# Patient Record
Sex: MALE | Race: WHITE | NOT HISPANIC OR LATINO | Employment: UNEMPLOYED | URBAN - METROPOLITAN AREA
[De-identification: names, ages, dates, MRNs, and addresses within clinical notes are randomized per-mention and may not be internally consistent; named-entity substitution may affect disease eponyms.]

---

## 2017-06-17 ENCOUNTER — HOSPITAL ENCOUNTER (EMERGENCY)
Facility: MEDICAL CENTER | Age: 47
End: 2017-06-18
Attending: EMERGENCY MEDICINE

## 2017-06-17 DIAGNOSIS — I10 HYPERTENSION, POOR CONTROL: ICD-10-CM

## 2017-06-17 DIAGNOSIS — L03.116 CELLULITIS OF LEFT LOWER LEG: ICD-10-CM

## 2017-06-17 LAB — EKG IMPRESSION: NORMAL

## 2017-06-17 PROCEDURE — 99284 EMERGENCY DEPT VISIT MOD MDM: CPT

## 2017-06-17 PROCEDURE — A9270 NON-COVERED ITEM OR SERVICE: HCPCS | Performed by: EMERGENCY MEDICINE

## 2017-06-17 PROCEDURE — 700102 HCHG RX REV CODE 250 W/ 637 OVERRIDE(OP): Performed by: EMERGENCY MEDICINE

## 2017-06-17 PROCEDURE — 93005 ELECTROCARDIOGRAM TRACING: CPT

## 2017-06-17 RX ORDER — LISINOPRIL 10 MG/1
10 TABLET ORAL ONCE
Status: COMPLETED | OUTPATIENT
Start: 2017-06-17 | End: 2017-06-17

## 2017-06-17 RX ORDER — CARVEDILOL 12.5 MG/1
12.5 TABLET ORAL ONCE
Status: COMPLETED | OUTPATIENT
Start: 2017-06-17 | End: 2017-06-17

## 2017-06-17 RX ADMIN — CARVEDILOL 12.5 MG: 12.5 TABLET, FILM COATED ORAL at 22:34

## 2017-06-17 RX ADMIN — LISINOPRIL 10 MG: 10 TABLET ORAL at 22:34

## 2017-06-17 ASSESSMENT — LIFESTYLE VARIABLES: DO YOU DRINK ALCOHOL: YES

## 2017-06-17 ASSESSMENT — PAIN SCALES - GENERAL: PAINLEVEL_OUTOF10: 0

## 2017-06-17 NOTE — ED AVS SNAPSHOT
Home Care Instructions                                                                                                                Jacinto Loyola   MRN: [id number]    Department:  [facility], Emergency Dept   Date of Visit:  6/17/2017            [facility], Emergency Dept    [address] [place] [id number]    Phone:  [phone]      You were seen by     Rudolph Chacko M.D.      Your Diagnosis Was     Cellulitis of left lower leg     L03.116       These are the medications you received during your hospitalization from 06/17/2017 2055 to 06/18/2017 0030     Date/Time Order Dose Route Action    06/17/2017 2234 lisinopril (PRINIVIL) 10 MG tablet 10 mg 10 mg Oral Given    06/17/2017 2234 carvedilol (COREG) tablet 12.5 mg 12.5 mg Oral Given      Follow-up Information     1. Follow up with Pcp Pt States None In 2 days.    Specialty:  Family Medicine        2. Follow up with [facility], Emergency Dept.    Specialty:  Emergency Medicine    Why:  As needed, If symptoms worsen    Contact information    [address] [place] [id number] [phone]      Medication Information     Review all of your home medications and newly ordered medications with your primary doctor and/or pharmacist as soon as possible. Follow medication instructions as directed by your doctor and/or pharmacist.     Please keep your complete medication list with you and share with your physician. Update the information when medications are discontinued, doses are changed, or new medications (including over-the-counter products) are added; and carry medication information at all times in the event of emergency situations.               Medication List      START taking these medications        Instructions    Morning Afternoon Evening Bedtime    cephALEXin 500 MG Caps   Commonly known as:  KEFLEX        Take 1 Cap by mouth 4 times a day for 7 days.   Dose:  500 mg                             ASK your doctor about these medications        Instructions    Morning Afternoon Evening Bedtime    amlodipine 10 MG Tabs   Commonly known as:  NORVASC        Take 1 Tab by mouth every day.   Dose:  10 mg                        carvedilol 12.5 MG Tabs   Last time this was given:  12.5 mg on 6/17/2017 10:34 PM   Commonly known as:  COREG        Take 1 Tab by mouth 2 times a day, with meals.   Dose:  12.5 mg                        * hydrochlorothiazide 25 MG Tabs   Commonly known as:  HYDRODIURIL        Take 25 mg by mouth every day.   Dose:  25 mg                        * hydrochlorothiazide 25 MG Tabs   Commonly known as:  HYDRODIURIL        Take 1 Tab by mouth every day.   Dose:  25 mg                        lisinopril 10 MG Tabs   Last time this was given:  10 mg on 6/17/2017 10:34 PM   Commonly known as:  PRINIVIL        Take 1 Tab by mouth every day.   Dose:  10 mg                        * Notice:  This list has 2 medication(s) that are the same as other medications prescribed for you. Read the directions carefully, and ask your doctor or other care provider to review them with you.         Where to Get Your Medications      You can get these medications from any pharmacy     Bring a paper prescription for each of these medications    - cephALEXin 500 MG Caps            Procedures and tests performed during your visit     EKG (ER)    LE VENOUS DUPLEX        Discharge Instructions       Cellulitis  Cellulitis is an infection of the skin and the tissue beneath it. The infected area is usually red and tender. Cellulitis occurs most often in the arms and lower legs.   CAUSES   Cellulitis is caused by bacteria that enter the skin through cracks or cuts in the skin. The most common types of bacteria that cause cellulitis are staphylococci and streptococci.  SIGNS AND SYMPTOMS   · Redness and warmth.  · Swelling.  · Tenderness or pain.  · Fever.  DIAGNOSIS   Your health care provider can usually determine  what is wrong based on a physical exam. Blood tests may also be done.  TREATMENT   Treatment usually involves taking an antibiotic medicine.  HOME CARE INSTRUCTIONS   · Take your antibiotic medicine as directed by your health care provider. Finish the antibiotic even if you start to feel better.  · Keep the infected arm or leg elevated to reduce swelling.  · Apply a warm cloth to the affected area up to 4 times per day to relieve pain.  · Take medicines only as directed by your health care provider.  · Keep all follow-up visits as directed by your health care provider.  SEEK MEDICAL CARE IF:   · You notice red streaks coming from the infected area.  · Your red area gets larger or turns dark in color.  · Your bone or joint underneath the infected area becomes painful after the skin has healed.  · Your infection returns in the same area or another area.  · You notice a swollen bump in the infected area.  · You develop new symptoms.  · You have a fever.  SEEK IMMEDIATE MEDICAL CARE IF:   · You feel very sleepy.  · You develop vomiting or diarrhea.  · You have a general ill feeling (malaise) with muscle aches and pains.  MAKE SURE YOU:   · Understand these instructions.  · Will watch your condition.  · Will get help right away if you are not doing well or get worse.     This information is not intended to replace advice given to you by your health care provider. Make sure you discuss any questions you have with your health care provider.     Document Released: 09/27/2006 Document Revised: 01/08/2016 Document Reviewed: 03/04/2013  [employer] Interactive Patient Education ©2016 [employer]      Hypertension  Hypertension, commonly called high blood pressure, is when the force of blood pumping through your arteries is too strong. Your arteries are the blood vessels that carry blood from your heart throughout your body. A blood pressure reading consists of a higher number over a lower number, such as 110/72. The higher  number (systolic) is the pressure inside your arteries when your heart pumps. The lower number (diastolic) is the pressure inside your arteries when your heart relaxes. Ideally you want your blood pressure below 120/80.  Hypertension forces your heart to work harder to pump blood. Your arteries may become narrow or stiff. Having untreated or uncontrolled hypertension can cause heart attack, stroke, kidney disease, and other problems.  RISK FACTORS  Some risk factors for high blood pressure are controllable. Others are not.   Risk factors you cannot control include:   · Race. You may be at higher risk if you are .  · Age. Risk increases with age.  · Gender. Men are at higher risk than women before age 45 years. After age 65, women are at higher risk than men.  Risk factors you can control include:  · Not getting enough exercise or physical activity.  · Being overweight.  · Getting too much fat, sugar, calories, or salt in your diet.  · Drinking too much alcohol.  SIGNS AND SYMPTOMS  Hypertension does not usually cause signs or symptoms. Extremely high blood pressure (hypertensive crisis) may cause headache, anxiety, shortness of breath, and nosebleed.  DIAGNOSIS  To check if you have hypertension, your health care provider will measure your blood pressure while you are seated, with your arm held at the level of your heart. It should be measured at least twice using the same arm. Certain conditions can cause a difference in blood pressure between your right and left arms. A blood pressure reading that is higher than normal on one occasion does not mean that you need treatment. If it is not clear whether you have high blood pressure, you may be asked to return on a different day to have your blood pressure checked again. Or, you may be asked to monitor your blood pressure at home for 1 or more weeks.  TREATMENT  Treating high blood pressure includes making lifestyle changes and possibly taking medicine.  Living a healthy lifestyle can help lower high blood pressure. You may need to change some of your habits.  Lifestyle changes may include:  · Following the DASH diet. This diet is high in fruits, vegetables, and whole grains. It is low in salt, red meat, and added sugars.  · Keep your sodium intake below 2,300 mg per day.  · Getting at least 30-45 minutes of aerobic exercise at least 4 times per week.  · Losing weight if necessary.  · Not smoking.  · Limiting alcoholic beverages.  · Learning ways to reduce stress.  Your health care provider may prescribe medicine if lifestyle changes are not enough to get your blood pressure under control, and if one of the following is true:  · You are 18-59 years of age and your systolic blood pressure is above 140.  · You are 60 years of age or older, and your systolic blood pressure is above 150.  · Your diastolic blood pressure is above 90.  · You have diabetes, and your systolic blood pressure is over 140 or your diastolic blood pressure is over 90.  · You have kidney disease and your blood pressure is above 140/90.  · You have heart disease and your blood pressure is above 140/90.  Your personal target blood pressure may vary depending on your medical conditions, your age, and other factors.  HOME CARE INSTRUCTIONS  · Have your blood pressure rechecked as directed by your health care provider.    · Take medicines only as directed by your health care provider. Follow the directions carefully. Blood pressure medicines must be taken as prescribed. The medicine does not work as well when you skip doses. Skipping doses also puts you at risk for problems.  · Do not smoke.    · Monitor your blood pressure at home as directed by your health care provider.   SEEK MEDICAL CARE IF:   · You think you are having a reaction to medicines taken.  · You have recurrent headaches or feel dizzy.  · You have swelling in your ankles.  · You have trouble with your vision.  SEEK IMMEDIATE MEDICAL  CARE IF:  · You develop a severe headache or confusion.  · You have unusual weakness, numbness, or feel faint.  · You have severe chest or abdominal pain.  · You vomit repeatedly.  · You have trouble breathing.  MAKE SURE YOU:   · Understand these instructions.  · Will watch your condition.  · Will get help right away if you are not doing well or get worse.     This information is not intended to replace advice given to you by your health care provider. Make sure you discuss any questions you have with your health care provider.     Document Released: 12/18/2006 Document Revised: 05/03/2016 Document Reviewed: 10/10/2014  [employer] Interactive Patient Education ©2016 [employer]            Patient Information     Patient Information    Following emergency treatment: all patient requiring follow-up care must return either to a private physician or a clinic if your condition worsens before you are able to obtain further medical attention, please return to the emergency room.     Billing Information    At [facility], we work to make the billing process streamlined for our patients.  Our Representatives are here to answer any questions you may have regarding your hospital bill.  If you have insurance coverage and have supplied your insurance information to us, we will submit a claim to your insurer on your behalf.  Should you have any questions regarding your bill, we can be reached online or by phone as follows:  Online: You are able pay your bills online or live chat with our representatives about any billing questions you may have. We are here to help Monday - Friday from 8:00am to 7:30pm and 9:00am - 12:00pm on Saturdays.  Please visit [email or web address]  for more information.   Phone:  [phone] or [phone]    Please note that your emergency physician, surgeon, pathologist, radiologist, anesthesiologist, and other specialists are not employed by [facility] and will  therefore bill separately for their services.  Please contact them directly for any questions concerning their bills at the numbers below:     Emergency Physician Services:  [phone]  [facility]:  [phone]  [facility]:  [phone]  [facility]:  [phone]    1. Your final bill may vary from the amount quoted upon discharge if all procedures are not complete at that time, or if your doctor has additional procedures of which we are not aware. You will receive an additional bill if you return to the Emergency Department at [facility] for suture removal regardless of the facility of which the sutures were placed.     2. Please arrange for settlement of this account at the emergency registration.    3. All self-pay accounts are due in full at the time of treatment.  If you are unable to meet this obligation then payment is expected within 4-5 days.     4. If you have had radiology studies (CT, X-ray, Ultrasound, MRI), you have received a preliminary result during your emergency department visit. Please contact the radiology department [phone] to receive a copy of your final result. Please discuss the Final result with your primary physician or with the follow up physician provided.     Crisis Hotline:  [place] Crisis Hotline:  6-016-AEJRPFN or 1-296.751.1825  [place] Crisis Hotline:    1-743.151.6895 or 548-078-3266         ED Discharge Follow Up Questions    1. In order to provide you with very good care, we would like to follow up with a phone call in the next few days.  May we have your permission to contact you?     YES /  NO    2. What is the best phone number to call you? (       )_____-__________    3. What is the best time to call you?      Morning  /  Afternoon  /  Evening                   Patient Signature:  ____________________________________________________________    Date:  ____________________________________________________________

## 2017-06-17 NOTE — ED AVS SNAPSHOT
[email or web address] Access Code: [email or web address]  Expires: 7/18/2017 12:30 AM    Your email address is not on file at [facility].  Email Addresses are required for you to sign up for [email or web address], please contact [phone] to verify your personal information and to provide your email address prior to attempting to register for [email or web address].    Jacinto Kosta Loyola  [address] [place] [place] [address]    [email or web address]  A secure, online tool to manage your health information     [facility]’s [employer]® is a secure, online tool that connects you to your personalized health information from the privacy of your home -- day or night - making it very easy for you to manage your healthcare. Once the activation process is completed, you can even access your medical information using the [email or web address] anamaria, which is available for free in the Apple Anamaria store or Google Play store.     To learn more about [email or web address], visit [email or web address]    There are two levels of access available (as shown below):   My Chart Features  [facility] Primary Care Doctor [facility]  Specialists [facility]  Urgent  Care Non-[facility] Primary Care Doctor   Email your healthcare team securely and privately 24/7 X X X    Manage appointments: schedule your next appointment; view details of past/upcoming appointments X      Request prescription refills. X      View recent personal medical records, including lab and immunizations X X X X   View health record, including health history, allergies, medications X X X X   Read reports about your outpatient visits, procedures, consult and ER notes X X X X   See your discharge summary, which is a recap of your hospital and/or ER visit that includes your diagnosis, lab results, and care plan X X  X     How to register for [employer]:  Once your e-mail address has been verified, follow the following steps to sign up for Y[employer].     1. Go to  [email or web address]  2. Click on the Sign Up Now box, which takes you to the New Member Sign Up page.  You will need to provide the following information:  a. Enter your Z[employer] Access Code exactly as it appears at the top of this page. (You will not need to use this code after you’ve completed the sign-up process. If you do not sign up before the expiration date, you must request a new code.)   b. Enter your date of birth.   c. Enter your home email address.   d. Click Submit, and follow the next screen’s instructions.  3. Create a Bizot ID. This will be your Zouxiu login ID and cannot be changed, so think of one that is secure and easy to remember.  4. Create a Zouxiu password. You can change your password at any time.  5. Enter your Password Reset Question and Answer. This can be used at a later time if you forget your password.   6. Enter your e-mail address. This allows you to receive e-mail notifications when new information is available in Zouxiu.  7. Click Sign Up. You can now view your health information.    For assistance activating your Zouxiu account, call [phone]

## 2017-06-17 NOTE — ED AVS SNAPSHOT
6/18/2017    Jacinto Loyola  [address] [place] [address]    Dear Jacinto Brambila:    Critical access hospital wants to ensure your discharge home is safe and you or your loved ones have had all of your questions answered regarding your care after you leave the hospital.    Below is a list of resources and contact information should you have any questions regarding your hospital stay, follow-up instructions, or active medical symptoms.    Questions or Concerns Regarding… Contact   Medical Questions Related to Your Discharge  (7 days a week, 8am-5pm) Contact a Nurse Care Coordinator   [phone]   Medical Questions Not Related to Your Discharge  (24 hours a day / 7 days a week)  Contact the Nurse Health Line   [phone]    Medications or Discharge Instructions Refer to your discharge packet   or contact your [facility] Primary Care Provider   [phone]   Follow-up Appointment(s) Schedule your appointment via Helicomm   or contact Scheduling [phone]   Billing Review your statement via Helicomm  or contact Billing [phone]   Medical Records Review your records via Helicomm   or contact Medical Records [phone]     You may receive a telephone call within two days of discharge. This call is to make certain you understand your discharge instructions and have the opportunity to have any questions answered. You can also easily access your medical information, test results and upcoming appointments via the Helicomm free online health management tool. You can learn more and sign up at [email or web address]. For assistance setting up your Helicomm account, please call [phone].    Once again, we want to ensure your discharge home is safe and that you have a clear understanding of any next steps in your care. If you have any questions or concerns, please do not hesitate to contact us, we are here for you. Thank you for choosing [facility] for your healthcare needs.    Sincerely,    Your [facility] Healthcare Team

## 2017-06-18 VITALS
SYSTOLIC BLOOD PRESSURE: 205 MMHG | WEIGHT: 248.02 LBS | OXYGEN SATURATION: 96 % | TEMPERATURE: 97.3 F | BODY MASS INDEX: 33.59 KG/M2 | RESPIRATION RATE: 20 BRPM | HEIGHT: 72 IN | DIASTOLIC BLOOD PRESSURE: 125 MMHG | HEART RATE: 94 BPM

## 2017-06-18 PROCEDURE — 93971 EXTREMITY STUDY: CPT

## 2017-06-18 RX ORDER — CEPHALEXIN 500 MG/1
500 CAPSULE ORAL 4 TIMES DAILY
Qty: 28 CAP | Refills: 0 | Status: SHIPPED | OUTPATIENT
Start: 2017-06-18 | End: 2017-06-25

## 2017-06-18 NOTE — ED PROVIDER NOTES
CHIEF COMPLAINT  Chief Complaint   Patient presents with   • Insect Bite     LLE   • Hypertension       HPI  Jacinto Loyola is a 46 y.o. male who presents with left lower calf swelling. Reports the medial aspect has an area of erythema and tenderness. He is uncertain if this is secondary to a blood clot or a possible bug bite. He denies any risk factors for DVT. No prior history of DVT. Denies any blood thinners. Upon the patient's arrival, was noted the patient has severe hypertension. Denies headache, chest pain, shortness of breath. Reports that he is on multiple antihypertensive medications however he does not take them regularly. He reports that he just cannot remember to take them on a regular basis.    REVIEW OF SYSTEMS  See HPI for further details. All other systems are negative.     PAST MEDICAL HISTORY   has a past medical history of Migraines, neuralgic (8/28/2009); HTN (8/28/2009); and Depression (8/28/2009).    SOCIAL HISTORY  Social History     Social History Main Topics   • Smoking status: Never Smoker    • Smokeless tobacco: Not on file   • Alcohol Use: Yes   • Drug Use: Yes     Special: Intravenous      Comment: daily marijuana use   • Sexual Activity: Not on file       SURGICAL HISTORY   has past surgical history that includes knee reconstruction and appendectomy.    CURRENT MEDICATIONS  Home Medications     **Home medications have not yet been reviewed for this encounter**          ALLERGIES  No Known Allergies    PHYSICAL EXAM  VITAL SIGNS: /125 mmHg  Pulse 103  Temp(Src) 36.3 °C (97.3 °F)  Resp 20  Ht 1.829 m (6')  Wt 112.5 kg (248 lb 0.3 oz)  BMI 33.63 kg/m2  SpO2 95%  Pulse ox interpretation: I interpret this pulse ox as normal.  Constitutional: Alert in no apparent distress.  HENT: No signs of trauma, Bilateral external ears normal, Nose normal.   Cardiovascular: Tachycardic rate and regular rhythm  Thorax & Lungs: Normal breath sounds, No respiratory distress, No wheezing,  No chest tenderness.   Abdomen: Bowel sounds normal, Soft, No tenderness, No masses, No pulsatile masses. No peritoneal signs.  Skin: Warm, Dry, No erythema, No rash. 6 cm area of erythema to the medial left calf with local swelling. Tenderness to palpation in that area. No obvious fluctuance underlying the erythema. Neurovascularly intact distally.  Extremities: Intact distal pulses, left lower extremity trace pitting edema, medial aspect of the left lower leg tenderness, No cyanosis  Musculoskeletal: Good range of motion in all major joints. No tenderness to palpation or major deformities noted.   Neurologic: Alert , Normal motor function and gait, Normal sensory function, No focal deficits noted.   Psychiatric: Affect normal, Judgment normal, Mood normal.       DIAGNOSTIC STUDIES / PROCEDURES    EKG  6/17/2017 at 9:16 PM  Normal sinus rhythm at 99  DC, QRS within normal limits  QTC slightly prolonged at 514  No acute changes from prior EKG in 3/2015    LABS  Labs Reviewed - No data to display    RADIOLOGY  LE VENOUS DUPLEX   Preliminary Result        COURSE & MEDICAL DECISION MAKING  Pertinent Labs & Imaging studies reviewed. (See chart for details)  46 y.o. M presenting with left lower extremity swelling. Presentation is more consistent with possible focal infection/inflammation/cellulitis. Cannot fully rule out DVT however. Ultrasound study was performed that was unremarkable on preliminary study. Wall attempt treatment of the patient's focal area of erythema on the left lower extremity with Keflex. To take for the next week and to monitor symptoms for any worsening.    With regards to the patient's significant hypertension. He is asymptomatic. No chest pain, shortness of breath, headache. EKG was only done per protocol. No significant changes from his prior EKG. He has known history of hypertension and known history of noncompliance with his prescribed medications. He was given his home dose of prescription  antihypertensive medications with improvement in his hypertension.  He does have his home medications however he continues to forget to take them.     Prolonged discussion was had with the patient regarding importance of treating his hypertension regularly. He reports understanding. To follow-up with his primary care physician for further management. Return precautions are provided for any worsening of his symptoms or development of any other concerning signs or symptoms.    The patient will return for worsening symptoms or failure of improvement and is stable at the time of discharge. The patient verbalizes understanding in their own words.    The patient was referred to primary care where they will receive further BP management.      Pcp Pt States None    In 2 days      [facility], Emergency Dept  [address] [address] [phone]    As needed, If symptoms worsen      FINAL IMPRESSION  1. Cellulitis of left lower leg    2. Hypertension, poor control            Electronically signed by: Rudolph Chacko, 6/17/2017 10:12 PM

## 2017-06-18 NOTE — DISCHARGE INSTRUCTIONS
Cellulitis  Cellulitis is an infection of the skin and the tissue beneath it. The infected area is usually red and tender. Cellulitis occurs most often in the arms and lower legs.   CAUSES   Cellulitis is caused by bacteria that enter the skin through cracks or cuts in the skin. The most common types of bacteria that cause cellulitis are staphylococci and streptococci.  SIGNS AND SYMPTOMS   · Redness and warmth.  · Swelling.  · Tenderness or pain.  · Fever.  DIAGNOSIS   Your health care provider can usually determine what is wrong based on a physical exam. Blood tests may also be done.  TREATMENT   Treatment usually involves taking an antibiotic medicine.  HOME CARE INSTRUCTIONS   · Take your antibiotic medicine as directed by your health care provider. Finish the antibiotic even if you start to feel better.  · Keep the infected arm or leg elevated to reduce swelling.  · Apply a warm cloth to the affected area up to 4 times per day to relieve pain.  · Take medicines only as directed by your health care provider.  · Keep all follow-up visits as directed by your health care provider.  SEEK MEDICAL CARE IF:   · You notice red streaks coming from the infected area.  · Your red area gets larger or turns dark in color.  · Your bone or joint underneath the infected area becomes painful after the skin has healed.  · Your infection returns in the same area or another area.  · You notice a swollen bump in the infected area.  · You develop new symptoms.  · You have a fever.  SEEK IMMEDIATE MEDICAL CARE IF:   · You feel very sleepy.  · You develop vomiting or diarrhea.  · You have a general ill feeling (malaise) with muscle aches and pains.  MAKE SURE YOU:   · Understand these instructions.  · Will watch your condition.  · Will get help right away if you are not doing well or get worse.     This information is not intended to replace advice given to you by your health care provider. Make sure you discuss any questions you have  with your health care provider.     Document Released: 09/27/2006 Document Revised: 01/08/2016 Document Reviewed: 03/04/2013  [employer] Interactive Patient Education ©2016 [employer]      Hypertension  Hypertension, commonly called high blood pressure, is when the force of blood pumping through your arteries is too strong. Your arteries are the blood vessels that carry blood from your heart throughout your body. A blood pressure reading consists of a higher number over a lower number, such as 110/72. The higher number (systolic) is the pressure inside your arteries when your heart pumps. The lower number (diastolic) is the pressure inside your arteries when your heart relaxes. Ideally you want your blood pressure below 120/80.  Hypertension forces your heart to work harder to pump blood. Your arteries may become narrow or stiff. Having untreated or uncontrolled hypertension can cause heart attack, stroke, kidney disease, and other problems.  RISK FACTORS  Some risk factors for high blood pressure are controllable. Others are not.   Risk factors you cannot control include:   · Race. You may be at higher risk if you are .  · Age. Risk increases with age.  · Gender. Men are at higher risk than women before age 45 years. After age 65, women are at higher risk than men.  Risk factors you can control include:  · Not getting enough exercise or physical activity.  · Being overweight.  · Getting too much fat, sugar, calories, or salt in your diet.  · Drinking too much alcohol.  SIGNS AND SYMPTOMS  Hypertension does not usually cause signs or symptoms. Extremely high blood pressure (hypertensive crisis) may cause headache, anxiety, shortness of breath, and nosebleed.  DIAGNOSIS  To check if you have hypertension, your health care provider will measure your blood pressure while you are seated, with your arm held at the level of your heart. It should be measured at least twice using the same arm. Certain  conditions can cause a difference in blood pressure between your right and left arms. A blood pressure reading that is higher than normal on one occasion does not mean that you need treatment. If it is not clear whether you have high blood pressure, you may be asked to return on a different day to have your blood pressure checked again. Or, you may be asked to monitor your blood pressure at home for 1 or more weeks.  TREATMENT  Treating high blood pressure includes making lifestyle changes and possibly taking medicine. Living a healthy lifestyle can help lower high blood pressure. You may need to change some of your habits.  Lifestyle changes may include:  · Following the DASH diet. This diet is high in fruits, vegetables, and whole grains. It is low in salt, red meat, and added sugars.  · Keep your sodium intake below 2,300 mg per day.  · Getting at least 30-45 minutes of aerobic exercise at least 4 times per week.  · Losing weight if necessary.  · Not smoking.  · Limiting alcoholic beverages.  · Learning ways to reduce stress.  Your health care provider may prescribe medicine if lifestyle changes are not enough to get your blood pressure under control, and if one of the following is true:  · You are 18-59 years of age and your systolic blood pressure is above 140.  · You are 60 years of age or older, and your systolic blood pressure is above 150.  · Your diastolic blood pressure is above 90.  · You have diabetes, and your systolic blood pressure is over 140 or your diastolic blood pressure is over 90.  · You have kidney disease and your blood pressure is above 140/90.  · You have heart disease and your blood pressure is above 140/90.  Your personal target blood pressure may vary depending on your medical conditions, your age, and other factors.  HOME CARE INSTRUCTIONS  · Have your blood pressure rechecked as directed by your health care provider.    · Take medicines only as directed by your health care provider.  Follow the directions carefully. Blood pressure medicines must be taken as prescribed. The medicine does not work as well when you skip doses. Skipping doses also puts you at risk for problems.  · Do not smoke.    · Monitor your blood pressure at home as directed by your health care provider.   SEEK MEDICAL CARE IF:   · You think you are having a reaction to medicines taken.  · You have recurrent headaches or feel dizzy.  · You have swelling in your ankles.  · You have trouble with your vision.  SEEK IMMEDIATE MEDICAL CARE IF:  · You develop a severe headache or confusion.  · You have unusual weakness, numbness, or feel faint.  · You have severe chest or abdominal pain.  · You vomit repeatedly.  · You have trouble breathing.  MAKE SURE YOU:   · Understand these instructions.  · Will watch your condition.  · Will get help right away if you are not doing well or get worse.     This information is not intended to replace advice given to you by your health care provider. Make sure you discuss any questions you have with your health care provider.     Document Released: 12/18/2006 Document Revised: 05/03/2016 Document Reviewed: 10/10/2014  [employer] Interactive Patient Education ©2016 [employer]

## 2017-06-18 NOTE — ED NOTES
Pt ambulatory to triage c/o possible insect bite to left calf 3 days ago. Mild redness/ swelling noted to left calf. Pt hypertensive in triage, Hx of same, has not taken his meds in 3-4 days. Denies chest discomfort or headache. Pt taken back for EKG.

## 2017-06-18 NOTE — ED NOTES
Discharge instructions given to patient; patient verbalized understanding. Paper prescription given to patient. Patient VS WNL for baseline and conditions. Patient ambulatory w/ steady gait upon leaving ED.

## 2019-12-24 ENCOUNTER — APPOINTMENT (OUTPATIENT)
Dept: RADIOLOGY | Facility: MEDICAL CENTER | Age: 49
DRG: 064 | End: 2019-12-24
Attending: EMERGENCY MEDICINE
Payer: MEDICAID

## 2019-12-24 ENCOUNTER — HOSPITAL ENCOUNTER (INPATIENT)
Facility: MEDICAL CENTER | Age: 49
LOS: 32 days | DRG: 064 | End: 2020-01-25
Attending: EMERGENCY MEDICINE | Admitting: INTERNAL MEDICINE
Payer: MEDICAID

## 2019-12-24 DIAGNOSIS — I10 HYPERTENSION, UNSPECIFIED TYPE: ICD-10-CM

## 2019-12-24 DIAGNOSIS — I16.1 HYPERTENSIVE EMERGENCY: ICD-10-CM

## 2019-12-24 DIAGNOSIS — F15.10 METHAMPHETAMINE ABUSE (HCC): ICD-10-CM

## 2019-12-24 DIAGNOSIS — I61.9 INTRAPARENCHYMAL HEMORRHAGE OF BRAIN (HCC): ICD-10-CM

## 2019-12-24 DIAGNOSIS — R40.4 TRANSIENT ALTERATION OF AWARENESS: ICD-10-CM

## 2019-12-24 DIAGNOSIS — I61.9 HEMORRHAGIC STROKE (HCC): Primary | ICD-10-CM

## 2019-12-24 PROBLEM — E87.6 HYPOKALEMIA: Status: ACTIVE | Noted: 2019-12-24

## 2019-12-24 PROBLEM — G47.33 OSA (OBSTRUCTIVE SLEEP APNEA): Status: ACTIVE | Noted: 2019-12-24

## 2019-12-24 LAB
ABO + RH BLD: NORMAL
ABO GROUP BLD: NORMAL
ALBUMIN SERPL BCP-MCNC: 3.9 G/DL (ref 3.2–4.9)
ALBUMIN/GLOB SERPL: 1.3 G/DL
ALP SERPL-CCNC: 65 U/L (ref 30–99)
ALT SERPL-CCNC: 15 U/L (ref 2–50)
AMPHET UR QL SCN: POSITIVE
ANION GAP SERPL CALC-SCNC: 7 MMOL/L (ref 0–11.9)
APTT PPP: 26.8 SEC (ref 24.7–36)
AST SERPL-CCNC: 19 U/L (ref 12–45)
BARBITURATES UR QL SCN: NEGATIVE
BASOPHILS # BLD AUTO: 0.5 % (ref 0–1.8)
BASOPHILS # BLD: 0.04 K/UL (ref 0–0.12)
BENZODIAZ UR QL SCN: NEGATIVE
BILIRUB SERPL-MCNC: 0.6 MG/DL (ref 0.1–1.5)
BLD GP AB SCN SERPL QL: NORMAL
BUN SERPL-MCNC: 16 MG/DL (ref 8–22)
BZE UR QL SCN: NEGATIVE
CALCIUM SERPL-MCNC: 9.2 MG/DL (ref 8.5–10.5)
CANNABINOIDS UR QL SCN: NEGATIVE
CHLORIDE SERPL-SCNC: 103 MMOL/L (ref 96–112)
CHOLEST SERPL-MCNC: 136 MG/DL (ref 100–199)
CO2 SERPL-SCNC: 29 MMOL/L (ref 20–33)
CREAT SERPL-MCNC: 1.12 MG/DL (ref 0.5–1.4)
EKG IMPRESSION: NORMAL
EOSINOPHIL # BLD AUTO: 0.17 K/UL (ref 0–0.51)
EOSINOPHIL NFR BLD: 2 % (ref 0–6.9)
ERYTHROCYTE [DISTWIDTH] IN BLOOD BY AUTOMATED COUNT: 41.8 FL (ref 35.9–50)
ETHANOL BLD-MCNC: 0.01 G/DL
GLOBULIN SER CALC-MCNC: 2.9 G/DL (ref 1.9–3.5)
GLUCOSE SERPL-MCNC: 96 MG/DL (ref 65–99)
HCT VFR BLD AUTO: 48.6 % (ref 42–52)
HDLC SERPL-MCNC: 44 MG/DL
HGB BLD-MCNC: 16.2 G/DL (ref 14–18)
IMM GRANULOCYTES # BLD AUTO: 0.03 K/UL (ref 0–0.11)
IMM GRANULOCYTES NFR BLD AUTO: 0.4 % (ref 0–0.9)
INR PPP: 0.95 (ref 0.87–1.13)
LDLC SERPL CALC-MCNC: 61 MG/DL
LYMPHOCYTES # BLD AUTO: 2.85 K/UL (ref 1–4.8)
LYMPHOCYTES NFR BLD: 33.5 % (ref 22–41)
MCH RBC QN AUTO: 29.2 PG (ref 27–33)
MCHC RBC AUTO-ENTMCNC: 33.3 G/DL (ref 33.7–35.3)
MCV RBC AUTO: 87.7 FL (ref 81.4–97.8)
METHADONE UR QL SCN: NEGATIVE
MONOCYTES # BLD AUTO: 0.74 K/UL (ref 0–0.85)
MONOCYTES NFR BLD AUTO: 8.7 % (ref 0–13.4)
NEUTROPHILS # BLD AUTO: 4.67 K/UL (ref 1.82–7.42)
NEUTROPHILS NFR BLD: 54.9 % (ref 44–72)
NRBC # BLD AUTO: 0 K/UL
NRBC BLD-RTO: 0 /100 WBC
OPIATES UR QL SCN: NEGATIVE
OXYCODONE UR QL SCN: NEGATIVE
PCP UR QL SCN: NEGATIVE
PLATELET # BLD AUTO: 222 K/UL (ref 164–446)
PMV BLD AUTO: 9.2 FL (ref 9–12.9)
POTASSIUM SERPL-SCNC: 3.6 MMOL/L (ref 3.6–5.5)
PROPOXYPH UR QL SCN: NEGATIVE
PROT SERPL-MCNC: 6.8 G/DL (ref 6–8.2)
PROTHROMBIN TIME: 12.8 SEC (ref 12–14.6)
RBC # BLD AUTO: 5.54 M/UL (ref 4.7–6.1)
RH BLD: NORMAL
SODIUM SERPL-SCNC: 139 MMOL/L (ref 135–145)
TRIGL SERPL-MCNC: 154 MG/DL (ref 0–149)
TROPONIN T SERPL-MCNC: 19 NG/L (ref 6–19)
WBC # BLD AUTO: 8.5 K/UL (ref 4.8–10.8)

## 2019-12-24 PROCEDURE — 86850 RBC ANTIBODY SCREEN: CPT

## 2019-12-24 PROCEDURE — 80307 DRUG TEST PRSMV CHEM ANLYZR: CPT

## 2019-12-24 PROCEDURE — 700101 HCHG RX REV CODE 250: Performed by: PSYCHIATRY & NEUROLOGY

## 2019-12-24 PROCEDURE — 303105 HCHG CATHETER EXTRA

## 2019-12-24 PROCEDURE — 700101 HCHG RX REV CODE 250

## 2019-12-24 PROCEDURE — 700105 HCHG RX REV CODE 258: Performed by: EMERGENCY MEDICINE

## 2019-12-24 PROCEDURE — 80061 LIPID PANEL: CPT

## 2019-12-24 PROCEDURE — 51798 US URINE CAPACITY MEASURE: CPT

## 2019-12-24 PROCEDURE — 99291 CRITICAL CARE FIRST HOUR: CPT | Performed by: INTERNAL MEDICINE

## 2019-12-24 PROCEDURE — 70498 CT ANGIOGRAPHY NECK: CPT

## 2019-12-24 PROCEDURE — 84484 ASSAY OF TROPONIN QUANT: CPT

## 2019-12-24 PROCEDURE — 94760 N-INVAS EAR/PLS OXIMETRY 1: CPT

## 2019-12-24 PROCEDURE — 71045 X-RAY EXAM CHEST 1 VIEW: CPT

## 2019-12-24 PROCEDURE — 700111 HCHG RX REV CODE 636 W/ 250 OVERRIDE (IP): Performed by: STUDENT IN AN ORGANIZED HEALTH CARE EDUCATION/TRAINING PROGRAM

## 2019-12-24 PROCEDURE — 36415 COLL VENOUS BLD VENIPUNCTURE: CPT

## 2019-12-24 PROCEDURE — 70496 CT ANGIOGRAPHY HEAD: CPT

## 2019-12-24 PROCEDURE — 96365 THER/PROPH/DIAG IV INF INIT: CPT

## 2019-12-24 PROCEDURE — 80053 COMPREHEN METABOLIC PANEL: CPT

## 2019-12-24 PROCEDURE — 93005 ELECTROCARDIOGRAM TRACING: CPT | Performed by: EMERGENCY MEDICINE

## 2019-12-24 PROCEDURE — 96375 TX/PRO/DX INJ NEW DRUG ADDON: CPT

## 2019-12-24 PROCEDURE — 70450 CT HEAD/BRAIN W/O DYE: CPT

## 2019-12-24 PROCEDURE — 51702 INSERT TEMP BLADDER CATH: CPT

## 2019-12-24 PROCEDURE — 700101 HCHG RX REV CODE 250: Performed by: EMERGENCY MEDICINE

## 2019-12-24 PROCEDURE — 86900 BLOOD TYPING SEROLOGIC ABO: CPT

## 2019-12-24 PROCEDURE — 700105 HCHG RX REV CODE 258: Performed by: STUDENT IN AN ORGANIZED HEALTH CARE EDUCATION/TRAINING PROGRAM

## 2019-12-24 PROCEDURE — 85730 THROMBOPLASTIN TIME PARTIAL: CPT

## 2019-12-24 PROCEDURE — 86901 BLOOD TYPING SEROLOGIC RH(D): CPT

## 2019-12-24 PROCEDURE — 770022 HCHG ROOM/CARE - ICU (200)

## 2019-12-24 PROCEDURE — 99291 CRITICAL CARE FIRST HOUR: CPT

## 2019-12-24 PROCEDURE — 700101 HCHG RX REV CODE 250: Performed by: STUDENT IN AN ORGANIZED HEALTH CARE EDUCATION/TRAINING PROGRAM

## 2019-12-24 PROCEDURE — 99233 SBSQ HOSP IP/OBS HIGH 50: CPT | Performed by: PSYCHIATRY & NEUROLOGY

## 2019-12-24 PROCEDURE — 85610 PROTHROMBIN TIME: CPT

## 2019-12-24 PROCEDURE — 85025 COMPLETE CBC W/AUTO DIFF WBC: CPT

## 2019-12-24 RX ORDER — ONDANSETRON 4 MG/1
4 TABLET, ORALLY DISINTEGRATING ORAL EVERY 4 HOURS PRN
Status: DISCONTINUED | OUTPATIENT
Start: 2019-12-24 | End: 2019-12-25

## 2019-12-24 RX ORDER — AMOXICILLIN 250 MG
2 CAPSULE ORAL 2 TIMES DAILY
Status: DISCONTINUED | OUTPATIENT
Start: 2019-12-25 | End: 2019-12-25

## 2019-12-24 RX ORDER — ACETAMINOPHEN 325 MG/1
650 TABLET ORAL EVERY 4 HOURS PRN
Status: DISCONTINUED | OUTPATIENT
Start: 2019-12-24 | End: 2019-12-25

## 2019-12-24 RX ORDER — FAMOTIDINE 20 MG/1
20 TABLET, FILM COATED ORAL EVERY 12 HOURS
Status: DISCONTINUED | OUTPATIENT
Start: 2019-12-24 | End: 2019-12-24

## 2019-12-24 RX ORDER — LABETALOL HYDROCHLORIDE 5 MG/ML
10 INJECTION, SOLUTION INTRAVENOUS ONCE
Status: COMPLETED | OUTPATIENT
Start: 2019-12-24 | End: 2019-12-24

## 2019-12-24 RX ORDER — POLYETHYLENE GLYCOL 3350 17 G/17G
1 POWDER, FOR SOLUTION ORAL
Status: DISCONTINUED | OUTPATIENT
Start: 2019-12-24 | End: 2019-12-25

## 2019-12-24 RX ORDER — POTASSIUM CHLORIDE 7.45 MG/ML
10 INJECTION INTRAVENOUS
Status: COMPLETED | OUTPATIENT
Start: 2019-12-25 | End: 2019-12-25

## 2019-12-24 RX ORDER — LABETALOL HYDROCHLORIDE 5 MG/ML
INJECTION, SOLUTION INTRAVENOUS
Status: COMPLETED
Start: 2019-12-24 | End: 2019-12-24

## 2019-12-24 RX ORDER — LABETALOL HYDROCHLORIDE 5 MG/ML
20 INJECTION, SOLUTION INTRAVENOUS ONCE
Status: COMPLETED | OUTPATIENT
Start: 2019-12-24 | End: 2019-12-24

## 2019-12-24 RX ORDER — ONDANSETRON 2 MG/ML
4 INJECTION INTRAMUSCULAR; INTRAVENOUS EVERY 4 HOURS PRN
Status: DISCONTINUED | OUTPATIENT
Start: 2019-12-24 | End: 2019-12-25

## 2019-12-24 RX ORDER — BISACODYL 10 MG
10 SUPPOSITORY, RECTAL RECTAL
Status: DISCONTINUED | OUTPATIENT
Start: 2019-12-24 | End: 2019-12-25

## 2019-12-24 RX ORDER — ACETAMINOPHEN 650 MG/1
650 SUPPOSITORY RECTAL EVERY 4 HOURS PRN
Status: DISCONTINUED | OUTPATIENT
Start: 2019-12-24 | End: 2019-12-25

## 2019-12-24 RX ADMIN — LABETALOL HYDROCHLORIDE 20 MG: 5 INJECTION INTRAVENOUS at 18:35

## 2019-12-24 RX ADMIN — SODIUM CHLORIDE 5 MG/HR: 9 INJECTION, SOLUTION INTRAVENOUS at 22:47

## 2019-12-24 RX ADMIN — LABETALOL HYDROCHLORIDE 20 MG: 5 INJECTION, SOLUTION INTRAVENOUS at 18:35

## 2019-12-24 RX ADMIN — FAMOTIDINE 20 MG: 10 INJECTION INTRAVENOUS at 21:34

## 2019-12-24 RX ADMIN — SODIUM CHLORIDE 5 MG/HR: 9 INJECTION, SOLUTION INTRAVENOUS at 18:49

## 2019-12-24 RX ADMIN — LABETALOL HYDROCHLORIDE 10 MG: 5 INJECTION, SOLUTION INTRAVENOUS at 18:42

## 2019-12-24 RX ADMIN — SODIUM CHLORIDE 5 MG/HR: 9 INJECTION, SOLUTION INTRAVENOUS at 21:30

## 2019-12-24 ASSESSMENT — PATIENT HEALTH QUESTIONNAIRE - PHQ9
1. LITTLE INTEREST OR PLEASURE IN DOING THINGS: NOT AT ALL
SUM OF ALL RESPONSES TO PHQ9 QUESTIONS 1 AND 2: 0
2. FEELING DOWN, DEPRESSED, IRRITABLE, OR HOPELESS: NOT AT ALL

## 2019-12-24 ASSESSMENT — ENCOUNTER SYMPTOMS
WEAKNESS: 1
HEADACHES: 1

## 2019-12-25 ENCOUNTER — APPOINTMENT (OUTPATIENT)
Dept: RADIOLOGY | Facility: MEDICAL CENTER | Age: 49
DRG: 064 | End: 2019-12-25
Attending: STUDENT IN AN ORGANIZED HEALTH CARE EDUCATION/TRAINING PROGRAM
Payer: MEDICAID

## 2019-12-25 ENCOUNTER — APPOINTMENT (OUTPATIENT)
Dept: CARDIOLOGY | Facility: MEDICAL CENTER | Age: 49
DRG: 064 | End: 2019-12-25
Attending: INTERNAL MEDICINE
Payer: MEDICAID

## 2019-12-25 ENCOUNTER — APPOINTMENT (OUTPATIENT)
Dept: RADIOLOGY | Facility: MEDICAL CENTER | Age: 49
DRG: 064 | End: 2019-12-25
Attending: INTERNAL MEDICINE
Payer: MEDICAID

## 2019-12-25 LAB
ACTION RANGE TRIGGERED IACRT: NO
ANION GAP SERPL CALC-SCNC: 7 MMOL/L (ref 0–11.9)
BASE EXCESS BLDA CALC-SCNC: -1 MMOL/L (ref -4–3)
BASOPHILS # BLD AUTO: 0.2 % (ref 0–1.8)
BASOPHILS # BLD: 0.03 K/UL (ref 0–0.12)
BODY TEMPERATURE: ABNORMAL DEGREES
BUN SERPL-MCNC: 15 MG/DL (ref 8–22)
CALCIUM SERPL-MCNC: 8.6 MG/DL (ref 8.5–10.5)
CFT BLD TEG: 3.8 MIN (ref 5–10)
CHLORIDE SERPL-SCNC: 105 MMOL/L (ref 96–112)
CLOT ANGLE BLD TEG: 66.1 DEGREES (ref 53–72)
CLOT LYSIS 30M P MA LENFR BLD TEG: 0.6 % (ref 0–8)
CO2 BLDA-SCNC: 24 MMOL/L (ref 20–33)
CO2 SERPL-SCNC: 24 MMOL/L (ref 20–33)
CREAT SERPL-MCNC: 1.03 MG/DL (ref 0.5–1.4)
CT.EXTRINSIC BLD ROTEM: 1.7 MIN (ref 1–3)
EOSINOPHIL # BLD AUTO: 0.05 K/UL (ref 0–0.51)
EOSINOPHIL NFR BLD: 0.3 % (ref 0–6.9)
ERYTHROCYTE [DISTWIDTH] IN BLOOD BY AUTOMATED COUNT: 40.6 FL (ref 35.9–50)
EST. AVERAGE GLUCOSE BLD GHB EST-MCNC: 108 MG/DL
GLUCOSE SERPL-MCNC: 125 MG/DL (ref 65–99)
HBA1C MFR BLD: 5.4 % (ref 0–5.6)
HCO3 BLDA-SCNC: 23.3 MMOL/L (ref 17–25)
HCT VFR BLD AUTO: 46.3 % (ref 42–52)
HGB BLD-MCNC: 15.8 G/DL (ref 14–18)
HOROWITZ INDEX BLDA+IHG-RTO: 200 MM[HG]
IMM GRANULOCYTES # BLD AUTO: 0.07 K/UL (ref 0–0.11)
IMM GRANULOCYTES NFR BLD AUTO: 0.4 % (ref 0–0.9)
INST. QUALIFIED PATIENT IIQPT: YES
LV EJECT FRACT  99904: 55
LV EJECT FRACT MOD 2C 99903: 51.76
LV EJECT FRACT MOD 4C 99902: 50.23
LV EJECT FRACT MOD BP 99901: 50.69
LYMPHOCYTES # BLD AUTO: 1.8 K/UL (ref 1–4.8)
LYMPHOCYTES NFR BLD: 11.2 % (ref 22–41)
MAGNESIUM SERPL-MCNC: 1.7 MG/DL (ref 1.5–2.5)
MCF BLD TEG: 60.9 MM (ref 50–70)
MCH RBC QN AUTO: 29.5 PG (ref 27–33)
MCHC RBC AUTO-ENTMCNC: 34.1 G/DL (ref 33.7–35.3)
MCV RBC AUTO: 86.4 FL (ref 81.4–97.8)
MONOCYTES # BLD AUTO: 0.9 K/UL (ref 0–0.85)
MONOCYTES NFR BLD AUTO: 5.6 % (ref 0–13.4)
NEUTROPHILS # BLD AUTO: 13.22 K/UL (ref 1.82–7.42)
NEUTROPHILS NFR BLD: 82.3 % (ref 44–72)
NRBC # BLD AUTO: 0 K/UL
NRBC BLD-RTO: 0 /100 WBC
O2/TOTAL GAS SETTING VFR VENT: 30 %
PA AA BLD-ACNC: 21.1 %
PA ADP BLD-ACNC: 58.6 %
PCO2 BLDA: 37.3 MMHG (ref 26–37)
PCO2 TEMP ADJ BLDA: 36.9 MMHG (ref 26–37)
PH BLDA: 7.4 [PH] (ref 7.4–7.5)
PH TEMP ADJ BLDA: 7.41 [PH] (ref 7.4–7.5)
PHOSPHATE SERPL-MCNC: 1.9 MG/DL (ref 2.5–4.5)
PLATELET # BLD AUTO: 251 K/UL (ref 164–446)
PMV BLD AUTO: 8.7 FL (ref 9–12.9)
PO2 BLDA: 60 MMHG (ref 64–87)
PO2 TEMP ADJ BLDA: 59 MMHG (ref 64–87)
POTASSIUM SERPL-SCNC: 3.5 MMOL/L (ref 3.6–5.5)
RBC # BLD AUTO: 5.36 M/UL (ref 4.7–6.1)
SAO2 % BLDA: 91 % (ref 93–99)
SODIUM SERPL-SCNC: 136 MMOL/L (ref 135–145)
SPECIMEN DRAWN FROM PATIENT: ABNORMAL
TEG ALGORITHM TGALG: ABNORMAL
WBC # BLD AUTO: 16.1 K/UL (ref 4.8–10.8)

## 2019-12-25 PROCEDURE — 70450 CT HEAD/BRAIN W/O DYE: CPT

## 2019-12-25 PROCEDURE — 80048 BASIC METABOLIC PNL TOTAL CA: CPT

## 2019-12-25 PROCEDURE — 36600 WITHDRAWAL OF ARTERIAL BLOOD: CPT

## 2019-12-25 PROCEDURE — 700101 HCHG RX REV CODE 250: Performed by: INTERNAL MEDICINE

## 2019-12-25 PROCEDURE — 93306 TTE W/DOPPLER COMPLETE: CPT | Mod: 26 | Performed by: INTERNAL MEDICINE

## 2019-12-25 PROCEDURE — 85025 COMPLETE CBC W/AUTO DIFF WBC: CPT

## 2019-12-25 PROCEDURE — 700111 HCHG RX REV CODE 636 W/ 250 OVERRIDE (IP): Performed by: INTERNAL MEDICINE

## 2019-12-25 PROCEDURE — 82803 BLOOD GASES ANY COMBINATION: CPT

## 2019-12-25 PROCEDURE — 84100 ASSAY OF PHOSPHORUS: CPT

## 2019-12-25 PROCEDURE — 770022 HCHG ROOM/CARE - ICU (200)

## 2019-12-25 PROCEDURE — 700105 HCHG RX REV CODE 258: Performed by: STUDENT IN AN ORGANIZED HEALTH CARE EDUCATION/TRAINING PROGRAM

## 2019-12-25 PROCEDURE — 700101 HCHG RX REV CODE 250: Performed by: STUDENT IN AN ORGANIZED HEALTH CARE EDUCATION/TRAINING PROGRAM

## 2019-12-25 PROCEDURE — 85576 BLOOD PLATELET AGGREGATION: CPT | Mod: 91

## 2019-12-25 PROCEDURE — 83036 HEMOGLOBIN GLYCOSYLATED A1C: CPT

## 2019-12-25 PROCEDURE — 83735 ASSAY OF MAGNESIUM: CPT

## 2019-12-25 PROCEDURE — 93306 TTE W/DOPPLER COMPLETE: CPT

## 2019-12-25 PROCEDURE — 99233 SBSQ HOSP IP/OBS HIGH 50: CPT | Performed by: PSYCHIATRY & NEUROLOGY

## 2019-12-25 PROCEDURE — 302136 NUTRITION PUMP: Performed by: INTERNAL MEDICINE

## 2019-12-25 PROCEDURE — 700105 HCHG RX REV CODE 258: Performed by: INTERNAL MEDICINE

## 2019-12-25 PROCEDURE — 700102 HCHG RX REV CODE 250 W/ 637 OVERRIDE(OP): Performed by: INTERNAL MEDICINE

## 2019-12-25 PROCEDURE — 85384 FIBRINOGEN ACTIVITY: CPT

## 2019-12-25 PROCEDURE — 99292 CRITICAL CARE ADDL 30 MIN: CPT | Performed by: INTERNAL MEDICINE

## 2019-12-25 PROCEDURE — 85347 COAGULATION TIME ACTIVATED: CPT

## 2019-12-25 PROCEDURE — A9270 NON-COVERED ITEM OR SERVICE: HCPCS | Performed by: INTERNAL MEDICINE

## 2019-12-25 PROCEDURE — 99291 CRITICAL CARE FIRST HOUR: CPT | Performed by: INTERNAL MEDICINE

## 2019-12-25 RX ORDER — ACETAMINOPHEN 650 MG/1
650 SUPPOSITORY RECTAL EVERY 4 HOURS PRN
Status: DISCONTINUED | OUTPATIENT
Start: 2019-12-25 | End: 2020-01-17

## 2019-12-25 RX ORDER — CARVEDILOL 6.25 MG/1
12.5 TABLET ORAL 2 TIMES DAILY WITH MEALS
Status: DISCONTINUED | OUTPATIENT
Start: 2019-12-25 | End: 2019-12-27

## 2019-12-25 RX ORDER — MAGNESIUM SULFATE HEPTAHYDRATE 40 MG/ML
2 INJECTION, SOLUTION INTRAVENOUS ONCE
Status: COMPLETED | OUTPATIENT
Start: 2019-12-25 | End: 2019-12-25

## 2019-12-25 RX ORDER — AMOXICILLIN 250 MG
2 CAPSULE ORAL 2 TIMES DAILY
Status: DISCONTINUED | OUTPATIENT
Start: 2019-12-25 | End: 2020-01-17

## 2019-12-25 RX ORDER — ONDANSETRON 2 MG/ML
4 INJECTION INTRAMUSCULAR; INTRAVENOUS EVERY 4 HOURS PRN
Status: DISCONTINUED | OUTPATIENT
Start: 2019-12-25 | End: 2020-01-17

## 2019-12-25 RX ORDER — AMLODIPINE BESYLATE 10 MG/1
10 TABLET ORAL
Status: DISCONTINUED | OUTPATIENT
Start: 2019-12-25 | End: 2019-12-27

## 2019-12-25 RX ORDER — POLYETHYLENE GLYCOL 3350 17 G/17G
1 POWDER, FOR SOLUTION ORAL
Status: DISCONTINUED | OUTPATIENT
Start: 2019-12-25 | End: 2020-01-17

## 2019-12-25 RX ORDER — BISACODYL 10 MG
10 SUPPOSITORY, RECTAL RECTAL
Status: DISCONTINUED | OUTPATIENT
Start: 2019-12-25 | End: 2020-01-17

## 2019-12-25 RX ORDER — LABETALOL HYDROCHLORIDE 5 MG/ML
10 INJECTION, SOLUTION INTRAVENOUS EVERY 4 HOURS PRN
Status: DISCONTINUED | OUTPATIENT
Start: 2019-12-25 | End: 2019-12-30

## 2019-12-25 RX ORDER — ONDANSETRON 4 MG/1
4 TABLET, ORALLY DISINTEGRATING ORAL EVERY 4 HOURS PRN
Status: DISCONTINUED | OUTPATIENT
Start: 2019-12-25 | End: 2020-01-17

## 2019-12-25 RX ORDER — ACETAMINOPHEN 325 MG/1
650 TABLET ORAL EVERY 4 HOURS PRN
Status: DISCONTINUED | OUTPATIENT
Start: 2019-12-25 | End: 2020-01-17

## 2019-12-25 RX ORDER — LABETALOL HYDROCHLORIDE 5 MG/ML
10 INJECTION, SOLUTION INTRAVENOUS ONCE
Status: COMPLETED | OUTPATIENT
Start: 2019-12-25 | End: 2019-12-25

## 2019-12-25 RX ADMIN — SODIUM CHLORIDE 7.5 MG/HR: 9 INJECTION, SOLUTION INTRAVENOUS at 13:06

## 2019-12-25 RX ADMIN — LABETALOL HYDROCHLORIDE 10 MG: 5 INJECTION INTRAVENOUS at 21:22

## 2019-12-25 RX ADMIN — POTASSIUM PHOSPHATE, MONOBASIC AND POTASSIUM PHOSPHATE, DIBASIC 30 MMOL: 224; 236 INJECTION, SOLUTION, CONCENTRATE INTRAVENOUS at 11:17

## 2019-12-25 RX ADMIN — MAGNESIUM SULFATE 2 G: 2 INJECTION INTRAVENOUS at 08:56

## 2019-12-25 RX ADMIN — POTASSIUM CHLORIDE 10 MEQ: 10 INJECTION, SOLUTION INTRAVENOUS at 04:15

## 2019-12-25 RX ADMIN — SODIUM CHLORIDE 5 MG/HR: 9 INJECTION, SOLUTION INTRAVENOUS at 18:20

## 2019-12-25 RX ADMIN — SODIUM CHLORIDE 12.5 MG/HR: 9 INJECTION, SOLUTION INTRAVENOUS at 15:42

## 2019-12-25 RX ADMIN — POTASSIUM CHLORIDE 10 MEQ: 10 INJECTION, SOLUTION INTRAVENOUS at 00:43

## 2019-12-25 RX ADMIN — SODIUM CHLORIDE 7.5 MG/HR: 9 INJECTION, SOLUTION INTRAVENOUS at 09:05

## 2019-12-25 RX ADMIN — LABETALOL HYDROCHLORIDE 10 MG: 5 INJECTION, SOLUTION INTRAVENOUS at 05:34

## 2019-12-25 RX ADMIN — CARVEDILOL 12.5 MG: 6.25 TABLET, FILM COATED ORAL at 15:42

## 2019-12-25 RX ADMIN — ACETAMINOPHEN 650 MG: 325 TABLET, FILM COATED ORAL at 16:03

## 2019-12-25 RX ADMIN — SODIUM CHLORIDE 10 MG/HR: 9 INJECTION, SOLUTION INTRAVENOUS at 05:37

## 2019-12-25 RX ADMIN — AMLODIPINE BESYLATE 10 MG: 10 TABLET ORAL at 15:42

## 2019-12-25 RX ADMIN — POTASSIUM CHLORIDE 10 MEQ: 10 INJECTION, SOLUTION INTRAVENOUS at 02:04

## 2019-12-25 RX ADMIN — POTASSIUM CHLORIDE 10 MEQ: 10 INJECTION, SOLUTION INTRAVENOUS at 03:16

## 2019-12-25 ASSESSMENT — LIFESTYLE VARIABLES: EVER_SMOKED: NEVER

## 2019-12-25 ASSESSMENT — COPD QUESTIONNAIRES: HAVE YOU SMOKED AT LEAST 100 CIGARETTES IN YOUR ENTIRE LIFE: NO/DON'T KNOW

## 2019-12-25 NOTE — ASSESSMENT & PLAN NOTE
-Presented with blood pressures of  190s SBP; and 110-130s DBP  - Intraparenchymal hemorrhage Imaging  - CTM Blood Pressure  - Assessing changes in BP regimen for better BP control  - Increasing Chlortalidone to 50 and Doxazosin to 2mg  - BP has been stable WNL SBP between 110's-120's  - Asymptomatic  - Normotensive  - Stable vital signs

## 2019-12-25 NOTE — ED PROVIDER NOTES
ED Provider Note    Scribed for SENIA Whitt II* by Constantin Kramer. 12/24/2019  6:29 PM    Means of Arrival: Ambulance  History obtained by: Patient and EMS  Limitations: None    CHIEF COMPLAINT  Chief Complaint   Patient presents with   • Possible Stroke     LKW 1600. noticed loss of sensation to LT side at that ttime then had a sudden onset of LUE & LLE weakness, slurred speech, LT sided facial droop 1745. BS: 89.        HPI  Jacinto Loyola is a 49 y.o. male with history of hypertension who presents to the Emergency Department for a possible stroke onset around 1600 today. Per EMS, patient was found down in his front yard, a few feet away from his vehicle. They report that he had just parked his vehicle in his driveway, walked a few steps away, and then fell down in his front yard. He was down for approximately 30 minutes before being found by a friend and EMS was called immediately after. During transport, EMS found the patient to have slurred speech, motor loss and numbness of both left upper and lower extremities, and weakness of his left face. At presentation, Mr. Loyola is endorsing a sudden onset headache that started when he was in the ambulance. He notes that these symptoms have never happened to him before. Per patient's friend, Mr. Loyola has not consumed alcohol for over a week now. He has not been compliant of his hypertension medications for about a year now. Mr. Loyola also has distant history of methamphetamine use.     REVIEW OF SYSTEMS  Review of Systems   Neurological: Positive for weakness (Left upper and lower extremities and left face) and headaches.        Positive for slurred speech  Positive for numbness of left upper and lower extremities   All other systems reviewed and are negative.    See HPI for further details.    PAST MEDICAL HISTORY   has a past medical history of Depression (8/28/2009), HTN (8/28/2009), and Migraines, neuralgic (8/28/2009).    SOCIAL HISTORY  Social History      Tobacco Use   • Smoking status: Never Smoker   • Smokeless tobacco: Never Used   Substance and Sexual Activity   • Alcohol use: Yes   • Drug use: Yes     Types: Intravenous     Comment: daily marijuana use   • Sexual activity: None noted       SURGICAL HISTORY   has a past surgical history that includes knee reconstruction and appendectomy.    CURRENT MEDICATIONS  Home Medications     Reviewed by Dana Dickinson (Pharmacy Tech) on 12/24/19 at 1908  Med List Status: Unable to Obtain   Medication Last Dose Status   amlodipine (NORVASC) 10 MG TABS  Active   carvedilol (COREG) 12.5 MG TABS  Active   hydrochlorothiazide (HYDRODIURIL) 25 MG TABS  Active   hydrochlorothiazide (HYDRODIURIL) 25 MG TABS  Active   lisinopril (PRINIVIL) 10 MG TABS  Active                ALLERGIES  No Known Allergies    PHYSICAL EXAM  VITAL SIGNS: Ht 1.829 m (6')   Wt 112.5 kg (248 lb)   BMI 33.63 kg/m²     BP (!) 164/84   Pulse 88   Temp 37.2 °C (98.9 °F) (Temporal)   Resp 17   Ht 1.829 m (6')   Wt 100.3 kg (221 lb 1.9 oz)   SpO2 94%   Initial blood pressure at bedside was 193/134  Pulse ox interpretation: I interpret this pulse ox as normal.  Constitutional: Drowsy 49 year old man  HENT: No signs of trauma, Bilateral external ears normal, Nose normal. Left sided facial droop. Protecting his own airway.   Eyes: Pupils are equal, Conjunctiva normal, Non-icteric.  Fixed gaze to the right  Neck:  No tenderness, Supple, No stridor.   Cardiovascular: Regular rate and rhythm, no murmurs. Symmetric distal pulses. No cyanosis of extremities. No peripheral edema of extremities.  Thorax & Lungs: Normal breath sounds, No respiratory distress, No wheezing, No chest tenderness.   Abdomen: Soft, No tenderness  Skin: Warm, Dry, No erythema, No rash.   Musculoskeletal: Good passive range of motion in all major joints. No tenderness to palpation or major deformities noted.   Neurologic: Drowsy. Oriented to place and day. He has mild dysarthria.   No obvious aphasia.  He has absence of sensation and strength at the left upper and lower extremities.  He has left-sided facial droop.  Fixed gaze to the right. Left-sided neglect.  Psychiatric: Flat affect    DIAGNOSTIC STUDIES / PROCEDURES    EKG Interpretation:  Results for orders placed or performed during the hospital encounter of 19   EKG (NOW)   Result Value Ref Range    Report       [facility] Emergency Dept.    Test Date:  2019  Pt Name:    TERESA KIM          Department: ER  MRN:        [id number]                      Room:       [facility]  Gender:     Male                         Technician: [id number]  :        1970                   Requested By:SAAD RAMIREZ II  Order #:    [id number]                    Reading MD: Saad Ramirez II, MD    Measurements  Intervals                                Axis  Rate:       64                           P:          53  DC:         173                          QRS:        49  QRSD:       100                          T:          131  QT:         455  QTc:        470    Interpretive Statements  Sinus rhythm  Rate 64  Normal intervals  Twave inversion in I, aVL; changed from prior  No ST elevation or depression  Normal sinus rhythm EKG with nonspecific twave inversions  Compared to ECG 2017 21:16:16  Left ventricular hypertrophy now present  Early repolarization now present  Prolon ged QT interval no longer present  Electronically Signed On 2019 19:05:30 PST by Saad Ramirez II, MD        LABS  Pertinent Labs & Imaging studies reviewed. (See chart for details)    RADIOLOGY  DX-CHEST-PORTABLE (1 VIEW)   Final Result      No evidence of acute cardiopulmonary process.      CT-CTA NECK WITH & W/O-POST PROCESSING   Final Result      CT angiogram of the neck within normal limits. No large vessel occlusion or stenosis. No dissection.      CT-CTA HEAD WITH & W/O-POST PROCESS   Final Result      1.  No evidence of  intracranial vascular occlusion or aneurysm.      2.  Large right basal ganglial intraparenchymal hemorrhage with extension into the right lateral ventricle again noted.      CT-HEAD W/O   Final Result      2.7 cm acute right basal ganglia/thalamic hemorrhage extravasating into the right lateral ventricle.   No hydrocephalus.   Mild right-sided mass effect with minimal right-to-left midline shift.   Central white matter low attenuation consistent with microvascular ischemic changes.      CT-HEAD W/O    (Results Pending)   EC-ECHOCARDIOGRAM COMPLETE W/O CONT    (Results Pending)     Pertinent Labs & Imaging studies reviewed. (See chart for details)    COURSE & MEDICAL DECISION MAKING  Pertinent Labs & Imaging studies reviewed. (See chart for details)    6:16 PM This is a 49 y.o. male who presents with acute onset left-sided hemiparesis, fixed gaze to the right, dysarthria and the differential diagnosis includes but is not limited to hemorrhagic stroke vs. Ischemic stroke. Ordered for DX-Chest, CT- Head w/o, CT-CTA Head w/ and w/o, CT-CTA Neck w/ and w/o, Troponin, COD, APTT, Prothrombin Time, CMP, CBC w/ Diff, Estimated GFR, Diagnostic Alcohol, EKG, ABO Rh Confirm, and Urine Drug screen to evaluate.     6:29 PM - Reviewed resulted CT findings at this time. Large right basal ganglial intraparenchymal hemorrhage with extension into the right lateral ventricle noted.     6:30 PM - I discussed the patient's case and the above findings with Dr. Graff (Neurologist) who is currently at the bedside.  Clearly not a TPA candidate since this is a hemorrhagic stroke.  We will focus on blood pressure control.  He will receive a one-time dose of labetalol and we will start a nicardipine infusion.  Suspect given deep brain bleed this is likely due to hypertension.  Dr. Isabel and I discussed if we needed neurosurgical involvement at this time.  Given location of bleed we do not feel that there is any indication for neurosurgical  intervention at this time.    6:37 PM - Paged ABNER Frausto.  I spoken with the [facility] gold ICU team as well as their attending.  They will be at the bedside shortly to evaluate for admission.      6:38 PM -  I discussed the patient's case and the above findings with Dr. Goldsmith ([facility]) who agrees to evaluate the patient for hospitalization.     7:37 PM  EKG without STEMI.  Blood count, metabolic panel within acceptable limits.  Alcohol level negative.  No coagulopathy.  Tox screen pending.  Troponin within acceptable limits.      The patient is not a candidate for IV Alteplase for stroke because patient has hemorrhagic stroke     CRITICAL CARE  The very real possibilty of a deterioration of this patient's condition required the highest level of my preparedness for sudden, emergent intervention. Hemorrhagic stroke, hypertension. I provided critical care services, which included medication orders, frequent reevaluations of the patient's condition and response to treatment, ordering and reviewing test results, and discussing the case with various consultants.  The critical care time associated with the care of the patient was 30 minutes. Review chart for interventions. This time is exclusive of any other billable procedures.      **Tox later returned positive for amphetamines**      DISPOSITION:  Patient will be hospitalized by Dr. Goldsmith ([facility] Frausto) in critical condition.       FINAL IMPRESSION  1. Hemorrhagic stroke (HCC)    2. Hypertension, unspecified type          I, Constantin Kramer (Josemanuel), am scribing for, and in the presence of, VIK Whitt II.    Electronically signed by: Constantin Kramer (Josemanuel), 12/24/2019    IWayne II, M* personally performed the services described in this documentation, as scribed by Constantin Kramer in my presence, and it is both accurate and complete.    C    The note accurately reflects work and decisions made by me.  Wayne Puentes II  12/24/2019  7:38 PM

## 2019-12-25 NOTE — ED NOTES
Unable to complete med rec -pt unable to participate in interview. No family at bedside. Pharmacy closed for the holiday. Med rec will attempt to follow up when pharmacy opens.

## 2019-12-25 NOTE — ASSESSMENT & PLAN NOTE
Improving again today  Continue with current dose of scheduled amlodipine, clonidine and Vasotec  PRN hydralazine and labetalol for goal SBP less than 160

## 2019-12-25 NOTE — ASSESSMENT & PLAN NOTE
- Intracranial Hemorrhage sec to poorly controlled HTN  -HTN emergency  - H/O methamphetamine abuse  - Head CT showed a 2.7 cm acute right basal ganglia/thalamic hemorrhage, no indications for neurosurgery intervention  - Repeated CT scan on 1/5 showed stable hemorrhage, no new bleed.  - Per Neurology: recommend inpatient SBP control <160, outpatient <130, LDL <70, A1C <7%.  - EEG negative for seizure. Neuro signed off on 12/30.  - Unchanged Dysarthria and Hemiparesis.  - Patient has not improved in gaining mobility at all in his extremities since I have been in the service.  - Peg Tube placed on 1/13  - Peg clamped  - Eating Dysph I diet w/o complications  - On Dysphagia 2 diet  - Encourage fluid intake                   PLAN:  - Barium swallow eval passed  - Patient tolerating diet  - Advancing diet to Dysphagia 2/NTL  - Hold Feeding by Tube  - PT/OT/SLP  - Turn patient Q2 hrs to prevent new bed sores  - HTN med changes made, with better BP control.  - SNF referral placed prior to transfer from ICU; pending.  - Speech following, recommendation appreciated.  - Feeding 1-1, bed 90 degree  - NO straws  - CTM

## 2019-12-25 NOTE — PROGRESS NOTES
[facility] ICU Progress Note      Admit Date: 12/24/2019  Resident(s): Sebastián Trent   Attending: ABNER Frausto/ MIREYA Chavis    Date & Time:   12/25/2019   8:34 AM       Patient ID:    Name:             Jacinto Loyola     YOB: 1970  Age:                 49 y.o.  male   MRN:               [id number]    Diagnosis:   Intraparenchymal Hemorrhage    ID:  49-year-old male with a past medical history of hypertension, JAYASHREE, medications noncompliance and h/o meth abuse was brought in via EMS after he was found down at outside of his home at around 1600 on 12/24/2019.  As per chart review, patient parked his car, took a few steps and collapsed down on his front yard. Down for approximately 30 minutes before being found by friend.  Patient noted for left lower and upper extremity weakness as well as left facial weakness/numbness.      At the ED, blood pressure in the 190s/110-130s, heart rate in the 70s, patient saturating above 90% on 2 liters nasal cannula.  No leukocytosis or anemia on CBC.  Chemistry, troponin, INR and diagnostic alcohol normal.  Checks x-ray without acute cardiopulmonary process.  Head CT showed a 2.7 cm acute right basal ganglia/thalamic hemorrhage extravasating into the right lateral ventricle along with small right-sided mass-effect with minimal right-to-left midline shift.  No hydrocephalus. This was confirmed on Head CTA. Neurology was consulted by EDP.  He was given a one-time dose of labetalol and was started on a Cardene drip.  As per neurology goal MAP of .  No indication for neurosurgical involvement at this time.  Patient was admitted to ICU for intraparenchymal hemorrhage management and hypertensive crisis control.      Interval Events:  - Afebrile, -149 mmHg, keep MAP , Spo2 92-97% on room air  - Still has dysarthria with left side weakness, no change in mentation  - Neurologist on board, follow rec  - Follow up CT brain W/O this morning  -  Replete electrolytes per pharmacy  - UDS positive for ampthetamine      Review of Systems   Unable to perform ROS: Acuity of condition       VITALS:  Pulse: 95  Blood Pressure: 149/86       Temp  Av.1 °C (98.8 °F)  Min: 36.8 °C (98.3 °F)  Max: 37.2 °C (99 °F)         Physical Exam:  Physical Exam  Constitutional: He is well-developed, well-nourished, and in no distress. No distress.   HENT:   Head: Normocephalic and atraumatic.   Eyes: Right eye exhibits no discharge. Left eye exhibits no discharge.   Neck: No tracheal deviation present.   Cardiovascular: Normal rate and regular rhythm.   No murmur heard.  Pulmonary/Chest: Effort normal and breath sounds normal. No respiratory distress.   Abdominal: Soft. Bowel sounds are normal. He exhibits no distension.   Musculoskeletal: Normal range of motion.         General: No tenderness or edema.   Lymphadenopathy:     He has no cervical adenopathy.   Neurological:   AAO to self  Drowsy  Following commands  Severe dysarthria  Right first gaze neglect  Left upper and lower extremity plegia, hyperflexion  No left-sided sensation to pressure or pain  Skin: Skin is warm and dry. No rash noted. He is not diaphoretic. No erythema. No pallor.     Consultants:  Neurologist:  Huan Graff M.D.  PMA: Constantin Avina M.D.      Procedures:  None    HemoDynamics:  Pulse: 95 Blood Pressure: 149/86        Respiratory:     Respiration: 20, Pulse Oximetry: 92 %    Chest Tube Drains:            Neuro:      Fluids:  Intake/Output       19 07 - 19 0659 (Not Admitted) 19 07 - 19 0659 19 07 - 19 0659      1010-6356 2460-2938 Total 6589-6392 9980-6337 Total 5222-7352 6809-7128 Total       Intake    I.V.  --  -- --  --  907.9 907.9  --  -- --    Cardene Volume -- -- -- -- 907.9 907.9 -- -- --    IV Piggyback  --  -- --  --  218.3 218.3  --  -- --    Volume (mL) (potassium chloride (KCL) ivpb 10 mEq) -- -- -- -- 218.3 218.3 -- -- --    Total Intake  -- -- -- -- 1126.3 1126.3 -- -- --       Output    Urine  --  -- --  --  1290 1290  --  -- --    Output (mL) (Urethral Catheter Temperature probe 16 Fr.) -- -- -- -- 1290 1290 -- -- --    Total Output -- -- -- -- 1290 1290 -- -- --       Net I/O     -- -- -- -- -163.7 -163.7 -- -- --        Weight: 100.3 kg (221 lb 1.9 oz)  Recent Labs     19  033   SODIUM 139 136   POTASSIUM 3.6 3.5*   CHLORIDE 103 105   CO2 29 24   BUN 16 15   CREATININE 1.12 1.03   MAGNESIUM  --  1.7   PHOSPHORUS  --  1.9*   CALCIUM 9.2 8.6     Body mass index is 29.99 kg/m².    GI/Nutrition:  Recent Labs     19  0331   ALTSGPT 15  --    ASTSGOT 19  --    ALKPHOSPHAT 65  --    TBILIRUBIN 0.6  --    GLUCOSE 96 125*       Heme:  Recent Labs     19   RBC 5.54   HEMOGLOBIN 16.2   HEMATOCRIT 48.6   PLATELETCT 222   PROTHROMBTM 12.8   APTT 26.8   INR 0.95       Infectious Disease:  Temp  Av.1 °C (98.8 °F)  Min: 36.8 °C (98.3 °F)  Max: 37.2 °C (99 °F)  Recent Labs     19   WBC 8.5   NEUTSPOLYS 54.90   LYMPHOCYTES 33.50   MONOCYTES 8.70   EOSINOPHILS 2.00   BASOPHILS 0.50   ASTSGOT 19   ALTSGPT 15   ALKPHOSPHAT 65   TBILIRUBIN 0.6       Medications:  • magnesium sulfate  2 g     • potassium phosphate ivpb  30 mmol     • senna-docusate  2 Tab      And   • polyethylene glycol/lytes  1 Packet      And   • magnesium hydroxide  30 mL      And   • bisacodyl  10 mg     • MD Alert...Adult ICU Electrolyte Replacement per Pharmacy       • Respiratory Care per Protocol       • niCARdipine infusion  0-15 mg/hr 7.5 mg/hr (19 0710)   • acetaminophen  650 mg      Or   • acetaminophen  650 mg     • ondansetron  4 mg      Or   • ondansetron  4 mg          Imaging:  DX-CHEST-PORTABLE (1 VIEW)   Final Result      No evidence of acute cardiopulmonary process.      CT-CTA NECK WITH & W/O-POST PROCESSING   Final Result      CT angiogram of the neck within normal limits. No large vessel occlusion or  stenosis. No dissection.      CT-CTA HEAD WITH & W/O-POST PROCESS   Final Result      1.  No evidence of intracranial vascular occlusion or aneurysm.      2.  Large right basal ganglial intraparenchymal hemorrhage with extension into the right lateral ventricle again noted.      CT-HEAD W/O   Final Result      2.7 cm acute right basal ganglia/thalamic hemorrhage extravasating into the right lateral ventricle.   No hydrocephalus.   Mild right-sided mass effect with minimal right-to-left midline shift.   Central white matter low attenuation consistent with microvascular ischemic changes.      CT-HEAD W/O    (Results Pending)   EC-ECHOCARDIOGRAM COMPLETE W/O CONT    (Results Pending)   CT-HEAD W/O    (Results Pending)         Assessment and plan    * Intraparenchymal hemorrhage of brain (HCC)- (present on admission)  Assessment & Plan       2/2 uncontrolled hypertension   History of HTN with medication noncompliance  History of methamphetamine abuse  Presented with left upper extremity, left lower extremity, and left facial weakness as well as slurred speech  Initial head CT showed a 2.7 cm acute right basal ganglia/thalamic hemorrhage extravasating into the right lateral ventricle along with small right-sided mass-effect with minimal right-to-left midline shift.  No hydrocephalus  Head/neck CT without vessel occlusions  Initially received a total of 30 mg of Labetalol push and was started on a Nicardipine drip  Plan:  Continue ICU care  Keep NPO  Keep head of bed 30 degrees  Neurochecks Q1H per neurologist rec  RT/O2  Continue IV hydration  Continue Nicardipine drip (titrate to MAP ), restart carvedilol and amlodipine.  Neurology following, appreciate recommendations  PT/OT/Speech/PMR  Head CT on AM, pending results     Hypertensive emergency- (present on admission)  Assessment & Plan  Presented with blood pressure in the 190s/110-130s  Intraparenchymal hemorrhage, no BEVERLY on labs or EKG changes/trops  elevation  Continue nicardipine drip (Goal MAP )  UDS positive for amphetamine       restart carvedilol and amlodipine.     JAYASHREE (obstructive sleep apnea)  Assessment & Plan  Not on CPAP  Continue O2 supplementation      DISPO: ICU    Code Status: FULL    Quality Measures:  Quality-Core Measures   Reviewed items::  EKG reviewed, Radiology images reviewed, Labs reviewed and Medications reviewed  Villasenor catheter::  Critically Ill - Requiring Accurate Measurement of Urinary Output  DVT prophylaxis pharmacological::  Contraindicated - High bleeding risk  DVT prophylaxis - mechanical:  SCDs  Ulcer Prophylaxis::  Yes

## 2019-12-25 NOTE — CONSULTS
INTRACEREBRAL HEMORRHAGE SCORE:    ICH SCORE:  [place] Coma Score:    Age:  0  ICH Volume greater than 30cc =no= 00  Intraventricular Hemorrhage:  1  Infratentorial Origin of Hemorrhage:  0  ICH TOTAL SCORE:  1

## 2019-12-25 NOTE — ASSESSMENT & PLAN NOTE
Keep head of bed at 30-45 degrees   Will need outpatient sleep study   Nocturnal oxygen as needed in the meantime

## 2019-12-25 NOTE — CONSULTS
Neurology Initial Consult H&P  Neurohospitalist Service, [facility]    Referring Physician: SENIA Whitt II*    Chief Complaint   Patient presents with   • Possible Stroke     LKW 1600. noticed loss of sensation to LT side at that ttime then had a sudden onset of LUE & LLE weakness, slurred speech, LT sided facial droop 1745. BS: 89.        HPI: Jacinto Loyola is a 49 y.o. male with history of HYN  Review of systems: In addition to what is detailed in the HPI above, (and scanned into the chart if and when applicable), all other systems reviewed and are negative.    Past Medical History:    has a past medical history of Depression (8/28/2009), HTN (8/28/2009), and Migraines, neuralgic (8/28/2009).    FHx:  family history is not on file.    SHx:   reports that he has never smoked. He has never used smokeless tobacco. He reports current alcohol use. He reports current drug use. Drug: Intravenous.    Allergies:  No Known Allergies    Medications:    Current Facility-Administered Medications:   •  niCARdipine (CARDENE) 25 mg in  mL Infusion, 0-15 mg/hr, Intravenous, Continuous, Wayne Puentes II, M.D., Last Rate: 75 mL/hr at 12/24/19 1854, 7.5 mg/hr at 12/24/19 1854    Current Outpatient Medications:   •  lisinopril (PRINIVIL) 10 MG TABS, Take 1 Tab by mouth every day., Disp: 30 Tab, Rfl: 11  •  carvedilol (COREG) 12.5 MG TABS, Take 1 Tab by mouth 2 times a day, with meals., Disp: 60 Tab, Rfl: 3  •  amlodipine (NORVASC) 10 MG TABS, Take 1 Tab by mouth every day., Disp: 30 Tab, Rfl: 11  •  hydrochlorothiazide (HYDRODIURIL) 25 MG TABS, Take 25 mg by mouth every day., Disp: , Rfl:   •  hydrochlorothiazide (HYDRODIURIL) 25 MG TABS, Take 1 Tab by mouth every day., Disp: 30 Tab, Rfl: 5    Physical Examination:     Vitals:    12/24/19 1850 12/24/19 1853 12/24/19 1855 12/24/19 1900   BP: (!) 166/116 152/106 145/99 121/97   Pulse: 64 65 73 72   Resp: 17 18 20 17   Temp:        TempSrc:       SpO2: 99% 98% 91% 92%   Weight:       Height:           General: Patient is awake and in no acute distress  Eyes: examination of optic disks not indicated at this time  CV: RRR    NEUROLOGICAL EXAM:     Somnolent, answers questions appropriately with severe dysarthria. Right forced gaze neglect left, left UE LE plegia with stim induced flexor posturing. Left sensory absent. Left UE LE hyperreflexia triple flexion.  Objective Data:    Labs:  Lab Results   Component Value Date/Time    PROTHROMBTM 12.8 12/24/2019 06:18 PM    INR 0.95 12/24/2019 06:18 PM      Lab Results   Component Value Date/Time    WBC 8.5 12/24/2019 06:18 PM    RBC 5.54 12/24/2019 06:18 PM    HEMOGLOBIN 16.2 12/24/2019 06:18 PM    HEMATOCRIT 48.6 12/24/2019 06:18 PM    MCV 87.7 12/24/2019 06:18 PM    MCH 29.2 12/24/2019 06:18 PM    MCHC 33.3 (L) 12/24/2019 06:18 PM    MPV 9.2 12/24/2019 06:18 PM    NEUTSPOLYS 54.90 12/24/2019 06:18 PM    LYMPHOCYTES 33.50 12/24/2019 06:18 PM    MONOCYTES 8.70 12/24/2019 06:18 PM    EOSINOPHILS 2.00 12/24/2019 06:18 PM    BASOPHILS 0.50 12/24/2019 06:18 PM      Lab Results   Component Value Date/Time    SODIUM 139 12/24/2019 06:18 PM    POTASSIUM 3.6 12/24/2019 06:18 PM    CHLORIDE 103 12/24/2019 06:18 PM    CO2 29 12/24/2019 06:18 PM    GLUCOSE 96 12/24/2019 06:18 PM    BUN 16 12/24/2019 06:18 PM    CREATININE 1.12 12/24/2019 06:18 PM    CREATININE 1.0 05/24/2007 09:55 PM      No results found for: CHOLSTRLTOT, LDL, HDL, TRIGLYCERIDE    Lab Results   Component Value Date/Time    ALKPHOSPHAT 65 12/24/2019 06:18 PM    ASTSGOT 19 12/24/2019 06:18 PM    ALTSGPT 15 12/24/2019 06:18 PM    TBILIRUBIN 0.6 12/24/2019 06:18 PM        Imaging/Testing:  CT CTA head reviewed. Approx 10-15ml right basalganglia ICH with slight right lat vent extension approx 2 cc    Assessment and Plan:    Jacinto Loyola is a 49 y.o. male with relevant history of HTN.  New right basal ganglia ICH Likely HTN.  193 systolic.   CTA with aneurysm or vascular malformation on my review.  Gave 30 mg labetolol and is on nicardipine 7.5 mg per hour just raised from 5 mg/hr with .  Goal MAP .  Adjust nicardipine accordingly.  Q 1 hr checks neuro.  Repeat CT head in am.  HOB 30 degrees.  Bed rest strict. Will likely need PO access via dobhoff given profound dysarthria and location of bleed.  Hx per report of meth in past tox pending.  Plan:    The evaluation of the patient, and recommended management, was discussed with the resident staff.     Huan Graff MD  Board Certified Neurology, ABPN  Board Certified Vascular Neurology, ABPN  (t) [phone]

## 2019-12-25 NOTE — ASSESSMENT & PLAN NOTE
Secondary to uncontrolled hypertension   Neurology consulted and signed off  Avoid anticoagulants, SCDs only  Continue strict blood pressure control with goal SBP less than 160  PT/OT/SLP  Rehab consulted  Up to chair, n.p.o. (may eventually need PEG), [facility]   no

## 2019-12-25 NOTE — PROGRESS NOTES
Neurology Progress Note  Neurohospitalist Service, [facility]    Referring Physician: SENIA Tejada*    Chief Complaint   Patient presents with   • Possible Stroke     LKW 1600. noticed loss of sensation to LT side at that ttime then had a sudden onset of LUE & LLE weakness, slurred speech, LT sided facial droop 1745. BS: 89.        HPI: Refer to initial documented Neurology H&P, as detailed in the patient's chart.    Interval History 12/25/19: No new events    Review of systems: In addition to what is detailed in the HPI and/or updated in the interval history, all other systems reviewed and are negative.    Past Medical History:    has a past medical history of Depression (8/28/2009), HTN (8/28/2009), and Migraines, neuralgic (8/28/2009).    FHx:  family history is not on file.    SHx:   reports that he has never smoked. He has never used smokeless tobacco. He reports current alcohol use. He reports current drug use. Drug: Intravenous.    Medications:    Current Facility-Administered Medications:   •  potassium phosphates 30 mmol in D5W 500 mL ivpb, 30 mmol, Intravenous, Once, Constantin Avina M.D., Last Rate: 100 mL/hr at 12/25/19 1117, 30 mmol at 12/25/19 1117  •  amLODIPine (NORVASC) tablet 10 mg, 10 mg, Enteral Tube, Q DAY, Constantin Avina M.D.  •  carvedilol (COREG) tablet 12.5 mg, 12.5 mg, Enteral Tube, BID WITH MEALS, Constantin Avina M.D.  •  senna-docusate (PERICOLACE or SENOKOT S) 8.6-50 MG per tablet 2 Tab, 2 Tab, Oral, BID, Stopped at 12/25/19 0600 **AND** polyethylene glycol/lytes (MIRALAX) PACKET 1 Packet, 1 Packet, Oral, QDAY PRN **AND** magnesium hydroxide (MILK OF MAGNESIA) suspension 30 mL, 30 mL, Oral, QDAY PRN **AND** bisacodyl (DULCOLAX) suppository 10 mg, 10 mg, Rectal, QDAY PRN, Wilberto Ness M.D.  •  MD Alert...ICU Electrolyte Replacement per Pharmacy, , Other, PHARMACY TO DOSE, Wilberto Ness M.D.  •  Respiratory Care per  Protocol, , Nebulization, Continuous RT, Wilberto Ness M.D.  •  niCARdipine (CARDENE) 25 mg in  mL Infusion, 0-15 mg/hr, Intravenous, Continuous, Wilberto Ness M.D., Last Rate: 75 mL/hr at 12/25/19 0905, 7.5 mg/hr at 12/25/19 0905  •  acetaminophen (TYLENOL) tablet 650 mg, 650 mg, Oral, Q4HRS PRN **OR** acetaminophen (TYLENOL) suppository 650 mg, 650 mg, Rectal, Q4HRS PRN, Wilberto Ness M.D.  •  ondansetron (ZOFRAN ODT) dispertab 4 mg, 4 mg, Oral, Q4HRS PRN **OR** ondansetron (ZOFRAN) syringe/vial injection 4 mg, 4 mg, Intravenous, Q4HRS PRN, Wilberto Ness M.D.    Physical Examination:     Vitals:    12/25/19 0800 12/25/19 0900 12/25/19 1000 12/25/19 1100   BP: 133/85 154/81 134/72 148/83   Pulse: 92 (!) 102 86 79   Resp: (!) 22 (!) 23 18 (!) 25   Temp: 37.1 °C (98.8 °F)      TempSrc: Temporal      SpO2: 96% 96% 98% 98%   Weight:       Height:           General: Patient is awake and in no acute distress  Eyes: examination of optic disks not indicated at this time  CV: RRR    NEUROLOGICAL EXAM:     Eyes persistently closed but answers questions appropriately with verbal and motor responses with profound dysarthria.  Right preferential gaze and left plegia persists.  Thalamic preservation of sensation.    Objective Data:    Labs:  Lab Results   Component Value Date/Time    PROTHROMBTM 12.8 12/24/2019 06:18 PM    INR 0.95 12/24/2019 06:18 PM      Lab Results   Component Value Date/Time    WBC 8.5 12/24/2019 06:18 PM    RBC 5.54 12/24/2019 06:18 PM    HEMOGLOBIN 16.2 12/24/2019 06:18 PM    HEMATOCRIT 48.6 12/24/2019 06:18 PM    MCV 87.7 12/24/2019 06:18 PM    MCH 29.2 12/24/2019 06:18 PM    MCHC 33.3 (L) 12/24/2019 06:18 PM    MPV 9.2 12/24/2019 06:18 PM    NEUTSPOLYS 54.90 12/24/2019 06:18 PM    LYMPHOCYTES 33.50 12/24/2019 06:18 PM    MONOCYTES 8.70 12/24/2019 06:18 PM    EOSINOPHILS 2.00 12/24/2019 06:18 PM    BASOPHILS 0.50 12/24/2019 06:18 PM      Lab Results    Component Value Date/Time    SODIUM 136 12/25/2019 03:31 AM    POTASSIUM 3.5 (L) 12/25/2019 03:31 AM    CHLORIDE 105 12/25/2019 03:31 AM    CO2 24 12/25/2019 03:31 AM    GLUCOSE 125 (H) 12/25/2019 03:31 AM    BUN 15 12/25/2019 03:31 AM    CREATININE 1.03 12/25/2019 03:31 AM    CREATININE 1.0 05/24/2007 09:55 PM      Lab Results   Component Value Date/Time    CHOLSTRLTOT 136 12/24/2019 06:18 PM    LDL 61 12/24/2019 06:18 PM    HDL 44 12/24/2019 06:18 PM    TRIGLYCERIDE 154 (H) 12/24/2019 06:18 PM       Lab Results   Component Value Date/Time    ALKPHOSPHAT 65 12/24/2019 06:18 PM    ASTSGOT 19 12/24/2019 06:18 PM    ALTSGPT 15 12/24/2019 06:18 PM    TBILIRUBIN 0.6 12/24/2019 06:18 PM        Imaging/Testing:  Repeat CT head this morning without change in ICH size approx 10ml with a small amount of right lateral ventricle IVH.    Assessment and Plan:    Jacinto Loyola is a 49 y.o. male with relevant history of HTN with new right thalamic ICH with small amount of IVH.  Severe deficit. BP well controlled on nicardipine same dose that controlled BP last night 7.5 mg per hour. Pt unlikely to pass swallow eval rec GI access via dobhoff placement. Amphetamine positive.  Will see if need to transition to oral BP meds as amphetamine wears off.  MAP 80 to 110 recommended.    Plan:    The evaluation of the patient, and recommended management, was discussed with the resident staff. I have performed a physical exam and reviewed and updated ROS and Plan today (12/25/2019). In review of yesterday's note (12/24/2019), there are no changes except as documented above.    Huan Graff MD  Board Certified Neurology, ABPN  Board Certified Vascular Neurology, ABPN  t) [phone]

## 2019-12-25 NOTE — CARE PLAN
Problem: Knowledge Deficit  Goal: Knowledge of disease process/condition, treatment plan, diagnostic tests, and medications will improve  Outcome: PROGRESSING AS EXPECTED  Goal: Knowledge of the prescribed therapeutic regimen will improve  Outcome: PROGRESSING AS EXPECTED     Problem: Pain Management  Goal: Pain level will decrease to patient's comfort goal  Outcome: PROGRESSING AS EXPECTED

## 2019-12-25 NOTE — PROGRESS NOTES
Critical Care Progress Note    Date of admission  12/24/2019    Chief Complaint  Jacinto Loyola, a 49 y.o. male for evaluation and management of the above problem. The history is mostly obtained from healthcare providers and the medical record as this gentleman cannot provide me with any history.  This gentleman has a history of primary hypertension, medical noncompliance, methamphetamine abuse and JAYASHREE.  At approximately 1600 hrs. today he was found down in his yard near his car.  He had left-sided weakness and slurred speech.  EMS was activated.  He complained of a headache en route to the hospital.  On arrival at [facility], he underwent CT imaging which revealed a 2.7 cm right basal ganglia hemorrhage with extension into the right lateral ventricle.  He was emergently seen by Dr. Graff from neurology.  CT imaging of his head and neck did not reveal any evidence of large vessel occlusion, aneurysms or AVMs.  At this point, Dr. Graff does not believe that neurosurgery needs to be involved.  This is likely a hypertensive hemorrhage. Taken from Dr Ledesma note.     Hospital Course    Interval Problem Update  Reviewed last 24 hour events:  Neuro: left side weakness basal ganglia lethargic withdrawal on left, right side full strength, expressive aphasia, left side neglect, dysartharic  HR:   SBP: nicardipine 7.5 -10 MAP    Tmax: afebrile  GI: prior to arrival NPO  UOP: palmer 1300ml  Lines: peripheral IV  Resp: oxy mask 3l    Vte: contra  PPI/H2: n/a  Antibx: none  k phos replace  Mag 4   Restart home regime 10mg coreg 12.5      Review of Systems  Review of Systems   Unable to perform ROS: Mental acuity        Vital Signs for last 24 hours   Temp:  [36.8 °C (98.3 °F)-37.2 °C (99 °F)] 37.2 °C (99 °F)  Pulse:  [] 109  Resp:  [13-32] 23  BP: (108-193)/() 144/82  SpO2:  [89 %-99 %] 97 %    Hemodynamic parameters for last 24 hours       Respiratory Information for the last 24 hours        Physical Exam   Physical Exam  Constitutional:       Appearance: He is not ill-appearing or diaphoretic.      Comments: sombulexnt male with oxy mask on   HENT:      Head: Normocephalic.      Nose: Nose normal.      Mouth/Throat:      Mouth: Mucous membranes are dry.      Comments: Thick blood like secretions  Eyes:      Comments: Disconjugate gaze   Cardiovascular:      Rate and Rhythm: Normal rate.      Heart sounds: No murmur.   Pulmonary:      Effort: No respiratory distress.      Breath sounds: No stridor. No wheezing, rhonchi or rales.   Abdominal:      General: Abdomen is flat. There is no distension.      Palpations: There is no mass.      Tenderness: There is no tenderness. There is no guarding or rebound.      Hernia: No hernia is present.   Skin:     Capillary Refill: Capillary refill takes less than 2 seconds.   Neurological:      Comments: Disconjugate gaze, weak left side but does withdrawal in upper, moves lower left right arm spont, snoring respiration, left side neglect   Psychiatric:      Comments: Unable          Medications  Current Facility-Administered Medications   Medication Dose Route Frequency Provider Last Rate Last Dose   • potassium phosphates 30 mmol in D5W 500 mL ivpb  30 mmol Intravenous Once Constantin Avina M.D. 100 mL/hr at 12/25/19 1117 30 mmol at 12/25/19 1117   • amLODIPine (NORVASC) tablet 10 mg  10 mg Enteral Tube Q DAY Constantin Avina M.D.   Stopped at 12/25/19 1115   • carvedilol (COREG) tablet 12.5 mg  12.5 mg Enteral Tube BID WITH MEALS Constantin Avina M.D.   Stopped at 12/25/19 1115   • Pharmacy Consult: Enteral tube insertion - review meds/change route/product selection  1 Each Other PHARMACY TO DOSE Constantin Avina M.D.       • senna-docusate (PERICOLACE or SENOKOT S) 8.6-50 MG per tablet 2 Tab  2 Tab Enteral Tube BID Constantin Avina M.D.        And   • polyethylene glycol/lytes (MIRALAX) PACKET 1 Packet  1 Packet Enteral Tube QDAY PRN Constantin Avina  M.D.        And   • magnesium hydroxide (MILK OF MAGNESIA) suspension 30 mL  30 mL Enteral Tube QDAY PRN Constantin Avina M.D.        And   • bisacodyl (DULCOLAX) suppository 10 mg  10 mg Rectal QDAY PRN Constantin Avina M.D.       • acetaminophen (TYLENOL) tablet 650 mg  650 mg Enteral Tube Q4HRS PRTAMEKA Avina M.D.        Or   • acetaminophen (TYLENOL) suppository 650 mg  650 mg Rectal Q4HRS PRN Constantin Avina M.D.       • ondansetron (ZOFRAN ODT) dispertab 4 mg  4 mg Enteral Tube Q4HRS PRTAMEKA Avina M.D.        Or   • ondansetron (ZOFRAN) syringe/vial injection 4 mg  4 mg Intravenous Q4HRS PRTAMEKA Avina M.D.       • MD Alert...ICU Electrolyte Replacement per Pharmacy   Other PHARMACY TO DOSE Wilberto Ness M.D.       • Respiratory Care per Protocol   Nebulization Continuous RT Wilberto Ness M.D.       • niCARdipine (CARDENE) 25 mg in  mL Infusion  0-15 mg/hr Intravenous Continuous Wilberto Ness M.D. 100 mL/hr at 12/25/19 1335 10 mg/hr at 12/25/19 1335       Fluids    Intake/Output Summary (Last 24 hours) at 12/25/2019 1432  Last data filed at 12/25/2019 1400  Gross per 24 hour   Intake 2029.18 ml   Output 1440 ml   Net 589.18 ml       Laboratory          Recent Labs     12/24/19 1818 12/25/19  0331   SODIUM 139 136   POTASSIUM 3.6 3.5*   CHLORIDE 103 105   CO2 29 24   BUN 16 15   CREATININE 1.12 1.03   MAGNESIUM  --  1.7   PHOSPHORUS  --  1.9*   CALCIUM 9.2 8.6     Recent Labs     12/24/19 1818 12/25/19  0331   ALTSGPT 15  --    ASTSGOT 19  --    ALKPHOSPHAT 65  --    TBILIRUBIN 0.6  --    GLUCOSE 96 125*     Recent Labs     12/24/19  1818 12/25/19  0300   WBC 8.5 16.1*   NEUTSPOLYS 54.90 82.30*   LYMPHOCYTES 33.50 11.20*   MONOCYTES 8.70 5.60   EOSINOPHILS 2.00 0.30   BASOPHILS 0.50 0.20   ASTSGOT 19  --    ALTSGPT 15  --    ALKPHOSPHAT 65  --    TBILIRUBIN 0.6  --      Recent Labs     12/24/19  1818 12/25/19  0300   RBC 5.54 5.36    HEMOGLOBIN 16.2 15.8   HEMATOCRIT 48.6 46.3   PLATELETCT 222 251   PROTHROMBTM 12.8  --    APTT 26.8  --    INR 0.95  --        Imaging  X-Ray:  I have personally reviewed the images and compared with prior images. and My impression is: bilateral aveolar process no obvious infiltrates  CT:    Reviewed    Assessment/Plan  * Intraparenchymal hemorrhage of brain (HCC)- (present on admission)  Assessment & Plan  Neurosurgery consult: No Neurology felt it was not necessary  Correct and reverse coagulopathy  Avoid platelet transfusion (aspirin/plavix) unless planned neurosurgery (patch trial)  SBP goal < 140 unless chronic hypertension and or poor exam < 160  Aspiration precautions  Head of bed 30 degress   Maintain CPP > 60-70  Neuro check Q 1 and pupilometer  Seizure prophylaxis: not indicated  Maintain euvolemia  Sodium goal: normatremia    Hypertensive related to methamphetamine abuse    Methamphetamine abuse (HCC)  Assessment & Plan  Cessation counseling when clinically appropriate  Urine drug screen positive for amphetamines    JAYASHREE (obstructive sleep apnea)  Assessment & Plan  Unclear if relate to ICH patient with significant obstructive on exam  Head of bed at 30-45 degrees   Unable to use Bipap with mental status and stroke    Re-evaluate  Will need outpatient sleep study     Hypertensive emergency- (present on admission)  Assessment & Plan  Strict blood pressure control with SBP < 160  I am titrating a nicardipine drip to achieve blood pressure goals  Start home norvasc and coreg    Hypokalemia  Assessment & Plan  Replete potassium       VTE:  Contraindicated  Ulcer: Not Indicated  Lines: None    I have performed a physical exam and reviewed and updated ROS and Plan today (12/25/2019). In review of yesterday's note (12/24/2019), there are no changes except as documented above.     Discussed patient condition and risk of morbidity and/or mortality with Hospitalist, RN, RT, Pharmacy, Charge nurse / hot rounds,  Patient and neurology  The patient remains critically ill for acute ICH and severe hypertension on nicardipine infusion with active titration and close monitor for need for intubation.  Critical care time = 76 minutes in directly providing and coordinating critical care and extensive data review.  No time overlap and excludes procedures.

## 2019-12-25 NOTE — CONSULTS
PULMONARY AND CRITICAL CARE MEDICINE CONSULTATION    Date of Consultation:  12/24/2019    Requesting Physician:  Wayne Puentes MD    Consulting Physician:  Brian Ledesma MD    Reason for Consultation: Critical care management in gentleman with acute intracranial hemorrhage    Chief Complaint: Intracranial hemorrhage    History of Present Illness:    I was kindly asked to see and evaluate Jacinto Loyola, a 49 y.o. male for evaluation and management of the above problem.    The history is mostly obtained from healthcare providers and the medical record as this gentleman cannot provide me with any history.  This gentleman has a history of primary hypertension, medical noncompliance, methamphetamine abuse and JAYASHREE.  At approximately 1600 hrs. today he was found down in his yard near his car.  He had left-sided weakness and slurred speech.  EMS was activated.  He complained of a headache en route to the hospital.  On arrival at [facility], he underwent CT imaging which revealed a 2.7 cm right basal ganglia hemorrhage with extension into the right lateral ventricle.  He was emergently seen by Dr. Graff from neurology.  CT imaging of his head and neck did not reveal any evidence of large vessel occlusion, aneurysms or AVMs.  At this point, Dr. Graff does not believe that neurosurgery needs to be involved.  This is likely a hypertensive hemorrhage.    Medications Prior to Admission:    No current facility-administered medications on file prior to encounter.      Current Outpatient Medications on File Prior to Encounter   Medication Sig Dispense Refill   • lisinopril (PRINIVIL) 10 MG TABS Take 1 Tab by mouth every day. 30 Tab 11   • carvedilol (COREG) 12.5 MG TABS Take 1 Tab by mouth 2 times a day, with meals. 60 Tab 3   • amlodipine (NORVASC) 10 MG TABS Take 1 Tab by mouth every day. 30 Tab 11   • hydrochlorothiazide (HYDRODIURIL) 25 MG TABS Take 25 mg by mouth every day.     • hydrochlorothiazide  (HYDRODIURIL) 25 MG TABS Take 1 Tab by mouth every day. 30 Tab 5       Current Medications:      Current Facility-Administered Medications:   •  [START ON 12/25/2019] senna-docusate (PERICOLACE or SENOKOT S) 8.6-50 MG per tablet 2 Tab, 2 Tab, Oral, BID **AND** polyethylene glycol/lytes (MIRALAX) PACKET 1 Packet, 1 Packet, Oral, QDAY PRN **AND** magnesium hydroxide (MILK OF MAGNESIA) suspension 30 mL, 30 mL, Oral, QDAY PRN **AND** bisacodyl (DULCOLAX) suppository 10 mg, 10 mg, Rectal, QDAY PRN, Wilberto Ness M.D.  •  MD Alert...ICU Electrolyte Replacement per Pharmacy, , Other, PHARMACY TO DOSE, Wilberto Ness M.D.  •  Respiratory Care per Protocol, , Nebulization, Continuous RT, Wilberto Ness M.D.  •  niCARdipine (CARDENE) 25 mg in  mL Infusion, 0-15 mg/hr, Intravenous, Continuous, Wilberto Ness M.D., Last Rate: 75 mL/hr at 12/24/19 2329, 7.5 mg/hr at 12/24/19 2329  •  acetaminophen (TYLENOL) tablet 650 mg, 650 mg, Oral, Q4HRS PRN **OR** acetaminophen (TYLENOL) suppository 650 mg, 650 mg, Rectal, Q4HRS PRN, Wilberto Ness M.D.  •  ondansetron (ZOFRAN ODT) dispertab 4 mg, 4 mg, Oral, Q4HRS PRN **OR** ondansetron (ZOFRAN) syringe/vial injection 4 mg, 4 mg, Intravenous, Q4HRS PRN, Wilberto Ness M.D.  •  [START ON 12/25/2019] potassium chloride (KCL) ivpb 10 mEq, 10 mEq, Intravenous, Q HOUR, Brian Ledesma M.D.    Allergies:    Patient has no known allergies.    Past Surgical History:    Past Surgical History:   Procedure Laterality Date   • APPENDECTOMY     • KNEE RECONSTRUCTION      x5       Past Medical History:    Past Medical History:   Diagnosis Date   • Depression 8/28/2009   • HTN 8/28/2009   • Migraines, neuralgic 8/28/2009       Social History:    Social History     Socioeconomic History   • Marital status:      Spouse name: Not on file   • Number of children: Not on file   • Years of education: Not on file   • Highest  education level: Not on file   Occupational History   • Not on file   Social Needs   • Financial resource strain: Not on file   • Food insecurity:     Worry: Not on file     Inability: Not on file   • Transportation needs:     Medical: Not on file     Non-medical: Not on file   Tobacco Use   • Smoking status: Never Smoker   • Smokeless tobacco: Never Used   Substance and Sexual Activity   • Alcohol use: Yes   • Drug use: Yes     Types: Intravenous     Comment: daily marijuana use   • Sexual activity: Not on file   Lifestyle   • Physical activity:     Days per week: Not on file     Minutes per session: Not on file   • Stress: Not on file   Relationships   • Social connections:     Talks on phone: Not on file     Gets together: Not on file     Attends Sabianist service: Not on file     Active member of club or organization: Not on file     Attends meetings of clubs or organizations: Not on file     Relationship status: Not on file   • Intimate partner violence:     Fear of current or ex partner: Not on file     Emotionally abused: Not on file     Physically abused: Not on file     Forced sexual activity: Not on file   Other Topics Concern   • Not on file   Social History Narrative   • Not on file       Family History:    History reviewed. No pertinent family history.    Review of System:    Review of Systems   Unable to perform ROS: Acuity of condition       Physical Examination:    BP (!) 164/84   Pulse 88   Temp 37.2 °C (99 °F) (Temporal)   Resp 17   Ht 1.829 m (6')   Wt 100.3 kg (221 lb 1.9 oz)   SpO2 94%   BMI 29.99 kg/m²   Physical Exam   HENT:   Head: Normocephalic and atraumatic.   Right Ear: External ear normal.   Left Ear: External ear normal.   Mouth/Throat: No oropharyngeal exudate.   Eyes: Pupils are equal, round, and reactive to light. Conjunctivae are normal. Right eye exhibits no discharge. Left eye exhibits no discharge.   Neck: Normal range of motion. Neck supple. No tracheal deviation present.    Cardiovascular: Intact distal pulses. Exam reveals no gallop.   No murmur heard.  Sinus rhythm   Pulmonary/Chest: No stridor. He has no wheezes. He has no rales.   Abdominal: Soft. Bowel sounds are normal. He exhibits no distension. There is no tenderness. There is no rebound.   Musculoskeletal:         General: No tenderness, deformity or edema.      Comments: No clubbing or cyanosis   Neurological:   He is somnolent, but arouses easily.  He is weak in his left arm and left leg.  His pupils are equal round react to light.  He is oriented.   Skin: He is not diaphoretic.       Laboratory Data:        Recent Labs     12/24/19  1818   WBC 8.5   RBC 5.54   HEMOGLOBIN 16.2   HEMATOCRIT 48.6   MCV 87.7   MCH 29.2   MCHC 33.3*   RDW 41.8   PLATELETCT 222   MPV 9.2     Recent Labs     12/24/19  1818   SODIUM 139   POTASSIUM 3.6   CHLORIDE 103   CO2 29   GLUCOSE 96   BUN 16   CREATININE 1.12   CALCIUM 9.2             Recent Labs     12/24/19  1937   METHADONE Negative   OPIATES Negative   CANNABINOID Negative   AMPHUR Positive*       Imaging:    I personally viewed the CXR and CT scan images as well as reviewed the radiology interpretation reports.    DX-CHEST-PORTABLE (1 VIEW)   Final Result      No evidence of acute cardiopulmonary process.      CT-CTA NECK WITH & W/O-POST PROCESSING   Final Result      CT angiogram of the neck within normal limits. No large vessel occlusion or stenosis. No dissection.      CT-CTA HEAD WITH & W/O-POST PROCESS   Final Result      1.  No evidence of intracranial vascular occlusion or aneurysm.      2.  Large right basal ganglial intraparenchymal hemorrhage with extension into the right lateral ventricle again noted.      CT-HEAD W/O   Final Result      2.7 cm acute right basal ganglia/thalamic hemorrhage extravasating into the right lateral ventricle.   No hydrocephalus.   Mild right-sided mass effect with minimal right-to-left midline shift.   Central white matter low attenuation  consistent with microvascular ischemic changes.      CT-HEAD W/O    (Results Pending)   EC-ECHOCARDIOGRAM COMPLETE W/O CONT    (Results Pending)       Assessment and Plan:    * Intraparenchymal hemorrhage of brain (HCC)- (present on admission)  Assessment & Plan  Imaging confirms a 2.7 cm right basal ganglia hemorrhage which is likely hypertensive in nature  Extension into the right lateral ventricle  No hydrocephalus  Neuro checks every hour  Strict blood pressure control with goal MAP   I am titrating a nicardipine drip to achieve blood pressure goals    Methamphetamine abuse (HCC)  Assessment & Plan  Cessation counseling when clinically appropriate  Urine drug screen positive for amphetamines    JAYASHREE (obstructive sleep apnea)  Assessment & Plan       Hypertensive emergency- (present on admission)  Assessment & Plan  Strict blood pressure control with goal MAP   I am titrating a nicardipine drip to achieve blood pressure goals    Hypokalemia  Assessment & Plan  Replete potassium    I have assessed and reassessed his neurologic status, blood pressure, hemodynamics and cardiovascular status with the titration of a nicardipine drip.  He is at high risk for worsening CNS system dysfunction.    High risk of deterioration and worsening vital organ dysfunction and death without the above critical care interventions.    Thank you for allowing me to participate in the care of this gentleman.  I will continue to follow him with great interest.    Critical Care Time: 35 minutes  [id number]  No time overlap  Time excludes procedures  Discussed with RN, RT, ER physician, neurologist, [facility] resident    Brian Ledesma MD  Pulmonary and Critical Care Medicine

## 2019-12-25 NOTE — ASSESSMENT & PLAN NOTE
- Not on CPAP at home  - Unable to use Bipap with mental status and stroke  - Continue O2 supplementation as needed  - CTM

## 2019-12-25 NOTE — H&P
Internal Medicine Admitting History and Physical    Note Author: Wilberto Ness M.D.       Name Jacinto Loyola       1970   Age/Sex 49 y.o. male   MRN [id number]   Code Status Full     After 5PM or if no immediate response to page, please call for cross-coverage  Attending/Team: Alexandre Tinoco  See Patient List for primary contact information  Call [phone] to page    1st Call - Day Intern (R1):    2nd Call - Day Sr. Resident (R2/R3):   Rupal       Chief Complaint:   Intraparenchymal Hemorrhage    HPI:  49-year-old male with a past medical history of hypertension, JAYASHREE, medications noncompliance and h/o meth abuse was brought in via EMS after he was found down at outside of his home at around 1600 on 2019.  As per chart review, patient parked his car, took a few steps and collapsed down on his front yard. Down for approximately 30 minutes before being found by friend.  Patient noted for left lower and upper extremity weakness as well as left facial weakness/numbness.     At the ED, blood pressure in the 190s/110-130s, heart rate in the 70s, patient saturating above 90% on 2 liters nasal cannula.  No leukocytosis or anemia on CBC.  Chemistry, troponin, INR and diagnostic alcohol normal.  Checks x-ray without acute cardiopulmonary process.  Head CT showed a 2.7 cm acute right basal ganglia/thalamic hemorrhage extravasating into the right lateral ventricle along with small right-sided mass-effect with minimal right-to-left midline shift.  No hydrocephalus. This was confirmed on Head CTA. Neurology was consulted by EDP.  He was given a one-time dose of labetalol and was started on a Cardene drip.  As per neurology goal MAP of .  No indication for neurosurgical involvement at this time.  Patient was admitted to ICU for intraparenchymal hemorrhage management and hypertensive crisis control.     Review of Systems   Unable to perform ROS: Mental status change     Past Medical  History (Chronic medical problem, known complications and current treatment)    Hypertension  JAYASHREE  Meth use    Past Surgical History:  Past Surgical History:   Procedure Laterality Date   • APPENDECTOMY     • KNEE RECONSTRUCTION      x5       Current Outpatient Medications:  Home Medications     Reviewed by Dana Dickinson (Pharmacy Tech) on 12/24/19 at 1908  Med List Status: Unable to Obtain   Medication Last Dose Status   amlodipine (NORVASC) 10 MG TABS  Active   carvedilol (COREG) 12.5 MG TABS  Active   hydrochlorothiazide (HYDRODIURIL) 25 MG TABS  Active   hydrochlorothiazide (HYDRODIURIL) 25 MG TABS  Active   lisinopril (PRINIVIL) 10 MG TABS  Active                Medication Allergy/Sensitivities:  No Known Allergies      Family History (mandatory)   History reviewed. No pertinent family history.    Social History (mandatory)   Social History     Socioeconomic History   • Marital status:      Spouse name: Not on file   • Number of children: Not on file   • Years of education: Not on file   • Highest education level: Not on file   Occupational History   • Not on file   Social Needs   • Financial resource strain: Not on file   • Food insecurity:     Worry: Not on file     Inability: Not on file   • Transportation needs:     Medical: Not on file     Non-medical: Not on file   Tobacco Use   • Smoking status: Never Smoker   • Smokeless tobacco: Never Used   Substance and Sexual Activity   • Alcohol use: Yes   • Drug use: Yes     Types: Intravenous     Comment: daily marijuana use   • Sexual activity: Not on file   Lifestyle   • Physical activity:     Days per week: Not on file     Minutes per session: Not on file   • Stress: Not on file   Relationships   • Social connections:     Talks on phone: Not on file     Gets together: Not on file     Attends Zoroastrian service: Not on file     Active member of club or organization: Not on file     Attends meetings of clubs or organizations: Not on file      Relationship status: Not on file   • Intimate partner violence:     Fear of current or ex partner: Not on file     Emotionally abused: Not on file     Physically abused: Not on file     Forced sexual activity: Not on file   Other Topics Concern   • Not on file   Social History Narrative   • Not on file     PCP : Pcp Pt States None    Physical Exam     Vitals:    12/24/19 1900 12/24/19 1905 12/24/19 1915 12/24/19 1930   BP: 121/97 135/95 148/113 141/93   Pulse: 72 72 87 87   Resp: 17 17 19    Temp:       TempSrc:       SpO2: 92% 96% 98% 99%   Weight:       Height:         Body mass index is 31.44 kg/m².  O2 therapy: Pulse Oximetry: 99 %    Physical Exam   Constitutional: He is well-developed, well-nourished, and in no distress. No distress.   HENT:   Head: Normocephalic and atraumatic.   Eyes: Right eye exhibits no discharge. Left eye exhibits no discharge.   Neck: No tracheal deviation present.   Cardiovascular: Normal rate and regular rhythm.   No murmur heard.  Pulmonary/Chest: Effort normal and breath sounds normal. No respiratory distress.   Abdominal: Soft. Bowel sounds are normal. He exhibits no distension.   Musculoskeletal: Normal range of motion.         General: No tenderness or edema.   Lymphadenopathy:     He has no cervical adenopathy.   Neurological:   AAO to self  Drowsy  Following commands  Severe dysarthria  Right first gaze neglect  Left upper and lower extremity plegia, hyperflexion  No left-sided sensation to pressure or pain     Skin: Skin is warm and dry. No rash noted. He is not diaphoretic. No erythema. No pallor.       Data Review       Old Records Request:   Completed  Current Records review/summary: Completed    Lab Data Review:  Recent Results (from the past 24 hour(s))   CBC WITH DIFFERENTIAL    Collection Time: 12/24/19  6:18 PM   Result Value Ref Range    WBC 8.5 4.8 - 10.8 K/uL    RBC 5.54 4.70 - 6.10 M/uL    Hemoglobin 16.2 14.0 - 18.0 g/dL    Hematocrit 48.6 42.0 - 52.0 %    MCV  87.7 81.4 - 97.8 fL    MCH 29.2 27.0 - 33.0 pg    MCHC 33.3 (L) 33.7 - 35.3 g/dL    RDW 41.8 35.9 - 50.0 fL    Platelet Count 222 164 - 446 K/uL    MPV 9.2 9.0 - 12.9 fL    Neutrophils-Polys 54.90 44.00 - 72.00 %    Lymphocytes 33.50 22.00 - 41.00 %    Monocytes 8.70 0.00 - 13.40 %    Eosinophils 2.00 0.00 - 6.90 %    Basophils 0.50 0.00 - 1.80 %    Immature Granulocytes 0.40 0.00 - 0.90 %    Nucleated RBC 0.00 /100 WBC    Neutrophils (Absolute) 4.67 1.82 - 7.42 K/uL    Lymphs (Absolute) 2.85 1.00 - 4.80 K/uL    Monos (Absolute) 0.74 0.00 - 0.85 K/uL    Eos (Absolute) 0.17 0.00 - 0.51 K/uL    Baso (Absolute) 0.04 0.00 - 0.12 K/uL    Immature Granulocytes (abs) 0.03 0.00 - 0.11 K/uL    NRBC (Absolute) 0.00 K/uL   COMP METABOLIC PANEL    Collection Time: 12/24/19  6:18 PM   Result Value Ref Range    Sodium 139 135 - 145 mmol/L    Potassium 3.6 3.6 - 5.5 mmol/L    Chloride 103 96 - 112 mmol/L    Co2 29 20 - 33 mmol/L    Anion Gap 7.0 0.0 - 11.9    Glucose 96 65 - 99 mg/dL    Bun 16 8 - 22 mg/dL    Creatinine 1.12 0.50 - 1.40 mg/dL    Calcium 9.2 8.5 - 10.5 mg/dL    AST(SGOT) 19 12 - 45 U/L    ALT(SGPT) 15 2 - 50 U/L    Alkaline Phosphatase 65 30 - 99 U/L    Total Bilirubin 0.6 0.1 - 1.5 mg/dL    Albumin 3.9 3.2 - 4.9 g/dL    Total Protein 6.8 6.0 - 8.2 g/dL    Globulin 2.9 1.9 - 3.5 g/dL    A-G Ratio 1.3 g/dL   PROTHROMBIN TIME    Collection Time: 12/24/19  6:18 PM   Result Value Ref Range    PT 12.8 12.0 - 14.6 sec    INR 0.95 0.87 - 1.13   APTT    Collection Time: 12/24/19  6:18 PM   Result Value Ref Range    APTT 26.8 24.7 - 36.0 sec   COD (ADULT)    Collection Time: 12/24/19  6:18 PM   Result Value Ref Range    ABO Grouping Only O     Rh Grouping Only NEG     Antibody Screen-Cod NEG    TROPONIN    Collection Time: 12/24/19  6:18 PM   Result Value Ref Range    Troponin T 19 6 - 19 ng/L   DIAGNOSTIC ALCOHOL    Collection Time: 12/24/19  6:18 PM   Result Value Ref Range    Diagnostic Alcohol 0.01 (H) 0.00 g/dL    ESTIMATED GFR    Collection Time: 19  6:18 PM   Result Value Ref Range    GFR If African American >60 >60 mL/min/1.73 m 2    GFR If Non African American >60 >60 mL/min/1.73 m 2   Lipid Profile    Collection Time: 19  6:18 PM   Result Value Ref Range    Cholesterol,Tot 136 100 - 199 mg/dL    Triglycerides 154 (H) 0 - 149 mg/dL    HDL 44 >=40 mg/dL    LDL 61 <100 mg/dL   EKG (NOW)    Collection Time: 19  6:46 PM   Result Value Ref Range    Report       [facility] Emergency Dept.    Test Date:  2019  Pt Name:    TERESA KIM          Department: ER  MRN:        [id number]                      Room:       [facility]  Gender:     Male                         Technician: [id number]  :        1970                   Requested By:SAAD RAMIREZ II  Order #:    [id number]                    Reading MD: Saad Ramirez II, MD    Measurements  Intervals                                Axis  Rate:       64                           P:          53  IL:         173                          QRS:        49  QRSD:       100                          T:          131  QT:         455  QTc:        470    Interpretive Statements  Sinus rhythm  Rate 64  Normal intervals  Twave inversion in I, aVL; changed from prior  No ST elevation or depression  Normal sinus rhythm EKG with nonspecific twave inversions  Compared to ECG 2017 21:16:16  Left ventricular hypertrophy now present  Early repolarization now present  Prolon ged QT interval no longer present  Electronically Signed On 2019 19:05:30 PST by Saad Ramirez II, MD     ABO Rh Confirm    Collection Time: 19  7:10 PM   Result Value Ref Range    ABO Rh Confirm O NEG        Imaging/Procedures Review:    Independant Imaging Review: Completed  DX-CHEST-PORTABLE (1 VIEW)   Final Result      No evidence of acute cardiopulmonary process.      CT-CTA NECK WITH & W/O-POST PROCESSING   Final Result      CT angiogram of the  neck within normal limits. No large vessel occlusion or stenosis. No dissection.      CT-CTA HEAD WITH & W/O-POST PROCESS   Final Result      1.  No evidence of intracranial vascular occlusion or aneurysm.      2.  Large right basal ganglial intraparenchymal hemorrhage with extension into the right lateral ventricle again noted.      CT-HEAD W/O   Final Result      2.7 cm acute right basal ganglia/thalamic hemorrhage extravasating into the right lateral ventricle.   No hydrocephalus.   Mild right-sided mass effect with minimal right-to-left midline shift.   Central white matter low attenuation consistent with microvascular ischemic changes.      CT-HEAD W/O    (Results Pending)             Assessment/Plan     * Intraparenchymal hemorrhage of brain (HCC)- (present on admission)  Assessment & Plan  History of blood pressure medication noncompliance  History of methamphetamine abuse  Presented with left upper extremity, left lower extremity, and left facial weakness as well as slurred speech  Head CT showed a 2.7 cm acute right basal ganglia/thalamic hemorrhage extravasating into the right lateral ventricle along with small right-sided mass-effect with minimal right-to-left midline shift.  No hydrocephalus  Head CTA showed above  Head/neck CT without vessel occlusions  2/2 uncontrolled hypertension   Received a total of 30 mg of Labetalol push and was started on a Nicardipine drip  Admit to ICU  Keep NPO  NG tube  Keep head of bed 30 degrees  Neurochecks Q1H  RT/O2  Continue IV hydration  Continue Nicardipine drip (titrate to MAP )  Neurology following, appreciate recommendations  PT/OT/Speech/PMR  Head CT on AM    Hypertensive emergency- (present on admission)  Assessment & Plan  Presented with blood pressure in the 190s/110-130s  Intraparenchymal hemorrhage, no BEVERLY on labs or EKG changes/trops elevation  Continue nicardipine drip (Goal MAP )  Pending UDS    JAYASHREE (obstructive sleep apnea)  Assessment &  Plan  Not on CPAP  Continue O2 supplementation      Anticipated Hospital stay:  >2 midnights        Quality Measures  Quality-Core Measures   Reviewed items::  EKG reviewed, Labs reviewed, Medications reviewed and Radiology images reviewed  Villasenor catheter::  Critically Ill - Requiring Accurate Measurement of Urinary Output  DVT prophylaxis pharmacological::  Contraindicated - High bleeding risk  DVT prophylaxis - mechanical:  SCDs  Ulcer Prophylaxis::  Yes    PCP: Pcp Pt States None

## 2019-12-26 ENCOUNTER — APPOINTMENT (OUTPATIENT)
Dept: RADIOLOGY | Facility: MEDICAL CENTER | Age: 49
DRG: 064 | End: 2019-12-26
Attending: PSYCHIATRY & NEUROLOGY
Payer: MEDICAID

## 2019-12-26 ENCOUNTER — APPOINTMENT (OUTPATIENT)
Dept: RADIOLOGY | Facility: MEDICAL CENTER | Age: 49
DRG: 064 | End: 2019-12-26
Attending: INTERNAL MEDICINE
Payer: MEDICAID

## 2019-12-26 LAB
ACTION RANGE TRIGGERED IACRT: NO
ANION GAP SERPL CALC-SCNC: 8 MMOL/L (ref 0–11.9)
BASE EXCESS BLDA CALC-SCNC: -4 MMOL/L (ref -4–3)
BASOPHILS # BLD AUTO: 0.2 % (ref 0–1.8)
BASOPHILS # BLD: 0.04 K/UL (ref 0–0.12)
BODY TEMPERATURE: ABNORMAL DEGREES
BUN SERPL-MCNC: 13 MG/DL (ref 8–22)
CALCIUM SERPL-MCNC: 8.3 MG/DL (ref 8.5–10.5)
CHLORIDE SERPL-SCNC: 105 MMOL/L (ref 96–112)
CO2 BLDA-SCNC: 21 MMOL/L (ref 20–33)
CO2 SERPL-SCNC: 23 MMOL/L (ref 20–33)
CREAT SERPL-MCNC: 0.9 MG/DL (ref 0.5–1.4)
CRP SERPL HS-MCNC: 0.37 MG/DL (ref 0–0.75)
EOSINOPHIL # BLD AUTO: 0.07 K/UL (ref 0–0.51)
EOSINOPHIL NFR BLD: 0.4 % (ref 0–6.9)
ERYTHROCYTE [DISTWIDTH] IN BLOOD BY AUTOMATED COUNT: 40.2 FL (ref 35.9–50)
GLUCOSE BLD-MCNC: 120 MG/DL (ref 65–99)
GLUCOSE SERPL-MCNC: 122 MG/DL (ref 65–99)
HCO3 BLDA-SCNC: 20 MMOL/L (ref 17–25)
HCT VFR BLD AUTO: 47.3 % (ref 42–52)
HGB BLD-MCNC: 16.3 G/DL (ref 14–18)
HOROWITZ INDEX BLDA+IHG-RTO: 169 MM[HG]
IMM GRANULOCYTES # BLD AUTO: 0.1 K/UL (ref 0–0.11)
IMM GRANULOCYTES NFR BLD AUTO: 0.6 % (ref 0–0.9)
INST. QUALIFIED PATIENT IIQPT: YES
LYMPHOCYTES # BLD AUTO: 1.91 K/UL (ref 1–4.8)
LYMPHOCYTES NFR BLD: 11.3 % (ref 22–41)
MAGNESIUM SERPL-MCNC: 1.9 MG/DL (ref 1.5–2.5)
MCH RBC QN AUTO: 29.3 PG (ref 27–33)
MCHC RBC AUTO-ENTMCNC: 34.5 G/DL (ref 33.7–35.3)
MCV RBC AUTO: 84.9 FL (ref 81.4–97.8)
MONOCYTES # BLD AUTO: 1.41 K/UL (ref 0–0.85)
MONOCYTES NFR BLD AUTO: 8.3 % (ref 0–13.4)
NEUTROPHILS # BLD AUTO: 13.44 K/UL (ref 1.82–7.42)
NEUTROPHILS NFR BLD: 79.2 % (ref 44–72)
NRBC # BLD AUTO: 0 K/UL
NRBC BLD-RTO: 0 /100 WBC
O2/TOTAL GAS SETTING VFR VENT: 36 %
PCO2 BLDA: 34.4 MMHG (ref 26–37)
PCO2 TEMP ADJ BLDA: 34.4 MMHG (ref 26–37)
PH BLDA: 7.37 [PH] (ref 7.4–7.5)
PH TEMP ADJ BLDA: 7.37 [PH] (ref 7.4–7.5)
PHOSPHATE SERPL-MCNC: 1.9 MG/DL (ref 2.5–4.5)
PLATELET # BLD AUTO: 272 K/UL (ref 164–446)
PMV BLD AUTO: 9.1 FL (ref 9–12.9)
PO2 BLDA: 61 MMHG (ref 64–87)
PO2 TEMP ADJ BLDA: 61 MMHG (ref 64–87)
POTASSIUM SERPL-SCNC: 3.6 MMOL/L (ref 3.6–5.5)
PREALB SERPL-MCNC: 20 MG/DL (ref 18–38)
RBC # BLD AUTO: 5.57 M/UL (ref 4.7–6.1)
SAO2 % BLDA: 91 % (ref 93–99)
SODIUM SERPL-SCNC: 136 MMOL/L (ref 135–145)
SPECIMEN DRAWN FROM PATIENT: ABNORMAL
WBC # BLD AUTO: 17 K/UL (ref 4.8–10.8)

## 2019-12-26 PROCEDURE — 700105 HCHG RX REV CODE 258: Performed by: INTERNAL MEDICINE

## 2019-12-26 PROCEDURE — 84134 ASSAY OF PREALBUMIN: CPT

## 2019-12-26 PROCEDURE — 700111 HCHG RX REV CODE 636 W/ 250 OVERRIDE (IP): Performed by: STUDENT IN AN ORGANIZED HEALTH CARE EDUCATION/TRAINING PROGRAM

## 2019-12-26 PROCEDURE — 700105 HCHG RX REV CODE 258: Performed by: STUDENT IN AN ORGANIZED HEALTH CARE EDUCATION/TRAINING PROGRAM

## 2019-12-26 PROCEDURE — 306559 VEST RESTRAINT (LARGE): Performed by: STUDENT IN AN ORGANIZED HEALTH CARE EDUCATION/TRAINING PROGRAM

## 2019-12-26 PROCEDURE — 82962 GLUCOSE BLOOD TEST: CPT

## 2019-12-26 PROCEDURE — 700111 HCHG RX REV CODE 636 W/ 250 OVERRIDE (IP): Performed by: INTERNAL MEDICINE

## 2019-12-26 PROCEDURE — 36600 WITHDRAWAL OF ARTERIAL BLOOD: CPT

## 2019-12-26 PROCEDURE — 99232 SBSQ HOSP IP/OBS MODERATE 35: CPT | Performed by: PSYCHIATRY & NEUROLOGY

## 2019-12-26 PROCEDURE — 700101 HCHG RX REV CODE 250: Performed by: STUDENT IN AN ORGANIZED HEALTH CARE EDUCATION/TRAINING PROGRAM

## 2019-12-26 PROCEDURE — 700111 HCHG RX REV CODE 636 W/ 250 OVERRIDE (IP)

## 2019-12-26 PROCEDURE — 700102 HCHG RX REV CODE 250 W/ 637 OVERRIDE(OP): Performed by: INTERNAL MEDICINE

## 2019-12-26 PROCEDURE — A9270 NON-COVERED ITEM OR SERVICE: HCPCS | Performed by: INTERNAL MEDICINE

## 2019-12-26 PROCEDURE — 99291 CRITICAL CARE FIRST HOUR: CPT | Performed by: INTERNAL MEDICINE

## 2019-12-26 PROCEDURE — 770022 HCHG ROOM/CARE - ICU (200)

## 2019-12-26 PROCEDURE — 99292 CRITICAL CARE ADDL 30 MIN: CPT | Performed by: INTERNAL MEDICINE

## 2019-12-26 PROCEDURE — 84100 ASSAY OF PHOSPHORUS: CPT

## 2019-12-26 PROCEDURE — 82803 BLOOD GASES ANY COMBINATION: CPT

## 2019-12-26 PROCEDURE — 85025 COMPLETE CBC W/AUTO DIFF WBC: CPT

## 2019-12-26 PROCEDURE — 83735 ASSAY OF MAGNESIUM: CPT

## 2019-12-26 PROCEDURE — 86140 C-REACTIVE PROTEIN: CPT

## 2019-12-26 PROCEDURE — 70450 CT HEAD/BRAIN W/O DYE: CPT

## 2019-12-26 PROCEDURE — 80048 BASIC METABOLIC PNL TOTAL CA: CPT

## 2019-12-26 PROCEDURE — 700101 HCHG RX REV CODE 250: Performed by: INTERNAL MEDICINE

## 2019-12-26 RX ORDER — MAGNESIUM SULFATE HEPTAHYDRATE 40 MG/ML
2 INJECTION, SOLUTION INTRAVENOUS ONCE
Status: COMPLETED | OUTPATIENT
Start: 2019-12-26 | End: 2019-12-26

## 2019-12-26 RX ORDER — CLONIDINE 0.2 MG/24H
1 PATCH, EXTENDED RELEASE TRANSDERMAL
Status: DISCONTINUED | OUTPATIENT
Start: 2019-12-26 | End: 2019-12-26

## 2019-12-26 RX ORDER — METOPROLOL TARTRATE 1 MG/ML
5 INJECTION, SOLUTION INTRAVENOUS EVERY 6 HOURS
Status: DISPENSED | OUTPATIENT
Start: 2019-12-26 | End: 2019-12-27

## 2019-12-26 RX ORDER — HALOPERIDOL 5 MG/ML
2 INJECTION INTRAMUSCULAR
Status: DISCONTINUED | OUTPATIENT
Start: 2019-12-26 | End: 2019-12-30

## 2019-12-26 RX ORDER — PHENYLEPHRINE HCL IN 0.9% NACL 0.5 MG/5ML
SYRINGE (ML) INTRAVENOUS
Status: COMPLETED
Start: 2019-12-26 | End: 2019-12-26

## 2019-12-26 RX ORDER — CLONIDINE HYDROCHLORIDE 0.1 MG/1
0.2 TABLET ORAL 3 TIMES DAILY
Status: DISCONTINUED | OUTPATIENT
Start: 2019-12-26 | End: 2019-12-26

## 2019-12-26 RX ORDER — KETOROLAC TROMETHAMINE 30 MG/ML
30 INJECTION, SOLUTION INTRAMUSCULAR; INTRAVENOUS ONCE
Status: COMPLETED | OUTPATIENT
Start: 2019-12-26 | End: 2019-12-26

## 2019-12-26 RX ORDER — DEXMEDETOMIDINE HYDROCHLORIDE 4 UG/ML
.1-1.5 INJECTION, SOLUTION INTRAVENOUS CONTINUOUS
Status: DISCONTINUED | OUTPATIENT
Start: 2019-12-26 | End: 2019-12-28

## 2019-12-26 RX ORDER — HALOPERIDOL 5 MG/ML
5 INJECTION INTRAMUSCULAR ONCE
Status: COMPLETED | OUTPATIENT
Start: 2019-12-26 | End: 2019-12-26

## 2019-12-26 RX ORDER — PHENYLEPHRINE HCL IN 0.9% NACL 0.5 MG/5ML
100 SYRINGE (ML) INTRAVENOUS
Status: ACTIVE | OUTPATIENT
Start: 2019-12-26 | End: 2019-12-26

## 2019-12-26 RX ORDER — OLANZAPINE 5 MG/1
5 TABLET ORAL EVERY 12 HOURS
Status: DISCONTINUED | OUTPATIENT
Start: 2019-12-26 | End: 2019-12-28

## 2019-12-26 RX ORDER — SODIUM CHLORIDE 9 MG/ML
1000 INJECTION, SOLUTION INTRAVENOUS ONCE
Status: COMPLETED | OUTPATIENT
Start: 2019-12-26 | End: 2019-12-26

## 2019-12-26 RX ADMIN — SODIUM CHLORIDE 10 MG/HR: 9 INJECTION, SOLUTION INTRAVENOUS at 10:27

## 2019-12-26 RX ADMIN — DEXMEDETOMIDINE HYDROCHLORIDE 1.5 MCG/KG/HR: 100 INJECTION, SOLUTION INTRAVENOUS at 10:29

## 2019-12-26 RX ADMIN — ACETAMINOPHEN 650 MG: 650 SUPPOSITORY RECTAL at 23:41

## 2019-12-26 RX ADMIN — SODIUM CHLORIDE 12.5 MG/HR: 9 INJECTION, SOLUTION INTRAVENOUS at 08:28

## 2019-12-26 RX ADMIN — POTASSIUM PHOSPHATE, MONOBASIC AND POTASSIUM PHOSPHATE, DIBASIC 30 MMOL: 224; 236 INJECTION, SOLUTION, CONCENTRATE INTRAVENOUS at 08:39

## 2019-12-26 RX ADMIN — OLANZAPINE 5 MG: 5 TABLET, FILM COATED ORAL at 14:30

## 2019-12-26 RX ADMIN — SODIUM CHLORIDE 1000 ML: 9 INJECTION, SOLUTION INTRAVENOUS at 12:15

## 2019-12-26 RX ADMIN — METOPROLOL TARTRATE 5 MG: 5 INJECTION, SOLUTION INTRAVENOUS at 23:57

## 2019-12-26 RX ADMIN — SENNOSIDES AND DOCUSATE SODIUM 2 TABLET: 8.6; 5 TABLET ORAL at 17:12

## 2019-12-26 RX ADMIN — METOPROLOL TARTRATE 5 MG: 5 INJECTION, SOLUTION INTRAVENOUS at 17:12

## 2019-12-26 RX ADMIN — LABETALOL HYDROCHLORIDE 10 MG: 5 INJECTION INTRAVENOUS at 22:17

## 2019-12-26 RX ADMIN — DEXMEDETOMIDINE HYDROCHLORIDE 0.2 MCG/KG/HR: 100 INJECTION, SOLUTION INTRAVENOUS at 06:38

## 2019-12-26 RX ADMIN — KETOROLAC TROMETHAMINE 30 MG: 30 INJECTION, SOLUTION INTRAMUSCULAR; INTRAVENOUS at 04:10

## 2019-12-26 RX ADMIN — SODIUM CHLORIDE 12.5 MG/HR: 9 INJECTION, SOLUTION INTRAVENOUS at 15:32

## 2019-12-26 RX ADMIN — MAGNESIUM SULFATE IN WATER 2 G: 40 INJECTION, SOLUTION INTRAVENOUS at 07:27

## 2019-12-26 RX ADMIN — OLANZAPINE 5 MG: 5 TABLET, FILM COATED ORAL at 17:11

## 2019-12-26 RX ADMIN — HALOPERIDOL LACTATE 5 MG: 5 INJECTION, SOLUTION INTRAMUSCULAR at 04:47

## 2019-12-26 RX ADMIN — Medication 300 MCG: at 12:15

## 2019-12-26 RX ADMIN — CARVEDILOL 12.5 MG: 6.25 TABLET, FILM COATED ORAL at 17:11

## 2019-12-26 RX ADMIN — SODIUM CHLORIDE 7.5 MG/HR: 9 INJECTION, SOLUTION INTRAVENOUS at 04:58

## 2019-12-26 NOTE — PROGRESS NOTES
Critical Care Progress Note    Date of admission  12/24/2019    Chief Complaint  Jacinto Loyola, a 49 y.o. male for evaluation and management of the above problem. The history is mostly obtained from healthcare providers and the medical record as this gentleman cannot provide me with any history.  This gentleman has a history of primary hypertension, medical noncompliance, methamphetamine abuse and JAYASHREE.  At approximately 1600 hrs. today he was found down in his yard near his car.  He had left-sided weakness and slurred speech.  EMS was activated.  He complained of a headache en route to the hospital.  On arrival at [facility], he underwent CT imaging which revealed a 2.7 cm right basal ganglia hemorrhage with extension into the right lateral ventricle.  He was emergently seen by Dr. Graff from neurology.  CT imaging of his head and neck did not reveal any evidence of large vessel occlusion, aneurysms or AVMs.  At this point, Dr. Graff does not believe that neurosurgery needs to be involved.  This is likely a hypertensive hemorrhage. Taken from Dr Ledesma note.     Hospital Course    Interval Problem Update  Reviewed last 24 hour events:  Neuro: q1 neurochecks can increase perrl sluggish left side flacid right stronge non verbal dex gtt  HR:   SBP: sbp 130-150 80-110map nicardipine 15 change to < 160 systolic  Tmax: afebrile  GI: cortrax removed, bm prior to arrival  UOP: 2l   Lines: 2 peripheral IV  Resp: snoring and very obstructive may need opa, oxy mask  Vte: contra  PPI/H2:none  Antibx: none    kphos   Mag replace  Dex gtt  zyprexa 5mg BID  Nicardipine gtt 15  Start IV metoprolol 5mg IV Q6  Ct head slightly worse   Repeat cortrax  Clonidine patch and oral for withdrawal    Worsening mental status and hypotension to 90 didn't receive any of the above medication  Dex gtt stopped    Emory 300mcg IV given  1l NS give  abg done not retaining  Serial monitoring closely  Call to neurology for  update  Will adjust above medication    Review of Systems  Review of Systems   Unable to perform ROS: Mental acuity        Vital Signs for last 24 hours   Temp:  [36.7 °C (98 °F)-37.7 °C (99.9 °F)] 36.7 °C (98 °F)  Pulse:  [] 78  Resp:  [13-39] 29  BP: (104-176)/() 127/91  SpO2:  [85 %-98 %] 93 %    Hemodynamic parameters for last 24 hours       Respiratory Information for the last 24 hours       Physical Exam   Physical Exam  Constitutional:       Appearance: He is not ill-appearing or diaphoretic.      Comments: Diaphoretic ill appearing   HENT:      Head: Normocephalic.      Nose: Nose normal.      Mouth/Throat:      Mouth: Mucous membranes are dry.      Comments: Thick blood like secretions  Eyes:      Comments: Disconjugate gaze   Cardiovascular:      Rate and Rhythm: Tachycardia present.      Heart sounds: No murmur.   Pulmonary:      Effort: No respiratory distress.      Breath sounds: No stridor. No wheezing, rhonchi or rales.   Abdominal:      General: Abdomen is flat. There is no distension.      Palpations: There is no mass.      Tenderness: There is no tenderness. There is no guarding or rebound.      Hernia: No hernia is present.   Skin:     Capillary Refill: Capillary refill takes less than 2 seconds.   Neurological:      Comments: Doesn't blink to threat, poor past midline vision when looking right, weak left side but does withdrawal in upper, moves right side spont, snoring respiration, left side neglect   Psychiatric:      Comments: Unable          Medications  Current Facility-Administered Medications   Medication Dose Route Frequency Provider Last Rate Last Dose   • dexmedetomidine (PRECEDEX) 400 mcg/100mL NS premix infusion  0.1-1.5 mcg/kg/hr Intravenous Continuous Constantin Avina M.D. 37.6 mL/hr at 12/26/19 1029 1.5 mcg/kg/hr at 12/26/19 1029   • potassium phosphates 30 mmol in D5W 500 mL ivpb  30 mmol Intravenous Once Sebastián Trent M.D. 83.3 mL/hr at 12/26/19 0839 30  mmol at 12/26/19 0839   • haloperidol lactate (HALDOL) injection 2 mg  2 mg Intravenous Once PRN Constantin Avina M.D.       • OLANZapine (ZYPREXA) tablet 5 mg  5 mg Enteral Tube Q12HRS Constantin Avina M.D.       • Metoprolol Tartrate (LOPRESSOR) injection 5 mg  5 mg Intravenous Q6HRS Constantin Avina M.D.       • cloNIDine (CATAPRES) 0.2 MG/24HR patch 1 Patch  1 Patch Transdermal Q7 DAYS Constantin Avina M.D.       • cloNIDine (CATAPRES) tablet 0.2 mg  0.2 mg Enteral Tube TID Constantin Avina M.D.       • amLODIPine (NORVASC) tablet 10 mg  10 mg Enteral Tube Q DAY Constantin Avina M.D.   Stopped at 12/26/19 0600   • carvedilol (COREG) tablet 12.5 mg  12.5 mg Enteral Tube BID WITH MEALS Constantin Avina M.D.   Stopped at 12/26/19 0730   • Pharmacy Consult: Enteral tube insertion - review meds/change route/product selection  1 Each Other PHARMACY TO DOSE Constantin Avina M.D.       • senna-docusate (PERICOLACE or SENOKOT S) 8.6-50 MG per tablet 2 Tab  2 Tab Enteral Tube BID Constantin Avina M.D.   Stopped at 12/26/19 0600    And   • polyethylene glycol/lytes (MIRALAX) PACKET 1 Packet  1 Packet Enteral Tube QDAY PRN Constantin Avina M.D.        And   • magnesium hydroxide (MILK OF MAGNESIA) suspension 30 mL  30 mL Enteral Tube QDAY PRN Constantin Avina M.D.        And   • bisacodyl (DULCOLAX) suppository 10 mg  10 mg Rectal QDAY PRN Constantin Avina M.D.       • acetaminophen (TYLENOL) tablet 650 mg  650 mg Enteral Tube Q4HRS PRN Constantin Avina M.D.   650 mg at 12/25/19 1603    Or   • acetaminophen (TYLENOL) suppository 650 mg  650 mg Rectal Q4HRS PRN Constantin Avina M.D.       • ondansetron (ZOFRAN ODT) dispertab 4 mg  4 mg Enteral Tube Q4HRS PRN Constantin Avina M.D.        Or   • ondansetron (ZOFRAN) syringe/vial injection 4 mg  4 mg Intravenous Q4HRS PRN Constantin Avina M.D.       • labetalol (NORMODYNE/TRANDATE) injection 10 mg  10 mg Intravenous Q4HRS PRN Wilberto Ness M.D.   10  mg at 12/25/19 2122   • MD Alert...ICU Electrolyte Replacement per Pharmacy   Other PHARMACY TO DOSE Wilberto Ness M.D.       • Respiratory Care per Protocol   Nebulization Continuous RT Wilberto Ness M.D.       • niCARdipine (CARDENE) 25 mg in  mL Infusion  0-15 mg/hr Intravenous Continuous Wilberto Ness M.D. 75 mL/hr at 12/26/19 1151 7.5 mg/hr at 12/26/19 1151       Fluids    Intake/Output Summary (Last 24 hours) at 12/26/2019 1158  Last data filed at 12/26/2019 1000  Gross per 24 hour   Intake 2532.99 ml   Output 2340 ml   Net 192.99 ml       Laboratory  Recent Labs     12/25/19  2205   ISTATAPH 7.404   ISTATAPCO2 37.3*   ISTATAPO2 60*   ISTATATCO2 24   [id number] 91*   ISTATARTHCO3 23.3   ISTATARTBE -1   ISTATTEMP 98.2 F   ISTATFIO2 30   ISTATSPEC Arterial   ISTATAPHTC 7.407   [id number] 59*         Recent Labs     12/24/19 1818 12/25/19  0331 12/26/19  0240   SODIUM 139 136 136   POTASSIUM 3.6 3.5* 3.6   CHLORIDE 103 105 105   CO2 29 24 23   BUN 16 15 13   CREATININE 1.12 1.03 0.90   MAGNESIUM  --  1.7 1.9   PHOSPHORUS  --  1.9* 1.9*   CALCIUM 9.2 8.6 8.3*     Recent Labs     12/24/19 1818 12/25/19  0331 12/26/19  0240   ALTSGPT 15  --   --    ASTSGOT 19  --   --    ALKPHOSPHAT 65  --   --    TBILIRUBIN 0.6  --   --    PREALBUMIN  --   --  20.0   GLUCOSE 96 125* 122*     Recent Labs     12/24/19 1818 12/25/19  0300 12/26/19  0320   WBC 8.5 16.1* 17.0*   NEUTSPOLYS 54.90 82.30* 79.20*   LYMPHOCYTES 33.50 11.20* 11.30*   MONOCYTES 8.70 5.60 8.30   EOSINOPHILS 2.00 0.30 0.40   BASOPHILS 0.50 0.20 0.20   ASTSGOT 19  --   --    ALTSGPT 15  --   --    ALKPHOSPHAT 65  --   --    TBILIRUBIN 0.6  --   --      Recent Labs     12/24/19  1818 12/25/19  0300 12/26/19  0320   RBC 5.54 5.36 5.57   HEMOGLOBIN 16.2 15.8 16.3   HEMATOCRIT 48.6 46.3 47.3   PLATELETCT 222 251 272   PROTHROMBTM 12.8  --   --    APTT 26.8  --   --    INR 0.95  --   --        Imaging  X-Ray:  I have  personally reviewed the images and compared with prior images. and My impression is: bilateral aveolar process no obvious infiltrates  CT:    Reviewed    Assessment/Plan  * Intraparenchymal hemorrhage of brain (HCC)- (present on admission)  Assessment & Plan  Neurosurgery consult: No Neurology felt it was not necessary  Correct and reverse coagulopathy  Avoid platelet transfusion (aspirin/plavix) unless planned neurosurgery (patch trial)  SBP goal < 140 unless chronic hypertension and or poor exam < 160  Aspiration precautions  Head of bed 30 degress   Maintain CPP > 60-70  Neuro check Q 1 and pupilometer  Seizure prophylaxis: not indicated  Maintain euvolemia  Sodium goal: normatremia    Hypertensive related to methamphetamine abuse  Still on nicardipine gtt  On home coreg, norvasc   IV metoprolol  Dex gtt for agitation  Ct head 12/26 reviewed    Methamphetamine abuse (HCC)  Assessment & Plan  Cessation counseling when clinically appropriate  Urine drug screen positive for amphetamines  Will start dex gtt  Start clonidine po and patch  zyprexa  Patient very diaphoretic will start IV metoprolol concerns for betablocker withdrawal    JAYASHREE (obstructive sleep apnea)  Assessment & Plan  Unclear if relate to ICH patient with significant obstructive on exam  Head of bed at 30-45 degrees   Unable to use Bipap with mental status and stroke    Re-evaluate  Will need outpatient sleep study     Hypertensive emergency- (present on admission)  Assessment & Plan  Strict blood pressure control with SBP < 160  I am titrating a nicardipine drip to achieve blood pressure goals  Start home norvasc and coreg  IV metoprolol 5mg IV   Clonidine patch and oral clonidine    Hypokalemia  Assessment & Plan  Replete potassium       VTE:  Contraindicated  Ulcer: Not Indicated  Lines: None    I have performed a physical exam and reviewed and updated ROS and Plan today (12/26/2019). In review of yesterday's note (12/25/2019), there are no changes  except as documented above.     Discussed patient condition and risk of morbidity and/or mortality with Hospitalist, RN, RT, Pharmacy, Charge nurse / hot rounds, Patient and neurology     The patient remains critically ill with agitation on dex gtt with active titration and hypertensive on nicardipine infusion then developed acute hypotension and stat management with neosynephrine push and bolus fluids.  Critical care time = 117 minutes in directly providing and coordinating critical care and extensive data review.  No time overlap and excludes procedures.

## 2019-12-26 NOTE — PROGRESS NOTES
[facility] Progress Note      Admit Date: 12/24/2019  Resident(s): Sebastián Trent   Attending: ABNER Frausto/ MIREYA Chavis    Date & Time:   12/26/2019   12:01 PM       Patient ID:    Name:             Jacinto Loyola     YOB: 1970  Age:                 49 y.o.  male   MRN:               [id number]    Diagnosis:   Intraparenchymal Hemorrhage    ID:  49-year-old male with a past medical history of hypertension, JAYASHREE, medications noncompliance and h/o meth abuse was brought in via EMS after he was found down at outside of his home at around 1600 on 12/24/2019.  As per chart review, patient parked his car, took a few steps and collapsed down on his front yard. Down for approximately 30 minutes before being found by friend.  Patient noted for left lower and upper extremity weakness as well as left facial weakness/numbness.      At the ED, blood pressure in the 190s/110-130s, heart rate in the 70s, patient saturating above 90% on 2 liters nasal cannula.  No leukocytosis or anemia on CBC.  Chemistry, troponin, INR and diagnostic alcohol normal.  Checks x-ray without acute cardiopulmonary process.  Head CT showed a 2.7 cm acute right basal ganglia/thalamic hemorrhage extravasating into the right lateral ventricle along with small right-sided mass-effect with minimal right-to-left midline shift.  No hydrocephalus. This was confirmed on Head CTA. Neurology was consulted by EDP.  He was given a one-time dose of labetalol and was started on a Cardene drip.  As per neurology goal MAP of .  No indication for neurosurgical involvement at this time.  Patient was admitted to ICU for intraparenchymal hemorrhage management and hypertensive crisis control.      Interval Events:  - Afebrile, -157 mmHg, keep MAP , Spo2 88-94% on room air  - Still has dysarthria with left side weakness  - Neurologist on board, follow rec  - Another CT this morning, per neurologist, pending results  -  Replete electrolytes per pharmacy  - Initial UDS positive for ampthetamine      Review of Systems   Unable to perform ROS: Acuity of condition       VITALS:  Pulse: 78  Blood Pressure: 127/91       Temp  Av °C (98.6 °F)  Min: 36.7 °C (98 °F)  Max: 37.7 °C (99.9 °F)         Physical Exam:  Physical Exam  Constitutional: He is well-developed, well-nourished, and in no distress. No distress.   HENT:   Head: Normocephalic and atraumatic.   Eyes: Right eye exhibits no discharge. Left eye exhibits no discharge.   Neck: No tracheal deviation present.   Cardiovascular: Normal rate and regular rhythm.   No murmur heard.  Pulmonary/Chest: Effort normal and breath sounds normal. No respiratory distress.   Abdominal: Soft. Bowel sounds are normal. He exhibits no distension.   Musculoskeletal: Normal range of motion.         General: No tenderness or edema.   Lymphadenopathy:     He has no cervical adenopathy.   Neurological:   AAO to self  Drowsy  Following commands  Severe dysarthria  Right first gaze neglect  Left upper and lower extremity plegia, hyperflexion  No left-sided sensation to pressure or pain  Skin: Skin is warm and dry. No rash noted. He is not diaphoretic. No erythema. No pallor.     Consultants:  Neurologist:  Huan Graff M.D.  PMA: Constantin Avina M.D.      Procedures:  None    HemoDynamics:  Pulse: 78 Blood Pressure: 127/91        Respiratory:     Respiration: (!) 29, Pulse Oximetry: 93 %    Chest Tube Drains:    Recent Labs     19  2205   ISTATAPH 7.404   ISTATAPCO2 37.3*   ISTATAPO2 60*   ISTATATCO2 24   [id number] 91*   ISTATARTHCO3 23.3   ISTATARTBE -1   ISTATTEMP 98.2 F   ISTATFIO2 30   ISTATSPEC Arterial   ISTATAPHTC 7.407   [id number] 59*         Neuro:      Fluids:  Intake/Output       19 0700 - 19 0659 19 0700 - 19 0659 19 07 - 19 0659      0203-8958 2037-2510 Total 3877-6519 2088-3684 Total 8761-9146 Total       Intake    I.V.  --   907.9 907.9  1068.3  331.7 1400  822  -- 822    Magnesium Sulfate Volume -- -- -- 50 -- 50 13.8 -- 13.8    Precedex Volume -- -- -- -- 0 0 147.4 -- 147.4    Cardene Volume -- 907.9 907.9 1018.3 331.7 1350 660.8 -- 660.8    NG/GT  --  -- --  90  200 290  --  -- --    Intake (mL) ([REMOVED] Enteral Tube 12/25/19 Cortrak - Gastric Left nare) -- -- -- 90 200 290 -- -- --    IV Piggyback  --  218.3 218.3  500  0 500  279.1  -- 279.1    Volume (mL) (potassium chloride (KCL) ivpb 10 mEq) -- 218.3 218.3 -- -- -- -- -- --    Volume (mL) (potassium phosphates 30 mmol in D5W 500 mL ivpb) -- -- -- 500 0 500 -- -- --    Volume (mL) (potassium phosphates 30 mmol in D5W 500 mL ivpb) -- -- -- -- -- -- 279.1 -- 279.1    Total Intake -- 1126.3 1126.3 1658.3 531.7 2190 1101 -- 1101       Output    Urine  --  1290 1290  490  2000 2490  --  -- --    Output (mL) ([REMOVED] Urethral Catheter Temperature probe 16 Fr.) -- 1290 1290 400 -- 400 -- -- --    Output (mL) (Urethral Catheter 16 Fr.) -- -- -- 90 2000 2090 -- -- --    Stool  --  -- --  --  -- --  --  -- --    Number of Times Stooled -- -- -- -- -- -- 0 x -- 0 x    Total Output -- 1290 3581 583 4854 2490 -- -- --       Net I/O     -- -163.7 -163.7 1168.3 -1468.3 -300 1101 -- 1101           Recent Labs     12/24/19  1818 12/25/19  0331 12/26/19  0240   SODIUM 139 136 136   POTASSIUM 3.6 3.5* 3.6   CHLORIDE 103 105 105   CO2 29 24 23   BUN 16 15 13   CREATININE 1.12 1.03 0.90   MAGNESIUM  --  1.7 1.9   PHOSPHORUS  --  1.9* 1.9*   CALCIUM 9.2 8.6 8.3*     Body mass index is 29.99 kg/m².    GI/Nutrition:  Recent Labs     12/24/19 1818 12/25/19  0331 12/26/19  0240   ALTSGPT 15  --   --    ASTSGOT 19  --   --    ALKPHOSPHAT 65  --   --    TBILIRUBIN 0.6  --   --    PREALBUMIN  --   --  20.0   GLUCOSE 96 125* 122*       Heme:  Recent Labs     12/24/19 1818 12/25/19  0300 12/26/19  0320   RBC 5.54 5.36 5.57   HEMOGLOBIN 16.2 15.8 16.3   HEMATOCRIT 48.6 46.3 47.3   PLATELETCT 222 132 502    PROTHROMBTM 12.8  --   --    APTT 26.8  --   --    INR 0.95  --   --        Infectious Disease:  Temp  Av °C (98.6 °F)  Min: 36.7 °C (98 °F)  Max: 37.7 °C (99.9 °F)  Recent Labs     19  1818 19  0300 19  0320   WBC 8.5 16.1* 17.0*   NEUTSPOLYS 54.90 82.30* 79.20*   LYMPHOCYTES 33.50 11.20* 11.30*   MONOCYTES 8.70 5.60 8.30   EOSINOPHILS 2.00 0.30 0.40   BASOPHILS 0.50 0.20 0.20   ASTSGOT 19  --   --    ALTSGPT 15  --   --    ALKPHOSPHAT 65  --   --    TBILIRUBIN 0.6  --   --        Medications:  • dexmedetomidine (PRECEDEX) infusion  0.1-1.5 mcg/kg/hr 1.5 mcg/kg/hr (19 1029)   • potassium phosphate ivpb  30 mmol 30 mmol (19 3839)   • haloperidol lactate  2 mg     • OLANZapine  5 mg     • Metoprolol Tartrate  5 mg     • cloNIDine  1 Patch     • cloNIDine  0.2 mg     • amLODIPine  10 mg     • carvedilol  12.5 mg     • Pharmacy  1 Each     • senna-docusate  2 Tab      And   • polyethylene glycol/lytes  1 Packet      And   • magnesium hydroxide  30 mL      And   • bisacodyl  10 mg     • acetaminophen  650 mg      Or   • acetaminophen  650 mg     • ondansetron  4 mg      Or   • ondansetron  4 mg     • labetalol  10 mg     • MD Alert...Adult ICU Electrolyte Replacement per Pharmacy       • Respiratory Care per Protocol       • niCARdipine infusion  0-15 mg/hr 7.5 mg/hr (19 1151)        Imaging:  CT-HEAD W/O   Final Result      Considerable motion and misregistration artifact on the images.   3.5 cm acute right thalamic/right basal ganglia hemorrhage slightly larger than previous.   Extravasation of hemorrhage into the right lateral ventricle and dependent hemorrhage within the left lateral ventricle.   Right-sided mass effect with 2.7 mm right to left midline shift.         DX-ABDOMEN FOR TUBE PLACEMENT   Final Result      Enteric tube tip projects over the stomach.      EC-ECHOCARDIOGRAM COMPLETE W/O CONT   Final Result      CT-HEAD W/O   Final Result      Stable to slight  interval enlargement of right thalamic intra-axial hematoma which extravasates into the lateral ventricle and results in unchanged trace leftward midline shift      Mild worsening vasogenic edema but there is no hydrocephalus or herniation      Motion degraded      DX-CHEST-PORTABLE (1 VIEW)   Final Result      No evidence of acute cardiopulmonary process.      CT-CTA NECK WITH & W/O-POST PROCESSING   Final Result      CT angiogram of the neck within normal limits. No large vessel occlusion or stenosis. No dissection.      CT-CTA HEAD WITH & W/O-POST PROCESS   Final Result      1.  No evidence of intracranial vascular occlusion or aneurysm.      2.  Large right basal ganglial intraparenchymal hemorrhage with extension into the right lateral ventricle again noted.      CT-HEAD W/O   Final Result      2.7 cm acute right basal ganglia/thalamic hemorrhage extravasating into the right lateral ventricle.   No hydrocephalus.   Mild right-sided mass effect with minimal right-to-left midline shift.   Central white matter low attenuation consistent with microvascular ischemic changes.            Assessment and plan    * Intraparenchymal hemorrhage of brain (HCC)- (present on admission)  Assessment & Plan       2/2 uncontrolled hypertension   History of HTN with medication noncompliance  History of methamphetamine abuse  Presented with left upper extremity, left lower extremity, and left facial weakness as well as slurred speech  Initial head CT showed a 2.7 cm acute right basal ganglia/thalamic hemorrhage extravasating into the right lateral ventricle along with small right-sided mass-effect with minimal right-to-left midline shift.  No hydrocephalus  Head/neck CT without vessel occlusions  Initially received a total of 30 mg of Labetalol push and was started on a Nicardipine drip  Plan:  Continue ICU care  Keep NPO  Keep head of bed 30 degrees  Neurochecks Q1H per neurologist rec  RT/O2  Continue IV hydration  Continue  Nicardipine drip (titrate to MAP ), restart carvedilol and amlodipine.  Neurology following, appreciate recommendations  PT/OT/Speech/PMR  Another Head CT today per neurologist, pending results     Hypertensive emergency- (present on admission)  Assessment & Plan  Presented with blood pressure in the 190s/110-130s  Intraparenchymal hemorrhage, no BEVERLY on labs or EKG changes/trops elevation  Continue nicardipine drip (Goal MAP )  UDS positive for amphetamine       Continue home carvedilol and amlodipine, add clonidine patch and scheduled 0.2 mg TID enteral tube clonidine.     JAYASHREE (obstructive sleep apnea)  Assessment & Plan  Not on CPAP  Continue O2 supplementation      DISPO: ICU    Code Status: FULL    Quality Measures:  Quality-Core Measures   Reviewed items::  EKG reviewed, Radiology images reviewed, Labs reviewed and Medications reviewed  Villasenor catheter::  Critically Ill - Requiring Accurate Measurement of Urinary Output  DVT prophylaxis pharmacological::  Contraindicated - High bleeding risk  DVT prophylaxis - mechanical:  SCDs  Ulcer Prophylaxis::  Yes

## 2019-12-26 NOTE — PROGRESS NOTES
Neurology Progress Note  Neurohospitalist Service, [facility]    Referring Physician: SENIA Tejada*    Chief Complaint   Patient presents with   • Possible Stroke     LKW 1600. noticed loss of sensation to LT side at that ttime then had a sudden onset of LUE & LLE weakness, slurred speech, LT sided facial droop 1745. BS: 89.        HPI: Refer to initial documented Neurology H&P, as detailed in the patient's chart.    Interval History 12/26/19: Pt agitated overnight    Review of systems: In addition to what is detailed in the HPI and/or updated in the interval history, all other systems reviewed and are negative.    Past Medical History:    has a past medical history of Depression (8/28/2009), HTN (8/28/2009), and Migraines, neuralgic (8/28/2009).    FHx:  family history is not on file.    SHx:   reports that he has never smoked. He has never used smokeless tobacco. He reports current alcohol use. He reports current drug use. Drug: Intravenous.    Medications:    Current Facility-Administered Medications:   •  dexmedetomidine (PRECEDEX) 400 mcg/100mL NS premix infusion, 0.1-1.5 mcg/kg/hr, Intravenous, Continuous, Constantin Avina M.D., Last Rate: 37.6 mL/hr at 12/26/19 0915, 1.5 mcg/kg/hr at 12/26/19 0915  •  potassium phosphates 30 mmol in D5W 500 mL ivpb, 30 mmol, Intravenous, Once, Sebastián Trent M.D., Last Rate: 83.3 mL/hr at 12/26/19 0839, 30 mmol at 12/26/19 0839  •  haloperidol lactate (HALDOL) injection 2 mg, 2 mg, Intravenous, Once PRN, Constantin Avina M.D.  •  OLANZapine (ZYPREXA) tablet 5 mg, 5 mg, Enteral Tube, Q12HRS, Constantin Avina M.D.  •  amLODIPine (NORVASC) tablet 10 mg, 10 mg, Enteral Tube, Q DAY, Constantin Avina M.D., Stopped at 12/26/19 0600  •  carvedilol (COREG) tablet 12.5 mg, 12.5 mg, Enteral Tube, BID WITH MEALS, Constantin Avina M.D., Stopped at 12/26/19 5872  •  Pharmacy Consult: Enteral tube insertion - review meds/change route/product  selection, 1 Each, Other, PHARMACY TO DOSE, Constantin Avina M.D.  •  senna-docusate (PERICOLACE or SENOKOT S) 8.6-50 MG per tablet 2 Tab, 2 Tab, Enteral Tube, BID, Stopped at 12/26/19 0600 **AND** polyethylene glycol/lytes (MIRALAX) PACKET 1 Packet, 1 Packet, Enteral Tube, QDAY PRN **AND** magnesium hydroxide (MILK OF MAGNESIA) suspension 30 mL, 30 mL, Enteral Tube, QDAY PRN **AND** bisacodyl (DULCOLAX) suppository 10 mg, 10 mg, Rectal, QDAY PRN, Constantin Avina M.D.  •  acetaminophen (TYLENOL) tablet 650 mg, 650 mg, Enteral Tube, Q4HRS PRN, 650 mg at 12/25/19 1603 **OR** acetaminophen (TYLENOL) suppository 650 mg, 650 mg, Rectal, Q4HRS PRN, Constantin Avina M.D.  •  ondansetron (ZOFRAN ODT) dispertab 4 mg, 4 mg, Enteral Tube, Q4HRS PRN **OR** ondansetron (ZOFRAN) syringe/vial injection 4 mg, 4 mg, Intravenous, Q4HRS PRN, Constantin Avina M.D.  •  labetalol (NORMODYNE/TRANDATE) injection 10 mg, 10 mg, Intravenous, Q4HRS PRN, Wilberto Ness M.D., 10 mg at 12/25/19 2122  •  MD Alert...ICU Electrolyte Replacement per Pharmacy, , Other, PHARMACY TO DOSE, Wilberto Ness M.D.  •  Respiratory Care per Protocol, , Nebulization, Continuous RT, Wilberto Ness M.D.  •  niCARdipine (CARDENE) 25 mg in  mL Infusion, 0-15 mg/hr, Intravenous, Continuous, Wilberto Ness M.D., Last Rate: 150 mL/hr at 12/26/19 0844, 15 mg/hr at 12/26/19 0844    Physical Examination:     Vitals:    12/26/19 0830 12/26/19 0845 12/26/19 0900 12/26/19 0915   BP: 124/87 139/99 143/98 153/91   Pulse: 96 (!) 119 (!) 117 (!) 118   Resp: 16 (!) 21 (!) 31 (!) 31   Temp:       TempSrc:       SpO2: 94% 91% 92% 91%   Weight:       Height:           General: Patient is awake and in no acute distress  Eyes: examination of optic disks not indicated at this time  CV: RRR    NEUROLOGICAL EXAM:     Responsive with simple words ie yeah to questions.  Keeps eyes closed. Difficult to assess pupils due to agitation.    Right preferential gaze.  Moves right UE LE 5/5 with good coordination. Left UE some movement to stim left leg plegic without stim response.  Left babinski.    Objective Data:    Labs:  Lab Results   Component Value Date/Time    PROTHROMBTM 12.8 12/24/2019 06:18 PM    INR 0.95 12/24/2019 06:18 PM      Lab Results   Component Value Date/Time    WBC 17.0 (H) 12/26/2019 03:20 AM    RBC 5.57 12/26/2019 03:20 AM    HEMOGLOBIN 16.3 12/26/2019 03:20 AM    HEMATOCRIT 47.3 12/26/2019 03:20 AM    MCV 84.9 12/26/2019 03:20 AM    MCH 29.3 12/26/2019 03:20 AM    MCHC 34.5 12/26/2019 03:20 AM    MPV 9.1 12/26/2019 03:20 AM    NEUTSPOLYS 79.20 (H) 12/26/2019 03:20 AM    LYMPHOCYTES 11.30 (L) 12/26/2019 03:20 AM    MONOCYTES 8.30 12/26/2019 03:20 AM    EOSINOPHILS 0.40 12/26/2019 03:20 AM    BASOPHILS 0.20 12/26/2019 03:20 AM      Lab Results   Component Value Date/Time    SODIUM 136 12/26/2019 02:40 AM    POTASSIUM 3.6 12/26/2019 02:40 AM    CHLORIDE 105 12/26/2019 02:40 AM    CO2 23 12/26/2019 02:40 AM    GLUCOSE 122 (H) 12/26/2019 02:40 AM    BUN 13 12/26/2019 02:40 AM    CREATININE 0.90 12/26/2019 02:40 AM    CREATININE 1.0 05/24/2007 09:55 PM      Lab Results   Component Value Date/Time    CHOLSTRLTOT 136 12/24/2019 06:18 PM    LDL 61 12/24/2019 06:18 PM    HDL 44 12/24/2019 06:18 PM    TRIGLYCERIDE 154 (H) 12/24/2019 06:18 PM       Lab Results   Component Value Date/Time    ALKPHOSPHAT 65 12/24/2019 06:18 PM    ASTSGOT 19 12/24/2019 06:18 PM    ALTSGPT 15 12/24/2019 06:18 PM    TBILIRUBIN 0.6 12/24/2019 06:18 PM        Imaging/Testing:      Assessment and Plan:    Jacinto Loyola is a 49 y.o. male with relevant history of right thalamic ICH. No change on CT at 24 hours. Pt very agitated. Possibly due to substance abuse withdrawal.  Discussed options with primary team.  Contd sedation warranted. Buspirone or neuroleptics could help transiently.  No obvious change in neuro exam.  Given limited ability to examine rec repeat  CT this morning to see if any changes especially given some IVH ie hydrocephalus.  Rec BP MAP  CCB preferred or labetolol.  Will review CT    Plan:    The evaluation of the patient, and recommended management, was discussed with the resident staff. I have performed a physical exam and reviewed and updated ROS and Plan today (12/26/2019). In review of yesterday's note (12/25/2019), there are no changes except as documented above.    Huan Graff MD  Board Certified Neurology, ABPN  Board Certified Vascular Neurology, ABPN  (t) [phone]

## 2019-12-26 NOTE — PROGRESS NOTES
Med rec called pt home pharmacy that was on file and pt has not filled any medications at this location or any [facility] location since 2015.

## 2019-12-26 NOTE — PROGRESS NOTES
Pt is very sleepy. Wakes briefly for a couple of seconds with painful stimuli but now unable to answer questions. Dr. Ledesma at bedside. ABG drawn, looks good for now. MD aware of pt's severe sleep apnea. Will continue to monitor respiratory status closely.

## 2019-12-26 NOTE — THERAPY
OT consult received, RN requested hold secondary to agitation and possible alcohol withdrawal. Will attempt OT eval later as medically appropriate.

## 2019-12-26 NOTE — PROGRESS NOTES
Pt thrashing around, frequently hitting leg and arm on railing. RN padded railings, pt has frequently ripped off padding, replaced by RN frequently.

## 2019-12-26 NOTE — PROGRESS NOTES
Pt more easily arousable. Still very lethargic but now conversational and agitated, asking for water, mouth swabs provided. Pt frequently removes equipment. Equipment frequently replaced.

## 2019-12-26 NOTE — THERAPY
consult received; nursing requesting hold; pt agitated and possible WD?; not appropriate at this time for initiation of PT evaluation; will re-attempt as appropriate

## 2019-12-26 NOTE — DIETARY
Nutrition Support Assessment:  Day 2 of admit.  Jacinto Loyola is a 49 y.o. male with admitting DX of Intraparenchymal hemorrhage of brain, Hypertensive crisis     Current problem list:  1. Intraparenchymal hemorrhage of brain   2. JAYASHREE  3. Methamphetamine abuse   4. Hypertensive emergency  5. Hypokalemia     Assessment:  Estimated Nutritional Needs based on:   Height: 182.9 cm (6')  Weight: 100.3 kg (221 lb 1.9 oz)  Weight to Use in Calculations: 100.3 kg (221 lb 1.9 oz)  Ideal Body Weight: 80.7 kg (178 lb)  Percent Ideal Body Weight: 124.2  Body mass index is 29.99 kg/m²., BMI classification: Overweight    Calculation/Equation: MSJ x 1.2 = 2295.3  Total Calories / day: 2200 - 2600  (Calories / k - 26)  Total Grams Protein / day: 121 - 150  (Grams Protein / k.2 - 1.5)  Total ml free water / day: 5036-8764 (1 ml/kcal)     Evaluation:   1. Indication for nutrition support: NPO, sedated with Precedex  2. Enteral access: Gastric Cortrak pending  3. PMH: hypertension, JAYASHREE, medications noncompliance and h/o meth abuse  4. A/O self only, severe dysarthria, agitated   5. Meds: KPhos, IV Mg (stopped), Precedex  6. Labs: Glu 122, A1c 5.4, Phos 1.9 (L), Mg 1.9, CRP WNL  7. PT/OT unable to assess r/t agitation, possible W/D  8. LBM: PTA  9. Increased protein needs r/t Intraparenchymal hemorrhage of brain  10. Standard high protein formula appropriate to meet estimated nutrition needs        Malnutrition Risk: Unable to assess at this time     Recommendations/Plan:  Start Replete with Fiber at 25 ml/hr, advance to goal rate 95 ml/hr as tolerated per protocol; provides: 2280 kcals, 146 grams protein, 1892 mL free water.  Fluids per medical team, recommend additional 400 ml/day free water flushes  Monitor for refeeding: Order BMP w/ Mg and Phos x 7 days. Replete K, Phos and Mg prn. Supplement 100 mg Thiamine x 7 days to reduce risk of refeeding  Monitor weight, labs, TF tolerance  PO diet trials per SLP as  appropriate    RD following

## 2019-12-26 NOTE — DISCHARGE PLANNING
Transitional Care Coordination  DX: Large right basal ganglial intraparenchymal hemorrhage with extension into the right lateral ventricle noted.     Current documentation reveals a potential for acute inpatient rehabilitation, pending TX evals (PT, OT, and SLP ordered )     Please consider a PMR referral to assist with discharge planning.

## 2019-12-26 NOTE — THERAPY
Orders for clinical swallow evaluation received. Per RN, pt is not appropriate to participate today due to agitation. SLP will complete swallow evaluation as pt is medically appropriate to participate.

## 2019-12-26 NOTE — CARE PLAN
Problem: Venous Thromboembolism (VTW)/Deep Vein Thrombosis (DVT) Prevention:  Goal: Patient will participate in Venous Thrombosis (VTE)/Deep Vein Thrombosis (DVT)Prevention Measures  Intervention: Encourage patient to perform ankle flex, foot rotation, and knee flex exercises in addition to other prophylatic measures every hour while awake  Note:   Patient moving right extremities, RN providing range of motion on left side.     Problem: Skin Integrity  Goal: Risk for impaired skin integrity will decrease  Intervention: Implement precautions to protect skin integrity in collaboration with the interdisciplinary team  Note:   Patient turned/repositioned [id number], extremely agitated and constantly moving extremities, sweaty (will continue to dry), tubes/lines off of skin.

## 2019-12-26 NOTE — CARE PLAN
Problem: Communication  Goal: The ability to communicate needs accurately and effectively will improve  Outcome: PROGRESSING SLOWER THAN EXPECTED  Pt appears to be unaware of the severity of his stroke. Reinforcement necessary to ensure pt remains safe with severe disability.      Problem: Respiratory:  Goal: Respiratory status will improve  Outcome: PROGRESSING SLOWER THAN EXPECTED  Pt very sleepy. Awakes briefly and promptly falls back asleep. Blood gas drawn. Looks okay for now. MD notified and aware of pt's severe sleep apnea, will continue to monitor respiratory status carefully.

## 2019-12-27 ENCOUNTER — APPOINTMENT (OUTPATIENT)
Dept: RADIOLOGY | Facility: MEDICAL CENTER | Age: 49
DRG: 064 | End: 2019-12-27
Attending: STUDENT IN AN ORGANIZED HEALTH CARE EDUCATION/TRAINING PROGRAM
Payer: MEDICAID

## 2019-12-27 ENCOUNTER — APPOINTMENT (OUTPATIENT)
Dept: RADIOLOGY | Facility: MEDICAL CENTER | Age: 49
DRG: 064 | End: 2019-12-27
Attending: INTERNAL MEDICINE
Payer: MEDICAID

## 2019-12-27 PROBLEM — J96.01 ACUTE RESPIRATORY FAILURE WITH HYPOXIA AND HYPERCAPNIA (HCC): Status: ACTIVE | Noted: 2019-12-27

## 2019-12-27 PROBLEM — T17.908A ASPIRATION INTO AIRWAY: Status: ACTIVE | Noted: 2019-12-27

## 2019-12-27 PROBLEM — R50.9 FEBRILE: Status: ACTIVE | Noted: 2019-12-27

## 2019-12-27 PROBLEM — J96.02 ACUTE RESPIRATORY FAILURE WITH HYPOXIA AND HYPERCAPNIA (HCC): Status: ACTIVE | Noted: 2019-12-27

## 2019-12-27 LAB
ACTION RANGE TRIGGERED IACRT: NO
ANION GAP SERPL CALC-SCNC: 8 MMOL/L (ref 0–11.9)
APPEARANCE FLD: NORMAL
APPEARANCE UR: ABNORMAL
BACTERIA #/AREA URNS HPF: NEGATIVE /HPF
BASE EXCESS BLDA CALC-SCNC: -2 MMOL/L (ref -4–3)
BASOPHILS # BLD AUTO: 0.2 % (ref 0–1.8)
BASOPHILS # BLD: 0.04 K/UL (ref 0–0.12)
BILIRUB UR QL STRIP.AUTO: NEGATIVE
BODY TEMPERATURE: ABNORMAL DEGREES
BUN SERPL-MCNC: 31 MG/DL (ref 8–22)
CALCIUM SERPL-MCNC: 8 MG/DL (ref 8.5–10.5)
CHLORIDE SERPL-SCNC: 106 MMOL/L (ref 96–112)
CO2 BLDA-SCNC: 25 MMOL/L (ref 20–33)
CO2 SERPL-SCNC: 21 MMOL/L (ref 20–33)
COLOR FLD: NORMAL
COLOR UR: ABNORMAL
CREAT SERPL-MCNC: 1.18 MG/DL (ref 0.5–1.4)
EOSINOPHIL # BLD AUTO: 0.02 K/UL (ref 0–0.51)
EOSINOPHIL NFR BLD: 0.1 % (ref 0–6.9)
EPI CELLS #/AREA URNS HPF: ABNORMAL /HPF
ERYTHROCYTE [DISTWIDTH] IN BLOOD BY AUTOMATED COUNT: 41.5 FL (ref 35.9–50)
GLUCOSE SERPL-MCNC: 121 MG/DL (ref 65–99)
GLUCOSE UR STRIP.AUTO-MCNC: 100 MG/DL
GRAM STN SPEC: NORMAL
HCO3 BLDA-SCNC: 23.5 MMOL/L (ref 17–25)
HCT VFR BLD AUTO: 42.8 % (ref 42–52)
HGB BLD-MCNC: 14.4 G/DL (ref 14–18)
HOROWITZ INDEX BLDA+IHG-RTO: 247 MM[HG]
IMM GRANULOCYTES # BLD AUTO: 0.12 K/UL (ref 0–0.11)
IMM GRANULOCYTES NFR BLD AUTO: 0.6 % (ref 0–0.9)
INST. QUALIFIED PATIENT IIQPT: YES
KETONES UR STRIP.AUTO-MCNC: ABNORMAL MG/DL
LACTATE BLD-SCNC: 0.7 MMOL/L (ref 0.5–2)
LACTATE BLD-SCNC: 1.4 MMOL/L (ref 0.5–2)
LACTATE BLD-SCNC: 1.4 MMOL/L (ref 0.5–2)
LACTATE BLD-SCNC: 1.7 MMOL/L (ref 0.5–2)
LACTATE BLD-SCNC: 3.5 MMOL/L (ref 0.5–2)
LEUKOCYTE ESTERASE UR QL STRIP.AUTO: ABNORMAL
LYMPHOCYTES # BLD AUTO: 1.21 K/UL (ref 1–4.8)
LYMPHOCYTES NFR BLD: 6.2 % (ref 22–41)
MAGNESIUM SERPL-MCNC: 2.2 MG/DL (ref 1.5–2.5)
MCH RBC QN AUTO: 29.3 PG (ref 27–33)
MCHC RBC AUTO-ENTMCNC: 33.6 G/DL (ref 33.7–35.3)
MCV RBC AUTO: 87.2 FL (ref 81.4–97.8)
MICRO URNS: ABNORMAL
MONOCYTES # BLD AUTO: 1.85 K/UL (ref 0–0.85)
MONOCYTES NFR BLD AUTO: 9.5 % (ref 0–13.4)
MONONUC CELLS NFR FLD: 2 %
MRSA DNA SPEC QL NAA+PROBE: NORMAL
NEUTROPHILS # BLD AUTO: 16.25 K/UL (ref 1.82–7.42)
NEUTROPHILS NFR BLD: 83.4 % (ref 44–72)
NEUTROPHILS NFR FLD: 98 %
NITRITE UR QL STRIP.AUTO: NEGATIVE
NRBC # BLD AUTO: 0 K/UL
NRBC BLD-RTO: 0 /100 WBC
O2/TOTAL GAS SETTING VFR VENT: 100 %
PCO2 BLDA: 41.2 MMHG (ref 26–37)
PCO2 TEMP ADJ BLDA: 43.3 MMHG (ref 26–37)
PH BLDA: 7.36 [PH] (ref 7.4–7.5)
PH TEMP ADJ BLDA: 7.35 [PH] (ref 7.4–7.5)
PH UR STRIP.AUTO: 5 [PH] (ref 5–8)
PHOSPHATE SERPL-MCNC: 3.1 MG/DL (ref 2.5–4.5)
PLATELET # BLD AUTO: 243 K/UL (ref 164–446)
PMV BLD AUTO: 8.7 FL (ref 9–12.9)
PO2 BLDA: 247 MMHG (ref 64–87)
PO2 TEMP ADJ BLDA: 253 MMHG (ref 64–87)
POTASSIUM SERPL-SCNC: 4.4 MMOL/L (ref 3.6–5.5)
PROCALCITONIN SERPL-MCNC: 0.16 NG/ML
PROT UR QL STRIP: 100 MG/DL
RBC # BLD AUTO: 4.91 M/UL (ref 4.7–6.1)
RBC # FLD: NORMAL CELLS/UL
RBC # URNS HPF: >150 /HPF
RBC UR QL AUTO: ABNORMAL
SAO2 % BLDA: 100 % (ref 93–99)
SIGNIFICANT IND 70042: NORMAL
SIGNIFICANT IND 70042: NORMAL
SITE SITE: NORMAL
SITE SITE: NORMAL
SODIUM SERPL-SCNC: 135 MMOL/L (ref 135–145)
SOURCE SOURCE: NORMAL
SOURCE SOURCE: NORMAL
SP GR UR STRIP.AUTO: 1.03
SPECIMEN DRAWN FROM PATIENT: ABNORMAL
TRIGL SERPL-MCNC: 111 MG/DL (ref 0–149)
UROBILINOGEN UR STRIP.AUTO-MCNC: 1 MG/DL
WBC # BLD AUTO: 19.5 K/UL (ref 4.8–10.8)
WBC # FLD: NORMAL CELLS/UL
WBC #/AREA URNS HPF: ABNORMAL /HPF

## 2019-12-27 PROCEDURE — 36600 WITHDRAWAL OF ARTERIAL BLOOD: CPT

## 2019-12-27 PROCEDURE — 302151 K-PAD 14X20: Performed by: INTERNAL MEDICINE

## 2019-12-27 PROCEDURE — 94002 VENT MGMT INPAT INIT DAY: CPT

## 2019-12-27 PROCEDURE — 302214 INTUBATION BOX: Performed by: INTERNAL MEDICINE

## 2019-12-27 PROCEDURE — 31624 DX BRONCHOSCOPE/LAVAGE: CPT | Mod: 51 | Performed by: INTERNAL MEDICINE

## 2019-12-27 PROCEDURE — 99292 CRITICAL CARE ADDL 30 MIN: CPT | Mod: 25 | Performed by: INTERNAL MEDICINE

## 2019-12-27 PROCEDURE — 700101 HCHG RX REV CODE 250: Performed by: INTERNAL MEDICINE

## 2019-12-27 PROCEDURE — 80048 BASIC METABOLIC PNL TOTAL CA: CPT

## 2019-12-27 PROCEDURE — 99152 MOD SED SAME PHYS/QHP 5/>YRS: CPT

## 2019-12-27 PROCEDURE — 700102 HCHG RX REV CODE 250 W/ 637 OVERRIDE(OP): Performed by: INTERNAL MEDICINE

## 2019-12-27 PROCEDURE — 99153 MOD SED SAME PHYS/QHP EA: CPT | Performed by: INTERNAL MEDICINE

## 2019-12-27 PROCEDURE — 31500 INSERT EMERGENCY AIRWAY: CPT

## 2019-12-27 PROCEDURE — 31645 BRNCHSC W/THER ASPIR 1ST: CPT | Performed by: INTERNAL MEDICINE

## 2019-12-27 PROCEDURE — A4314 CATH W/DRAINAGE 2-WAY LATEX: HCPCS | Performed by: INTERNAL MEDICINE

## 2019-12-27 PROCEDURE — 84145 PROCALCITONIN (PCT): CPT

## 2019-12-27 PROCEDURE — 770022 HCHG ROOM/CARE - ICU (200)

## 2019-12-27 PROCEDURE — 83605 ASSAY OF LACTIC ACID: CPT | Mod: 91

## 2019-12-27 PROCEDURE — 71045 X-RAY EXAM CHEST 1 VIEW: CPT

## 2019-12-27 PROCEDURE — 87186 SC STD MICRODIL/AGAR DIL: CPT

## 2019-12-27 PROCEDURE — 302978 HCHG BRONCHOSCOPY-DIAGNOSTIC

## 2019-12-27 PROCEDURE — 89051 BODY FLUID CELL COUNT: CPT

## 2019-12-27 PROCEDURE — 87040 BLOOD CULTURE FOR BACTERIA: CPT

## 2019-12-27 PROCEDURE — 99152 MOD SED SAME PHYS/QHP 5/>YRS: CPT | Performed by: INTERNAL MEDICINE

## 2019-12-27 PROCEDURE — 92950 HEART/LUNG RESUSCITATION CPR: CPT

## 2019-12-27 PROCEDURE — 5A1955Z RESPIRATORY VENTILATION, GREATER THAN 96 CONSECUTIVE HOURS: ICD-10-PCS | Performed by: INTERNAL MEDICINE

## 2019-12-27 PROCEDURE — 700111 HCHG RX REV CODE 636 W/ 250 OVERRIDE (IP): Performed by: INTERNAL MEDICINE

## 2019-12-27 PROCEDURE — 81001 URINALYSIS AUTO W/SCOPE: CPT

## 2019-12-27 PROCEDURE — 700105 HCHG RX REV CODE 258: Performed by: INTERNAL MEDICINE

## 2019-12-27 PROCEDURE — 84478 ASSAY OF TRIGLYCERIDES: CPT

## 2019-12-27 PROCEDURE — 83735 ASSAY OF MAGNESIUM: CPT

## 2019-12-27 PROCEDURE — 99291 CRITICAL CARE FIRST HOUR: CPT | Mod: 25 | Performed by: INTERNAL MEDICINE

## 2019-12-27 PROCEDURE — 87205 SMEAR GRAM STAIN: CPT

## 2019-12-27 PROCEDURE — 99232 SBSQ HOSP IP/OBS MODERATE 35: CPT | Performed by: PSYCHIATRY & NEUROLOGY

## 2019-12-27 PROCEDURE — 0B9D8ZX DRAINAGE OF RIGHT MIDDLE LUNG LOBE, VIA NATURAL OR ARTIFICIAL OPENING ENDOSCOPIC, DIAGNOSTIC: ICD-10-PCS | Performed by: INTERNAL MEDICINE

## 2019-12-27 PROCEDURE — A9270 NON-COVERED ITEM OR SERVICE: HCPCS | Performed by: INTERNAL MEDICINE

## 2019-12-27 PROCEDURE — 87070 CULTURE OTHR SPECIMN AEROBIC: CPT

## 2019-12-27 PROCEDURE — 31500 INSERT EMERGENCY AIRWAY: CPT | Mod: GC | Performed by: INTERNAL MEDICINE

## 2019-12-27 PROCEDURE — 85025 COMPLETE CBC W/AUTO DIFF WBC: CPT

## 2019-12-27 PROCEDURE — 700111 HCHG RX REV CODE 636 W/ 250 OVERRIDE (IP): Performed by: STUDENT IN AN ORGANIZED HEALTH CARE EDUCATION/TRAINING PROGRAM

## 2019-12-27 PROCEDURE — 84100 ASSAY OF PHOSPHORUS: CPT

## 2019-12-27 PROCEDURE — 87641 MR-STAPH DNA AMP PROBE: CPT

## 2019-12-27 PROCEDURE — 700105 HCHG RX REV CODE 258: Performed by: STUDENT IN AN ORGANIZED HEALTH CARE EDUCATION/TRAINING PROGRAM

## 2019-12-27 PROCEDURE — 302132 K THERMIA MOTOR: Performed by: INTERNAL MEDICINE

## 2019-12-27 PROCEDURE — 0BH17EZ INSERTION OF ENDOTRACHEAL AIRWAY INTO TRACHEA, VIA NATURAL OR ARTIFICIAL OPENING: ICD-10-PCS | Performed by: INTERNAL MEDICINE

## 2019-12-27 PROCEDURE — 700111 HCHG RX REV CODE 636 W/ 250 OVERRIDE (IP)

## 2019-12-27 PROCEDURE — 82803 BLOOD GASES ANY COMBINATION: CPT

## 2019-12-27 RX ORDER — LIDOCAINE HYDROCHLORIDE 20 MG/ML
5 INJECTION, SOLUTION INFILTRATION; PERINEURAL PRN
Status: ACTIVE | OUTPATIENT
Start: 2019-12-27 | End: 2019-12-27

## 2019-12-27 RX ORDER — ETOMIDATE 2 MG/ML
20 INJECTION INTRAVENOUS ONCE
Status: COMPLETED | OUTPATIENT
Start: 2019-12-27 | End: 2019-12-27

## 2019-12-27 RX ORDER — FUROSEMIDE 10 MG/ML
20 INJECTION INTRAMUSCULAR; INTRAVENOUS ONCE
Status: COMPLETED | OUTPATIENT
Start: 2019-12-27 | End: 2019-12-27

## 2019-12-27 RX ORDER — LIDOCAINE HYDROCHLORIDE 20 MG/ML
5 SOLUTION OROPHARYNGEAL PRN
Status: ACTIVE | OUTPATIENT
Start: 2019-12-27 | End: 2019-12-27

## 2019-12-27 RX ORDER — AMLODIPINE BESYLATE 5 MG/1
5 TABLET ORAL
Status: DISCONTINUED | OUTPATIENT
Start: 2019-12-28 | End: 2019-12-27

## 2019-12-27 RX ORDER — PROPOFOL 10 MG/ML
100 INJECTION, EMULSION INTRAVENOUS ONCE
Status: COMPLETED | OUTPATIENT
Start: 2019-12-27 | End: 2019-12-27

## 2019-12-27 RX ORDER — FAMOTIDINE 20 MG/1
20 TABLET, FILM COATED ORAL 2 TIMES DAILY
Status: DISCONTINUED | OUTPATIENT
Start: 2019-12-27 | End: 2020-01-01

## 2019-12-27 RX ORDER — SUCCINYLCHOLINE CHLORIDE 20 MG/ML
100 INJECTION INTRAMUSCULAR; INTRAVENOUS ONCE
Status: COMPLETED | OUTPATIENT
Start: 2019-12-27 | End: 2019-12-27

## 2019-12-27 RX ORDER — SODIUM CHLORIDE 9 MG/ML
1000 INJECTION, SOLUTION INTRAVENOUS ONCE
Status: COMPLETED | OUTPATIENT
Start: 2019-12-27 | End: 2019-12-27

## 2019-12-27 RX ORDER — PHENYLEPHRINE HCL IN 0.9% NACL 0.5 MG/5ML
100-200 SYRINGE (ML) INTRAVENOUS
Status: ACTIVE | OUTPATIENT
Start: 2019-12-27 | End: 2019-12-27

## 2019-12-27 RX ORDER — ACETYLCYSTEINE 200 MG/ML
3 SOLUTION ORAL; RESPIRATORY (INHALATION) PRN
Status: ACTIVE | OUTPATIENT
Start: 2019-12-27 | End: 2019-12-27

## 2019-12-27 RX ORDER — SODIUM CHLORIDE, SODIUM LACTATE, POTASSIUM CHLORIDE, CALCIUM CHLORIDE 600; 310; 30; 20 MG/100ML; MG/100ML; MG/100ML; MG/100ML
INJECTION, SOLUTION INTRAVENOUS CONTINUOUS
Status: DISCONTINUED | OUTPATIENT
Start: 2019-12-27 | End: 2019-12-28

## 2019-12-27 RX ORDER — ACETAMINOPHEN 500 MG
1000 TABLET ORAL EVERY 6 HOURS
Status: COMPLETED | OUTPATIENT
Start: 2019-12-27 | End: 2019-12-29

## 2019-12-27 RX ADMIN — Medication 200 MCG: at 03:05

## 2019-12-27 RX ADMIN — SODIUM CHLORIDE, POTASSIUM CHLORIDE, SODIUM LACTATE AND CALCIUM CHLORIDE: 600; 310; 30; 20 INJECTION, SOLUTION INTRAVENOUS at 11:38

## 2019-12-27 RX ADMIN — Medication 100 MCG: at 02:42

## 2019-12-27 RX ADMIN — PROPOFOL 25 MCG/KG/MIN: 10 INJECTION, EMULSION INTRAVENOUS at 11:10

## 2019-12-27 RX ADMIN — FAMOTIDINE 20 MG: 20 TABLET ORAL at 17:30

## 2019-12-27 RX ADMIN — PROPOFOL 20 MG: 10 INJECTION, EMULSION INTRAVENOUS at 02:07

## 2019-12-27 RX ADMIN — OLANZAPINE 5 MG: 5 TABLET, FILM COATED ORAL at 17:30

## 2019-12-27 RX ADMIN — CARVEDILOL 12.5 MG: 6.25 TABLET, FILM COATED ORAL at 08:19

## 2019-12-27 RX ADMIN — AMPICILLIN SODIUM AND SULBACTAM SODIUM 3 G: 2; 1 INJECTION, POWDER, FOR SOLUTION INTRAMUSCULAR; INTRAVENOUS at 08:36

## 2019-12-27 RX ADMIN — Medication 100 MCG: at 02:27

## 2019-12-27 RX ADMIN — FENTANYL CITRATE 50 MCG: 0.05 INJECTION, SOLUTION INTRAMUSCULAR; INTRAVENOUS at 02:15

## 2019-12-27 RX ADMIN — SUCCINYLCHOLINE CHLORIDE 100 MG: 20 INJECTION, SOLUTION INTRAMUSCULAR; INTRAVENOUS; PARENTERAL at 01:56

## 2019-12-27 RX ADMIN — AMPICILLIN SODIUM AND SULBACTAM SODIUM 3 G: 2; 1 INJECTION, POWDER, FOR SOLUTION INTRAMUSCULAR; INTRAVENOUS at 21:12

## 2019-12-27 RX ADMIN — FAMOTIDINE 20 MG: 20 TABLET ORAL at 11:38

## 2019-12-27 RX ADMIN — SENNOSIDES AND DOCUSATE SODIUM 2 TABLET: 8.6; 5 TABLET ORAL at 17:30

## 2019-12-27 RX ADMIN — AMPICILLIN SODIUM AND SULBACTAM SODIUM 3 G: 2; 1 INJECTION, POWDER, FOR SOLUTION INTRAMUSCULAR; INTRAVENOUS at 15:16

## 2019-12-27 RX ADMIN — ETOMIDATE 20 MG: 2 INJECTION INTRAVENOUS at 01:56

## 2019-12-27 RX ADMIN — ACETAMINOPHEN 1000 MG: 500 TABLET ORAL at 23:31

## 2019-12-27 RX ADMIN — SODIUM CHLORIDE, POTASSIUM CHLORIDE, SODIUM LACTATE AND CALCIUM CHLORIDE: 600; 310; 30; 20 INJECTION, SOLUTION INTRAVENOUS at 01:22

## 2019-12-27 RX ADMIN — FENTANYL CITRATE 50 MCG/HR: 50 INJECTION, SOLUTION INTRAMUSCULAR; INTRAVENOUS at 03:36

## 2019-12-27 RX ADMIN — AMPICILLIN SODIUM AND SULBACTAM SODIUM 3 G: 2; 1 INJECTION, POWDER, FOR SOLUTION INTRAMUSCULAR; INTRAVENOUS at 02:54

## 2019-12-27 RX ADMIN — PROPOFOL 10 MCG/KG/MIN: 10 INJECTION, EMULSION INTRAVENOUS at 02:01

## 2019-12-27 RX ADMIN — PROPOFOL 30 MG: 10 INJECTION, EMULSION INTRAVENOUS at 02:18

## 2019-12-27 RX ADMIN — ACETAMINOPHEN 1000 MG: 500 TABLET ORAL at 11:38

## 2019-12-27 RX ADMIN — METOPROLOL TARTRATE 5 MG: 5 INJECTION, SOLUTION INTRAVENOUS at 08:19

## 2019-12-27 RX ADMIN — SODIUM CHLORIDE 1000 ML: 9 INJECTION, SOLUTION INTRAVENOUS at 17:40

## 2019-12-27 RX ADMIN — SENNOSIDES AND DOCUSATE SODIUM 2 TABLET: 8.6; 5 TABLET ORAL at 08:18

## 2019-12-27 RX ADMIN — FUROSEMIDE 20 MG: 10 INJECTION, SOLUTION INTRAMUSCULAR; INTRAVENOUS at 03:17

## 2019-12-27 RX ADMIN — ACETAMINOPHEN 1000 MG: 500 TABLET ORAL at 08:19

## 2019-12-27 RX ADMIN — FENTANYL CITRATE 100 MCG: 0.05 INJECTION, SOLUTION INTRAMUSCULAR; INTRAVENOUS at 01:56

## 2019-12-27 RX ADMIN — PROPOFOL 20 MG: 10 INJECTION, EMULSION INTRAVENOUS at 02:05

## 2019-12-27 RX ADMIN — OLANZAPINE 5 MG: 5 TABLET, FILM COATED ORAL at 08:25

## 2019-12-27 RX ADMIN — AMLODIPINE BESYLATE 10 MG: 10 TABLET ORAL at 08:18

## 2019-12-27 RX ADMIN — ACETAMINOPHEN 1000 MG: 500 TABLET ORAL at 17:30

## 2019-12-27 RX ADMIN — PROPOFOL 30 MG: 10 INJECTION, EMULSION INTRAVENOUS at 02:14

## 2019-12-27 ASSESSMENT — COGNITIVE AND FUNCTIONAL STATUS - GENERAL
DRESSING REGULAR UPPER BODY CLOTHING: TOTAL
SUGGESTED CMS G CODE MODIFIER MOBILITY: CN
SUGGESTED CMS G CODE MODIFIER DAILY ACTIVITY: CN
DRESSING REGULAR LOWER BODY CLOTHING: TOTAL
TOILETING: TOTAL
MOVING TO AND FROM BED TO CHAIR: UNABLE
PERSONAL GROOMING: TOTAL
WALKING IN HOSPITAL ROOM: TOTAL
MOVING FROM LYING ON BACK TO SITTING ON SIDE OF FLAT BED: UNABLE
DAILY ACTIVITIY SCORE: 6
EATING MEALS: TOTAL
STANDING UP FROM CHAIR USING ARMS: TOTAL
HELP NEEDED FOR BATHING: TOTAL
TURNING FROM BACK TO SIDE WHILE IN FLAT BAD: UNABLE
MOBILITY SCORE: 6
CLIMB 3 TO 5 STEPS WITH RAILING: TOTAL

## 2019-12-27 NOTE — PROGRESS NOTES
Late entry:  12/27 2333 Pt remains obtunded off precedex for over 12 hours.  Increased work of breathing noted, ABNER Frausto paged  12/27 2343 Return call from Dr Goldsmith with ABNER frausto.  Updated on fever, pt remaining obtunded, and increased work of breathing, orders received.   12/28 0018 second page to Dr Goldsmith Pt with increased work of breathing, audible respiratory distress noted.  NT suction provided. Large amount of think tan secretions obtained.  RT to bedside to assess patient. NT suction provided by RT.  12/27 0038 ABNER Frausto resident Dr. Goldsmith paged and updated on pts respiratory distress and possible aspiration.  Xray for tube verification and chest xray ordered.    12/27 0050 Dr Goldsmith and Dr Pete at bedside to evaluate pt, Pt to be intubated.

## 2019-12-27 NOTE — CARE PLAN
Problem: Venous Thromboembolism (VTW)/Deep Vein Thrombosis (DVT) Prevention:  Goal: Patient will participate in Venous Thrombosis (VTE)/Deep Vein Thrombosis (DVT)Prevention Measures  Intervention: Ensure patient wears graduated elastic stockings (AIDAN hose) and/or SCDs, if ordered, when in bed or chair (Remove at least once per shift for skin check)  Note:   SCD's applied bilaterally while patient on bed rest and unable to follow commands.     Problem: Skin Integrity  Goal: Risk for impaired skin integrity will decrease  Intervention: Implement precautions to protect skin integrity in collaboration with the interdisciplinary team  Note:   Patient turned/repositioned Q2 hours, extremities floating on pillows, tubes/lines off of skin.

## 2019-12-27 NOTE — PROCEDURES
Endotracheal Intubation    Date: 12/27/2019  Time: 0153  Indication: Respiratory Distress  Resident: Dr. Goldsmith  Attending: Dr. Pete    A time-out was completed verifying correct patient, procedure, site, positioning, and special equipment if applicable. The patient was placed in a flat position. Sedation was obtained using Etomidate, and additionally with Succinylcholine. The patient was easily ventilated using an ambu bag. The [employer] was used and inserted into the oropharynx at which time there was a Grade 1 view of the vocal cords. A 7.5-Swedish endotracheal tube was inserted and visualized going through the vocal cords. The stylette was removed. Colorimetric change was visualized on the CO2 meter. Breath sounds were heard in both lung fields equally. The endotracheal tube was placed at 25 cm, measured at the teeth. Dr. Pete was present for the entire procedure.    A chest x-ray was ordered to assess for pneumothorax and verify endotrachealtube placement.    Estimated Blood Loss: <5ml    The patient tolerated the procedure well and there were no complications.

## 2019-12-27 NOTE — PROGRESS NOTES
[facility] ICU Progress Note      Admit Date: 12/24/2019  Resident(s): Sebastián Trent   Attending: ABNER Frausto/ MIREYA Chavis    Date & Time:   12/27/2019   7:47 AM       Patient ID:    Name:             Jacinto Loyola     YOB: 1970  Age:                 49 y.o.  male   MRN:               [id number]    Diagnosis:  Intraparenchymal Hemorrhage  Acute hypoxic respiratory failure  HTN emergency    ID:  49-year-old male with a past medical history of hypertension, JAYASHREE, medications noncompliance and h/o meth abuse was brought in via EMS after he was found down at outside of his home at around 1600 on 12/24/2019.  As per chart review, patient parked his car, took a few steps and collapsed down on his front yard. Down for approximately 30 minutes before being found by friend.  Patient noted for left lower and upper extremity weakness as well as left facial weakness/numbness.      At the ED, blood pressure in the 190s/110-130s, heart rate in the 70s, patient saturating above 90% on 2 liters nasal cannula.  No leukocytosis or anemia on CBC.  Chemistry, troponin, INR and diagnostic alcohol normal.  Checks x-ray without acute cardiopulmonary process.  Head CT showed a 2.7 cm acute right basal ganglia/thalamic hemorrhage extravasating into the right lateral ventricle along with small right-sided mass-effect with minimal right-to-left midline shift.  No hydrocephalus. This was confirmed on Head CTA. Neurology was consulted by EDP.  He was given a one-time dose of labetalol and was started on a Cardene drip.  As per neurology goal MAP of .  No indication for neurosurgical involvement at this time.  Patient was admitted to ICU for intraparenchymal hemorrhage management and hypertensive crisis control.      Interval Events:  - Overnight Pt aspirated tube feed. Pt was more obtunded (bedside nurse noted tube feed in the oropharynx), patient was intubated, bronched/BAL.  - Febrile T Max 102F, SBP   mmHg, keep MAP , ventilated  - Unasyn for Aspiration pneumonia, pending BAL cell count/culture results  - Neurologist on board, appreciate rec  - Replete electrolytes per pharmacy  - Initial UDS positive for ampthetamine      Review of Systems   Unable to perform ROS: Acuity of condition       VITALS:  Pulse: 86  Blood Pressure: 142/91       Monitored Temp 2  Av.5 °C (101.3 °F)  Min: 38.5 °C (101.3 °F)  Max: 38.5 °C (101.3 °F)  Temp  Av.3 °C (99.1 °F)  Min: 36.7 °C (98 °F)  Max: 38.9 °C (102 °F)    [place] Vent Mode: APVCMV, Rate (breaths/min): 20, Vt Target (mL): 470, PEEP/CPAP: 8, FiO2: 30, Static Compliance (ml / cm H2O): 11    Physical Exam:  Physical Exam  Intubated and sedated  Spontaneously moving his left lower extremity, not following commands.    Consultants:  Neurologist:  Huan Graff M.D.  PMA: Constantin Avina M.D.      Procedures:  None    HemoDynamics:  Pulse: 86 Blood Pressure: 142/91        Respiratory:  [place]en Vent Mode: APVCMV  Respiration: 15, Pulse Oximetry: 100 %    Chest Tube Drains:    Recent Labs     19  2205 19  1210 19  0304   ISTATAPH 7.404 7.371* 7.364*   ISTATAPCO2 37.3* 34.4 41.2*   ISTATAPO2 60* 61* 247*   ISTATATCO2 24 21 25   NREQDXY1VDD 91* 91* 100*   ISTATARTHCO3 23.3 20.0 23.5   ISTATARTBE -1 -4 -2   ISTATTEMP 98.2 F 98.6 F 100.6 F   ISTATFIO2 30 36 100   ISTATSPEC Arterial Arterial Arterial   ISTATAPHTC 7.407 7.371* 7.348*   JBAGSPUZ4AQ 59* 61* 253*         Neuro:      Fluids:  Intake/Output       19 0700 - 19 0659 19 0700 - 19 0659 12700 - 19 0659       Total  Total  Total       Intake    I.V.  1068.3  331.7 1400  1289.5  903 2192.5  200  -- 200    Magnesium Sulfate Volume 50 -- 50 13.8 36.3 50 -- -- --    Precedex Volume -- 0 0 147.4 -- 147.4 -- -- --    Cardene Volume 1018.3 331.7 1350 1128.3 349.2 1477.5 -- -- --    Phenylephrine  Volume -- -- -- -- 7 7 -- -- --    Propofol Volume -- -- -- -- 47.3 47.3 -- -- --    Volume (mL) (lactated ringers infusion) -- -- -- -- 463.3 463.3 200 -- 200    Other  --  -- --  --  30 30  --  -- --    Medications (PO/Enteral Liquids) -- -- -- -- 30 30 -- -- --    NG/GT  90  200 290  190  380 570  --  -- --    Intake (mL) ([REMOVED] Enteral Tube 12/25/19 Cortrak - Gastric Left nare) 90 200 290 -- -- -- -- -- --    Intake (mL) (Enteral Tube 12/26/19 Cortrak - Gastric Right nare) -- -- -- 190 380 570 -- -- --    IV Piggyback  500  0 500  499.8  1100 1599.8  --  -- --    Volume (mL) (NS (BOLUS) infusion 1,000 mL) -- -- -- -- 1000 1000 -- -- --    Volume (mL) (potassium phosphates 30 mmol in D5W 500 mL ivpb) 500 0 500 -- -- -- -- -- --    Volume (mL) (potassium phosphates 30 mmol in D5W 500 mL ivpb) -- -- -- 499.8 0 499.8 -- -- --    Volume (mL) (ampicillin/sulbactam (UNASYN) 3 g in  mL IVPB) -- -- -- -- 100 100 -- -- --    Total Intake 1658.3 531.7 2190 1979.3 2413 4392.3 200 -- 200       Output    Urine  490  2000 2490  470  798 1268  --  -- --    Output (mL) ([REMOVED] Urethral Catheter Temperature probe 16 Fr.) 400 -- 400 -- -- -- -- -- --    Output (mL) ([REMOVED] Urethral Catheter 16 Fr.) 90 2000 2090 470 428 898 -- -- --    Output (mL) (Urethral Catheter) -- -- -- -- 370 370 -- -- --    Stool  --  -- --  --  -- --  --  -- --    Number of Times Stooled -- -- -- 0 x 0 x 0 x 0 x -- 0 x    Total Output 490 2000 0730  -- -- --       Net I/O     1168.3 -1468.3 -300 1509.3 1615 3124.3 200 -- 200           Recent Labs     12/25/19  0331 12/26/19  0240 12/27/19  0230   SODIUM 136 136 135   POTASSIUM 3.5* 3.6 4.4   CHLORIDE 105 105 106   CO2 24 23 21   BUN 15 13 31*   CREATININE 1.03 0.90 1.18   MAGNESIUM 1.7 1.9 2.2   PHOSPHORUS 1.9* 1.9* 3.1   CALCIUM 8.6 8.3* 8.0*     Body mass index is 29.99 kg/m².    GI/Nutrition:  Recent Labs     12/24/19  1818 12/25/19  0331 12/26/19  0240 12/27/19  0230    ALTSGPT 15  --   --   --    ASTSGOT 19  --   --   --    ALKPHOSPHAT 65  --   --   --    TBILIRUBIN 0.6  --   --   --    PREALBUMIN  --   --  20.0  --    GLUCOSE 96 125* 122* 121*       Heme:  Recent Labs     19   RBC 5.54 5.36 5.57 4.91   HEMOGLOBIN 16.2 15.8 16.3 14.4   HEMATOCRIT 48.6 46.3 47.3 42.8   PLATELETCT 222 251 272 243   PROTHROMBTM 12.8  --   --   --    APTT 26.8  --   --   --    INR 0.95  --   --   --        Infectious Disease:  Monitored Temp 2  Av.5 °C (101.3 °F)  Min: 38.5 °C (101.3 °F)  Max: 38.5 °C (101.3 °F)  Temp  Av.3 °C (99.1 °F)  Min: 36.7 °C (98 °F)  Max: 38.9 °C (102 °F)  Recent Labs     19   WBC 8.5 16.1* 17.0* 19.5*   NEUTSPOLYS 54.90 82.30* 79.20* 83.40*   LYMPHOCYTES 33.50 11.20* 11.30* 6.20*   MONOCYTES 8.70 5.60 8.30 9.50   EOSINOPHILS 2.00 0.30 0.40 0.10   BASOPHILS 0.50 0.20 0.20 0.20   ASTSGOT 19  --   --   --    ALTSGPT 15  --   --   --    ALKPHOSPHAT 65  --   --   --    TBILIRUBIN 0.6  --   --   --        Medications:  • ampicillin-sulbactam (UNASYN) IV  3 g Stopped (19)   • LR   100 mL/hr at 19 0122   • propofol  0-80 mcg/kg/min 15 mcg/kg/min (195)   • fentaNYL   50 mcg/hr (19 0336)   • lidocaine  1-2 mL     • lidocaine  0.5 mL     • acetaminophen  1,000 mg     • dexmedetomidine (PRECEDEX) infusion  0.1-1.5 mcg/kg/hr Stopped (19 1200)   • haloperidol lactate  2 mg     • OLANZapine  5 mg     • Metoprolol Tartrate  5 mg     • amLODIPine  10 mg     • carvedilol  12.5 mg     • Pharmacy  1 Each     • senna-docusate  2 Tab      And   • polyethylene glycol/lytes  1 Packet      And   • magnesium hydroxide  30 mL      And   • bisacodyl  10 mg     • acetaminophen  650 mg      Or   • acetaminophen  650 mg     • ondansetron  4 mg      Or   • ondansetron  4 mg     • labetalol  10 mg     • MD Alert...Adult ICU Electrolyte  Replacement per Pharmacy       • Respiratory Care per Protocol       • niCARdipine infusion  0-15 mg/hr Stopped (12/27/19 0201)        Imaging:  DX-CHEST-PORTABLE (1 VIEW)   Final Result      Interval intubation with decreasing lung volumes and increasing perihilar, basilar opacity worrisome for aspiration, pneumonia over edema      DX-CHEST-PORTABLE (1 VIEW)   Final Result      Increasing infrahilar opacity is worrisome for aspiration      DX-ABDOMEN FOR TUBE PLACEMENT   Final Result      Enteric tube terminates over the stomach.      DX-ABDOMEN FOR TUBE PLACEMENT   Final Result      Enteric tube tip projects over the distal stomach.      DX-ABDOMEN FOR TUBE PLACEMENT   Final Result      Enteric tube is coiled in the neck.      CT-HEAD W/O   Final Result      Considerable motion and misregistration artifact on the images.   3.5 cm acute right thalamic/right basal ganglia hemorrhage slightly larger than previous.   Extravasation of hemorrhage into the right lateral ventricle and dependent hemorrhage within the left lateral ventricle.   Right-sided mass effect with 2.7 mm right to left midline shift.         DX-ABDOMEN FOR TUBE PLACEMENT   Final Result      Enteric tube tip projects over the stomach.      EC-ECHOCARDIOGRAM COMPLETE W/O CONT   Final Result      CT-HEAD W/O   Final Result      Stable to slight interval enlargement of right thalamic intra-axial hematoma which extravasates into the lateral ventricle and results in unchanged trace leftward midline shift      Mild worsening vasogenic edema but there is no hydrocephalus or herniation      Motion degraded      DX-CHEST-PORTABLE (1 VIEW)   Final Result      No evidence of acute cardiopulmonary process.      CT-CTA NECK WITH & W/O-POST PROCESSING   Final Result      CT angiogram of the neck within normal limits. No large vessel occlusion or stenosis. No dissection.      CT-CTA HEAD WITH & W/O-POST PROCESS   Final Result      1.  No evidence of intracranial  vascular occlusion or aneurysm.      2.  Large right basal ganglial intraparenchymal hemorrhage with extension into the right lateral ventricle again noted.      CT-HEAD W/O   Final Result      2.7 cm acute right basal ganglia/thalamic hemorrhage extravasating into the right lateral ventricle.   No hydrocephalus.   Mild right-sided mass effect with minimal right-to-left midline shift.   Central white matter low attenuation consistent with microvascular ischemic changes.            Assessment and plan    * Acute hypoxic respiratory failure   Assessment & Plan      - Pt aspirated tube feed. Pt was more obtunded overnight     - Pt was in resp distress using accessory muscles, making loud snore, obtunded, not arousable. On 3L oxymask, 96%      - Pt was subsequently intubated, bronched/BAL 12/27/2019 (Family was updated by the night team)      - ABG 7.34, 43, 253, 23      - continue on APV/CMV mode    * Aspiration pneumonia/pneumonitis   Assessment & Plan   - Highly suspicious, Pt aspirated tube feed, bronchoscopy significant for moderate amount of white frothy secretions in distal left main stem, lingula, left lower lobe,   - Leukocytosis, WBC count increased to 19.5 from 17 yesterday, fever 102F, resp distress using accessory muscles required intubation, ICH with residual weakness,   - Continue Unasyn  - pending BAL secretions cell count/culture results       * Intraparenchymal hemorrhage of brain (HCC)- (present on admission)    Assessment & Plan       2/2 uncontrolled hypertension/HTN emergency/ meth abuse  Presented with left upper extremity, left lower extremity, and left facial weakness as well as slurred speech  Initial head CT showed a 2.7 cm acute right basal ganglia/thalamic hemorrhage extravasating into the right lateral ventricle along with small right-sided mass-effect with minimal right-to-left midline shift.  No hydrocephalus  Head/neck CT without vessel occlusions  Initially received a total of 30 mg of  Labetalol push and was started on a Nicardipine drip  Plan:  Continue ICU care  Keep NPO  Keep head of bed 30 degrees  Neurochecks Q2H per neurologist rec.  RT/O2  Continue IV hydration  Continue Nicardipine drip (titrate to MAP ), restart carvedilol and amlodipine.  Neurology following, appreciate recommendations  PT/OT/Speech/PMR       Hypertensive emergency- (present on admission)  Assessment & Plan  Presented with blood pressure in the 190s/110-130s  Intraparenchymal hemorrhage, no BEVERLY on labs or EKG changes/trops elevation  Continue nicardipine drip (Goal MAP )  UDS positive for amphetamine       Continue home carvedilol and amlodipine, add clonidine patch and scheduled 0.2 mg TID enteral tube clonidine.     JAYASHREE (obstructive sleep apnea)  Assessment & Plan  Not on CPAP  Continue O2 supplementation      DISPO: ICU    Code Status: FULL    Quality Measures:  Quality-Core Measures   Reviewed items::  EKG reviewed, Radiology images reviewed, Labs reviewed and Medications reviewed  Villasenor catheter::  Critically Ill - Requiring Accurate Measurement of Urinary Output  DVT prophylaxis pharmacological::  Contraindicated - High bleeding risk  DVT prophylaxis - mechanical:  SCDs  Ulcer Prophylaxis::  Yes

## 2019-12-27 NOTE — RESPIRATORY CARE
Adult Ventilation Update    Total Vent Days: 1  APVCMV  20  470  +8  40%    Patient Lines/Drains/Airways Status    Active Airway     Name: Placement date: Placement time: Site: Days:    Airway ETT Oral 8.0@25  12/27/19   0200   Oral  less than 1              Post intubation ABG hand delivered to Dr. Goldsmith    7.34  43  253  23    Events/Summary/Plan: ABg drawn (12/27/19 0307)

## 2019-12-27 NOTE — CARE PLAN
Adult Ventilation Update    Total Vent Days: 1    Patient Lines/Drains/Airways Status      Active Airway       Name: Placement date: Placement time: Site: Days:    Airway ETT Oral 8.0  12/27/19   0200   Oral  less than 1                             Plateau Pressure (Q Shift): 17 (12/27/19 1506)  Static Compliance (ml / cm H2O): 67.1 (12/27/19 1506)    Patient failed trials because of    Barriers to SBT    Length of Weaning Trial    Cough: Non Productive (12/27/19 1506)  Sputum Amount: Small (12/27/19 1200)  Sputum Color: White;Tan (12/27/19 1200)  Sputum Consistency: Thick;Thin (12/27/19 1200)    Mobility  Level of Mobility: Level I (12/27/19 1200)  Activity Performed: Unable to mobilize (12/27/19 1200)  Reason Not Mobilized: Bed rest;Unstable condition(Per MD. Patient newly intubated and unable to follow any com) (12/27/19 1200)    Events/Summary/Plan: Vent check (12/27/19 1506)

## 2019-12-27 NOTE — DIETARY
Nutrition Support Assessment:  Day 3 of admit.  Jacinto Loyola is a 49 y.o. male with admitting DX of intraparechymal hemorrhage of brain, hypertensive crisis.     Current problem list:  1. Intraparenchymal hemorrhage of brain  2. JAYASHREE  3. Methamphetamine use  4. Hypertensive emergency  5. Hypokalemia     Assessment:  Estimated Nutritional Needs based on:   Height: 182.9 cm (6')  Weight: 100.3 kg (221 lb 1.9 oz)  Weight to Use in Calculations: 100.3 kg (221 lb 1.9 oz) - bed scale  Ideal Body Weight: 80.7 kg (178 lb)  Percent Ideal Body Weight: 124.2  Body mass index is 29.99 kg/m²., BMI classification: overweight    Calculation/Equation:   MSJ x 1.1 = 2104 kcals  REE using PSU with vent L/min 8.8 and Tmax 38.6 = 2343 kcals   Total Calories / day: 2100 - 2300  (Calories / k - 23)  Total Grams Protein / day: 100 - 130  (Grams Protein / k - 1.3)     Evaluation:   1. Pt intubated early this morning and is on ventilator (day 1), nutrition support indicated.  2. Gastric cortrak in place with confirmed placement.   3. Per RN, tubefeed stopped overnight due to high suspicion for aspiration of tubefeed.   4. Labs: glucose 121, BUN 31  5. Meds: pepcid, colace, zofran prn, bowel protocol, LR at 100 ml/hr, propofol at 15 mcg/kg (9 ml/hr = 237 kcals daily)  6. Specialized formula indicated to meet increased protein needs.     Malnutrition Risk: Unable to assess.     Recommendations/Plan:  Start Impact Peptide 1.5 @ 25 ml/hr and advance per protocol to 55 ml/hr (goal rate with and without propofol) to provide 1980 kcal (20 kcal/kg), 124 grams protein (1.2 gm/kg), and 1016 ml free water per day.   Fluids per MD.   Monitor propofol.    RD to follow.

## 2019-12-27 NOTE — THERAPY
Pt continues to remain medically inappropriate to participate in therapy. Per RN,  now intubated and not following commands. Will d/c OT orders d/t decreased in status since initial consult. Please re-order therapy once pt is able to participate in skilled OT services.

## 2019-12-27 NOTE — PROGRESS NOTES
Neurology Progress Note  Neurohospitalist Service, [facility]    Referring Physician: SENIA Tejada*    Chief Complaint   Patient presents with   • Possible Stroke     LKW 1600. noticed loss of sensation to LT side at that ttime then had a sudden onset of LUE & LLE weakness, slurred speech, LT sided facial droop 1745. BS: 89.        HPI: Refer to initial documented Neurology H&P, as detailed in the patient's chart.    Interval History 12/27/19: Now intubated and sedated    Review of systems: In addition to what is detailed in the HPI and/or updated in the interval history, all other systems reviewed and are negative.    Past Medical History:    has a past medical history of Depression (8/28/2009), HTN (8/28/2009), and Migraines, neuralgic (8/28/2009).    FHx:  family history is not on file.    SHx:   reports that he has never smoked. He has never used smokeless tobacco. He reports current alcohol use. He reports current drug use. Drug: Intravenous.    Medications:    Current Facility-Administered Medications:   •  ampicillin/sulbactam (UNASYN) 3 g in  mL IVPB, 3 g, Intravenous, Q6HRS, Wilberto Ness M.D., Stopped at 12/27/19 0906  •  lactated ringers infusion, , Intravenous, Continuous, Wilberto Ness M.D., Last Rate: 100 mL/hr at 12/27/19 1138  •  propofol (DIPRIVAN) injection, 0-80 mcg/kg/min, Intravenous, Continuous, Last Rate: 9 mL/hr at 12/27/19 1153, 15 mcg/kg/min at 12/27/19 1153 **AND** Triglycerides Starting now and then Every 3 Days, , , Every 3 Days (0300), Bruce Pete D.O.  •  fentaNYL (SUBLIMAZE) 50 mcg/mL in 50mL (Continuous Infusion), , Intravenous, Continuous, Bruce Pete D.O., Last Rate: 1 mL/hr at 12/27/19 0336, 50 mcg/hr at 12/27/19 0336  •  lidocaine (XYLOCAINE) 1 % injection 1-2 mL, 1-2 mL, Tracheal Tube, Q30 MIN PRN, Wilberto Ness M.D.  •  lidocaine (XYLOCAINE) 1 % injection 0.5 mL, 0.5 mL, Intradermal, Once PRN, Bruce  BILL Pete  •  acetaminophen (TYLENOL) tablet 1,000 mg, 1,000 mg, Oral, Q6HRS, Constantin Avina M.D., 1,000 mg at 12/27/19 1138  •  famotidine (PEPCID) tablet 20 mg, 20 mg, Enteral Tube, BID, 20 mg at 12/27/19 1138 **OR** famotidine (PEPCID) injection 20 mg, 20 mg, Intravenous, BID, Constantin Avina M.D.  •  dexmedetomidine (PRECEDEX) 400 mcg/100mL NS premix infusion, 0.1-1.5 mcg/kg/hr, Intravenous, Continuous, Constantin Avina M.D., Stopped at 12/26/19 1200  •  haloperidol lactate (HALDOL) injection 2 mg, 2 mg, Intravenous, Once PRN, Constantin Avina M.D.  •  OLANZapine (ZYPREXA) tablet 5 mg, 5 mg, Enteral Tube, Q12HRS, Constantin Avina M.D., 5 mg at 12/27/19 0825  •  amLODIPine (NORVASC) tablet 10 mg, 10 mg, Enteral Tube, Q DAY, Constantin Avina M.D., 10 mg at 12/27/19 0818  •  carvedilol (COREG) tablet 12.5 mg, 12.5 mg, Enteral Tube, BID WITH MEALS, Constantin Avina M.D., 12.5 mg at 12/27/19 0819  •  Pharmacy Consult: Enteral tube insertion - review meds/change route/product selection, 1 Each, Other, PHARMACY TO DOSE, Constantin Avina M.D.  •  senna-docusate (PERICOLACE or SENOKOT S) 8.6-50 MG per tablet 2 Tab, 2 Tab, Enteral Tube, BID, 2 Tab at 12/27/19 0818 **AND** polyethylene glycol/lytes (MIRALAX) PACKET 1 Packet, 1 Packet, Enteral Tube, QDAY PRN **AND** magnesium hydroxide (MILK OF MAGNESIA) suspension 30 mL, 30 mL, Enteral Tube, QDAY PRN **AND** bisacodyl (DULCOLAX) suppository 10 mg, 10 mg, Rectal, QDAY PRN, Constantin Avina M.D.  •  acetaminophen (TYLENOL) tablet 650 mg, 650 mg, Enteral Tube, Q4HRS PRN, 650 mg at 12/25/19 1603 **OR** acetaminophen (TYLENOL) suppository 650 mg, 650 mg, Rectal, Q4HRS PRN, Constantin Avina M.D., 650 mg at 12/26/19 2341  •  ondansetron (ZOFRAN ODT) dispertab 4 mg, 4 mg, Enteral Tube, Q4HRS PRN **OR** ondansetron (ZOFRAN) syringe/vial injection 4 mg, 4 mg, Intravenous, Q4HRS PRN, Constantin Avina M.D.  •  labetalol (NORMODYNE/TRANDATE) injection 10 mg, 10 mg,  Intravenous, Q4HRS PRN, Wilberto Ness M.D., 10 mg at 12/26/19 7907  •  MD Alert...ICU Electrolyte Replacement per Pharmacy, , Other, PHARMACY TO DOSE, Wilberto Ness M.D.  •  Respiratory Care per Protocol, , Nebulization, Continuous RT, Wilberot Ness M.D.  •  niCARdipine (CARDENE) 25 mg in  mL Infusion, 0-15 mg/hr, Intravenous, Continuous, Wilberto Ness M.D., Stopped at 12/27/19 0201    Physical Examination:     Vitals:    12/27/19 1030 12/27/19 1100 12/27/19 1153 12/27/19 1200   BP: (!) 96/56 (!) 94/58     Pulse: 73 73 72    Resp: 20 20     Temp:       TempSrc:    Bladder   SpO2: 97% 97% 98%    Weight:       Height:           General: Patient is awake and in no acute distress  Eyes: examination of optic disks not indicated at this time  CV: RRR    NEUROLOGICAL EXAM:     Sedated and intubated with limited response.  Objective Data:    Labs:  Lab Results   Component Value Date/Time    PROTHROMBTM 12.8 12/24/2019 06:18 PM    INR 0.95 12/24/2019 06:18 PM      Lab Results   Component Value Date/Time    WBC 19.5 (H) 12/27/2019 02:30 AM    RBC 4.91 12/27/2019 02:30 AM    HEMOGLOBIN 14.4 12/27/2019 02:30 AM    HEMATOCRIT 42.8 12/27/2019 02:30 AM    MCV 87.2 12/27/2019 02:30 AM    MCH 29.3 12/27/2019 02:30 AM    MCHC 33.6 (L) 12/27/2019 02:30 AM    MPV 8.7 (L) 12/27/2019 02:30 AM    NEUTSPOLYS 83.40 (H) 12/27/2019 02:30 AM    LYMPHOCYTES 6.20 (L) 12/27/2019 02:30 AM    MONOCYTES 9.50 12/27/2019 02:30 AM    EOSINOPHILS 0.10 12/27/2019 02:30 AM    BASOPHILS 0.20 12/27/2019 02:30 AM      Lab Results   Component Value Date/Time    SODIUM 135 12/27/2019 02:30 AM    POTASSIUM 4.4 12/27/2019 02:30 AM    CHLORIDE 106 12/27/2019 02:30 AM    CO2 21 12/27/2019 02:30 AM    GLUCOSE 121 (H) 12/27/2019 02:30 AM    BUN 31 (H) 12/27/2019 02:30 AM    CREATININE 1.18 12/27/2019 02:30 AM    CREATININE 1.0 05/24/2007 09:55 PM      Lab Results   Component Value Date/Time    CHOLSTRGAYATRI 136  12/24/2019 06:18 PM    LDL 61 12/24/2019 06:18 PM    HDL 44 12/24/2019 06:18 PM    TRIGLYCERIDE 111 12/27/2019 02:30 AM       Lab Results   Component Value Date/Time    ALKPHOSPHAT 65 12/24/2019 06:18 PM    ASTSGOT 19 12/24/2019 06:18 PM    ALTSGPT 15 12/24/2019 06:18 PM    TBILIRUBIN 0.6 12/24/2019 06:18 PM        Imaging/Testing:  CT yesterday reviewed without significant change in ICH volume. No evidence of hydrocephalus.    Assessment and Plan:    Jacinto Loyola is a 49 y.o. 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  Now intubated and sedated due to agitation.  Cognitive issues could be contributed by amphetamine withdrawal or hypothalamic involvement of ICH.  Will follow CTs as needed.  Buspirone or neuroleptic reasonable to start as pt sedation lightened. Note systolic BP in 90s.  Unclear if due to sedatives.  BP MAP 80 - 110 recommended.  Plan:    The evaluation of the patient, and recommended management, was discussed with the resident staff. I have performed a physical exam and reviewed and updated ROS and Plan today (12/27/2019). In review of yesterday's note (12/26/2019), there are no changes except as documented above.    Huan Graff MD  Board Certified Neurology, ABPN  Board Certified Vascular Neurology, ABPN  t) [phone]

## 2019-12-27 NOTE — PROCEDURES
BRONCHOSCOPY PROCEDURE NOTE      Date: 12/27/2019  Time: 2:27 AM    Time out: performed. Name, MRN, allergy and procedure were confirmed.     Indication: acute hypoxic resp failure.     Consent: not done, emergent.     Procedure: Bronchoscopy with bronchoalveolar lavage and therapeutic aspiration of secretions    Sedation: propofol, fentanyl, 1% lidocaine    Findings:  Respiratory therapy and nursing at bedside throughout procedure. Patient provided sedation and analgesia throughout the procedure. Placed on full ventilator support with an FiO2 of 100% during procedure. Using a fiberoptic bronchoscope, trachea entered via ET tube.  6mL of local 1% lidocaine sprayed at the miranda, RC1, LC2 achieving appropriate comfort level for patient. Airways visualized directly and careful inspection of airway was accomplished.     Upon entry, noted large amount of white purulent secretions in miranda extending to the right main stem, segmental and subsegmental airways. BAL was done in [facility] and sent for microbiology. All of the secretions were suctioned and cleaned. I then turned attention to the left side. Noted moderate amount of white frothy secretions in distal left main stem, lingula, left lower lobe. All secretions were suctioned and cleared. All secretions were suctioned and cleared. At the end, ET tube was adjusted to 25cm at the lip.     Specimen sent to microbiology/pathology: cell count, routine gs/cx    Complications:   Patient tolerated procedure well without any difficulties and left in care of bedside nurse/RT.     Estimated blood loss: none    For the above procedure I also performed the conscious sedation and was directly involved with administration of medication, monitoring airway, vital signs, preventing complications.  Administered propofol total 120mg and fentanyl 150mcg in titration fashion until achieving a level of moderate procedural sedation.      Patient tolerated procedure well without complications  from sedation and was left in the care of his bedside nurse and respiratory therapy. Procedural sedation time equals 24 minutes which was separate from procedure time as described above.  Start time: 0156 Stop time: 0220      Bruce Pete D.O.  Critical Care Medicine

## 2019-12-27 NOTE — THERAPY
SLP will hold clinical swallow evaluation as pt is now intubated. Please reorder as medically appropriate.

## 2019-12-27 NOTE — PROGRESS NOTES
Night intensivist note.     Notified by [facility] Resident Dr. Ness regarding pt aspirated tube feed. Pt was more obtunded, and bedside nurse noted tube feed in the oropharynx.     Reason for admissions was reviewed. ICH, meth abuse, HTN emergency.     I came to bedside and pt was in resp distress using accessory muscles, making loud snore, obtunded, not arousable. On 3L oxymask, 96%. Pt was subsequently intubated, bronched/BAL (see my procedure notes).     Assessment:   Acute hypoxic resp failure  Aspiration pneumonia/pneumonitis.   ICH  Meth abuse  HTN emergency    Plan:   Full vent support, low tidal volume strategy  Keep PEEP 10, FiO2 100%.   ABG post intubation, adjust accordingly  S/p bronch/BAL, send for cultures.   Sedation with propofol gtt and fentanyl gtt, goal to keep RASS -3 for tonight.   Keep MAP >65  Started on unasyn.   Blood cultures x2  Lactate  Monitor UOP, goal >50cc/hr.     I have assessed and reassessed his respiratory status, blood pressure, hemodynamics, cardiovascular and neurological status.  He is at increased risk for worsening respiratory, cardiovascular and neurological system dysfunction.  High risk of deterioration and worsening vital organ dysfunction and death without the above critical care interventions.     The patient remains critically ill. Critical care time = 85 mins in directly providing and coordinating critical care and extensive data review. No time overlap and excludes procedures.     Family was updated.     Bruce Pete DO

## 2019-12-28 ENCOUNTER — APPOINTMENT (OUTPATIENT)
Dept: RADIOLOGY | Facility: MEDICAL CENTER | Age: 49
DRG: 064 | End: 2019-12-28
Attending: STUDENT IN AN ORGANIZED HEALTH CARE EDUCATION/TRAINING PROGRAM
Payer: MEDICAID

## 2019-12-28 ENCOUNTER — APPOINTMENT (OUTPATIENT)
Dept: RADIOLOGY | Facility: MEDICAL CENTER | Age: 49
DRG: 064 | End: 2019-12-28
Attending: PSYCHIATRY & NEUROLOGY
Payer: MEDICAID

## 2019-12-28 LAB
ACTION RANGE TRIGGERED IACRT: YES
ANION GAP SERPL CALC-SCNC: 7 MMOL/L (ref 0–11.9)
BASE EXCESS BLDA CALC-SCNC: -2 MMOL/L (ref -4–3)
BASOPHILS # BLD AUTO: 0.3 % (ref 0–1.8)
BASOPHILS # BLD: 0.04 K/UL (ref 0–0.12)
BODY TEMPERATURE: ABNORMAL DEGREES
BUN SERPL-MCNC: 39 MG/DL (ref 8–22)
CALCIUM SERPL-MCNC: 8 MG/DL (ref 8.5–10.5)
CHLORIDE SERPL-SCNC: 109 MMOL/L (ref 96–112)
CO2 BLDA-SCNC: 24 MMOL/L (ref 20–33)
CO2 SERPL-SCNC: 23 MMOL/L (ref 20–33)
CREAT SERPL-MCNC: 1.24 MG/DL (ref 0.5–1.4)
EOSINOPHIL # BLD AUTO: 0.14 K/UL (ref 0–0.51)
EOSINOPHIL NFR BLD: 1.1 % (ref 0–6.9)
ERYTHROCYTE [DISTWIDTH] IN BLOOD BY AUTOMATED COUNT: 44 FL (ref 35.9–50)
GLUCOSE SERPL-MCNC: 110 MG/DL (ref 65–99)
HCO3 BLDA-SCNC: 23 MMOL/L (ref 17–25)
HCT VFR BLD AUTO: 37.3 % (ref 42–52)
HGB BLD-MCNC: 12.7 G/DL (ref 14–18)
HOROWITZ INDEX BLDA+IHG-RTO: 160 MM[HG]
IMM GRANULOCYTES # BLD AUTO: 0.05 K/UL (ref 0–0.11)
IMM GRANULOCYTES NFR BLD AUTO: 0.4 % (ref 0–0.9)
INST. QUALIFIED PATIENT IIQPT: YES
LYMPHOCYTES # BLD AUTO: 2.01 K/UL (ref 1–4.8)
LYMPHOCYTES NFR BLD: 15.1 % (ref 22–41)
MCH RBC QN AUTO: 30.3 PG (ref 27–33)
MCHC RBC AUTO-ENTMCNC: 34 G/DL (ref 33.7–35.3)
MCV RBC AUTO: 89 FL (ref 81.4–97.8)
MONOCYTES # BLD AUTO: 1.22 K/UL (ref 0–0.85)
MONOCYTES NFR BLD AUTO: 9.2 % (ref 0–13.4)
NEUTROPHILS # BLD AUTO: 9.81 K/UL (ref 1.82–7.42)
NEUTROPHILS NFR BLD: 73.9 % (ref 44–72)
NRBC # BLD AUTO: 0 K/UL
NRBC BLD-RTO: 0 /100 WBC
O2/TOTAL GAS SETTING VFR VENT: 30 %
PCO2 BLDA: 38.9 MMHG (ref 26–37)
PCO2 TEMP ADJ BLDA: 39.6 MMHG (ref 26–37)
PH BLDA: 7.38 [PH] (ref 7.4–7.5)
PH TEMP ADJ BLDA: 7.37 [PH] (ref 7.4–7.5)
PLATELET # BLD AUTO: 184 K/UL (ref 164–446)
PMV BLD AUTO: 9.4 FL (ref 9–12.9)
PO2 BLDA: 48 MMHG (ref 64–87)
PO2 TEMP ADJ BLDA: 50 MMHG (ref 64–87)
POTASSIUM SERPL-SCNC: 3.7 MMOL/L (ref 3.6–5.5)
RBC # BLD AUTO: 4.19 M/UL (ref 4.7–6.1)
SAO2 % BLDA: 83 % (ref 93–99)
SODIUM SERPL-SCNC: 139 MMOL/L (ref 135–145)
SPECIMEN DRAWN FROM PATIENT: ABNORMAL
WBC # BLD AUTO: 13.3 K/UL (ref 4.8–10.8)

## 2019-12-28 PROCEDURE — 82803 BLOOD GASES ANY COMBINATION: CPT

## 2019-12-28 PROCEDURE — 700101 HCHG RX REV CODE 250: Performed by: INTERNAL MEDICINE

## 2019-12-28 PROCEDURE — 99291 CRITICAL CARE FIRST HOUR: CPT | Mod: 25 | Performed by: INTERNAL MEDICINE

## 2019-12-28 PROCEDURE — 85025 COMPLETE CBC W/AUTO DIFF WBC: CPT

## 2019-12-28 PROCEDURE — 700111 HCHG RX REV CODE 636 W/ 250 OVERRIDE (IP): Performed by: STUDENT IN AN ORGANIZED HEALTH CARE EDUCATION/TRAINING PROGRAM

## 2019-12-28 PROCEDURE — 302978 HCHG BRONCHOSCOPY-DIAGNOSTIC

## 2019-12-28 PROCEDURE — 0B9J8ZX DRAINAGE OF LEFT LOWER LUNG LOBE, VIA NATURAL OR ARTIFICIAL OPENING ENDOSCOPIC, DIAGNOSTIC: ICD-10-PCS | Performed by: INTERNAL MEDICINE

## 2019-12-28 PROCEDURE — 80048 BASIC METABOLIC PNL TOTAL CA: CPT

## 2019-12-28 PROCEDURE — 700111 HCHG RX REV CODE 636 W/ 250 OVERRIDE (IP): Performed by: INTERNAL MEDICINE

## 2019-12-28 PROCEDURE — 700105 HCHG RX REV CODE 258: Performed by: STUDENT IN AN ORGANIZED HEALTH CARE EDUCATION/TRAINING PROGRAM

## 2019-12-28 PROCEDURE — A9270 NON-COVERED ITEM OR SERVICE: HCPCS | Performed by: STUDENT IN AN ORGANIZED HEALTH CARE EDUCATION/TRAINING PROGRAM

## 2019-12-28 PROCEDURE — A9270 NON-COVERED ITEM OR SERVICE: HCPCS | Performed by: INTERNAL MEDICINE

## 2019-12-28 PROCEDURE — 31624 DX BRONCHOSCOPE/LAVAGE: CPT | Mod: 51 | Performed by: INTERNAL MEDICINE

## 2019-12-28 PROCEDURE — 99152 MOD SED SAME PHYS/QHP 5/>YRS: CPT

## 2019-12-28 PROCEDURE — 700102 HCHG RX REV CODE 250 W/ 637 OVERRIDE(OP): Performed by: INTERNAL MEDICINE

## 2019-12-28 PROCEDURE — 99292 CRITICAL CARE ADDL 30 MIN: CPT | Mod: 25 | Performed by: INTERNAL MEDICINE

## 2019-12-28 PROCEDURE — 700105 HCHG RX REV CODE 258: Performed by: INTERNAL MEDICINE

## 2019-12-28 PROCEDURE — 70450 CT HEAD/BRAIN W/O DYE: CPT

## 2019-12-28 PROCEDURE — 31645 BRNCHSC W/THER ASPIR 1ST: CPT | Performed by: INTERNAL MEDICINE

## 2019-12-28 PROCEDURE — 71045 X-RAY EXAM CHEST 1 VIEW: CPT

## 2019-12-28 PROCEDURE — 770022 HCHG ROOM/CARE - ICU (200)

## 2019-12-28 PROCEDURE — 99152 MOD SED SAME PHYS/QHP 5/>YRS: CPT | Mod: 59 | Performed by: INTERNAL MEDICINE

## 2019-12-28 PROCEDURE — 0BJ08ZZ INSPECTION OF TRACHEOBRONCHIAL TREE, VIA NATURAL OR ARTIFICIAL OPENING ENDOSCOPIC: ICD-10-PCS | Performed by: INTERNAL MEDICINE

## 2019-12-28 PROCEDURE — 700102 HCHG RX REV CODE 250 W/ 637 OVERRIDE(OP): Performed by: STUDENT IN AN ORGANIZED HEALTH CARE EDUCATION/TRAINING PROGRAM

## 2019-12-28 PROCEDURE — 0B9D8ZX DRAINAGE OF RIGHT MIDDLE LUNG LOBE, VIA NATURAL OR ARTIFICIAL OPENING ENDOSCOPIC, DIAGNOSTIC: ICD-10-PCS | Performed by: INTERNAL MEDICINE

## 2019-12-28 PROCEDURE — 99232 SBSQ HOSP IP/OBS MODERATE 35: CPT | Performed by: PSYCHIATRY & NEUROLOGY

## 2019-12-28 PROCEDURE — 94003 VENT MGMT INPAT SUBQ DAY: CPT

## 2019-12-28 RX ORDER — POTASSIUM CHLORIDE 20 MEQ/1
40 TABLET, EXTENDED RELEASE ORAL ONCE
Status: COMPLETED | OUTPATIENT
Start: 2019-12-28 | End: 2019-12-28

## 2019-12-28 RX ORDER — PROPOFOL 10 MG/ML
60 INJECTION, EMULSION INTRAVENOUS ONCE
Status: COMPLETED | OUTPATIENT
Start: 2019-12-28 | End: 2019-12-28

## 2019-12-28 RX ORDER — ROCURONIUM BROMIDE 10 MG/ML
50 INJECTION, SOLUTION INTRAVENOUS ONCE
Status: COMPLETED | OUTPATIENT
Start: 2019-12-28 | End: 2019-12-28

## 2019-12-28 RX ORDER — PROPOFOL 10 MG/ML
120 INJECTION, EMULSION INTRAVENOUS ONCE
Status: COMPLETED | OUTPATIENT
Start: 2019-12-28 | End: 2019-12-28

## 2019-12-28 RX ORDER — SODIUM CHLORIDE, SODIUM GLUCONATE, SODIUM ACETATE, POTASSIUM CHLORIDE AND MAGNESIUM CHLORIDE 526; 502; 368; 37; 30 MG/100ML; MG/100ML; MG/100ML; MG/100ML; MG/100ML
1000 INJECTION, SOLUTION INTRAVENOUS CONTINUOUS
Status: DISCONTINUED | OUTPATIENT
Start: 2019-12-28 | End: 2019-12-29

## 2019-12-28 RX ORDER — SODIUM CHLORIDE 9 MG/ML
1000 INJECTION, SOLUTION INTRAVENOUS ONCE
Status: COMPLETED | OUTPATIENT
Start: 2019-12-28 | End: 2019-12-28

## 2019-12-28 RX ADMIN — SENNOSIDES AND DOCUSATE SODIUM 2 TABLET: 8.6; 5 TABLET ORAL at 05:08

## 2019-12-28 RX ADMIN — OLANZAPINE 5 MG: 5 TABLET, FILM COATED ORAL at 05:08

## 2019-12-28 RX ADMIN — ROCURONIUM BROMIDE 50 MG: 10 INJECTION, SOLUTION INTRAVENOUS at 17:30

## 2019-12-28 RX ADMIN — PROPOFOL 120 MG: 10 INJECTION, EMULSION INTRAVENOUS at 17:25

## 2019-12-28 RX ADMIN — ACETAMINOPHEN 1000 MG: 500 TABLET ORAL at 18:36

## 2019-12-28 RX ADMIN — AMPICILLIN SODIUM AND SULBACTAM SODIUM 3 G: 2; 1 INJECTION, POWDER, FOR SOLUTION INTRAMUSCULAR; INTRAVENOUS at 21:26

## 2019-12-28 RX ADMIN — ACETAMINOPHEN 1000 MG: 500 TABLET ORAL at 05:08

## 2019-12-28 RX ADMIN — AMPICILLIN SODIUM AND SULBACTAM SODIUM 3 G: 2; 1 INJECTION, POWDER, FOR SOLUTION INTRAMUSCULAR; INTRAVENOUS at 09:52

## 2019-12-28 RX ADMIN — FENTANYL CITRATE 100 MCG/HR: 50 INJECTION, SOLUTION INTRAMUSCULAR; INTRAVENOUS at 11:48

## 2019-12-28 RX ADMIN — SODIUM CHLORIDE, POTASSIUM CHLORIDE, SODIUM LACTATE AND CALCIUM CHLORIDE: 600; 310; 30; 20 INJECTION, SOLUTION INTRAVENOUS at 07:28

## 2019-12-28 RX ADMIN — FENTANYL CITRATE 100 MCG: 0.05 INJECTION, SOLUTION INTRAMUSCULAR; INTRAVENOUS at 17:28

## 2019-12-28 RX ADMIN — FAMOTIDINE 20 MG: 20 TABLET ORAL at 18:36

## 2019-12-28 RX ADMIN — FAMOTIDINE 20 MG: 20 TABLET ORAL at 05:08

## 2019-12-28 RX ADMIN — AMPICILLIN SODIUM AND SULBACTAM SODIUM 3 G: 2; 1 INJECTION, POWDER, FOR SOLUTION INTRAMUSCULAR; INTRAVENOUS at 14:49

## 2019-12-28 RX ADMIN — PROPOFOL 60 MG: 10 INJECTION, EMULSION INTRAVENOUS at 17:33

## 2019-12-28 RX ADMIN — SENNOSIDES AND DOCUSATE SODIUM 2 TABLET: 8.6; 5 TABLET ORAL at 18:36

## 2019-12-28 RX ADMIN — SODIUM CHLORIDE, SODIUM GLUCONATE, SODIUM ACETATE, POTASSIUM CHLORIDE AND MAGNESIUM CHLORIDE 1000 ML: 526; 502; 368; 37; 30 INJECTION, SOLUTION INTRAVENOUS at 21:26

## 2019-12-28 RX ADMIN — POTASSIUM CHLORIDE 40 MEQ: 1500 TABLET, EXTENDED RELEASE ORAL at 07:05

## 2019-12-28 RX ADMIN — FENTANYL CITRATE 100 MCG: 0.05 INJECTION, SOLUTION INTRAMUSCULAR; INTRAVENOUS at 17:20

## 2019-12-28 RX ADMIN — ACETAMINOPHEN 1000 MG: 500 TABLET ORAL at 11:49

## 2019-12-28 RX ADMIN — AMPICILLIN SODIUM AND SULBACTAM SODIUM 3 G: 2; 1 INJECTION, POWDER, FOR SOLUTION INTRAMUSCULAR; INTRAVENOUS at 03:11

## 2019-12-28 RX ADMIN — SODIUM CHLORIDE, SODIUM GLUCONATE, SODIUM ACETATE, POTASSIUM CHLORIDE AND MAGNESIUM CHLORIDE 1000 ML: 526; 502; 368; 37; 30 INJECTION, SOLUTION INTRAVENOUS at 08:21

## 2019-12-28 RX ADMIN — SODIUM CHLORIDE 1000 ML: 9 INJECTION, SOLUTION INTRAVENOUS at 06:15

## 2019-12-28 NOTE — THERAPY
continues to remain medically inappropriate for therapy; not following, now intubated; nsg aware of attempts and aware of cancellation of initial order; please reconsult once appropriate for PT evaluation

## 2019-12-28 NOTE — CARE PLAN
Problem: Ventilation Defect:  Goal: Ability to achieve and maintain unassisted ventilation or tolerate decreased levels of ventilator support  Outcome: PROGRESSING AS EXPECTED      Ventilator Daily Summary    Vent Day # 2  CMV 20, 470, +8, 30%    Ventilator settings changed this shift: None    Weaning trials:    Respiratory Procedures:    Plan: Continue current ventilator settings and wean mechanical ventilation as tolerated per physician orders.

## 2019-12-28 NOTE — PROGRESS NOTES
[facility] ICU Progress Note      Admit Date: 12/24/2019  Resident(s): Sebastián Trent   Attending: ABNER Frausto/ MIREYA Chavis    Date & Time:   12/28/2019   12:04 PM       Patient ID:    Name:             Jacinto Loyola     YOB: 1970  Age:                 49 y.o.  male   MRN:               [id number]    Diagnosis:  Intraparenchymal Hemorrhage  Acute hypoxic respiratory failure  HTN emergency    ID:  49-year-old male with a past medical history of hypertension, JAYASHREE, medications noncompliance and h/o meth abuse was brought in via EMS after he was found down at outside of his home at around 1600 on 12/24/2019.  As per chart review, patient parked his car, took a few steps and collapsed down on his front yard. Down for approximately 30 minutes before being found by friend.  Patient noted for left lower and upper extremity weakness as well as left facial weakness/numbness.      At the ED, blood pressure in the 190s/110-130s, heart rate in the 70s, patient saturating above 90% on 2 liters nasal cannula.  No leukocytosis or anemia on CBC.  Chemistry, troponin, INR and diagnostic alcohol normal.  Checks x-ray without acute cardiopulmonary process.  Head CT showed a 2.7 cm acute right basal ganglia/thalamic hemorrhage extravasating into the right lateral ventricle along with small right-sided mass-effect with minimal right-to-left midline shift.  No hydrocephalus. This was confirmed on Head CTA. Neurology was consulted by EDP.  He was given a one-time dose of labetalol and was started on a Cardene drip.  As per neurology goal MAP of .  No indication for neurosurgical involvement at this time.  Patient was admitted to ICU for intraparenchymal hemorrhage management and hypertensive crisis control.      Interval Events:  - Intubated and sedated APVCMV  - Febrile T Max 101.3F, -149 mmHg, keep MAP   - Unasyn for Aspiration pneumonia, pending BAL culture results (collected on  2019, negative to date)  - Neurologist on board, appreciate rec  - Replete electrolytes per pharmacy  - Initial UDS positive for ampthetamine      Review of Systems   Unable to perform ROS: Acuity of condition       VITALS:  Pulse: 63  Blood Pressure: 143/91       Monitored Temp 2  Av.8 °C (100.1 °F)  Min: 37.4 °C (99.3 °F)  Max: 38.2 °C (100.8 °F)    [place]en Vent Mode: APVCMV, Rate (breaths/min): 20, Vt Target (mL): 470, PEEP/CPAP: 8, FiO2: 30, Static Compliance (ml / cm H2O): 74.2    Physical Exam:  Physical Exam  Intubated and sedated  Spontaneously moving his left lower extremity, not following commands.    Consultants:  Neurologist:  Huan Graff M.D.  PMA: Constantin Avina M.D.      Procedures:  None    HemoDynamics:  Pulse: 63 Blood Pressure: 143/91        Respiratory:  [place]cf[place]en Vent Mode: APVCMV  Respiration: 20, Pulse Oximetry: 98 %    Chest Tube Drains:    Recent Labs     19  1210 19  0304 19  0847   ISTATAPH 7.371* 7.364* 7.379*   ISTATAPCO2 34.4 41.2* 38.9*   ISTATAPO2 61* 247* 48*   ISTATATCO2 21 25 24   [id number] 91* 100* 83*   ISTATARTHCO3 20.0 23.5 23.0   ISTATARTBE -4 -2 -2   ISTATTEMP 98.6 F 100.6 F 99.3 F   ISTATFIO2 36 100 30   ISTATSPEC Arterial Arterial Arterial   ISTATAPHTC 7.371* 7.348* 7.373*   [id number] 61* 253* 50*         Neuro:      Fluids:  Intake/Output       19 07 - 19 0659 19 07 - 19 0659 19 07 - 19 0659      3460-9805 7580-2149 Total 1119-1928 9166-7553 Total 8527-2758 2385-8794 Total       Intake    I.V.  1289.5  903 2192.5  1283  1200 2483  358.8  -- 358.8    Magnesium Sulfate Volume 13.8 36.3 50 -- -- -- -- -- --    Precedex Volume 147.4 -- 147.4 -- -- -- -- -- --    Cardene Volume 1128.3 349.2 1477.5 -- -- -- -- -- --    Phenylephrine Volume -- 7 7 -- -- -- -- -- --    Propofol Volume -- 47.3 47.3 83 -- 83 -- -- --    Volume (mL) (lactated ringers infusion) -- 463.3 463.3 1200 1200 2400 235 -- 235     Volume (mL) (electrolyte-A (PLASMALYTE-A) infusion 1,000 mL) -- -- -- -- -- -- 123.8 -- 123.8    Other  --  30 30  --  90 90  --  -- --    Medications (PO/Enteral Liquids) -- 30 30 -- -- -- -- -- --    Flush / Irrigation Volume -- -- -- -- 90 90 -- -- --    NG/GT  190  380 570  100  540 640  220  -- 220    Intake (mL) (Enteral Tube 12/26/19 Cortrak - Gastric Right nare) 190 380 570 100 540 640 220 -- 220    IV Piggyback  499.8  1100 1599.8  1200  200 1400  1026.7  -- 1026.7    Volume (mL) (NS (BOLUS) infusion 1,000 mL) -- 1000 1000 -- -- -- -- -- --    Volume (mL) (NS (BOLUS) infusion 1,000 mL) -- -- -- 1000 -- 1000 -- -- --    Volume (mL) (NS (BOLUS) infusion 1,000 mL) -- -- -- -- -- -- 1000 -- 1000    Volume (mL) (potassium phosphates 30 mmol in D5W 500 mL ivpb) 499.8 0 499.8 -- -- -- -- -- --    Volume (mL) (ampicillin/sulbactam (UNASYN) 3 g in  mL IVPB) -- 100 100 200 200 400 26.7 -- 26.7    Total Intake 1979.3 2413 4392.3 2583 2030 4613 1605.4 -- 1605.4       Output    Urine  470  798 1268  500  485 985  330  -- 330    Output (mL) ([REMOVED] Urethral Catheter 16 Fr.) 470 428 898 -- -- -- -- -- --    Output (mL) (Urethral Catheter) -- 370 370 500 485 985 330 -- 330    Stool  --  -- --  --  -- --  --  -- --    Number of Times Stooled 0 x 0 x 0 x 0 x 0 x 0 x 1 x -- 1 x    Total Output  500 485 985 330 -- 330       Net I/O     1509.3 1615 3124.3 2083 1545 3628 1275.4 -- 1275.4           Recent Labs     12/26/19  0240 12/27/19  0230 12/28/19  0305   SODIUM 136 135 139   POTASSIUM 3.6 4.4 3.7   CHLORIDE 105 106 109   CO2 23 21 23   BUN 13 31* 39*   CREATININE 0.90 1.18 1.24   MAGNESIUM 1.9 2.2  --    PHOSPHORUS 1.9* 3.1  --    CALCIUM 8.3* 8.0* 8.0*     Body mass index is 29.99 kg/m².    GI/Nutrition:  Recent Labs     12/26/19  0240 12/27/19  0230 12/28/19  0305   PREALBUMIN 20.0  --   --    GLUCOSE 122* 121* 110*       Heme:  Recent Labs     12/26/19  0320 12/27/19  0230 12/28/19  0305   RBC 5.57  4.91 4.19*   HEMOGLOBIN 16.3 14.4 12.7*   HEMATOCRIT 47.3 42.8 37.3*   PLATELETCT 272 243 184       Infectious Disease:  Monitored Temp 2  Av.8 °C (100.1 °F)  Min: 37.4 °C (99.3 °F)  Max: 38.2 °C (100.8 °F)  Recent Labs     19  0320 19  0230 19  0305   WBC 17.0* 19.5* 13.3*   NEUTSPOLYS 79.20* 83.40* 73.90*   LYMPHOCYTES 11.30* 6.20* 15.10*   MONOCYTES 8.30 9.50 9.20   EOSINOPHILS 0.40 0.10 1.10   BASOPHILS 0.20 0.20 0.30       Medications:  • electrolyte-A  1,000 mL 1,000 mL (19 0821)   • ampicillin-sulbactam (UNASYN) IV  3 g Stopped (19 1022)   • propofol  0-80 mcg/kg/min Stopped (19 1400)   • fentaNYL   100 mcg/hr (19 1148)   • lidocaine  1-2 mL     • acetaminophen  1,000 mg     • famotidine  20 mg      Or   • famotidine  20 mg     • haloperidol lactate  2 mg     • Pharmacy  1 Each     • senna-docusate  2 Tab      And   • polyethylene glycol/lytes  1 Packet      And   • magnesium hydroxide  30 mL      And   • bisacodyl  10 mg     • acetaminophen  650 mg      Or   • acetaminophen  650 mg     • ondansetron  4 mg      Or   • ondansetron  4 mg     • labetalol  10 mg     • MD Alert...Adult ICU Electrolyte Replacement per Pharmacy       • Respiratory Care per Protocol       • niCARdipine infusion  0-15 mg/hr Stopped (19 0201)        Imaging:  DX-CHEST-PORTABLE (1 VIEW)   Final Result         1. No significant interval change.      DX-CHEST-PORTABLE (1 VIEW)   Final Result      Interval intubation with decreasing lung volumes and increasing perihilar, basilar opacity worrisome for aspiration, pneumonia over edema      DX-CHEST-PORTABLE (1 VIEW)   Final Result      Increasing infrahilar opacity is worrisome for aspiration      DX-ABDOMEN FOR TUBE PLACEMENT   Final Result      Enteric tube terminates over the stomach.      DX-ABDOMEN FOR TUBE PLACEMENT   Final Result      Enteric tube tip projects over the distal stomach.      DX-ABDOMEN FOR TUBE PLACEMENT   Final  Result      Enteric tube is coiled in the neck.      CT-HEAD W/O   Final Result      Considerable motion and misregistration artifact on the images.   3.5 cm acute right thalamic/right basal ganglia hemorrhage slightly larger than previous.   Extravasation of hemorrhage into the right lateral ventricle and dependent hemorrhage within the left lateral ventricle.   Right-sided mass effect with 2.7 mm right to left midline shift.         DX-ABDOMEN FOR TUBE PLACEMENT   Final Result      Enteric tube tip projects over the stomach.      EC-ECHOCARDIOGRAM COMPLETE W/O CONT   Final Result      CT-HEAD W/O   Final Result      Stable to slight interval enlargement of right thalamic intra-axial hematoma which extravasates into the lateral ventricle and results in unchanged trace leftward midline shift      Mild worsening vasogenic edema but there is no hydrocephalus or herniation      Motion degraded      DX-CHEST-PORTABLE (1 VIEW)   Final Result      No evidence of acute cardiopulmonary process.      CT-CTA NECK WITH & W/O-POST PROCESSING   Final Result      CT angiogram of the neck within normal limits. No large vessel occlusion or stenosis. No dissection.      CT-CTA HEAD WITH & W/O-POST PROCESS   Final Result      1.  No evidence of intracranial vascular occlusion or aneurysm.      2.  Large right basal ganglial intraparenchymal hemorrhage with extension into the right lateral ventricle again noted.      CT-HEAD W/O   Final Result      2.7 cm acute right basal ganglia/thalamic hemorrhage extravasating into the right lateral ventricle.   No hydrocephalus.   Mild right-sided mass effect with minimal right-to-left midline shift.   Central white matter low attenuation consistent with microvascular ischemic changes.      CT-HEAD W/O    (Results Pending)         Assessment and plan    * Acute hypoxic respiratory failure   Assessment & Plan       - Ventilated and sedated      - Pending BAL culture results (collected on  12/27/2019, negative to date)      - ABG 7.34, 43, 253, 23      - Continue on APV/CMV mode, vent day 2, tolerating SBT minimum of 1 hour       - Repeat CT head this morning per neurologist    * Aspiration pneumonia/pneumonitis   Assessment & Plan   - Pt aspirated tube feed, bronchoscopy significant for moderate amount of white frothy secretions in distal left main stem, lingula, left lower lobe,   - Improving Leukocytosis, WBC count down to 13.3 from 19.5 yesterday   - Continue Unasyn  - pending BAL secretions cell count/culture results, negative to date (collected on 12/27/2019)     * Shock  - Unclear etiology  - Had IVNS 1L this morning  - Not on pressors  - continue monitoring    * Intraparenchymal hemorrhage of brain with vasogenic edema (HCC)- (present on admission)    Assessment & Plan       2/2 uncontrolled hypertension/HTN emergency/ meth abuse  Presented with left upper extremity, left lower extremity, and left facial weakness as well as slurred speech  Initial head CT showed a 2.7 cm acute right basal ganglia/thalamic hemorrhage extravasating into the right lateral ventricle along with small right-sided mass-effect with minimal right-to-left midline shift.  No hydrocephalus  Head/neck CT without vessel occlusions  Initially received a total of 30 mg of Labetalol push and was started on a Nicardipine drip  Plan:  Keep NPO  Keep head of bed 30 degrees  Neurochecks Q2H per neurologist rec.  RT/O2  Continue IV hydration  Nicardipine drip per nursing, currently off.  Neurology following, appreciate recommendations  PT/OT/Speech/PMR  New CT scan this morning per neurologist       Hypertensive emergency- (present on admission)  Assessment & Plan  Presented with blood pressure in the 190s/110-130s  Intraparenchymal hemorrhage, no BEVERLY on labs or EKG changes/trops elevation  Nicardipine drip per nursing, currently off.  UDS positive for amphetamine       JAYASHREE (obstructive sleep apnea)  Assessment & Plan  Not on CPAP  at home  Currently intubated and sedated      DISPO: ICU    Code Status: FULL    Quality Measures:  Quality-Core Measures   Reviewed items::  EKG reviewed, Radiology images reviewed, Labs reviewed and Medications reviewed  V[place]enor catheter::  Critically Ill - Requiring Accurate Measurement of Urinary Output  DVT prophylaxis pharmacological::  Contraindicated - High bleeding risk  DVT prophylaxis - mechanical:  SCDs  Ulcer Prophylaxis::  Yes

## 2019-12-28 NOTE — DOCUMENTATION QUERY
"                                                                         [facility]                                                                       Query Response Note      PATIENT:               TERESA KIM  ACCT #:                  [id number]  MRN:                     [id number]  :                      1970  ADMIT DATE:       2019 6:17 PM  DISCH DATE:          RESPONDING  PROVIDER #:        [id number]           QUERY TEXT:    Vasogenic edema is noted on the CT Head on 16 but is not addressed in the Progress Notes. Please specify if you:    NOTE:  If an appropriate response is not listed below, please respond with a new note.       The patient's Clinical Indicators include:  \"Mild worsening vasogenic edema but there is no hydrocephalus or herniation\" and \"leftward midline shift\" are documented in the CT head on 19.     Treatments include: Catapres, Cardene, Coreg, CT-Head, and Neurology Consult.    Risk factors include: dx Nontraumatic ICH, dx Hypertensive Emergency, and hx HTN.  Options provided:   -- Agree with Radiologist's Findings   -- Disagree with Radiologist's Findings   -- Unable to determine      Query created by: Joel Balderas on 2019 8:51 AM    RESPONSE TEXT:    Agree with Radiologist's Findings       QUERY TEXT:    Your help is needed in capturing a diagnosis for the corresponding medication ordered.     Please provide a diagnosis for the following medication: [facility]-Synephrine.    The patient's Clinical Indicators include:  \"The patient remains critically ill with agitation on dex gtt with active titration and hypertensive on nicardipine infusion then developed acute hypotension and stat management with neosynephrine push and bolus fluids\" documented in the Progress Notes on 19. Lowest BP documented is 77/51 on 19 at 0229.    Treatments include: Emory-Synephrine.    Risk factors include: dx Nontraumatic ICH.  Options provided:   -- Shock, Please " specify type of shock (e.g. Cardiogenic, Hypovolemic, Hemorrhagic, etc.)   -- Unable to determine      Query created by: Joel Balderas on 12/27/2019 8:58 AM    RESPONSE TEXT:    Shock- unclear etiology          Electronically signed by:  MIREYA MORALES 12/27/2019 5:29 PM

## 2019-12-28 NOTE — CARE PLAN
Problem: Communication  Goal: The ability to communicate needs accurately and effectively will improve  Intervention: Educate patient and significant other/support system about the plan of care, procedures, treatments, medications and allow for questions  Note:   Patient's son and best friend in to visit today. Allowed staff to educate on plan of care and answer all questions and concerns.     Problem: Pain Management  Goal: Pain level will decrease to patient's comfort goal  Intervention: Follow pain managment plan developed in collaboration with patient and Interdisciplinary Team  Note:   Patient on 50mcg/hr continuous fentanyl gtt to treat pain and maintain CPOT 0. Will reassess CPOT throughout day to titrate fentanyl gtt as needed.

## 2019-12-28 NOTE — PROGRESS NOTES
Critical Care Progress Note    Date of admission  12/24/2019    Chief Complaint  Jacinto Loyola, a 49 y.o. male for evaluation and management of the above problem. The history is mostly obtained from healthcare providers and the medical record as this gentleman cannot provide me with any history.  This gentleman has a history of primary hypertension, medical noncompliance, methamphetamine abuse and JAYASHREE.  At approximately 1600 hrs. today he was found down in his yard near his car.  He had left-sided weakness and slurred speech.  EMS was activated.  He complained of a headache en route to the hospital.  On arrival at [facility], he underwent CT imaging which revealed a 2.7 cm right basal ganglia hemorrhage with extension into the right lateral ventricle.  He was emergently seen by Dr. Graff from neurology.  CT imaging of his head and neck did not reveal any evidence of large vessel occlusion, aneurysms or AVMs.  At this point, Dr. Graff does not believe that neurosurgery needs to be involved.  This is likely a hypertensive hemorrhage. Taken from Dr Ledesma note.     Hospital Course    Interval Problem Update  Reviewed last 24 hour events:  Neuro: ct 1130pm perrl sluggish left 3mm right 2mm not following commands fentanyl 50   HR: 50-60  SBP: 120-160  Tmax: + fever  GI: impax at goal BM this am  UOP: palmer jayjay 350ml  Lines: 3 peripheral iv   Resp: vent 8 sbt rsbi 27 increase secretions   Vte: contra scd's  PPI/H2:pepcid  Antibx: unasyn    NS bolus 1l   plasmalyte  Bal rare gram p cocci  mrsa neg  zyprexa d/c   Dex  Updated family   Fentanyl wean down  Bronchoscopy   Sedation  Repeat ct head stable  Improving urine output    Review of Systems  Review of Systems   Unable to perform ROS: Intubated        Vital Signs for last 24 hours   Pulse:  [56-80] 62  Resp:  [16-23] 20  BP: (118-164)/() 159/106  SpO2:  [97 %-99 %] 99 %    Hemodynamic parameters for last 24 hours       Respiratory Information for the  last 24 hours  Mcfadden Vent Mode: APVCMV  Rate (breaths/min): 20  Vt Target (mL): 470  PEEP/CPAP: 8  FiO2: 30  P MEAN: 12  Length of Weaning Trial (Hours): 1 hour  Control VTE (exp VT): 470    Physical Exam   Physical Exam  Constitutional:       Appearance: He is not ill-appearing or diaphoretic.      Comments: Diaphoretic ill appearing, on ventilator   HENT:      Head: Normocephalic.      Nose: Nose normal.      Mouth/Throat:      Mouth: Mucous membranes are dry.      Comments: Thick blood like secretions  Eyes:      Comments: Disconjugate gaze, mild left pupil large 3mm   Cardiovascular:      Rate and Rhythm: Tachycardia present.      Heart sounds: No murmur.   Pulmonary:      Effort: No respiratory distress.      Breath sounds: No stridor. No wheezing, rhonchi or rales.   Abdominal:      General: Abdomen is flat. There is no distension.      Palpations: There is no mass.      Tenderness: There is no tenderness. There is no guarding or rebound.      Hernia: No hernia is present.   Skin:     Capillary Refill: Capillary refill takes less than 2 seconds.   Neurological:      Comments: withdrawal postures in left arm and leg improved, moving right sided, not following commands, no facial asymmetry on fentanyl gtt.    Psychiatric:      Comments: Unable          Medications  Current Facility-Administered Medications   Medication Dose Route Frequency Provider Last Rate Last Dose   • FENTANYL CITRATE (PF) 0.05 MG/ML INJ SOLN            • FENTANYL CITRATE (PF) 0.05 MG/ML INJ SOLN            • electrolyte-A (PLASMALYTE-A) infusion 1,000 mL  1,000 mL Intravenous Continuous Constantin Avina M.D. 75 mL/hr at 12/28/19 0821 1,000 mL at 12/28/19 0821   • ampicillin/sulbactam (UNASYN) 3 g in  mL IVPB  3 g Intravenous Q6HRS Wilberto Ness M.D.   Stopped at 12/28/19 1519   • propofol (DIPRIVAN) injection  0-80 mcg/kg/min Intravenous Continuous Bruce Pete D.O.   Stopped at 12/27/19 1400   • fentaNYL (SUBLIMAZE) 50  mcg/mL in 50mL (Continuous Infusion)   Intravenous Continuous Bruce Pete D.O. 2 mL/hr at 12/28/19 1148 100 mcg/hr at 12/28/19 1148   • lidocaine (XYLOCAINE) 1 % injection 1-2 mL  1-2 mL Tracheal Tube Q30 MIN PRN Wilberto Ness M.D.       • acetaminophen (TYLENOL) tablet 1,000 mg  1,000 mg Oral Q6HRS Constantin Avina M.D.   1,000 mg at 12/28/19 1149   • famotidine (PEPCID) tablet 20 mg  20 mg Enteral Tube BID Constantin Avina M.D.   20 mg at 12/28/19 0508    Or   • famotidine (PEPCID) injection 20 mg  20 mg Intravenous BID Constantin Avina M.D.       • haloperidol lactate (HALDOL) injection 2 mg  2 mg Intravenous Once PRN Constantin Avina M.D.       • Pharmacy Consult: Enteral tube insertion - review meds/change route/product selection  1 Each Other PHARMACY TO DOSE Constantin Avina M.D.       • senna-docusate (PERICOLACE or SENOKOT S) 8.6-50 MG per tablet 2 Tab  2 Tab Enteral Tube BID Constantin Avina M.D.   2 Tab at 12/28/19 0508    And   • polyethylene glycol/lytes (MIRALAX) PACKET 1 Packet  1 Packet Enteral Tube QDAY PRN Constantin Avina M.D.        And   • magnesium hydroxide (MILK OF MAGNESIA) suspension 30 mL  30 mL Enteral Tube QDAY PRN Constantin Avina M.D.        And   • bisacodyl (DULCOLAX) suppository 10 mg  10 mg Rectal QDAY PRN Constantin Avina M.D.       • acetaminophen (TYLENOL) tablet 650 mg  650 mg Enteral Tube Q4HRS PRN Constantin Avina M.D.   650 mg at 12/25/19 1603    Or   • acetaminophen (TYLENOL) suppository 650 mg  650 mg Rectal Q4HRS PRN Constantin Avina M.D.   650 mg at 12/26/19 2341   • ondansetron (ZOFRAN ODT) dispertab 4 mg  4 mg Enteral Tube Q4HRS PRN Constantin Avina M.D.        Or   • ondansetron (ZOFRAN) syringe/vial injection 4 mg  4 mg Intravenous Q4HRS PRN Constantin Avina M.D.       • labetalol (NORMODYNE/TRANDATE) injection 10 mg  10 mg Intravenous Q4HRS PRN Wilberto Ness M.D.   10 mg at 12/26/19 7034   • MD Alert...ICU Electrolyte Replacement  per Pharmacy   Other PHARMACY TO DOSE Wilberto Ness M.D.       • Respiratory Care per Protocol   Nebulization Continuous RT Wilberto Ness M.D.       • niCARdipine (CARDENE) 25 mg in  mL Infusion  0-15 mg/hr Intravenous Continuous Wilberto Ness M.D.   Stopped at 12/27/19 0201       Fluids    Intake/Output Summary (Last 24 hours) at 12/28/2019 1800  Last data filed at 12/28/2019 1600  Gross per 24 hour   Intake 4588.75 ml   Output 1125 ml   Net 3463.75 ml       Laboratory  Recent Labs     12/26/19  1210 12/27/19  0304 12/28/19  0847   ISTATAPH 7.371* 7.364* 7.379*   ISTATAPCO2 34.4 41.2* 38.9*   ISTATAPO2 61* 247* 48*   ISTATATCO2 21 25 24   ZEVXOHZ3VRT 91* 100* 83*   ISTATARTHCO3 20.0 23.5 23.0   ISTATARTBE -4 -2 -2   ISTATTEMP 98.6 F 100.6 F 99.3 F   ISTATFIO2 36 100 30   ISTATSPEC Arterial Arterial Arterial   ISTATAPHTC 7.371* 7.348* 7.373*   FEWZNIPA9VH 61* 253* 50*         Recent Labs     12/26/19  0240 12/27/19  0230 12/28/19  0305   SODIUM 136 135 139   POTASSIUM 3.6 4.4 3.7   CHLORIDE 105 106 109   CO2 23 21 23   BUN 13 31* 39*   CREATININE 0.90 1.18 1.24   MAGNESIUM 1.9 2.2  --    PHOSPHORUS 1.9* 3.1  --    CALCIUM 8.3* 8.0* 8.0*     Recent Labs     12/26/19  0240 12/27/19  0230 12/28/19  0305   PREALBUMIN 20.0  --   --    GLUCOSE 122* 121* 110*     Recent Labs     12/26/19  0320 12/27/19  0230 12/28/19  0305   WBC 17.0* 19.5* 13.3*   NEUTSPOLYS 79.20* 83.40* 73.90*   LYMPHOCYTES 11.30* 6.20* 15.10*   MONOCYTES 8.30 9.50 9.20   EOSINOPHILS 0.40 0.10 1.10   BASOPHILS 0.20 0.20 0.30     Recent Labs     12/26/19  0320 12/27/19  0230 12/28/19  0305   RBC 5.57 4.91 4.19*   HEMOGLOBIN 16.3 14.4 12.7*   HEMATOCRIT 47.3 42.8 37.3*   PLATELETCT 272 243 184       Imaging  X-Ray:  I have personally reviewed the images and compared with prior images. and My impression is: bilateral lower lobe infiltrates and left sided infiltrates, ET in place  CT:     Reviewed    Assessment/Plan  * Intraparenchymal hemorrhage of brain (HCC)- (present on admission)  Assessment & Plan  Neurosurgery consult: No Neurology felt it was not necessary  Correct and reverse coagulopathy  Avoid platelet transfusion (aspirin/plavix) unless planned neurosurgery (patch trial)  SBP goal < 140 unless chronic hypertension and or poor exam < 160  Aspiration precautions  Head of bed 30 degress   Maintain CPP > 60-70  Neuro check Q 1 and pupilometer  Seizure prophylaxis: not indicated  Maintain euvolemia  Sodium goal: normatremia    Hypertensive related to methamphetamine abuse  Still titrating nicardipine gtt  Ct head 12/26, 12/28 reviewed and stable    Acute respiratory failure with hypoxia and hypercapnia (HCC)  Assessment & Plan  Intubated date: 12/26 for aspiration event and airway protection  Goal saturation > 92%    Monitor pulse oximetry and adjust peep and FIO2 to abg/vbg, and peep optimization.  Monitor ventilator waveforms and titrate flow/peep and volumes according.   CXR: monitor lung volumes and tube/line placement  VAP bundle prevention, oral care, post pyloric feeding  Head of bed > 30 degree  GI prophylaxis  Daily awakening and SBT trials unless contraindicated  Monitor for liberation/extubation    Respiratory treatments: prn    Repeat broncho 12/28 to help with respiratory clearence            Methamphetamine abuse (HCC)  Assessment & Plan  Cessation counseling when clinically appropriate  Urine drug screen positive for amphetamines  zyprexa -> d/c 12/28    Concerns for meth, betablocker withdrawal, ICH, sepsis    JAYASHREE (obstructive sleep apnea)  Assessment & Plan  Unclear if relate to ICH patient with significant obstructive on exam  Head of bed at 30-45 degrees   Unable to use Bipap with mental status and stroke    Re-evaluate  Will need outpatient sleep study     Hypertensive emergency- (present on admission)  Assessment & Plan  Strict blood pressure control with SBP < 160  I am  titrating a nicardipine drip to achieve blood pressure goals      Episode of hypotension relative likely worsened from sedation and infection from aspiration  Will hold oral medication and bolus NS  Monitor need for pressors     Aspiration into airway  Assessment & Plan  Patient with acute aspiration into airways due to depressed mental status from ICH  Start Unasyn x 5 days  Follow up on BAL -> mssa pna  Check MRSA nares due to length of hospitalization  12/28 bronch continue to monitor need for repeat  High risk of failure       Febrile  Assessment & Plan  Multifactorial deep ICH, methamphetamines, aspiration pneumonia  Schedule tylenol, cooling blanket to prevent CNS injury    Hypokalemia  Assessment & Plan  Replete potassium       VTE:  Contraindicated  Ulcer: Not Indicated  Lines: None    I have performed a physical exam and reviewed and updated ROS and Plan today (12/28/2019). In review of yesterday's note (12/27/2019), there are no changes except as documented above.     Discussed patient condition and risk of morbidity and/or mortality with Hospitalist, RN, RT, Pharmacy, Charge nurse / hot rounds, Patient and neurology     The patient remains critically ill on ventilator dependent with titration from acute ICH off and on nicardipine with active titration and close monitoring.  Critical care time = 76 minutes in directly providing and coordinating critical care and extensive data review.  No time overlap and excludes procedures.

## 2019-12-28 NOTE — CARE PLAN
Problem: Bowel/Gastric:  Goal: Normal bowel function is maintained or improved  Outcome: PROGRESSING AS EXPECTED     Problem: Pain Management  Goal: Pain level will decrease to patient's comfort goal  Outcome: PROGRESSING AS EXPECTED     Problem: Communication  Goal: The ability to communicate needs accurately and effectively will improve  Outcome: PROGRESSING SLOWER THAN EXPECTED  Pt unable to communicate at this time.

## 2019-12-28 NOTE — ASSESSMENT & PLAN NOTE
Intubated date: 12/26-12/31  Continue aggressive pulmonary toiletry  RT/O2 protocol  Limit sedatives  Mobilization  Aspiration precautions  Discontinue diuresis

## 2019-12-28 NOTE — RESPIRATORY CARE
Ventilator Weaning Update    Patient is on vent day 2.  SBT was tolerated for a minimum of 1 hour hours on settings of 5/8.    Wean parameters for this SBT were:        Rapid Shallow Breathing Index (RR/VT): 27 (12/28/19 0848)  RR (bpm): 15 (12/28/19 0848)  Spontaneous VE: 8.9 (12/28/19 0848)  Spontaneous VT: 538 (12/28/19 0848)       Weaning Trial Stopped due to:: Pt weaned for 1 hour and returned to rest settings per protocol

## 2019-12-29 ENCOUNTER — APPOINTMENT (OUTPATIENT)
Dept: RADIOLOGY | Facility: MEDICAL CENTER | Age: 49
DRG: 064 | End: 2019-12-29
Attending: STUDENT IN AN ORGANIZED HEALTH CARE EDUCATION/TRAINING PROGRAM
Payer: MEDICAID

## 2019-12-29 LAB
ACTION RANGE TRIGGERED IACRT: NO
ANION GAP SERPL CALC-SCNC: 4 MMOL/L (ref 0–11.9)
BACTERIA SPEC RESP CULT: ABNORMAL
BACTERIA SPEC RESP CULT: ABNORMAL
BASE EXCESS BLDA CALC-SCNC: -1 MMOL/L (ref -4–3)
BASOPHILS # BLD AUTO: 0.3 % (ref 0–1.8)
BASOPHILS # BLD: 0.03 K/UL (ref 0–0.12)
BODY TEMPERATURE: ABNORMAL DEGREES
BUN SERPL-MCNC: 34 MG/DL (ref 8–22)
CALCIUM SERPL-MCNC: 8 MG/DL (ref 8.5–10.5)
CHLORIDE SERPL-SCNC: 113 MMOL/L (ref 96–112)
CO2 BLDA-SCNC: 24 MMOL/L (ref 20–33)
CO2 SERPL-SCNC: 25 MMOL/L (ref 20–33)
CREAT SERPL-MCNC: 0.93 MG/DL (ref 0.5–1.4)
EOSINOPHIL # BLD AUTO: 0.14 K/UL (ref 0–0.51)
EOSINOPHIL NFR BLD: 1.4 % (ref 0–6.9)
ERYTHROCYTE [DISTWIDTH] IN BLOOD BY AUTOMATED COUNT: 44.9 FL (ref 35.9–50)
GLUCOSE SERPL-MCNC: 113 MG/DL (ref 65–99)
GRAM STN SPEC: ABNORMAL
HCO3 BLDA-SCNC: 23.3 MMOL/L (ref 17–25)
HCT VFR BLD AUTO: 37.6 % (ref 42–52)
HGB BLD-MCNC: 12.3 G/DL (ref 14–18)
HOROWITZ INDEX BLDA+IHG-RTO: 303 MM[HG]
IMM GRANULOCYTES # BLD AUTO: 0.05 K/UL (ref 0–0.11)
IMM GRANULOCYTES NFR BLD AUTO: 0.5 % (ref 0–0.9)
INST. QUALIFIED PATIENT IIQPT: YES
LYMPHOCYTES # BLD AUTO: 2.04 K/UL (ref 1–4.8)
LYMPHOCYTES NFR BLD: 19.8 % (ref 22–41)
MAGNESIUM SERPL-MCNC: 2 MG/DL (ref 1.5–2.5)
MCH RBC QN AUTO: 29.7 PG (ref 27–33)
MCHC RBC AUTO-ENTMCNC: 32.7 G/DL (ref 33.7–35.3)
MCV RBC AUTO: 90.8 FL (ref 81.4–97.8)
MONOCYTES # BLD AUTO: 1.15 K/UL (ref 0–0.85)
MONOCYTES NFR BLD AUTO: 11.2 % (ref 0–13.4)
NEUTROPHILS # BLD AUTO: 6.87 K/UL (ref 1.82–7.42)
NEUTROPHILS NFR BLD: 66.8 % (ref 44–72)
NRBC # BLD AUTO: 0 K/UL
NRBC BLD-RTO: 0 /100 WBC
O2/TOTAL GAS SETTING VFR VENT: 30 %
PCO2 BLDA: 37.2 MMHG (ref 26–37)
PCO2 TEMP ADJ BLDA: 38.5 MMHG (ref 26–37)
PH BLDA: 7.4 [PH] (ref 7.4–7.5)
PH TEMP ADJ BLDA: 7.39 [PH] (ref 7.4–7.5)
PHOSPHATE SERPL-MCNC: 1.9 MG/DL (ref 2.5–4.5)
PLATELET # BLD AUTO: 186 K/UL (ref 164–446)
PMV BLD AUTO: 9.4 FL (ref 9–12.9)
PO2 BLDA: 91 MMHG (ref 64–87)
PO2 TEMP ADJ BLDA: 95 MMHG (ref 64–87)
POTASSIUM SERPL-SCNC: 3.8 MMOL/L (ref 3.6–5.5)
RBC # BLD AUTO: 4.14 M/UL (ref 4.7–6.1)
SAO2 % BLDA: 97 % (ref 93–99)
SIGNIFICANT IND 70042: ABNORMAL
SITE SITE: ABNORMAL
SODIUM SERPL-SCNC: 142 MMOL/L (ref 135–145)
SOURCE SOURCE: ABNORMAL
SPECIMEN DRAWN FROM PATIENT: ABNORMAL
TRIGL SERPL-MCNC: 125 MG/DL (ref 0–149)
WBC # BLD AUTO: 10.3 K/UL (ref 4.8–10.8)

## 2019-12-29 PROCEDURE — 770022 HCHG ROOM/CARE - ICU (200)

## 2019-12-29 PROCEDURE — 36600 WITHDRAWAL OF ARTERIAL BLOOD: CPT

## 2019-12-29 PROCEDURE — 99232 SBSQ HOSP IP/OBS MODERATE 35: CPT | Performed by: PSYCHIATRY & NEUROLOGY

## 2019-12-29 PROCEDURE — 80048 BASIC METABOLIC PNL TOTAL CA: CPT

## 2019-12-29 PROCEDURE — 700101 HCHG RX REV CODE 250: Performed by: STUDENT IN AN ORGANIZED HEALTH CARE EDUCATION/TRAINING PROGRAM

## 2019-12-29 PROCEDURE — 84100 ASSAY OF PHOSPHORUS: CPT

## 2019-12-29 PROCEDURE — 71045 X-RAY EXAM CHEST 1 VIEW: CPT

## 2019-12-29 PROCEDURE — 85025 COMPLETE CBC W/AUTO DIFF WBC: CPT

## 2019-12-29 PROCEDURE — 84478 ASSAY OF TRIGLYCERIDES: CPT

## 2019-12-29 PROCEDURE — 82803 BLOOD GASES ANY COMBINATION: CPT

## 2019-12-29 PROCEDURE — 700102 HCHG RX REV CODE 250 W/ 637 OVERRIDE(OP): Performed by: INTERNAL MEDICINE

## 2019-12-29 PROCEDURE — 700105 HCHG RX REV CODE 258: Performed by: INTERNAL MEDICINE

## 2019-12-29 PROCEDURE — 700105 HCHG RX REV CODE 258: Performed by: STUDENT IN AN ORGANIZED HEALTH CARE EDUCATION/TRAINING PROGRAM

## 2019-12-29 PROCEDURE — 700111 HCHG RX REV CODE 636 W/ 250 OVERRIDE (IP): Performed by: STUDENT IN AN ORGANIZED HEALTH CARE EDUCATION/TRAINING PROGRAM

## 2019-12-29 PROCEDURE — 83735 ASSAY OF MAGNESIUM: CPT

## 2019-12-29 PROCEDURE — 99292 CRITICAL CARE ADDL 30 MIN: CPT | Performed by: INTERNAL MEDICINE

## 2019-12-29 PROCEDURE — 700111 HCHG RX REV CODE 636 W/ 250 OVERRIDE (IP): Performed by: INTERNAL MEDICINE

## 2019-12-29 PROCEDURE — 99291 CRITICAL CARE FIRST HOUR: CPT | Performed by: INTERNAL MEDICINE

## 2019-12-29 PROCEDURE — A9270 NON-COVERED ITEM OR SERVICE: HCPCS | Performed by: INTERNAL MEDICINE

## 2019-12-29 PROCEDURE — 94003 VENT MGMT INPAT SUBQ DAY: CPT

## 2019-12-29 RX ORDER — HYDRALAZINE HYDROCHLORIDE 20 MG/ML
10-20 INJECTION INTRAMUSCULAR; INTRAVENOUS EVERY 4 HOURS PRN
Status: DISCONTINUED | OUTPATIENT
Start: 2019-12-29 | End: 2020-01-25 | Stop reason: HOSPADM

## 2019-12-29 RX ORDER — CEFAZOLIN SODIUM 2 G/100ML
2 INJECTION, SOLUTION INTRAVENOUS EVERY 8 HOURS
Status: COMPLETED | OUTPATIENT
Start: 2019-12-29 | End: 2019-12-31

## 2019-12-29 RX ORDER — SODIUM CHLORIDE, SODIUM GLUCONATE, SODIUM ACETATE, POTASSIUM CHLORIDE AND MAGNESIUM CHLORIDE 526; 502; 368; 37; 30 MG/100ML; MG/100ML; MG/100ML; MG/100ML; MG/100ML
1000 INJECTION, SOLUTION INTRAVENOUS CONTINUOUS
Status: DISCONTINUED | OUTPATIENT
Start: 2019-12-29 | End: 2019-12-30

## 2019-12-29 RX ADMIN — CEFAZOLIN SODIUM 2 G: 2 INJECTION, SOLUTION INTRAVENOUS at 14:01

## 2019-12-29 RX ADMIN — ACETAMINOPHEN 1000 MG: 500 TABLET ORAL at 00:45

## 2019-12-29 RX ADMIN — FENTANYL CITRATE 100 MCG: 0.05 INJECTION, SOLUTION INTRAMUSCULAR; INTRAVENOUS at 19:19

## 2019-12-29 RX ADMIN — FENTANYL CITRATE 100 MCG: 0.05 INJECTION, SOLUTION INTRAMUSCULAR; INTRAVENOUS at 13:07

## 2019-12-29 RX ADMIN — HYDRALAZINE HYDROCHLORIDE 10 MG: 20 INJECTION INTRAMUSCULAR; INTRAVENOUS at 22:32

## 2019-12-29 RX ADMIN — SODIUM CHLORIDE, SODIUM GLUCONATE, SODIUM ACETATE, POTASSIUM CHLORIDE AND MAGNESIUM CHLORIDE 1000 ML: 526; 502; 368; 37; 30 INJECTION, SOLUTION INTRAVENOUS at 13:03

## 2019-12-29 RX ADMIN — FENTANYL CITRATE 100 MCG: 0.05 INJECTION, SOLUTION INTRAMUSCULAR; INTRAVENOUS at 14:15

## 2019-12-29 RX ADMIN — FENTANYL CITRATE 100 MCG: 0.05 INJECTION, SOLUTION INTRAMUSCULAR; INTRAVENOUS at 23:01

## 2019-12-29 RX ADMIN — HYDRALAZINE HYDROCHLORIDE 10 MG: 20 INJECTION INTRAMUSCULAR; INTRAVENOUS at 21:49

## 2019-12-29 RX ADMIN — AMPICILLIN SODIUM AND SULBACTAM SODIUM 3 G: 2; 1 INJECTION, POWDER, FOR SOLUTION INTRAMUSCULAR; INTRAVENOUS at 03:55

## 2019-12-29 RX ADMIN — CEFAZOLIN SODIUM 2 G: 2 INJECTION, SOLUTION INTRAVENOUS at 21:49

## 2019-12-29 RX ADMIN — SENNOSIDES AND DOCUSATE SODIUM 2 TABLET: 8.6; 5 TABLET ORAL at 17:07

## 2019-12-29 RX ADMIN — LABETALOL HYDROCHLORIDE 10 MG: 5 INJECTION INTRAVENOUS at 12:05

## 2019-12-29 RX ADMIN — SENNOSIDES AND DOCUSATE SODIUM 2 TABLET: 8.6; 5 TABLET ORAL at 05:10

## 2019-12-29 RX ADMIN — FENTANYL CITRATE 100 MCG: 0.05 INJECTION, SOLUTION INTRAMUSCULAR; INTRAVENOUS at 22:00

## 2019-12-29 RX ADMIN — PROPOFOL 20 MCG/KG/MIN: 10 INJECTION, EMULSION INTRAVENOUS at 23:01

## 2019-12-29 RX ADMIN — FAMOTIDINE 20 MG: 20 TABLET ORAL at 05:10

## 2019-12-29 RX ADMIN — LABETALOL HYDROCHLORIDE 10 MG: 5 INJECTION INTRAVENOUS at 17:07

## 2019-12-29 RX ADMIN — POTASSIUM PHOSPHATE, MONOBASIC AND POTASSIUM PHOSPHATE, DIBASIC 30 MMOL: 224; 236 INJECTION, SOLUTION, CONCENTRATE INTRAVENOUS at 09:01

## 2019-12-29 RX ADMIN — AMPICILLIN SODIUM AND SULBACTAM SODIUM 3 G: 2; 1 INJECTION, POWDER, FOR SOLUTION INTRAMUSCULAR; INTRAVENOUS at 08:31

## 2019-12-29 RX ADMIN — FAMOTIDINE 20 MG: 20 TABLET ORAL at 17:07

## 2019-12-29 RX ADMIN — FENTANYL CITRATE 100 MCG: 0.05 INJECTION, SOLUTION INTRAMUSCULAR; INTRAVENOUS at 17:10

## 2019-12-29 NOTE — PROGRESS NOTES
Neurology Progress Note  Neurohospitalist Service, [facility]    Referring Physician: SENIA Tejada*    Chief Complaint   Patient presents with   • Possible Stroke     LKW 1600. noticed loss of sensation to LT side at that ttime then had a sudden onset of LUE & LLE weakness, slurred speech, LT sided facial droop 1745. BS: 89.        HPI: Refer to initial documented Neurology H&P, as detailed in the patient's chart.    Interval History 12/28/19: No new events    Review of systems: In addition to what is detailed in the HPI and/or updated in the interval history, all other systems reviewed and are negative.    Past Medical History:    has a past medical history of Depression (8/28/2009), HTN (8/28/2009), and Migraines, neuralgic (8/28/2009).    FHx:  family history is not on file.    SHx:   reports that he has never smoked. He has never used smokeless tobacco. He reports current alcohol use. He reports current drug use. Drug: Intravenous.    Medications:    Current Facility-Administered Medications:   •  FENTANYL CITRATE (PF) 0.05 MG/ML INJ SOLN, , , ,   •  FENTANYL CITRATE (PF) 0.05 MG/ML INJ SOLN, , , ,   •  electrolyte-A (PLASMALYTE-A) infusion 1,000 mL, 1,000 mL, Intravenous, Continuous, Constantin Avina M.D., Last Rate: 75 mL/hr at 12/28/19 0821, 1,000 mL at 12/28/19 0821  •  ampicillin/sulbactam (UNASYN) 3 g in  mL IVPB, 3 g, Intravenous, Q6HRS, Wilberto Ness M.D., Stopped at 12/28/19 1519  •  propofol (DIPRIVAN) injection, 0-80 mcg/kg/min, Intravenous, Continuous, Stopped at 12/27/19 1400 **AND** Triglycerides Starting now and then Every 3 Days, , , Every 3 Days (0300), Bruce Pete D.O.  •  fentaNYL (SUBLIMAZE) 50 mcg/mL in 50mL (Continuous Infusion), , Intravenous, Continuous, Bruce Pete D.O., Last Rate: 2 mL/hr at 12/28/19 1148, 100 mcg/hr at 12/28/19 1148  •  lidocaine (XYLOCAINE) 1 % injection 1-2 mL, 1-2 mL, Tracheal Tube, Q30 MIN PRN, Wliberto GONSAELZ  Rupal Ness M.D.  •  acetaminophen (TYLENOL) tablet 1,000 mg, 1,000 mg, Oral, Q6HRS, Constantin Avina M.D., 1,000 mg at 12/28/19 1149  •  famotidine (PEPCID) tablet 20 mg, 20 mg, Enteral Tube, BID, 20 mg at 12/28/19 0508 **OR** famotidine (PEPCID) injection 20 mg, 20 mg, Intravenous, BID, Constantin Avina M.D.  •  haloperidol lactate (HALDOL) injection 2 mg, 2 mg, Intravenous, Once PRN, Constantin Avina M.D.  •  Pharmacy Consult: Enteral tube insertion - review meds/change route/product selection, 1 Each, Other, PHARMACY TO DOSE, Constantin Avina M.D.  •  senna-docusate (PERICOLACE or SENOKOT S) 8.6-50 MG per tablet 2 Tab, 2 Tab, Enteral Tube, BID, 2 Tab at 12/28/19 0508 **AND** polyethylene glycol/lytes (MIRALAX) PACKET 1 Packet, 1 Packet, Enteral Tube, QDAY PRN **AND** magnesium hydroxide (MILK OF MAGNESIA) suspension 30 mL, 30 mL, Enteral Tube, QDAY PRN **AND** bisacodyl (DULCOLAX) suppository 10 mg, 10 mg, Rectal, QDAY PRN, Constantin Avina M.D.  •  acetaminophen (TYLENOL) tablet 650 mg, 650 mg, Enteral Tube, Q4HRS PRN, 650 mg at 12/25/19 1603 **OR** acetaminophen (TYLENOL) suppository 650 mg, 650 mg, Rectal, Q4HRS PRN, Constantin Avnia M.D., 650 mg at 12/26/19 2341  •  ondansetron (ZOFRAN ODT) dispertab 4 mg, 4 mg, Enteral Tube, Q4HRS PRN **OR** ondansetron (ZOFRAN) syringe/vial injection 4 mg, 4 mg, Intravenous, Q4HRS PRN, Constantin Avina M.D.  •  labetalol (NORMODYNE/TRANDATE) injection 10 mg, 10 mg, Intravenous, Q4HRS PRN, Wilberto Ness M.D., 10 mg at 12/26/19 2217  •  MD Alert...ICU Electrolyte Replacement per Pharmacy, , Other, PHARMACY TO DOSE, Wilberto Ness M.D.  •  Respiratory Care per Protocol, , Nebulization, Continuous RT, Wilberto Ness M.D.  •  niCARdipine (CARDENE) 25 mg in  mL Infusion, 0-15 mg/hr, Intravenous, Continuous, Wilberto Ness M.D., Stopped at 12/27/19 0201    Physical Examination:     Vitals:    12/28/19 1543 12/28/19  1600 12/28/19 1700 12/28/19 1800   BP:  139/94 159/106 115/73   Pulse: (!) 56 (!) 57 62 61   Resp:  20 20 20   Temp:       TempSrc:  Bladder  Bladder   SpO2: 99% 98% 99% 92%   Weight:       Height:           General: Patient is awake and in no acute distress  Eyes: examination of optic disks not indicated at this time  CV: RRR    NEUROLOGICAL EXAM:     Sedated. Limited exam. Pt without tracking with eyelids held open without blink to trhreat. Moves right UE and LE spont. Contd decreased use of left UE LE and decreased response to pain stim  Objective Data:    Labs:  Lab Results   Component Value Date/Time    PROTHROMBTM 12.8 12/24/2019 06:18 PM    INR 0.95 12/24/2019 06:18 PM      Lab Results   Component Value Date/Time    WBC 13.3 (H) 12/28/2019 03:05 AM    RBC 4.19 (L) 12/28/2019 03:05 AM    HEMOGLOBIN 12.7 (L) 12/28/2019 03:05 AM    HEMATOCRIT 37.3 (L) 12/28/2019 03:05 AM    MCV 89.0 12/28/2019 03:05 AM    MCH 30.3 12/28/2019 03:05 AM    MCHC 34.0 12/28/2019 03:05 AM    MPV 9.4 12/28/2019 03:05 AM    NEUTSPOLYS 73.90 (H) 12/28/2019 03:05 AM    LYMPHOCYTES 15.10 (L) 12/28/2019 03:05 AM    MONOCYTES 9.20 12/28/2019 03:05 AM    EOSINOPHILS 1.10 12/28/2019 03:05 AM    BASOPHILS 0.30 12/28/2019 03:05 AM      Lab Results   Component Value Date/Time    SODIUM 139 12/28/2019 03:05 AM    POTASSIUM 3.7 12/28/2019 03:05 AM    CHLORIDE 109 12/28/2019 03:05 AM    CO2 23 12/28/2019 03:05 AM    GLUCOSE 110 (H) 12/28/2019 03:05 AM    BUN 39 (H) 12/28/2019 03:05 AM    CREATININE 1.24 12/28/2019 03:05 AM    CREATININE 1.0 05/24/2007 09:55 PM      Lab Results   Component Value Date/Time    CHOLSTRLTOT 136 12/24/2019 06:18 PM    LDL 61 12/24/2019 06:18 PM    HDL 44 12/24/2019 06:18 PM    TRIGLYCERIDE 111 12/27/2019 02:30 AM       Lab Results   Component Value Date/Time    ALKPHOSPHAT 65 12/24/2019 06:18 PM    ASTSGOT 19 12/24/2019 06:18 PM    ALTSGPT 15 12/24/2019 06:18 PM    TBILIRUBIN 0.6 12/24/2019 06:18 PM         Imaging/Testing:  CT 12/28 compared to 12/26. No changes    Assessment and Plan:    Jacinto Loyola is a 49 y.o. male with likley HTN right thalamic ICH likley HTN possibly amphetamine related without change in CT ICH size 12/26 to 12/28.  Contd agitation on vent and sedated. Contd decreased use of left UE LE.  Territory of ICH is in thalamus and likely hypothalamus that could be contributing to decreased MS. Will consider EEg although seizures are less likely to explain AMS. Unclear potential significance of substance w/d.  Plan:    The evaluation of the patient, and recommended management, was discussed with the resident staff. I have performed a physical exam and reviewed and updated ROS and Plan today (12/28/2019). In review of yesterday's note (12/27/2019), there are no changes except as documented above.    Huan Graff MD  Board Certified Neurology, ABPN  Board Certified Vascular Neurology, ABPN  (t) [phone]

## 2019-12-29 NOTE — CARE PLAN
Problem: Bowel/Gastric:  Goal: Normal bowel function is maintained or improved  Outcome: PROGRESSING AS EXPECTED  2x BM today, first since arrival      Problem: Communication  Goal: The ability to communicate needs accurately and effectively will improve  Outcome: PROGRESSING SLOWER THAN EXPECTED  Not able to communicate needs      Problem: Respiratory:  Goal: Respiratory status will improve  Outcome: PROGRESSING SLOWER THAN EXPECTED  Increased respiratory secretions, requiring regular ET suction after Bronchoscopy to maintain adequate oxygenation.

## 2019-12-29 NOTE — PROCEDURES
Date: 12/28/2019     Procedure: Conscious sedation     Indication: bronchoscopy     Physician:  Constantin Avina M.D.     Consent:  Son at bedside     Procedure:  A time out occurred with the patient name, medical record number, allergies, and consent with right procedure and indication with bedside nurse and if able the patient. The patient was connected to a monitor and had continuous pulse oximeter and serial blood pressure measurement were done during the procedure. I performed the conscious sedation and was directly involved with administration of medication, monitoring airway, vital signs, preventing complications.  Administered of  180mg of porpofol, fentanyl 200mcg, rocuronium 50mg lidocaine 5ml total mg in titration fashion until achieving a level of moderate procedural sedation.  Patient tolerated procedure well without complications from sedation and was left in the care of his bedside nurse and respiratory therapy. Procedural sedation time equals 17 minutes which was separate from procedure time as described above.  Start time: 1719  Stop time: 1736     Constantin Avina MD  Critical Care Medicine

## 2019-12-29 NOTE — CARE PLAN
Problem: Ventilation Defect:  Goal: Ability to achieve and maintain unassisted ventilation or tolerate decreased levels of ventilator support  Outcome: PROGRESSING AS EXPECTED      Ventilator Daily Summary    Vent Day #3    Ventilator settings changed this shift: None     Weaning trials: yes    Respiratory Procedures: Not at this time     Plan: Continue current ventilator settings and wean mechanical ventilation as tolerated per physician orders.

## 2019-12-29 NOTE — PROCEDURES
Date: 12/28/2019    Procedure: Bronchoscopy with Therapeutic suctioning and BAL    Indication: Infiltrate/atlectasis    Physician:  Constantin Avina M.D.    Consent:  Son at bedside    Procedure:  A time out occurred with the patient name, medical record number, allergies, and consent with right procedure and indication with bedside nurse and if able the patient. The patient was connected to a monitor and had continuous pulse oximeter. The patient was sedated with propofol 180, fentanyl 200mcg, rocuronium 50 lidocaine 5ml. The bronchoscope was insert down the left and right bronchi to the terminal bronchioles. Therapeutic suction of secretion was provided in the et tube, trachea to miranda with white thick creamy in left lower lobe and lingula right lower lobe and right middle lobes with thick creamy white. A BAL was preformed in the left lower lobe and right middle after injecting with small aliquots. The patient tolerated the procedure without any immediate complications with minimal <5ml of blood loss.     Findings thick white creamy secretions in bilateral lower lobes with normal endobronchial mucosa.     Contsantin Avina MD  Critical Care Medicine

## 2019-12-29 NOTE — PROGRESS NOTES
Critical Care Progress Note    Date of admission  12/24/2019    Chief Complaint  Jacinto Loyola, a 49 y.o. male for evaluation and management of the above problem. The history is mostly obtained from healthcare providers and the medical record as this gentleman cannot provide me with any history.  This gentleman has a history of primary hypertension, medical noncompliance, methamphetamine abuse and JAYASHREE.  At approximately 1600 hrs. today he was found down in his yard near his car.  He had left-sided weakness and slurred speech.  EMS was activated.  He complained of a headache en route to the hospital.  On arrival at [facility], he underwent CT imaging which revealed a 2.7 cm right basal ganglia hemorrhage with extension into the right lateral ventricle.  He was emergently seen by Dr. Graff from neurology.  CT imaging of his head and neck did not reveal any evidence of large vessel occlusion, aneurysms or AVMs.  At this point, Dr. Graff does not believe that neurosurgery needs to be involved.  This is likely a hypertensive hemorrhage. Taken from Dr Ledesma note.     Hospital Course    Interval Problem Update  Reviewed last 24 hour events:  Neuro: fentanyl off moving not following moving spontaneous  HR: 60-70  SBP: 120-140's  Tmax: 100.2  GI: tf at goal 2 bm's  UOP: 600 clear   Lines: 3 peripheral   Resp: day 3 peep 8 rsbi 24 clear deminish thick yellow secretions   Vte: contra scd  PPI/H2:pepcid  Antibx: unasyn bal mssa cefazolin 5 total days  eeg -> pending  3.8  Phos 1.9  Plasmalyte  Oxy prn cpot    Review of Systems  Review of Systems   Unable to perform ROS: Intubated        Vital Signs for last 24 hours   Pulse:  [53-81] 64  Resp:  [13-21] 15  BP: (115-186)/() 164/105  SpO2:  [92 %-100 %] 98 %    Hemodynamic parameters for last 24 hours       Respiratory Information for the last 24 hours  [place] Vent Mode: APVCMV  Rate (breaths/min): 20  Vt Target (mL): 470  PEEP/CPAP: 8  FiO2: 30  P Support: 5  P  MEAN: 12  Length of Weaning Trial (Hours): 1  Control VTE (exp VT): 596    Physical Exam   Physical Exam  Constitutional:       Appearance: He is not ill-appearing or diaphoretic.      Comments: Diaphoretic ill appearing, on ventilator   HENT:      Head: Normocephalic.      Nose: Nose normal.      Mouth/Throat:      Mouth: Mucous membranes are dry.      Comments: Thick blood like secretions  Eyes:      Comments: Disconjugate gaze, beating to midline,    Cardiovascular:      Rate and Rhythm: Tachycardia present.      Heart sounds: No murmur.   Pulmonary:      Effort: No respiratory distress.      Breath sounds: No stridor. No wheezing, rhonchi or rales.   Abdominal:      General: Abdomen is flat. There is no distension.      Palpations: There is no mass.      Tenderness: There is no tenderness. There is no guarding or rebound.      Hernia: No hernia is present.   Skin:     Capillary Refill: Capillary refill takes less than 2 seconds.   Neurological:      Comments: On sedation with fentanyl not withdrawaling in right arm, withdrawals to left lower extremity, no withdrawal in left upper, beating of pupils to midline dysconjugate gaze on fentanyl gtt.    Psychiatric:      Comments: Unable          Medications  Current Facility-Administered Medications   Medication Dose Route Frequency Provider Last Rate Last Dose   • electrolyte-A (PLASMALYTE-A) infusion 1,000 mL  1,000 mL Intravenous Continuous Constantin Avina M.D. 50 mL/hr at 12/29/19 1303 1,000 mL at 12/29/19 1303   • fentaNYL (SUBLIMAZE) injection 25 mcg  25 mcg Intravenous Q HOUR PRN Constantin Avina M.D.        Or   • fentaNYL (SUBLIMAZE) injection 50 mcg  50 mcg Intravenous Q HOUR PRN Constantin Avina M.D.        Or   • fentaNYL (SUBLIMAZE) injection 100 mcg  100 mcg Intravenous Q HOUR PRN Constantin Avina M.D.   100 mcg at 12/29/19 1710   • ceFAZolin in dextrose (ANCEF) IVPB premix 2 g  2 g Intravenous Q8HRS Constantin Avina M.D.   Stopped at 12/29/19 1431    • propofol (DIPRIVAN) injection  0-80 mcg/kg/min Intravenous Continuous Bruce Pete, D.O.   Stopped at 12/27/19 1400   • fentaNYL (SUBLIMAZE) 50 mcg/mL in 50mL (Continuous Infusion)   Intravenous Continuous Bruce Pete, D.O.   Stopped at 12/29/19 0858   • lidocaine (XYLOCAINE) 1 % injection 1-2 mL  1-2 mL Tracheal Tube Q30 MIN PRN Wilberto Ness M.D.       • famotidine (PEPCID) tablet 20 mg  20 mg Enteral Tube BID Constantin Avina M.D.   20 mg at 12/29/19 1707    Or   • famotidine (PEPCID) injection 20 mg  20 mg Intravenous BID Constantin Avina M.D.       • haloperidol lactate (HALDOL) injection 2 mg  2 mg Intravenous Once PRN Constantin Avina M.D.       • Pharmacy Consult: Enteral tube insertion - review meds/change route/product selection  1 Each Other PHARMACY TO DOSE Constantin Avina M.D.       • senna-docusate (PERICOLACE or SENOKOT S) 8.6-50 MG per tablet 2 Tab  2 Tab Enteral Tube BID Constantin Avina M.D.   2 Tab at 12/29/19 1707    And   • polyethylene glycol/lytes (MIRALAX) PACKET 1 Packet  1 Packet Enteral Tube QDAY PRN Constantin Avina M.D.        And   • magnesium hydroxide (MILK OF MAGNESIA) suspension 30 mL  30 mL Enteral Tube QDAY PRN Constantin Avina M.D.        And   • bisacodyl (DULCOLAX) suppository 10 mg  10 mg Rectal QDAY PRN Constantin Avina M.D.       • acetaminophen (TYLENOL) tablet 650 mg  650 mg Enteral Tube Q4HRS PRN Constantin Avina M.D.   650 mg at 12/25/19 1603    Or   • acetaminophen (TYLENOL) suppository 650 mg  650 mg Rectal Q4HRS PRN Constantin Avina M.D.   650 mg at 12/26/19 2341   • ondansetron (ZOFRAN ODT) dispertab 4 mg  4 mg Enteral Tube Q4HRS PRN Constantin Avina M.D.        Or   • ondansetron (ZOFRAN) syringe/vial injection 4 mg  4 mg Intravenous Q4HRS PRN Constantin Avina M.D.       • labetalol (NORMODYNE/TRANDATE) injection 10 mg  10 mg Intravenous Q4HRS PRN Wilberto Ness M.D.   10 mg at 12/29/19 1707   • MD Alert...ICU Electrolyte  Replacement per Pharmacy   Other PHARMACY TO DOSE Wilberto Ness M.D.       • Respiratory Care per Protocol   Nebulization Continuous RT Wilberto Ness M.D.       • niCARdipine (CARDENE) 25 mg in  mL Infusion  0-15 mg/hr Intravenous Continuous Wilberto Ness M.D.   Stopped at 12/27/19 0201       Fluids    Intake/Output Summary (Last 24 hours) at 12/29/2019 1733  Last data filed at 12/29/2019 1400  Gross per 24 hour   Intake 3493.11 ml   Output 1825 ml   Net 1668.11 ml       Laboratory  Recent Labs     12/27/19  0304 12/28/19  0847 12/29/19  0347   ISTATAPH 7.364* 7.379* 7.404   ISTATAPCO2 41.2* 38.9* 37.2*   ISTATAPO2 247* 48* 91*   ISTATATCO2 25 24 24   ESOFSNE4ZMT 100* 83* 97   ISTATARTHCO3 23.5 23.0 23.3   ISTATARTBE -2 -2 -1   ISTATTEMP 100.6 F 99.3 F 100.0 F   ISTATFIO2 100 30 30   ISTATSPEC Arterial Arterial Arterial   ISTATAPHTC 7.348* 7.373* 7.393*   [id number] 253* 50* 95*         Recent Labs     12/27/19  0230 12/28/19  0305 12/29/19  0345   SODIUM 135 139 142   POTASSIUM 4.4 3.7 3.8   CHLORIDE 106 109 113*   CO2 21 23 25   BUN 31* 39* 34*   CREATININE 1.18 1.24 0.93   MAGNESIUM 2.2  --  2.0   PHOSPHORUS 3.1  --  1.9*   CALCIUM 8.0* 8.0* 8.0*     Recent Labs     12/27/19  0230 12/28/19  0305 12/29/19  0345   GLUCOSE 121* 110* 113*     Recent Labs     12/27/19  0230 12/28/19  0305 12/29/19  0345   WBC 19.5* 13.3* 10.3   NEUTSPOLYS 83.40* 73.90* 66.80   LYMPHOCYTES 6.20* 15.10* 19.80*   MONOCYTES 9.50 9.20 11.20   EOSINOPHILS 0.10 1.10 1.40   BASOPHILS 0.20 0.30 0.30     Recent Labs     12/27/19  0230 12/28/19  0305 12/29/19  0345   RBC 4.91 4.19* 4.14*   HEMOGLOBIN 14.4 12.7* 12.3*   HEMATOCRIT 42.8 37.3* 37.6*   PLATELETCT 243 184 186       Imaging  X-Ray:  I have personally reviewed the images and compared with prior images. and My impression is: right side lower infiltrates improved left side infiltrates ET in place  CT:    Reviewed    Assessment/Plan  *  Intraparenchymal hemorrhage of brain (HCC)- (present on admission)  Assessment & Plan  Neurosurgery consult: No Neurology felt it was not necessary  Correct and reverse coagulopathy  Avoid platelet transfusion (aspirin/plavix) unless planned neurosurgery (patch trial)  SBP goal < 140 unless chronic hypertension and or poor exam < 160  Aspiration precautions  Head of bed 30 degress   Maintain CPP > 60-70  Neuro check Q 1 and pupilometer  Seizure prophylaxis: not indicated  Maintain euvolemia  Sodium goal: normatremia    Hypertensive related to methamphetamine abuse  Ct head 12/26, 12/28 reviewed and stable    Patient with pretty poor airway reflexes and obstructive physiology    Acute respiratory failure with hypoxia and hypercapnia (HCC)  Assessment & Plan  Intubated date: 12/26 for aspiration event and airway protection  Goal saturation > 92%    Monitor pulse oximetry and adjust peep and FIO2 to abg/vbg, and peep optimization.  Monitor ventilator waveforms and titrate flow/peep and volumes according.   CXR: monitor lung volumes and tube/line placement  VAP bundle prevention, oral care, post pyloric feeding  Head of bed > 30 degree  GI prophylaxis  Daily awakening and SBT trials unless contraindicated  Monitor for liberation/extubation    Respiratory treatments: prn    Repeat broncho 12/28 to help with respiratory clearence  Would attempt repeat extubation trial with early trach       Methamphetamine abuse (HCC)  Assessment & Plan  Cessation counseling when clinically appropriate  Urine drug screen positive for amphetamines  zyprexa -> d/c 12/28    Concerns for meth, betablocker withdrawal, ICH, sepsis    JAYASHREE (obstructive sleep apnea)  Assessment & Plan  Likely relate to ICH patient with significant obstructive on exam -> early trach if fails extubation trial  Head of bed at 30-45 degrees   Unable to use Bipap with mental status and stroke  Will need outpatient sleep study     Hypertensive emergency- (present on  admission)  Assessment & Plan  Strict blood pressure control with SBP < 160    Episode of hypotension relative likely worsened from sedation and infection from aspiration or finishing with methamphetamine intoxication  Will hold oral medication and bolus NS  Monitor need for pressors or restarting nicardipine    Aspiration into airway  Assessment & Plan  Patient with acute aspiration into airways due to depressed mental status from ICH  Start Unasyn x 5 days  Follow up on BAL -> mssa pna  Check MRSA nares due to length of hospitalization  12/28 bronch continue to monitor need for repeat  High risk of failure       Febrile  Assessment & Plan  Multifactorial deep ICH, methamphetamines, aspiration pneumonia  Schedule tylenol, cooling blanket to prevent CNS injury    Hypokalemia  Assessment & Plan  Replete potassium       VTE:  Contraindicated  Ulcer: Not Indicated  Lines: None    I have performed a physical exam and reviewed and updated ROS and Plan today (12/29/2019). In review of yesterday's note (12/28/2019), there are no changes except as documented above.     Discussed patient condition and risk of morbidity and/or mortality with Hospitalist, RN, RT, Pharmacy, Charge nurse / hot rounds, Patient and neurology     The patient remains critically ill with respiratory failure and ventilator support.  Critical care time = 77 minutes in directly providing and coordinating critical care and extensive data review.  No time overlap and excludes procedures.

## 2019-12-29 NOTE — PROGRESS NOTES
[facility] Progress Note      Admit Date: 12/24/2019  Resident(s): Sebastián Trent   Attending: ABNER Frausto/ MIREYA Chavis    Date & Time:   12/29/2019   9:51 AM       Patient ID:    Name:             Jacinto Loyola     YOB: 1970  Age:                 49 y.o.  male   MRN:               [id number]    Diagnosis:  Intraparenchymal Hemorrhage  Acute hypoxic respiratory failure  HTN emergency    ID:  49-year-old male with a past medical history of hypertension, JAYASHREE, medications noncompliance and h/o meth abuse was brought in via EMS after he was found down at outside of his home at around 1600 on 12/24/2019.  As per chart review, patient parked his car, took a few steps and collapsed down on his front yard. Down for approximately 30 minutes before being found by friend.  Patient noted for left lower and upper extremity weakness as well as left facial weakness/numbness.      At the ED, blood pressure in the 190s/110-130s, heart rate in the 70s, patient saturating above 90% on 2 liters nasal cannula.  No leukocytosis or anemia on CBC.  Chemistry, troponin, INR and diagnostic alcohol normal.  Checks x-ray without acute cardiopulmonary process.  Head CT showed a 2.7 cm acute right basal ganglia/thalamic hemorrhage extravasating into the right lateral ventricle along with small right-sided mass-effect with minimal right-to-left midline shift.  No hydrocephalus. This was confirmed on Head CTA. Neurology was consulted by EDP.  He was given a one-time dose of labetalol and was started on a Cardene drip.  As per neurology goal MAP of .  No indication for neurosurgical involvement at this time.  Patient was admitted to ICU for intraparenchymal hemorrhage management and hypertensive crisis control.      Interval Events:  - Intubated and sedated APV/CMV, Vent Day #3  - Febrile T Max 101.3F, -149 mmHg, keep MAP   - Unasyn for Aspiration pneumonia, pending BAL culture results  (collected on 2019, negative to date)  - Replete electrolytes per pharmacy  - Initial UDS positive for amphetamine  - Territory of ICH is in thalamus and likely hypothalamus that could be contributing to decreased MS, neurologist will consider EEG although seizures are less likely to explain AMS  - I&Os +ve 1,876 cc  - 2x BM 2019      Review of Systems   Unable to perform ROS: Acuity of condition       VITALS:  Pulse: 60  Blood Pressure: 140/91       Monitored Temp 2  Av.7 °C (99.9 °F)  Min: 37.5 °C (99.5 °F)  Max: 38 °C (100.4 °F)    [place]en Vent Mode: APVCMV, Rate (breaths/min): 20, Vt Target (mL): 470, PEEP/CPAP: 8, FiO2: 30, P Support: 5, Static Compliance (ml / cm H2O): 48    Physical Exam:  Physical Exam  Intubated and sedated  Spontaneously moving his left lower extremity, not following commands.    Consultants:  Neurologist:  Huan Graff M.D.  PMA: Constantin Avina M.D.      Procedures:  None    HemoDynamics:  Pulse: 60 Blood Pressure: 140/91        Respiratory:  [place]en Vent Mode: APVCMV  Respiration: 15, Pulse Oximetry: 98 %    Chest Tube Drains:    Recent Labs     19  0304 19  0847 19  0347   ISTATAPH 7.364* 7.379* 7.404   ISTATAPCO2 41.2* 38.9* 37.2*   ISTATAPO2 247* 48* 91*   ISTATATCO2 25 24 24   [id number] 100* 83* 97   ISTATARTHCO3 23.5 23.0 23.3   ISTATARTBE -2 -2 -1   ISTATTEMP 100.6 F 99.3 F 100.0 F   ISTATFIO2 100 30 30   ISTATSPEC Arterial Arterial Arterial   ISTATAPHTC 7.348* 7.373* 7.393*   MDARFHUO5BP 253* 50* 95*         Neuro:      Fluids:  Intake/Output       19 0700 - 19 0659 19 0700 - 19 0659 19 0700 - 12/30/659 Total  Total  Total       Intake    P.O.  --  -- --  --  -- --  0  -- 0    P.O. -- -- -- -- -- -- 0 -- 0    I.V.  1283  1200 2483  976.8  900 1876.8  150  -- 150    Propofol Volume 83 -- 83 18 -- 18 -- -- --    Volume (mL) (lactated ringers  infusion) 1200 1200 2400 235 -- 235 -- -- --    Volume (mL) (electrolyte-A (PLASMALYTE-A) infusion 1,000 mL) -- -- -- 723.8 900 1623.8 150 -- 150    Other  --  90 90  --  90 90  30  -- 30    Flush / Irrigation Volume -- 90 90 -- 90 90 30 -- 30    NG/GT  100  540 640  660  660 1320  110  -- 110    Intake (mL) (Enteral Tube 19 Cortrak - Gastric Right nare) 100 540 640  110 -- 110    IV Piggyback  1200  200 1400  1200  200 1400  0  -- 0    Volume (mL) (NS (BOLUS) infusion 1,000 mL) 1000 -- 1000 -- -- -- -- -- --    Volume (mL) (NS (BOLUS) infusion 1,000 mL) -- -- -- 1000 -- 1000 -- -- --    Volume (mL) (ampicillin/sulbactam (UNASYN) 3 g in  mL IVPB) 200 200 400 200 200 400 0 -- 0    Total Intake 2583 2030 4613 2836.8 1850 4686.8 290 -- 290       Output    Urine  500  485 985  960  590 1550  75  -- 75    Output (mL) (Urethral Catheter) 500 485 985  75 -- 75    Stool  --  -- --  --  -- --  --  -- --    Number of Times Stooled 0 x 0 x 0 x 2 x 1 x 3 x 0 x -- 0 x    Total Output 500 485 985  75 -- 75       Net I/O     2083 1545 3628 1876.8 1260 3136.8 215 -- 215        Weight: 101.1 kg (222 lb 14.2 oz)  Recent Labs     19  0230 19  0305 19  0345   SODIUM 135 139 142   POTASSIUM 4.4 3.7 3.8   CHLORIDE 106 109 113*   CO2 21 23 25   BUN 31* 39* 34*   CREATININE 1.18 1.24 0.93   MAGNESIUM 2.2  --  2.0   PHOSPHORUS 3.1  --  1.9*   CALCIUM 8.0* 8.0* 8.0*     Body mass index is 30.23 kg/m².    GI/Nutrition:  Recent Labs     19  034   GLUCOSE 121* 110* 113*       Heme:  Recent Labs     19   RBC 4.91 4.19* 4.14*   HEMOGLOBIN 14.4 12.7* 12.3*   HEMATOCRIT 42.8 37.3* 37.6*   PLATELETCT 243 184 186       Infectious Disease:  Monitored Temp 2  Av.7 °C (99.9 °F)  Min: 37.5 °C (99.5 °F)  Max: 38 °C (100.4 °F)  Recent Labs     19  034   WBC 19.5* 13.3*  10.3   NEUTSPOLYS 83.40* 73.90* 66.80   LYMPHOCYTES 6.20* 15.10* 19.80*   MONOCYTES 9.50 9.20 11.20   EOSINOPHILS 0.10 1.10 1.40   BASOPHILS 0.20 0.30 0.30       Medications:  • potassium phosphate ivpb  30 mmol 30 mmol (12/29/19 0901)   • electrolyte-A  1,000 mL 50 mL/hr at 12/29/19 0830   • ampicillin-sulbactam (UNASYN) IV  3 g 3 g (12/29/19 0831)   • propofol  0-80 mcg/kg/min Stopped (12/27/19 1400)   • fentaNYL   Stopped (12/29/19 0858)   • lidocaine  1-2 mL     • famotidine  20 mg      Or   • famotidine  20 mg     • haloperidol lactate  2 mg     • Pharmacy  1 Each     • senna-docusate  2 Tab      And   • polyethylene glycol/lytes  1 Packet      And   • magnesium hydroxide  30 mL      And   • bisacodyl  10 mg     • acetaminophen  650 mg      Or   • acetaminophen  650 mg     • ondansetron  4 mg      Or   • ondansetron  4 mg     • labetalol  10 mg     • MD Alert...Adult ICU Electrolyte Replacement per Pharmacy       • Respiratory Care per Protocol       • niCARdipine infusion  0-15 mg/hr Stopped (12/27/19 0201)        Imaging:  DX-CHEST-PORTABLE (1 VIEW)   Final Result         1. No significant interval change.      CT-HEAD W/O   Final Result      Stable right colonic intraparenchymal hematoma with intraventricular extension.      DX-CHEST-PORTABLE (1 VIEW)   Final Result         1. No significant interval change.      DX-CHEST-PORTABLE (1 VIEW)   Final Result      Interval intubation with decreasing lung volumes and increasing perihilar, basilar opacity worrisome for aspiration, pneumonia over edema      DX-CHEST-PORTABLE (1 VIEW)   Final Result      Increasing infrahilar opacity is worrisome for aspiration      DX-ABDOMEN FOR TUBE PLACEMENT   Final Result      Enteric tube terminates over the stomach.      DX-ABDOMEN FOR TUBE PLACEMENT   Final Result      Enteric tube tip projects over the distal stomach.      DX-ABDOMEN FOR TUBE PLACEMENT   Final Result      Enteric tube is coiled in the neck.      CT-HEAD W/O    Final Result      Considerable motion and misregistration artifact on the images.   3.5 cm acute right thalamic/right basal ganglia hemorrhage slightly larger than previous.   Extravasation of hemorrhage into the right lateral ventricle and dependent hemorrhage within the left lateral ventricle.   Right-sided mass effect with 2.7 mm right to left midline shift.         DX-ABDOMEN FOR TUBE PLACEMENT   Final Result      Enteric tube tip projects over the stomach.      EC-ECHOCARDIOGRAM COMPLETE W/O CONT   Final Result      CT-HEAD W/O   Final Result      Stable to slight interval enlargement of right thalamic intra-axial hematoma which extravasates into the lateral ventricle and results in unchanged trace leftward midline shift      Mild worsening vasogenic edema but there is no hydrocephalus or herniation      Motion degraded      DX-CHEST-PORTABLE (1 VIEW)   Final Result      No evidence of acute cardiopulmonary process.      CT-CTA NECK WITH & W/O-POST PROCESSING   Final Result      CT angiogram of the neck within normal limits. No large vessel occlusion or stenosis. No dissection.      CT-CTA HEAD WITH & W/O-POST PROCESS   Final Result      1.  No evidence of intracranial vascular occlusion or aneurysm.      2.  Large right basal ganglial intraparenchymal hemorrhage with extension into the right lateral ventricle again noted.      CT-HEAD W/O   Final Result      2.7 cm acute right basal ganglia/thalamic hemorrhage extravasating into the right lateral ventricle.   No hydrocephalus.   Mild right-sided mass effect with minimal right-to-left midline shift.   Central white matter low attenuation consistent with microvascular ischemic changes.            Assessment and plan    * Acute hypoxic respiratory failure   Assessment & Plan       - Ventilated and sedated      - BAL culture positive for MSSA heavy growth, negative MRSA nares      - ABG improving      - Continue on APV/CMV mode, vent day 3, tolerating SBT  minimum of 1 hour       - latest CT head on 12/28/2019 showing Stable right colonic intraparenchymal hematoma with intraventricular extension      -  neurologist consider doing EEG although seizures are less likely to explain AMS    * Aspiration pneumonia/pneumonitis   Assessment & Plan  - Improving Leukocytosis, WBC count down(19.5 >> 13.3 >> 10.3), Tmax 100.4 down from 100.8 yesterday  - Continue Unasyn  - BAL culture positive for MSSA heavy growth, negative MRSA nares     * Shock  - Improving  - Unclear etiology, septic/MSSA?  - Not on pressors  - Continue Abxs  - continue monitoring    * Intraparenchymal hemorrhage of brain with vasogenic edema (HCC)- (present on admission)    Assessment & Plan       2/2 uncontrolled hypertension/HTN emergency/ meth abuse  Presented with left upper extremity, left lower extremity, and left facial weakness as well as slurred speech  Initial head CT showed a 2.7 cm acute right basal ganglia/thalamic hemorrhage extravasating into the right lateral ventricle along with small right-sided mass-effect with minimal right-to-left midline shift.  No hydrocephalus  Head/neck CT without vessel occlusions  Initially received a total of 30 mg of Labetalol push and was started on a Nicardipine drip  Plan:  Keep NPO  Keep head of bed 30 degrees  Neurochecks Q2H per neurologist rec.  RT/O2  Off fentanyl drip  Nicardipine drip per nursing, currently off.  Neurology following, appreciate recommendations  PT/OT/Speech/PMR      Territory of ICH is in thalamus and likely hypothalamus that could be contributing to  decreased MS, neurologist will consider EEG although seizures are less likely to explain AMS       Hypertensive emergency- (present on admission)  Assessment & Plan  Presented with blood pressure in the 190s/110-130s  Intraparenchymal hemorrhage, no BEVERLY on labs or EKG changes/trops elevation  Nicardipine drip per nursing, currently off.  UDS positive for amphetamine       JAYASHREE (obstructive  sleep apnea)  Assessment & Plan  Not on CPAP at home  Currently intubated and sedated      DISPO: ICU    Code Status: FULL    Quality Measures:  Quality-Core Measures   Reviewed items::  EKG reviewed, Radiology images reviewed, Labs reviewed and Medications reviewed  Villasenor catheter::  Critically Ill - Requiring Accurate Measurement of Urinary Output  DVT prophylaxis pharmacological::  Contraindicated - High bleeding risk  DVT prophylaxis - mechanical:  SCDs  Ulcer Prophylaxis::  Yes  Antibiotics:  Treating active infection/contamination beyond 24 hours perioperative coverage

## 2019-12-29 NOTE — PROGRESS NOTES
Neurology Progress Note  Neurohospitalist Service, [facility]    Referring Physician: SENIA Tejada*    Chief Complaint   Patient presents with   • Possible Stroke     LKW 1600. noticed loss of sensation to LT side at that ttime then had a sudden onset of LUE & LLE weakness, slurred speech, LT sided facial droop 1745. BS: 89.        HPI: Refer to initial documented Neurology H&P, as detailed in the patient's chart.    Interval History 12/29/19: No new events    Review of systems: In addition to what is detailed in the HPI and/or updated in the interval history, all other systems reviewed and are negative.    Past Medical History:    has a past medical history of Depression (8/28/2009), HTN (8/28/2009), and Migraines, neuralgic (8/28/2009).    FHx:  family history is not on file.    SHx:   reports that he has never smoked. He has never used smokeless tobacco. He reports current alcohol use. He reports current drug use. Drug: Intravenous.    Medications:    Current Facility-Administered Medications:   •  potassium phosphates 30 mmol in D5W 500 mL ivpb, 30 mmol, Intravenous, Once, Sebastián Trent M.D., Last Rate: 83.3 mL/hr at 12/29/19 0901, 30 mmol at 12/29/19 0901  •  electrolyte-A (PLASMALYTE-A) infusion 1,000 mL, 1,000 mL, Intravenous, Continuous, Constantin Avina M.D., Last Rate: 50 mL/hr at 12/29/19 1303, 1,000 mL at 12/29/19 1303  •  fentaNYL (SUBLIMAZE) injection 25 mcg, 25 mcg, Intravenous, Q HOUR PRN **OR** fentaNYL (SUBLIMAZE) injection 50 mcg, 50 mcg, Intravenous, Q HOUR PRN **OR** fentaNYL (SUBLIMAZE) injection 100 mcg, 100 mcg, Intravenous, Q HOUR PRN, Constantin Avina M.D., 100 mcg at 12/29/19 1307  •  ceFAZolin in dextrose (ANCEF) IVPB premix 2 g, 2 g, Intravenous, Q8HRS, Constantin Avina M.D.  •  propofol (DIPRIVAN) injection, 0-80 mcg/kg/min, Intravenous, Continuous, Stopped at 12/27/19 1400 **AND** Triglycerides Starting now and then Every 3 Days, , ,  Every 3 Days (0300), Bruce Pete D.O.  •  fentaNYL (SUBLIMAZE) 50 mcg/mL in 50mL (Continuous Infusion), , Intravenous, Continuous, Bruce Pete D.O., Stopped at 12/29/19 0858  •  lidocaine (XYLOCAINE) 1 % injection 1-2 mL, 1-2 mL, Tracheal Tube, Q30 MIN PRN, Wilberto Ness M.D.  •  famotidine (PEPCID) tablet 20 mg, 20 mg, Enteral Tube, BID, 20 mg at 12/29/19 0510 **OR** famotidine (PEPCID) injection 20 mg, 20 mg, Intravenous, BID, Constantin Avina M.D.  •  haloperidol lactate (HALDOL) injection 2 mg, 2 mg, Intravenous, Once PRN, Constantin Avina M.D.  •  Pharmacy Consult: Enteral tube insertion - review meds/change route/product selection, 1 Each, Other, PHARMACY TO DOSE, Constantin Avina M.D.  •  senna-docusate (PERICOLACE or SENOKOT S) 8.6-50 MG per tablet 2 Tab, 2 Tab, Enteral Tube, BID, 2 Tab at 12/29/19 0510 **AND** polyethylene glycol/lytes (MIRALAX) PACKET 1 Packet, 1 Packet, Enteral Tube, QDAY PRN **AND** magnesium hydroxide (MILK OF MAGNESIA) suspension 30 mL, 30 mL, Enteral Tube, QDAY PRN **AND** bisacodyl (DULCOLAX) suppository 10 mg, 10 mg, Rectal, QDAY PRN, Constantin Avina M.D.  •  acetaminophen (TYLENOL) tablet 650 mg, 650 mg, Enteral Tube, Q4HRS PRN, 650 mg at 12/25/19 1603 **OR** acetaminophen (TYLENOL) suppository 650 mg, 650 mg, Rectal, Q4HRS PRN, Constantin Avina M.D., 650 mg at 12/26/19 2341  •  ondansetron (ZOFRAN ODT) dispertab 4 mg, 4 mg, Enteral Tube, Q4HRS PRN **OR** ondansetron (ZOFRAN) syringe/vial injection 4 mg, 4 mg, Intravenous, Q4HRS PRN, Constantin Avina M.D.  •  labetalol (NORMODYNE/TRANDATE) injection 10 mg, 10 mg, Intravenous, Q4HRS PRN, Wilberto Ness M.D., 10 mg at 12/29/19 1205  •  MD Alert...ICU Electrolyte Replacement per Pharmacy, , Other, PHARMACY TO DOSE, Wilberto Ness M.D.  •  Respiratory Care per Protocol, , Nebulization, Continuous RT, Wilberto Ness M.D.  •  niCARdipine (CARDENE) 25 mg in  mL Infusion, 0-15  mg/hr, Intravenous, Continuous, Wilberto Ness M.D., Stopped at 12/27/19 0201    Physical Examination:     Vitals:    12/29/19 0900 12/29/19 1000 12/29/19 1034 12/29/19 1205   BP: 140/91 141/87  158/103   Pulse: 60 64 75    Resp: 15 20     Temp:       TempSrc: Bladder Bladder     SpO2: 98% 99% 95%    Weight:       Height:           General: Patient is awake and in no acute distress  Eyes: examination of optic disks not indicated at this time  CV: RRR    NEUROLOGICAL EXAM:     Sedated and intubated poorly responsive. No obvious response to pain stimuli.  Objective Data:    Labs:  Lab Results   Component Value Date/Time    PROTHROMBTM 12.8 12/24/2019 06:18 PM    INR 0.95 12/24/2019 06:18 PM      Lab Results   Component Value Date/Time    WBC 10.3 12/29/2019 03:45 AM    RBC 4.14 (L) 12/29/2019 03:45 AM    HEMOGLOBIN 12.3 (L) 12/29/2019 03:45 AM    HEMATOCRIT 37.6 (L) 12/29/2019 03:45 AM    MCV 90.8 12/29/2019 03:45 AM    MCH 29.7 12/29/2019 03:45 AM    MCHC 32.7 (L) 12/29/2019 03:45 AM    MPV 9.4 12/29/2019 03:45 AM    NEUTSPOLYS 66.80 12/29/2019 03:45 AM    LYMPHOCYTES 19.80 (L) 12/29/2019 03:45 AM    MONOCYTES 11.20 12/29/2019 03:45 AM    EOSINOPHILS 1.40 12/29/2019 03:45 AM    BASOPHILS 0.30 12/29/2019 03:45 AM      Lab Results   Component Value Date/Time    SODIUM 142 12/29/2019 03:45 AM    POTASSIUM 3.8 12/29/2019 03:45 AM    CHLORIDE 113 (H) 12/29/2019 03:45 AM    CO2 25 12/29/2019 03:45 AM    GLUCOSE 113 (H) 12/29/2019 03:45 AM    BUN 34 (H) 12/29/2019 03:45 AM    CREATININE 0.93 12/29/2019 03:45 AM    CREATININE 1.0 05/24/2007 09:55 PM      Lab Results   Component Value Date/Time    CHOLSTRLTOT 136 12/24/2019 06:18 PM    LDL 61 12/24/2019 06:18 PM    HDL 44 12/24/2019 06:18 PM    TRIGLYCERIDE 125 12/29/2019 03:45 AM       Lab Results   Component Value Date/Time    ALKPHOSPHAT 65 12/24/2019 06:18 PM    ASTSGOT 19 12/24/2019 06:18 PM    ALTSGPT 15 12/24/2019 06:18 PM    TBILIRUBIN 0.6 12/24/2019 06:18  PM        Imaging/Testing:  CT head with out significant change in ICH size    Assessment and Plan:    Jacinto Loyola is a 49 y.o. male with relevant history of right thalamic ICH with small IVH without hydrocephalus.  Poorly responsive. Possibly from sedation. Possibly from hypothalamic extension.  Will get EEg to exclude seizure even if ICH was deep and not cortical.  Will follow BP and exam.  Pt with ampthetamine pos tox on admit likley HTN ICH Pts AMS may also be from substance abuse /withdrawal.  INR   Date Value Ref Range Status   12/24/2019 0.95 0.87 - 1.13 Final     Comment:     INR - Non-therapeutic Reference Range: 0.87-1.13  INR - Therapeutic Reference Range: 2.0-4.0       No results found for: POCINR  Plan:    The evaluation of the patient, and recommended management, was discussed with the resident staff. I have performed a physical exam and reviewed and updated ROS and Plan today (12/29/2019). In review of yesterday's note (12/28/2019), there are no changes except as documented above.    Huan Graff MD  Board Certified Neurology, ABPN  Board Certified Vascular Neurology, ABPN  (t) [phone]

## 2019-12-29 NOTE — CARE PLAN
Problem: Communication  Goal: The ability to communicate needs accurately and effectively will improve  Outcome: PROGRESSING SLOWER THAN EXPECTED   Patient is not following commands    Problem: Skin Integrity  Goal: Risk for impaired skin integrity will decrease  Outcome: PROGRESSING SLOWER THAN EXPECTED  Patient continues to be extremely diaphoretic. Completing bed bath and linen change Qshift. Closed moisture blister noted on sacram. Skin is red but blanching.      Problem: Respiratory:  Goal: Respiratory status will improve  Outcome: PROGRESSING SLOWER THAN EXPECTED  Patient is not following commands to pull parameters for weaning. SBT trials done today. Patient has moderate amount of thick, yellow secretions

## 2019-12-30 ENCOUNTER — APPOINTMENT (OUTPATIENT)
Dept: RADIOLOGY | Facility: MEDICAL CENTER | Age: 49
DRG: 064 | End: 2019-12-30
Attending: STUDENT IN AN ORGANIZED HEALTH CARE EDUCATION/TRAINING PROGRAM
Payer: MEDICAID

## 2019-12-30 PROBLEM — E83.42 HYPOMAGNESEMIA: Status: ACTIVE | Noted: 2019-12-30

## 2019-12-30 LAB
ACTION RANGE TRIGGERED IACRT: NO
ANION GAP SERPL CALC-SCNC: 6 MMOL/L (ref 0–11.9)
BASE EXCESS BLDA CALC-SCNC: 0 MMOL/L (ref -4–3)
BASOPHILS # BLD AUTO: 0.3 % (ref 0–1.8)
BASOPHILS # BLD: 0.03 K/UL (ref 0–0.12)
BODY TEMPERATURE: ABNORMAL DEGREES
BUN SERPL-MCNC: 24 MG/DL (ref 8–22)
CALCIUM SERPL-MCNC: 8.3 MG/DL (ref 8.5–10.5)
CHLORIDE SERPL-SCNC: 108 MMOL/L (ref 96–112)
CO2 BLDA-SCNC: 25 MMOL/L (ref 20–33)
CO2 SERPL-SCNC: 26 MMOL/L (ref 20–33)
CREAT SERPL-MCNC: 0.8 MG/DL (ref 0.5–1.4)
CRP SERPL HS-MCNC: 4.74 MG/DL (ref 0–0.75)
EOSINOPHIL # BLD AUTO: 0.13 K/UL (ref 0–0.51)
EOSINOPHIL NFR BLD: 1.3 % (ref 0–6.9)
ERYTHROCYTE [DISTWIDTH] IN BLOOD BY AUTOMATED COUNT: 44 FL (ref 35.9–50)
GLUCOSE SERPL-MCNC: 113 MG/DL (ref 65–99)
HCO3 BLDA-SCNC: 24.3 MMOL/L (ref 17–25)
HCT VFR BLD AUTO: 40.9 % (ref 42–52)
HGB BLD-MCNC: 13.2 G/DL (ref 14–18)
HOROWITZ INDEX BLDA+IHG-RTO: ABNORMAL MM[HG]
IMM GRANULOCYTES # BLD AUTO: 0.04 K/UL (ref 0–0.11)
IMM GRANULOCYTES NFR BLD AUTO: 0.4 % (ref 0–0.9)
INST. QUALIFIED PATIENT IIQPT: YES
LYMPHOCYTES # BLD AUTO: 1.85 K/UL (ref 1–4.8)
LYMPHOCYTES NFR BLD: 19.1 % (ref 22–41)
MAGNESIUM SERPL-MCNC: 1.9 MG/DL (ref 1.5–2.5)
MCH RBC QN AUTO: 29.2 PG (ref 27–33)
MCHC RBC AUTO-ENTMCNC: 32.3 G/DL (ref 33.7–35.3)
MCV RBC AUTO: 90.5 FL (ref 81.4–97.8)
MONOCYTES # BLD AUTO: 1.16 K/UL (ref 0–0.85)
MONOCYTES NFR BLD AUTO: 12 % (ref 0–13.4)
NEUTROPHILS # BLD AUTO: 6.46 K/UL (ref 1.82–7.42)
NEUTROPHILS NFR BLD: 66.9 % (ref 44–72)
NRBC # BLD AUTO: 0 K/UL
NRBC BLD-RTO: 0 /100 WBC
O2/TOTAL GAS SETTING VFR VENT: 0.3 %
PCO2 BLDA: 36.8 MMHG (ref 26–37)
PCO2 TEMP ADJ BLDA: 37.4 MMHG (ref 26–37)
PH BLDA: 7.43 [PH] (ref 7.4–7.5)
PH TEMP ADJ BLDA: 7.42 [PH] (ref 7.4–7.5)
PHOSPHATE SERPL-MCNC: 2.5 MG/DL (ref 2.5–4.5)
PLATELET # BLD AUTO: 223 K/UL (ref 164–446)
PMV BLD AUTO: 9.4 FL (ref 9–12.9)
PO2 BLDA: 95 MMHG (ref 64–87)
PO2 TEMP ADJ BLDA: 97 MMHG (ref 64–87)
POTASSIUM SERPL-SCNC: 3.6 MMOL/L (ref 3.6–5.5)
PREALB SERPL-MCNC: 13 MG/DL (ref 18–38)
RBC # BLD AUTO: 4.52 M/UL (ref 4.7–6.1)
SAO2 % BLDA: 98 % (ref 93–99)
SODIUM SERPL-SCNC: 140 MMOL/L (ref 135–145)
SPECIMEN DRAWN FROM PATIENT: ABNORMAL
WBC # BLD AUTO: 9.7 K/UL (ref 4.8–10.8)

## 2019-12-30 PROCEDURE — 80048 BASIC METABOLIC PNL TOTAL CA: CPT

## 2019-12-30 PROCEDURE — 700101 HCHG RX REV CODE 250: Performed by: INTERNAL MEDICINE

## 2019-12-30 PROCEDURE — 700111 HCHG RX REV CODE 636 W/ 250 OVERRIDE (IP): Performed by: INTERNAL MEDICINE

## 2019-12-30 PROCEDURE — 700105 HCHG RX REV CODE 258: Performed by: INTERNAL MEDICINE

## 2019-12-30 PROCEDURE — 94003 VENT MGMT INPAT SUBQ DAY: CPT

## 2019-12-30 PROCEDURE — 4A10X4Z MONITORING OF CENTRAL NERVOUS ELECTRICAL ACTIVITY, EXTERNAL APPROACH: ICD-10-PCS | Performed by: PSYCHIATRY & NEUROLOGY

## 2019-12-30 PROCEDURE — A9270 NON-COVERED ITEM OR SERVICE: HCPCS | Performed by: INTERNAL MEDICINE

## 2019-12-30 PROCEDURE — 700102 HCHG RX REV CODE 250 W/ 637 OVERRIDE(OP): Performed by: INTERNAL MEDICINE

## 2019-12-30 PROCEDURE — 86140 C-REACTIVE PROTEIN: CPT

## 2019-12-30 PROCEDURE — 99291 CRITICAL CARE FIRST HOUR: CPT | Performed by: INTERNAL MEDICINE

## 2019-12-30 PROCEDURE — 83735 ASSAY OF MAGNESIUM: CPT

## 2019-12-30 PROCEDURE — 770022 HCHG ROOM/CARE - ICU (200)

## 2019-12-30 PROCEDURE — 95951 EEG: CPT | Mod: 26,52 | Performed by: PSYCHIATRY & NEUROLOGY

## 2019-12-30 PROCEDURE — 71045 X-RAY EXAM CHEST 1 VIEW: CPT

## 2019-12-30 PROCEDURE — 82803 BLOOD GASES ANY COMBINATION: CPT

## 2019-12-30 PROCEDURE — 95951 EEG: CPT | Mod: 52 | Performed by: PSYCHIATRY & NEUROLOGY

## 2019-12-30 PROCEDURE — 36600 WITHDRAWAL OF ARTERIAL BLOOD: CPT

## 2019-12-30 PROCEDURE — 84100 ASSAY OF PHOSPHORUS: CPT

## 2019-12-30 PROCEDURE — 84134 ASSAY OF PREALBUMIN: CPT

## 2019-12-30 PROCEDURE — 85025 COMPLETE CBC W/AUTO DIFF WBC: CPT

## 2019-12-30 RX ORDER — HALOPERIDOL 5 MG/ML
2-5 INJECTION INTRAMUSCULAR EVERY 4 HOURS PRN
Status: DISCONTINUED | OUTPATIENT
Start: 2019-12-30 | End: 2019-12-30

## 2019-12-30 RX ORDER — OXYCODONE HYDROCHLORIDE 5 MG/1
5-10 TABLET ORAL EVERY 4 HOURS PRN
Status: DISCONTINUED | OUTPATIENT
Start: 2019-12-30 | End: 2020-01-17

## 2019-12-30 RX ORDER — DEXMEDETOMIDINE HYDROCHLORIDE 4 UG/ML
.1-1.5 INJECTION, SOLUTION INTRAVENOUS CONTINUOUS
Status: DISCONTINUED | OUTPATIENT
Start: 2019-12-30 | End: 2020-01-02

## 2019-12-30 RX ORDER — HALOPERIDOL 5 MG/ML
2-5 INJECTION INTRAMUSCULAR EVERY 4 HOURS PRN
Status: DISCONTINUED | OUTPATIENT
Start: 2019-12-30 | End: 2019-12-31

## 2019-12-30 RX ORDER — MAGNESIUM SULFATE HEPTAHYDRATE 40 MG/ML
2 INJECTION, SOLUTION INTRAVENOUS ONCE
Status: COMPLETED | OUTPATIENT
Start: 2019-12-30 | End: 2019-12-30

## 2019-12-30 RX ORDER — LABETALOL HYDROCHLORIDE 5 MG/ML
10-20 INJECTION, SOLUTION INTRAVENOUS EVERY 4 HOURS PRN
Status: DISCONTINUED | OUTPATIENT
Start: 2019-12-30 | End: 2020-01-25 | Stop reason: HOSPADM

## 2019-12-30 RX ORDER — AMLODIPINE BESYLATE 5 MG/1
5 TABLET ORAL
Status: DISCONTINUED | OUTPATIENT
Start: 2019-12-30 | End: 2019-12-31

## 2019-12-30 RX ADMIN — SENNOSIDES AND DOCUSATE SODIUM 2 TABLET: 8.6; 5 TABLET ORAL at 05:48

## 2019-12-30 RX ADMIN — ACETAMINOPHEN 650 MG: 325 TABLET, FILM COATED ORAL at 19:37

## 2019-12-30 RX ADMIN — AMLODIPINE BESYLATE 5 MG: 5 TABLET ORAL at 10:33

## 2019-12-30 RX ADMIN — PROPOFOL 40 MCG/KG/MIN: 10 INJECTION, EMULSION INTRAVENOUS at 03:45

## 2019-12-30 RX ADMIN — DEXMEDETOMIDINE HYDROCHLORIDE 0.8 MCG/KG/HR: 100 INJECTION, SOLUTION INTRAVENOUS at 14:17

## 2019-12-30 RX ADMIN — FAMOTIDINE 20 MG: 20 TABLET ORAL at 17:00

## 2019-12-30 RX ADMIN — CEFAZOLIN SODIUM 2 G: 2 INJECTION, SOLUTION INTRAVENOUS at 21:15

## 2019-12-30 RX ADMIN — DEXMEDETOMIDINE HYDROCHLORIDE 0.2 MCG/KG/HR: 100 INJECTION, SOLUTION INTRAVENOUS at 10:33

## 2019-12-30 RX ADMIN — CEFAZOLIN SODIUM 2 G: 2 INJECTION, SOLUTION INTRAVENOUS at 14:03

## 2019-12-30 RX ADMIN — POTASSIUM BICARBONATE 50 MEQ: 978 TABLET, EFFERVESCENT ORAL at 08:10

## 2019-12-30 RX ADMIN — MAGNESIUM SULFATE IN WATER 2 G: 40 INJECTION, SOLUTION INTRAVENOUS at 08:10

## 2019-12-30 RX ADMIN — CEFAZOLIN SODIUM 2 G: 2 INJECTION, SOLUTION INTRAVENOUS at 05:48

## 2019-12-30 RX ADMIN — FAMOTIDINE 20 MG: 20 TABLET ORAL at 05:48

## 2019-12-30 RX ADMIN — SODIUM CHLORIDE, SODIUM GLUCONATE, SODIUM ACETATE, POTASSIUM CHLORIDE AND MAGNESIUM CHLORIDE 1000 ML: 526; 502; 368; 37; 30 INJECTION, SOLUTION INTRAVENOUS at 08:15

## 2019-12-30 RX ADMIN — DEXMEDETOMIDINE HYDROCHLORIDE 0.6 MCG/KG/HR: 100 INJECTION, SOLUTION INTRAVENOUS at 19:44

## 2019-12-30 RX ADMIN — OXYCODONE HYDROCHLORIDE 10 MG: 5 TABLET ORAL at 14:07

## 2019-12-30 NOTE — CARE PLAN
Problem: Nutritional:  Goal: Nutrition support tolerated and meeting greater than 85% of estimated needs  Outcome: MET   TF @ goal

## 2019-12-30 NOTE — PROGRESS NOTES
Brief Neurology Note:     48 y/o male w/ hx of HTN, JAYASHREE, medication noncompliance and meth abuse (positive amphetamines in urine at time of admission) presenting after being found down at home and collapsed. At the time of admission had elevated SBP around 190. CT Head W/O CST demonstrated a R basal ganglia hemorrhage w/ some intraventricular extension. ICH score at least 2 (26% mortality in 30 days). Serial CT Head W/O CST were stable. At this time etiology is likely HTN likely 2/2 meth use. Location of hemorrhage is typical for hypertensive hemorrhages. Etiology of AMS is likely 2/2 thalamic involvement w/ hemorrhagic stroke.     Plan:   -SBP < 160 inpatient.  -Outpatient SBP goal ~130.  -LDL goal < 70  -Hgb A1C goal <7   -EEG is pending. If unremarkable for seizure activity neurology signs off. Please call w/ any further questions or concerns.

## 2019-12-30 NOTE — CARE PLAN
Problem: Communication  Goal: The ability to communicate needs accurately and effectively will improve  Outcome: NOT MET  Note:   Pt intubated and sedated, does not follow commands when sedation is off.      Problem: Safety  Goal: Will remain free from injury  Outcome: PROGRESSING AS EXPECTED  Note:   Bed in lowest position, call light within reach, bed alarm set.   Goal: Will remain free from falls  Outcome: PROGRESSING AS EXPECTED     Problem: Venous Thromboembolism (VTW)/Deep Vein Thrombosis (DVT) Prevention:  Goal: Patient will participate in Venous Thrombosis (VTE)/Deep Vein Thrombosis (DVT)Prevention Measures  Outcome: PROGRESSING AS EXPECTED  Note:   SCD's in place and on      Problem: Pain Management  Goal: Pain level will decrease to patient's comfort goal  Outcome: PROGRESSING AS EXPECTED     Problem: Psychosocial Needs:  Goal: Level of anxiety will decrease  Outcome: PROGRESSING AS EXPECTED  Note:   Sedation restarted due to patient becoming agitated/uncomfortable and fighting ventilator.      Problem: Skin Integrity  Goal: Risk for impaired skin integrity will decrease  Outcome: PROGRESSING SLOWER THAN EXPECTED  Note:   Blanchable redness to coccyx present, will continue to turn patient every 2 hours and keep patient skin clean and dry.

## 2019-12-30 NOTE — CARE PLAN
Problem: Communication  Goal: The ability to communicate needs accurately and effectively will improve  Outcome: PROGRESSING SLOWER THAN EXPECTED   Patient is not following     Problem: Safety  Goal: Will remain free from injury  Outcome: MET     Problem: Bowel/Gastric:  Goal: Normal bowel function is maintained or improved  Outcome: MET   BM today

## 2019-12-30 NOTE — PROGRESS NOTES
Critical Care Progress Note    Date of admission  12/24/2019    Chief Complaint  Jacinto Loyola, a 49 y.o. male for evaluation and management of the above problem. The history is mostly obtained from healthcare providers and the medical record as this gentleman cannot provide me with any history.  This gentleman has a history of primary hypertension, medical noncompliance, methamphetamine abuse and JAYASHREE.  At approximately 1600 hrs. today he was found down in his yard near his car.  He had left-sided weakness and slurred speech.  EMS was activated.  He complained of a headache en route to the hospital.  On arrival at [facility], he underwent CT imaging which revealed a 2.7 cm right basal ganglia hemorrhage with extension into the right lateral ventricle.  He was emergently seen by Dr. Graff from neurology.  CT imaging of his head and neck did not reveal any evidence of large vessel occlusion, aneurysms or AVMs.  At this point, Dr. Graff does not believe that neurosurgery needs to be involved.  This is likely a hypertensive hemorrhage. Taken from Dr Ledesma note.     Hospital Course    Interval Problem Update  Reviewed last 24 hour events:   - AF, nl WBC   - NSR, -150   - propofol/fentanyl gtts resumed overnight for tachypnea, agitation, HTN   - Hgb up to 13   - low K, Mag   - improving BEVERLY   - withdraws in all 4, R >L   - kavin TFs, last BM 12/28   - good UOP   - EEG pending   - SBT x 1 hr: not following, RSB 75   - ancef for MSSA PNA through tomorrow (day 5)     Review of Systems  Review of Systems   Unable to perform ROS: Intubated        Vital Signs for last 24 hours   Pulse:  [55-81] 61  Resp:  [10-21] 21  BP: (129-186)/() 129/82  SpO2:  [95 %-100 %] 99 %    Respiratory Information for the last 24 hours  [place] Vent Mode: APVCMV  Rate (breaths/min): 20  Vt Target (mL): 470  PEEP/CPAP: 8  FiO2: 30  P Support: 5  P MEAN: 10  Length of Weaning Trial (Hours): 1.4  Control VTE (exp VT):  487    Physical Exam   Physical Exam  Vitals signs and nursing note reviewed.   Constitutional:       Appearance: He is normal weight. He is ill-appearing.      Interventions: He is sedated and intubated.   HENT:      Head: Normocephalic and atraumatic.      Right Ear: External ear normal.      Left Ear: External ear normal.      Nose: Nose normal. No congestion.      Comments: Nasal feeding tube in place     Mouth/Throat:      Mouth: Mucous membranes are moist.      Pharynx: Oropharynx is clear.      Comments: Endotracheal tube in position  Eyes:      General: No scleral icterus.     Conjunctiva/sclera: Conjunctivae normal.      Pupils: Pupils are equal, round, and reactive to light.      Comments: Pupils are 2 mm and reactive bilaterally, mild disconjugate gaze   Neck:      Musculoskeletal: Neck supple. No neck rigidity.   Cardiovascular:      Rate and Rhythm: Normal rate and regular rhythm.      Chest Wall: PMI is not displaced.      Pulses: Normal pulses.      Heart sounds: No murmur.   Pulmonary:      Effort: No tachypnea. He is intubated.      Breath sounds: Examination of the right-lower field reveals rhonchi and rales. Examination of the left-lower field reveals rhonchi and rales. Rhonchi and rales present. No decreased breath sounds or wheezing.   Abdominal:      General: Bowel sounds are normal. There is no distension.      Palpations: Abdomen is soft.      Tenderness: There is no tenderness.   Genitourinary:     Comments: Villasenor catheter in place  Musculoskeletal:         General: No tenderness.      Right lower leg: No edema.      Left lower leg: No edema.   Skin:     General: Skin is warm and dry.      Capillary Refill: Capillary refill takes less than 2 seconds.      Findings: No rash.   Neurological:      Mental Status: He is easily aroused.      Comments: Decerebrate posturing in the left upper extremity, flaccid in the left lower extremity, purposeful movements in the right side of his body, not  following commands   Psychiatric:      Comments: Unable to assess given current clinical condition         Medications  Current Facility-Administered Medications   Medication Dose Route Frequency Provider Last Rate Last Dose   • electrolyte-A (PLASMALYTE-A) infusion 1,000 mL  1,000 mL Intravenous Continuous Constantin Avina M.D. 50 mL/hr at 12/29/19 1303 1,000 mL at 12/29/19 1303   • fentaNYL (SUBLIMAZE) injection 25 mcg  25 mcg Intravenous Q HOUR PRN Constantin Avina M.D.        Or   • fentaNYL (SUBLIMAZE) injection 50 mcg  50 mcg Intravenous Q HOUR PRN Constantin Avina M.D.        Or   • fentaNYL (SUBLIMAZE) injection 100 mcg  100 mcg Intravenous Q HOUR PRN Constantin Avina M.D.   100 mcg at 12/29/19 2301   • ceFAZolin in dextrose (ANCEF) IVPB premix 2 g  2 g Intravenous Q8HRS Constantin Avina M.D.   Stopped at 12/30/19 0618   • hydrALAZINE (APRESOLINE) injection 10-20 mg  10-20 mg Intravenous Q4HRS PRN Brian Ledesma M.D.   10 mg at 12/29/19 2232   • propofol (DIPRIVAN) injection  0-80 mcg/kg/min Intravenous Continuous Bruce Pete, D.O. 24.1 mL/hr at 12/30/19 0345 40 mcg/kg/min at 12/30/19 0345   • fentaNYL (SUBLIMAZE) 50 mcg/mL in 50mL (Continuous Infusion)   Intravenous Continuous Bruce Pete, D.O.   Stopped at 12/29/19 0858   • lidocaine (XYLOCAINE) 1 % injection 1-2 mL  1-2 mL Tracheal Tube Q30 MIN PRN Wilberto Ness M.D.       • famotidine (PEPCID) tablet 20 mg  20 mg Enteral Tube BID Constantin Avina M.D.   20 mg at 12/30/19 0548    Or   • famotidine (PEPCID) injection 20 mg  20 mg Intravenous BID Constantin Avina M.D.       • haloperidol lactate (HALDOL) injection 2 mg  2 mg Intravenous Once PRN Constantin Avina M.D.       • Pharmacy Consult: Enteral tube insertion - review meds/change route/product selection  1 Each Other PHARMACY TO DOSE Constantin Avina M.D.       • senna-docusate (PERICOLACE or SENOKOT S) 8.6-50 MG per tablet 2 Tab  2 Tab Enteral Tube BID Constantin Avina,  M.D.   2 Tab at 12/30/19 0548    And   • polyethylene glycol/lytes (MIRALAX) PACKET 1 Packet  1 Packet Enteral Tube QDAY PRN Constantin Avina M.D.        And   • magnesium hydroxide (MILK OF MAGNESIA) suspension 30 mL  30 mL Enteral Tube QDAY PRN Constantin Avina M.D.        And   • bisacodyl (DULCOLAX) suppository 10 mg  10 mg Rectal QDAY PRN Constantin Avina M.D.       • acetaminophen (TYLENOL) tablet 650 mg  650 mg Enteral Tube Q4HRS PRN Constantin Avina M.D.   650 mg at 12/25/19 1603    Or   • acetaminophen (TYLENOL) suppository 650 mg  650 mg Rectal Q4HRS PRN Constantin Avina M.D.   650 mg at 12/26/19 2341   • ondansetron (ZOFRAN ODT) dispertab 4 mg  4 mg Enteral Tube Q4HRS PRN Constantin Avina M.D.        Or   • ondansetron (ZOFRAN) syringe/vial injection 4 mg  4 mg Intravenous Q4HRS PRN Constantin Avina M.D.       • labetalol (NORMODYNE/TRANDATE) injection 10 mg  10 mg Intravenous Q4HRS PRN Wilberto Ness M.D.   10 mg at 12/29/19 1707   • MD Alert...ICU Electrolyte Replacement per Pharmacy   Other PHARMACY TO DOSE Wilberto Ness M.D.       • Respiratory Care per Protocol   Nebulization Continuous RT Wilberto Ness M.D.       • niCARdipine (CARDENE) 25 mg in  mL Infusion  0-15 mg/hr Intravenous Continuous Wilberto Ness M.D.   Stopped at 12/27/19 0201       Fluids    Intake/Output Summary (Last 24 hours) at 12/30/2019 0710  Last data filed at 12/30/2019 0600  Gross per 24 hour   Intake 3759.87 ml   Output 3250 ml   Net 509.87 ml       Laboratory  Recent Labs     12/28/19  0847 12/29/19  0347 12/30/19  0536   ISTATAPH 7.379* 7.404 7.427   ISTATAPCO2 38.9* 37.2* 36.8   ISTATAPO2 48* 91* 95*   ISTATATCO2 24 24 25   IAMBCGS2ENE 83* 97 98   ISTATARTHCO3 23.0 23.3 24.3   ISTATARTBE -2 -1 0   ISTATTEMP 99.3 F 100.0 F 99.3 F   ISTATFIO2 30 30 .30   ISTATSPEC Arterial Arterial Arterial   ISTATAPHTC 7.373* 7.393* 7.422   [id number] 50* 95* 97*         Recent Labs      12/28/19  0305 12/29/19  0345 12/30/19  0405   SODIUM 139 142 140   POTASSIUM 3.7 3.8 3.6   CHLORIDE 109 113* 108   CO2 23 25 26   BUN 39* 34* 24*   CREATININE 1.24 0.93 0.80   MAGNESIUM  --  2.0 1.9   PHOSPHORUS  --  1.9* 2.5   CALCIUM 8.0* 8.0* 8.3*     Recent Labs     12/28/19  0305 12/29/19  0345 12/30/19  0405   PREALBUMIN  --   --  13.0*   GLUCOSE 110* 113* 113*     Recent Labs     12/28/19  0305 12/29/19  0345 12/30/19  0405   WBC 13.3* 10.3 9.7   NEUTSPOLYS 73.90* 66.80 66.90   LYMPHOCYTES 15.10* 19.80* 19.10*   MONOCYTES 9.20 11.20 12.00   EOSINOPHILS 1.10 1.40 1.30   BASOPHILS 0.30 0.30 0.30     Recent Labs     12/28/19  0305 12/29/19  0345 12/30/19  0405   RBC 4.19* 4.14* 4.52*   HEMOGLOBIN 12.7* 12.3* 13.2*   HEMATOCRIT 37.3* 37.6* 40.9*   PLATELETCT 184 186 223       Imaging  X-Ray:  I have personally reviewed the images and compared with prior images. and My impression is: Unchanged patchy bilateral lower lobe infiltrates, mild edema pattern, endotracheal tube is 6.5 cm above the miranda, gastric tube in good position  CT:    Reviewed  EEG: Pending    Assessment/Plan  * Intraparenchymal hemorrhage of brain (HCC)- (present on admission)  Assessment & Plan  Secondary to uncontrolled hypertension  Neurology consultation  Avoid anticoagulants  Strict blood pressure control with goal SBP less than 160  Therapies  Limit sedatives with close neurologic monitoring  EEG    Acute respiratory failure with hypoxia and hypercapnia (HCC)  Assessment & Plan  Intubated date: 12/26 for aspiration event and airway protection  Continue full mechanical ventilatory support, decrease respiratory to 16  Advance endotracheal tube by 2 cm  Begin forced diuresis  RT/O2 protocol  Limit sedatives with daily sedation vacation and spontaneous breathing trial    Aspiration into airway  Assessment & Plan  MSSA  Continue Ancef x5 days  Aspiration precautions    Febrile  Assessment & Plan  Multifactorial  PRN antipyretics  Continue  antibiotics    Methamphetamine abuse (HCC)  Assessment & Plan  Cessation counseling when clinically appropriate    JAYASHREE (obstructive sleep apnea)  Assessment & Plan  Likely relate to ICH patient with significant obstructive on exam -> early trach if fails extubation trial  Head of bed at 30-45 degrees   Unable to use Bipap with mental status and stroke  Will need outpatient sleep study     Hypertensive emergency- (present on admission)  Assessment & Plan  Begin scheduled amlodipine  PRN hydralazine and labetalol for goal SBP less than 160    Hypomagnesemia  Assessment & Plan  Repletion and monitor closely    Hypokalemia  Assessment & Plan  Repletion and monitor closely       VTE:  Contraindicated  Ulcer: H2 Antagonist  Lines: Villasenor Catheter  Ongoing indication addressed    I have performed a physical exam and reviewed and updated ROS and Plan today (12/30/2019). In review of yesterday's note (12/29/2019), there are no changes except as documented above.     Patient remains critically ill today requirement active management of his mechanical ventilatory support, strict blood pressure control and close neurologic monitoring. High risk of deterioration and worsening vital organ dysfunction and death without the above critical care interventions.    Discussed patient condition and risk of morbidity and/or mortality with RN, RT, Pharmacy, , Charge nurse / hot rounds, Patient and neurology Critical care time = 46 minutes in directly providing and coordinating critical care and extensive data review.  No time overlap and excludes procedures.

## 2019-12-30 NOTE — CARE PLAN
Problem: Ventilation Defect:  Goal: Ability to achieve and maintain unassisted ventilation or tolerate decreased levels of ventilator support  Outcome: PROGRESSING AS EXPECTED      Ventilator Daily Summary    Vent Day #4  8.0 @ 25  CMV 20, 470, +8, 30%    Ventilator settings changed this shift: NONE    Weaning trials: YES    Respiratory Procedures: AM ABG    Plan: Continue current ventilator settings and wean mechanical ventilation as tolerated per physician orders.

## 2019-12-30 NOTE — PROGRESS NOTES
[facility] ICU Progress Note      Admit Date: 12/24/2019    Resident(s): Kat Garcia M.D.   Attending:  ABNER MONTALVO/ Dr. Gonda    Patient ID:    Name:  Jacinto Loyola     YOB: 1970  Age:  49 y.o.  male   MRN:  [id number]    Hospital Course (carried forward and updated):  Jacinto Loyola is a 49 y.o. male with past medical history of hypertension, JAYASHREE, medications noncompliance, and meth abuse was brought in via EMS after he was found down at outside of his home at around 1600 on 12/24/2019.  As per chart review, patient parked his car, took a few steps and collapsed down on his front yard. Down for approximately 30 minutes before being found by friend.  Patient noted for left lower and upper extremity weakness as well as left facial weakness/numbness.      In the ED, blood pressure in the 190s/110-130s, heart rate in the 70s, patient saturating above 90% on 2 liters nasal cannula.  No leukocytosis or anemia on CBC.  Chemistry, troponin, INR and diagnostic alcohol normal.  Checks x-ray without acute cardiopulmonary process.  Head CT showed a 2.7 cm acute right basal ganglia/thalamic hemorrhage extravasating into the right lateral ventricle along with small right-sided mass-effect with minimal right-to-left midline shift.  No hydrocephalus. This was confirmed on Head CTA. Neurology was consulted by EDP.  He was given a one-time dose of labetalol and was started on a Cardene drip.  As per neurology goal MAP of .  No indication for neurosurgical involvement at this time.  Patient was admitted to ICU for intraparenchymal hemorrhage management and hypertensive crisis control.         Consultants:  Critical Care  Neurology     Interval Events:  Tmax 100.6  LBM 12/28  Cefazolin started for aspiration PNA MSSA  Withdraws to pain  Getting fentanyl, propofol for agitation with hypertension  Getting nicardipine  DC IVF  EEG today per Neuro  Added precedex, haldol, oxyccodone  Start amlodipine for  better long term BP control  SBT    Family: son has been visiting, will discuss goals when appropriate      Review of Systems   Unable to perform ROS: Intubated       PHYSICAL EXAM:  Vitals:    12/30/19 0600 12/30/19 0658 12/30/19 0700 12/30/19 0800   BP: 129/82  136/84 150/89   Pulse: 62 61 63 63   Resp: (!) 21  20 20   Temp:       TempSrc:    Bladder   SpO2: 100% 99% 99% 99%   Weight:       Height:        Body mass index is 32.44 kg/m².  Latest Vitals:  /89   Pulse 63   Temp (!) 38.9 °C (102 °F) (Temporal)   Resp 20   Ht 1.829 m (6')   Wt 108.5 kg (239 lb 3.2 oz)   SpO2 99%   BMI 32.44 kg/m²   O2 therapy: Pulse Oximetry: 99 %, O2 Delivery: Ventilator  Vitals Range last 24h:  Pulse:  [57-81] 63  Resp:  [10-21] 20  BP: (129-186)/() 150/89  SpO2:  [95 %-100 %] 99 %       Intake/Output Summary (Last 24 hours) at 12/30/2019 0901  Last data filed at 12/30/2019 0600  Gross per 24 hour   Intake 3469.87 ml   Output 3175 ml   Net 294.87 ml         Physical Exam   Constitutional: He is well-developed, well-nourished, and in no distress. He is intubated.   HENT:   Head: Normocephalic and atraumatic.   Eyes: Pupils are equal, round, and reactive to light. No scleral icterus.   Neck: Normal range of motion. Neck supple.   Cardiovascular: Normal rate and regular rhythm.   Pulmonary/Chest: He is intubated. He has rhonchi. He has rales.   Abdominal: Soft. He exhibits no distension. There is no tenderness.   Musculoskeletal:         General: No edema.   Neurological:   Arouses to auditory stimuli. Purposeful movements on right side only.   Skin: Skin is warm and dry.       Recent Labs     12/28/19  0847 12/29/19  0347 12/30/19  0536   ISTATAPH 7.379* 7.404 7.427   ISTATAPCO2 38.9* 37.2* 36.8   ISTATAPO2 48* 91* 95*   ISTATATCO2 24 24 25   [id number] 83* 97 98   ISTATARTHCO3 23.0 23.3 24.3   ISTATARTBE -2 -1 0   ISTATTEMP 99.3 F 100.0 F 99.3 F   ISTATFIO2 30 30 .30   ISTATSPEC Arterial Arterial Arterial    ISTATAPHTC 7.373* 7.393* 7.422   [id number] 50* 95* 97*     Recent Labs     12/28/19  0305 12/29/19 0345 12/30/19  0405   SODIUM 139 142 140   POTASSIUM 3.7 3.8 3.6   CHLORIDE 109 113* 108   CO2 23 25 26   BUN 39* 34* 24*   CREATININE 1.24 0.93 0.80   MAGNESIUM  --  2.0 1.9   PHOSPHORUS  --  1.9* 2.5   CALCIUM 8.0* 8.0* 8.3*     Recent Labs     12/28/19  0305 12/29/19 0345 12/30/19  0405   PREALBUMIN  --   --  13.0*   GLUCOSE 110* 113* 113*     Recent Labs     12/28/19 0305 12/29/19 0345 12/30/19  0405   RBC 4.19* 4.14* 4.52*   HEMOGLOBIN 12.7* 12.3* 13.2*   HEMATOCRIT 37.3* 37.6* 40.9*   PLATELETCT 184 186 223     Recent Labs     12/28/19 0305 12/29/19 0345 12/30/19  0405   WBC 13.3* 10.3 9.7   NEUTSPOLYS 73.90* 66.80 66.90   LYMPHOCYTES 15.10* 19.80* 19.10*   MONOCYTES 9.20 11.20 12.00   EOSINOPHILS 1.10 1.40 1.30   BASOPHILS 0.30 0.30 0.30       Meds:  • magnesium sulfate  2 g 2 g (12/30/19 0810)   • electrolyte-A  1,000 mL 1,000 mL (12/30/19 0815)   • fentaNYL  25 mcg      Or   • fentaNYL  50 mcg      Or   • fentaNYL  100 mcg     • ceFAZolin  2 g Stopped (12/30/19 0618)   • hydrALAZINE  10-20 mg     • propofol  0-80 mcg/kg/min 20 mcg/kg/min (12/30/19 0826)   • fentaNYL   Stopped (12/29/19 0858)   • lidocaine  1-2 mL     • famotidine  20 mg      Or   • famotidine  20 mg     • haloperidol lactate  2 mg     • Pharmacy  1 Each     • senna-docusate  2 Tab      And   • polyethylene glycol/lytes  1 Packet      And   • magnesium hydroxide  30 mL      And   • bisacodyl  10 mg     • acetaminophen  650 mg      Or   • acetaminophen  650 mg     • ondansetron  4 mg      Or   • ondansetron  4 mg     • labetalol  10 mg     • MD Alert...Adult ICU Electrolyte Replacement per Pharmacy       • Respiratory Care per Protocol       • niCARdipine infusion  0-15 mg/hr Stopped (12/27/19 0201)        Procedures:  12/28/19 Bronchoscopy with Therapeutic suctioning and BAL  12/27/19 Endotracheal intubation  12/27/19 Bronchoscopy  with bronchoalveolar lavage and therapeutic aspiration of secretions    Imaging:  DX-CHEST-PORTABLE (1 VIEW)   Final Result      1.  No significant change in right basilar opacification.      2.  Left basilar opacification has improved.      DX-CHEST-PORTABLE (1 VIEW)   Final Result         1. No significant interval change.      CT-HEAD W/O   Final Result      Stable right colonic intraparenchymal hematoma with intraventricular extension.      DX-CHEST-PORTABLE (1 VIEW)   Final Result         1. No significant interval change.      DX-CHEST-PORTABLE (1 VIEW)   Final Result      Interval intubation with decreasing lung volumes and increasing perihilar, basilar opacity worrisome for aspiration, pneumonia over edema      DX-CHEST-PORTABLE (1 VIEW)   Final Result      Increasing infrahilar opacity is worrisome for aspiration      DX-ABDOMEN FOR TUBE PLACEMENT   Final Result      Enteric tube terminates over the stomach.      DX-ABDOMEN FOR TUBE PLACEMENT   Final Result      Enteric tube tip projects over the distal stomach.      DX-ABDOMEN FOR TUBE PLACEMENT   Final Result      Enteric tube is coiled in the neck.      CT-HEAD W/O   Final Result      Considerable motion and misregistration artifact on the images.   3.5 cm acute right thalamic/right basal ganglia hemorrhage slightly larger than previous.   Extravasation of hemorrhage into the right lateral ventricle and dependent hemorrhage within the left lateral ventricle.   Right-sided mass effect with 2.7 mm right to left midline shift.         DX-ABDOMEN FOR TUBE PLACEMENT   Final Result      Enteric tube tip projects over the stomach.      EC-ECHOCARDIOGRAM COMPLETE W/O CONT   Final Result      CT-HEAD W/O   Final Result      Stable to slight interval enlargement of right thalamic intra-axial hematoma which extravasates into the lateral ventricle and results in unchanged trace leftward midline shift      Mild worsening vasogenic edema but there is no hydrocephalus  or herniation      Motion degraded      DX-CHEST-PORTABLE (1 VIEW)   Final Result      No evidence of acute cardiopulmonary process.      CT-CTA NECK WITH & W/O-POST PROCESSING   Final Result      CT angiogram of the neck within normal limits. No large vessel occlusion or stenosis. No dissection.      CT-CTA HEAD WITH & W/O-POST PROCESS   Final Result      1.  No evidence of intracranial vascular occlusion or aneurysm.      2.  Large right basal ganglial intraparenchymal hemorrhage with extension into the right lateral ventricle again noted.      CT-HEAD W/O   Final Result      2.7 cm acute right basal ganglia/thalamic hemorrhage extravasating into the right lateral ventricle.   No hydrocephalus.   Mild right-sided mass effect with minimal right-to-left midline shift.   Central white matter low attenuation consistent with microvascular ischemic changes.          Problem and Plan:  * Intraparenchymal hemorrhage of brain (HCC)- (present on admission)  Assessment & Plan  History of blood pressure medication noncompliance  History of methamphetamine abuse  Presented with left upper extremity, left lower extremity, and left facial weakness as well as slurred speech  Head CT showed a 2.7 cm acute right basal ganglia/thalamic hemorrhage extravasating into the right lateral ventricle along with small right-sided mass-effect with minimal right-to-left midline shift.  No hydrocephalus  Head CTA showed above  Head/neck CT without vessel occlusions  2/2 uncontrolled hypertension   Received a total of 20 mg of Labetalol push and was started on a Nicardipine drip  NPO  Continue OG  Keep head of bed 30 degrees  Continue RT  Continue Nicardipine to MAP   Neurology following, appreciate recommendations    Acute respiratory failure with hypoxia and hypercapnia (HCC)- (present on admission)  Assessment & Plan  Intubated 12/26  Diuresis  RT per protocol    Methamphetamine abuse (HCC)- (present on admission)  Assessment &  Plan  HTN emergency/ICH  Counseling, future  Consult     JAYASHREE (obstructive sleep apnea)- (present on admission)  Assessment & Plan  Not on CPAP  Continue O2 supplementation    Hypertensive emergency- (present on admission)  Assessment & Plan  Presented with blood pressure in the 190s/110-130s  Intraparenchymal hemorrhage, no BEVERLY on labs or EKG changes/trops elevation  Continue nicardipine drip (Goal MAP )  UDS+ for amphetamines   Trial of amlodipine for improved baseline control    DISPO: continue ICU    CODE STATUS: Full code    Quality Measures:  Feeding: NPO, TF  Analgesia: fentanyl  Sedation: propofol, precedex  Thromboprophylaxis: SCDs, pharmacologic ppx held for bleeding  Head of bed: >30 degrees  Ulcer prophylaxis: famotidine  Glycemic control: n/a  Bowel care: bowel regimen  Indwelling lines: ETT, OG, Villasenor  Deescalation of antibiotics: continue cefazolin      Kat Garcia M.D.

## 2019-12-30 NOTE — PROCEDURES
ROUTINE ELECTROENCEPHALOGRAM REPORT      Referring provider: Dr. Huan rosas    DOS: 12/30/2019 (total recording of 28 minutes)    INDICATION:  Jacinto Loyola 49 y.o. male presenting with intracranial hemorrhage    CURRENT ANTIEPILEPTIC REGIMEN:     TECHNIQUE: 30 channel routine electroencephalogram (EEG) was performed in accordance with the international 10-20 system. The study was reviewed in bipolar and referential montages. The recording examined the patient during wakeful and drowsy/sleep state(s).     DESCRIPTION OF THE RECORD:  The EEG in this intubated patient shows an 8 Hz activity over the bilateral posterior head regions with intermixed 5 to 6 Hz theta.  There is presence of occasional right hemispheric focal slowing with 5 to 6 Hz theta.  No epileptiform discharges or subclinical seizures were seen.  No sleep architecture was seen.    During the record, there was an episode during which the patient had coughing, posturing of the upper extremities and diaphoresis.  Throughout this time only movement artifact was seen.  There was no ictal onset or post ictal slowing after this event and therefore not consistent with seizure.    ACTIVATION PROCEDURES:   Photic stimulation did not produce a driving response.  Hyperventilation was not performed.    ICTAL AND/OR INTERICTAL FINDINGS:   No focal or generalized epileptiform activity noted. No regional slowing was seen during this routine study.  No clinical events or seizures were reported or recorded during the study.     EKG: sampling of the EKG recording demonstrated sinus rhythm.       INTERPRETATION:  This is an abnormal EEG due to occasional slowing of the posterior dominant rhythm and presence of right hemispheric focal slowing.  This would be consistent with a lesion involving the white matter of the left hemisphere and may be consistent with the patient's history of intracranial hemorrhage in this region.  Occasional slowing of the posterior  dominant rhythm would be consistent with a mild encephalopathy of nonspecific etiology.  No epileptiform discharges or subclinical seizures were seen throughout the record.    Kevin Limon M.D., Diplomat of the American Board of Psychiatry and Neurology  Diplomat of [employer] Epilepsy Subspecialty   Assistant Clinical Professor, [facility] Neurology Consultant

## 2019-12-31 ENCOUNTER — APPOINTMENT (OUTPATIENT)
Dept: RADIOLOGY | Facility: MEDICAL CENTER | Age: 49
DRG: 064 | End: 2019-12-31
Attending: STUDENT IN AN ORGANIZED HEALTH CARE EDUCATION/TRAINING PROGRAM
Payer: MEDICAID

## 2019-12-31 LAB
ACTION RANGE TRIGGERED IACRT: NO
ANION GAP SERPL CALC-SCNC: 6 MMOL/L (ref 0–11.9)
BASE EXCESS BLDA CALC-SCNC: 1 MMOL/L (ref -4–3)
BASOPHILS # BLD AUTO: 0.4 % (ref 0–1.8)
BASOPHILS # BLD: 0.03 K/UL (ref 0–0.12)
BODY TEMPERATURE: ABNORMAL DEGREES
BUN SERPL-MCNC: 28 MG/DL (ref 8–22)
CALCIUM SERPL-MCNC: 8.3 MG/DL (ref 8.5–10.5)
CHLORIDE SERPL-SCNC: 109 MMOL/L (ref 96–112)
CO2 BLDA-SCNC: 27 MMOL/L (ref 20–33)
CO2 SERPL-SCNC: 25 MMOL/L (ref 20–33)
CREAT SERPL-MCNC: 0.75 MG/DL (ref 0.5–1.4)
EOSINOPHIL # BLD AUTO: 0.18 K/UL (ref 0–0.51)
EOSINOPHIL NFR BLD: 2.3 % (ref 0–6.9)
ERYTHROCYTE [DISTWIDTH] IN BLOOD BY AUTOMATED COUNT: 42.8 FL (ref 35.9–50)
GLUCOSE SERPL-MCNC: 129 MG/DL (ref 65–99)
HCO3 BLDA-SCNC: 25.5 MMOL/L (ref 17–25)
HCT VFR BLD AUTO: 40.8 % (ref 42–52)
HGB BLD-MCNC: 13.2 G/DL (ref 14–18)
HOROWITZ INDEX BLDA+IHG-RTO: 300 MM[HG]
IMM GRANULOCYTES # BLD AUTO: 0.05 K/UL (ref 0–0.11)
IMM GRANULOCYTES NFR BLD AUTO: 0.6 % (ref 0–0.9)
INST. QUALIFIED PATIENT IIQPT: YES
LYMPHOCYTES # BLD AUTO: 1.69 K/UL (ref 1–4.8)
LYMPHOCYTES NFR BLD: 21.6 % (ref 22–41)
MAGNESIUM SERPL-MCNC: 2.1 MG/DL (ref 1.5–2.5)
MCH RBC QN AUTO: 29.2 PG (ref 27–33)
MCHC RBC AUTO-ENTMCNC: 32.4 G/DL (ref 33.7–35.3)
MCV RBC AUTO: 90.3 FL (ref 81.4–97.8)
MONOCYTES # BLD AUTO: 0.88 K/UL (ref 0–0.85)
MONOCYTES NFR BLD AUTO: 11.3 % (ref 0–13.4)
NEUTROPHILS # BLD AUTO: 4.99 K/UL (ref 1.82–7.42)
NEUTROPHILS NFR BLD: 63.8 % (ref 44–72)
NRBC # BLD AUTO: 0 K/UL
NRBC BLD-RTO: 0 /100 WBC
O2/TOTAL GAS SETTING VFR VENT: 30 %
PCO2 BLDA: 39.3 MMHG (ref 26–37)
PCO2 TEMP ADJ BLDA: 40.1 MMHG (ref 26–37)
PH BLDA: 7.42 [PH] (ref 7.4–7.5)
PH TEMP ADJ BLDA: 7.41 [PH] (ref 7.4–7.5)
PHOSPHATE SERPL-MCNC: 2.7 MG/DL (ref 2.5–4.5)
PLATELET # BLD AUTO: 225 K/UL (ref 164–446)
PMV BLD AUTO: 9.9 FL (ref 9–12.9)
PO2 BLDA: 90 MMHG (ref 64–87)
PO2 TEMP ADJ BLDA: 93 MMHG (ref 64–87)
POTASSIUM SERPL-SCNC: 4.4 MMOL/L (ref 3.6–5.5)
RBC # BLD AUTO: 4.52 M/UL (ref 4.7–6.1)
SAO2 % BLDA: 97 % (ref 93–99)
SODIUM SERPL-SCNC: 140 MMOL/L (ref 135–145)
SPECIMEN DRAWN FROM PATIENT: ABNORMAL
WBC # BLD AUTO: 7.8 K/UL (ref 4.8–10.8)

## 2019-12-31 PROCEDURE — 85025 COMPLETE CBC W/AUTO DIFF WBC: CPT

## 2019-12-31 PROCEDURE — 84100 ASSAY OF PHOSPHORUS: CPT

## 2019-12-31 PROCEDURE — 700101 HCHG RX REV CODE 250: Performed by: INTERNAL MEDICINE

## 2019-12-31 PROCEDURE — 71045 X-RAY EXAM CHEST 1 VIEW: CPT

## 2019-12-31 PROCEDURE — 700102 HCHG RX REV CODE 250 W/ 637 OVERRIDE(OP): Performed by: INTERNAL MEDICINE

## 2019-12-31 PROCEDURE — 36600 WITHDRAWAL OF ARTERIAL BLOOD: CPT

## 2019-12-31 PROCEDURE — A9270 NON-COVERED ITEM OR SERVICE: HCPCS | Performed by: INTERNAL MEDICINE

## 2019-12-31 PROCEDURE — 700111 HCHG RX REV CODE 636 W/ 250 OVERRIDE (IP): Performed by: INTERNAL MEDICINE

## 2019-12-31 PROCEDURE — 94003 VENT MGMT INPAT SUBQ DAY: CPT

## 2019-12-31 PROCEDURE — 99291 CRITICAL CARE FIRST HOUR: CPT | Performed by: INTERNAL MEDICINE

## 2019-12-31 PROCEDURE — 770022 HCHG ROOM/CARE - ICU (200)

## 2019-12-31 PROCEDURE — 80048 BASIC METABOLIC PNL TOTAL CA: CPT

## 2019-12-31 PROCEDURE — 83735 ASSAY OF MAGNESIUM: CPT

## 2019-12-31 PROCEDURE — 82803 BLOOD GASES ANY COMBINATION: CPT

## 2019-12-31 PROCEDURE — 97535 SELF CARE MNGMENT TRAINING: CPT

## 2019-12-31 RX ORDER — AMLODIPINE BESYLATE 5 MG/1
10 TABLET ORAL
Status: DISCONTINUED | OUTPATIENT
Start: 2020-01-01 | End: 2020-01-17

## 2019-12-31 RX ORDER — FUROSEMIDE 10 MG/ML
20 INJECTION INTRAMUSCULAR; INTRAVENOUS ONCE
Status: COMPLETED | OUTPATIENT
Start: 2019-12-31 | End: 2019-12-31

## 2019-12-31 RX ORDER — ENALAPRIL MALEATE 2.5 MG/1
2.5 TABLET ORAL
Status: DISCONTINUED | OUTPATIENT
Start: 2019-12-31 | End: 2020-01-01

## 2019-12-31 RX ADMIN — DEXMEDETOMIDINE HYDROCHLORIDE 0.4 MCG/KG/HR: 100 INJECTION, SOLUTION INTRAVENOUS at 15:38

## 2019-12-31 RX ADMIN — AMLODIPINE BESYLATE 5 MG: 5 TABLET ORAL at 05:05

## 2019-12-31 RX ADMIN — FUROSEMIDE 20 MG: 10 INJECTION, SOLUTION INTRAMUSCULAR; INTRAVENOUS at 16:06

## 2019-12-31 RX ADMIN — FAMOTIDINE 20 MG: 20 TABLET ORAL at 05:05

## 2019-12-31 RX ADMIN — ENALAPRIL MALEATE 2.5 MG: 2.5 TABLET ORAL at 17:55

## 2019-12-31 RX ADMIN — HYDRALAZINE HYDROCHLORIDE 20 MG: 20 INJECTION INTRAMUSCULAR; INTRAVENOUS at 22:08

## 2019-12-31 RX ADMIN — FAMOTIDINE 20 MG: 20 TABLET ORAL at 17:55

## 2019-12-31 RX ADMIN — HYDRALAZINE HYDROCHLORIDE 10 MG: 20 INJECTION INTRAMUSCULAR; INTRAVENOUS at 15:01

## 2019-12-31 RX ADMIN — SENNOSIDES AND DOCUSATE SODIUM 2 TABLET: 8.6; 5 TABLET ORAL at 05:04

## 2019-12-31 RX ADMIN — LABETALOL HYDROCHLORIDE 10 MG: 5 INJECTION INTRAVENOUS at 16:40

## 2019-12-31 RX ADMIN — SENNOSIDES AND DOCUSATE SODIUM 2 TABLET: 8.6; 5 TABLET ORAL at 17:55

## 2019-12-31 RX ADMIN — CEFAZOLIN SODIUM 2 G: 2 INJECTION, SOLUTION INTRAVENOUS at 15:03

## 2019-12-31 RX ADMIN — HYDRALAZINE HYDROCHLORIDE 20 MG: 20 INJECTION INTRAMUSCULAR; INTRAVENOUS at 16:06

## 2019-12-31 RX ADMIN — CEFAZOLIN SODIUM 2 G: 2 INJECTION, SOLUTION INTRAVENOUS at 22:12

## 2019-12-31 RX ADMIN — LABETALOL HYDROCHLORIDE 20 MG: 5 INJECTION INTRAVENOUS at 22:07

## 2019-12-31 RX ADMIN — CEFAZOLIN SODIUM 2 G: 2 INJECTION, SOLUTION INTRAVENOUS at 05:04

## 2019-12-31 RX ADMIN — DEXMEDETOMIDINE HYDROCHLORIDE 0.3 MCG/KG/HR: 100 INJECTION, SOLUTION INTRAVENOUS at 05:04

## 2019-12-31 ASSESSMENT — COGNITIVE AND FUNCTIONAL STATUS - GENERAL
STANDING UP FROM CHAIR USING ARMS: TOTAL
MOBILITY SCORE: 6
MOVING TO AND FROM BED TO CHAIR: UNABLE
MOVING FROM LYING ON BACK TO SITTING ON SIDE OF FLAT BED: UNABLE
SUGGESTED CMS G CODE MODIFIER MOBILITY: CN
WALKING IN HOSPITAL ROOM: TOTAL
TURNING FROM BACK TO SIDE WHILE IN FLAT BAD: UNABLE
CLIMB 3 TO 5 STEPS WITH RAILING: TOTAL

## 2019-12-31 NOTE — PROGRESS NOTES
Critical Care Progress Note    Date of admission  12/24/2019    Chief Complaint  Jacinto Loyola, a 49 y.o. male for evaluation and management of the above problem. The history is mostly obtained from healthcare providers and the medical record as this gentleman cannot provide me with any history.  This gentleman has a history of primary hypertension, medical noncompliance, methamphetamine abuse and JAYASHREE.  At approximately 1600 hrs. today he was found down in his yard near his car.  He had left-sided weakness and slurred speech.  EMS was activated.  He complained of a headache en route to the hospital.  On arrival at [facility], he underwent CT imaging which revealed a 2.7 cm right basal ganglia hemorrhage with extension into the right lateral ventricle.  He was emergently seen by Dr. Graff from neurology.  CT imaging of his head and neck did not reveal any evidence of large vessel occlusion, aneurysms or AVMs.  At this point, Dr. Graff does not believe that neurosurgery needs to be involved.  This is likely a hypertensive hemorrhage. Taken from Dr Ledesma note.     Hospital Course    Interval Problem Update  Reviewed last 24 hour events:   - moves RUE/RLE spontaneously, following, posturing on L   - nl WBC   - sinus josy, -130s   - precedex gtt 0.3   - Tm 38.1   - kavin TFs, last BM yesterday   - VD# 5: SBT x 1 hr - RSB 46, minimal secretions   - ancef through today for MSSA PNA     Review of Systems  Review of Systems   Unable to perform ROS: Intubated        Vital Signs for last 24 hours   Pulse:  [54-98] 59  Resp:  [17-56] 20  BP: (119-216)/() 128/87  SpO2:  [96 %-100 %] 99 %    Respiratory Information for the last 24 hours  [place] Vent Mode: APVCMV  Rate (breaths/min): 16  Vt Target (mL): 470  PEEP/CPAP: 8  FiO2: 30  P Support: 5  P MEAN: 10  Length of Weaning Trial (Hours): 1 hour  Control VTE (exp VT): 494    Physical Exam   Physical Exam  Vitals signs and nursing note reviewed.    Constitutional:       Appearance: He is normal weight. He is ill-appearing and diaphoretic.      Interventions: He is intubated.   HENT:      Head: Normocephalic and atraumatic.      Nose: Nose normal.      Comments: Nasal feeding tube in place     Mouth/Throat:      Mouth: Mucous membranes are moist.      Comments: Endotracheal tube in position  Eyes:      General:         Right eye: No discharge.         Left eye: No discharge.      Conjunctiva/sclera: Conjunctivae normal.      Pupils: Pupils are equal, round, and reactive to light.   Neck:      Musculoskeletal: Neck supple. No muscular tenderness.   Cardiovascular:      Rate and Rhythm: Regular rhythm. Bradycardia present. Occasional extrasystoles are present.     Chest Wall: PMI is not displaced.      Pulses: Normal pulses.      Heart sounds: No murmur.   Pulmonary:      Effort: No tachypnea. He is intubated.      Breath sounds: Examination of the right-lower field reveals rales. Examination of the left-lower field reveals rhonchi and rales. Rhonchi and rales present. No decreased breath sounds or wheezing.      Comments: Good lung compliance  Abdominal:      General: Bowel sounds are normal. There is no distension.      Palpations: Abdomen is soft.      Tenderness: There is no guarding.   Genitourinary:     Penis: Normal.       Comments: Villasenor catheter in place  Musculoskeletal:         General: No tenderness or deformity.   Skin:     General: Skin is warm.      Capillary Refill: Capillary refill takes less than 2 seconds.      Coloration: Skin is not jaundiced.   Neurological:      Mental Status: He is alert and easily aroused.      Comments: Decerebrate posturing in the left upper extremity, flaccid in the left lower extremity, purposeful movements in the right side of his body, following commands better today, sticks tongue out to commands   Psychiatric:         Behavior: Behavior is cooperative.      Comments: Unable to assess given current clinical  condition         Medications  Current Facility-Administered Medications   Medication Dose Route Frequency Provider Last Rate Last Dose   • dexmedetomidine (PRECEDEX) 400 mcg/100mL NS premix infusion  0.1-1.5 mcg/kg/hr Intravenous Continuous Jeremy M Gonda, M.D. 8.1 mL/hr at 12/31/19 0504 0.3 mcg/kg/hr at 12/31/19 0504   • haloperidol lactate (HALDOL) injection 2-5 mg  2-5 mg Intravenous Q4HRS PRN Jeremy M Gonda, M.D.       • oxyCODONE immediate-release (ROXICODONE) tablet 5-10 mg  5-10 mg Enteral Tube Q4HRS PRN Jeremy M Gonda, M.D.   10 mg at 12/30/19 1407   • amLODIPine (NORVASC) tablet 5 mg  5 mg Enteral Tube Q DAY Jeremy M Gonda, M.D.   5 mg at 12/31/19 0505   • labetalol (NORMODYNE/TRANDATE) injection 10-20 mg  10-20 mg Intravenous Q4HRS PRN Jeremy M Gonda, M.D.       • fentaNYL (SUBLIMAZE) injection 25 mcg  25 mcg Intravenous Q HOUR PRN Constantin Avina M.D.        Or   • fentaNYL (SUBLIMAZE) injection 50 mcg  50 mcg Intravenous Q HOUR PRN Constantin Avina M.D.        Or   • fentaNYL (SUBLIMAZE) injection 100 mcg  100 mcg Intravenous Q HOUR PRN Constantin Avina M.D.   100 mcg at 12/29/19 2301   • ceFAZolin in dextrose (ANCEF) IVPB premix 2 g  2 g Intravenous Q8HRS Constantin Avina M.D.   Stopped at 12/31/19 0534   • hydrALAZINE (APRESOLINE) injection 10-20 mg  10-20 mg Intravenous Q4HRS PRN Brian Ledesma M.D.   10 mg at 12/29/19 2232   • propofol (DIPRIVAN) injection  0-80 mcg/kg/min Intravenous Continuous Bruce Pete, D.O.   Stopped at 12/30/19 1034   • fentaNYL (SUBLIMAZE) 50 mcg/mL in 50mL (Continuous Infusion)   Intravenous Continuous Bruce Pete, D.O.   Stopped at 12/29/19 0858   • lidocaine (XYLOCAINE) 1 % injection 1-2 mL  1-2 mL Tracheal Tube Q30 MIN PRN Wilberto Ness M.D.       • famotidine (PEPCID) tablet 20 mg  20 mg Enteral Tube BID Constantin Avina M.D.   20 mg at 12/31/19 0505    Or   • famotidine (PEPCID) injection 20 mg  20 mg Intravenous BID Constantin Avina M.D.        • Pharmacy Consult: Enteral tube insertion - review meds/change route/product selection  1 Each Other PHARMACY TO DOSE Constantin Avina M.D.       • senna-docusate (PERICOLACE or SENOKOT S) 8.6-50 MG per tablet 2 Tab  2 Tab Enteral Tube BID Constantin Avina M.D.   2 Tab at 12/31/19 0504    And   • polyethylene glycol/lytes (MIRALAX) PACKET 1 Packet  1 Packet Enteral Tube QDAY PRN Constantin Avina M.D.        And   • magnesium hydroxide (MILK OF MAGNESIA) suspension 30 mL  30 mL Enteral Tube QDAY PRN Constantin Avina M.D.        And   • bisacodyl (DULCOLAX) suppository 10 mg  10 mg Rectal QDAY PRN Constantin Avina M.D.       • acetaminophen (TYLENOL) tablet 650 mg  650 mg Enteral Tube Q4HRS PRN Constantin Avina M.D.   650 mg at 12/30/19 1937    Or   • acetaminophen (TYLENOL) suppository 650 mg  650 mg Rectal Q4HRS PRN Constantin Avina M.D.   650 mg at 12/26/19 2341   • ondansetron (ZOFRAN ODT) dispertab 4 mg  4 mg Enteral Tube Q4HRS PRN Constantin Avina M.D.        Or   • ondansetron (ZOFRAN) syringe/vial injection 4 mg  4 mg Intravenous Q4HRS PRN Constantin Avina M.D.       • MD Alert...ICU Electrolyte Replacement per Pharmacy   Other PHARMACY TO DOSE Wilberto Ness M.D.       • Respiratory Care per Protocol   Nebulization Continuous RT Wilberto Ness M.D.           Fluids    Intake/Output Summary (Last 24 hours) at 12/31/2019 0714  Last data filed at 12/31/2019 0600  Gross per 24 hour   Intake 2585.1 ml   Output 1860 ml   Net 725.1 ml       Laboratory  Recent Labs     12/29/19  0347 12/30/19  0536 12/31/19  0331   ISTATAPH 7.404 7.427 7.421   ISTATAPCO2 37.2* 36.8 39.3*   ISTATAPO2 91* 95* 90*   ISTATATCO2 24 25 27   [id number] 97 98 97   ISTATARTHCO3 23.3 24.3 25.5*   ISTATARTBE -1 0 1   ISTATTEMP 100.0 F 99.3 F 99.5 F   ISTATFIO2 30 .30 30   ISTATSPEC Arterial Arterial Arterial   ISTATAPHTC 7.393* 7.422 7.414   [id number] 95* 97* 93*         Recent Labs     12/29/19  0345  12/30/19 0405 12/31/19 0410   SODIUM 142 140 140   POTASSIUM 3.8 3.6 4.4   CHLORIDE 113* 108 109   CO2 25 26 25   BUN 34* 24* 28*   CREATININE 0.93 0.80 0.75   MAGNESIUM 2.0 1.9 2.1   PHOSPHORUS 1.9* 2.5 2.7   CALCIUM 8.0* 8.3* 8.3*     Recent Labs     12/29/19 0345 12/30/19 0405 12/31/19  0410   PREALBUMIN  --  13.0*  --    GLUCOSE 113* 113* 129*     Recent Labs     12/29/19 0345 12/30/19 0405 12/31/19 0410   WBC 10.3 9.7 7.8   NEUTSPOLYS 66.80 66.90 63.80   LYMPHOCYTES 19.80* 19.10* 21.60*   MONOCYTES 11.20 12.00 11.30   EOSINOPHILS 1.40 1.30 2.30   BASOPHILS 0.30 0.30 0.40     Recent Labs     12/29/19 0345 12/30/19 0405 12/31/19 0410   RBC 4.14* 4.52* 4.52*   HEMOGLOBIN 12.3* 13.2* 13.2*   HEMATOCRIT 37.6* 40.9* 40.8*   PLATELETCT 186 223 225       Imaging  X-Ray:  I have personally reviewed the images and compared with prior images. and My impression is: Improving edema pattern with enlarged cardiac silhouette, endotracheal tube and gastric tube in good position    Assessment/Plan  * Intraparenchymal hemorrhage of brain (HCC)- (present on admission)  Assessment & Plan  Secondary to uncontrolled hypertension  Neurology consulted and signed off  Avoid anticoagulants, SCDs only  I will continue strict blood pressure control with goal SBP less than 160  PT/OT/SLP  Continue to limit sedatives with close neurologic monitoring    Acute respiratory failure with hypoxia and hypercapnia (HCC)- (present on admission)  Assessment & Plan  Intubated date: 12/26 for aspiration event and airway protection  Continue full mechanical ventilatory support, titrate FiO2 to goal SaO2 greater than 92%  RT/O2 protocol  Limit sedatives (Precedex drip) with daily sedation vacation and spontaneous breathing trial  Hopeful extubation in the next 12 to 24 hours  Early mobilization  Aspiration precautions    Aspiration into airway  Assessment & Plan  MSSA pneumonia  Continue Ancef x5 days (completes today)  Aspiration  precautions    Febrile  Assessment & Plan  Multifactorial -improving slowly  Continue PRN antipyretics  Continue antibiotics through today    Methamphetamine abuse (HCC)- (present on admission)  Assessment & Plan  Cessation counseling when clinically appropriate    JAYASHREE (obstructive sleep apnea)- (present on admission)  Assessment & Plan  Likely relate to ICH patient with significant obstructive on exam -> early trach if fails extubation trial  Head of bed at 30-45 degrees   Unable to use Bipap with mental status and stroke  Will need outpatient sleep study     Hypertensive emergency- (present on admission)  Assessment & Plan  Continue scheduled amlodipine  PRN hydralazine and labetalol for goal SBP less than 160    Hypomagnesemia- (present on admission)  Assessment & Plan  Repleted    Hypokalemia- (present on admission)  Assessment & Plan  Repleted       VTE:  Contraindicated, SCDs only  Ulcer: H2 Antagonist  Lines: Villasenor Catheter  Ongoing indication addressed    I have performed a physical exam and reviewed and updated ROS and Plan today (12/31/2019). In review of yesterday's note (12/30/2019), there are no changes except as documented above.     Patient is critically ill today requiring active management of his mechanical ventilatory support as well as strict blood pressure control. High risk of deterioration and worsening vital organ dysfunction and death without the above critical care interventions.    Discussed patient condition and risk of morbidity and/or mortality with RN, RT, Pharmacy, , [employer] resident, Charge nurse / hot rounds and Patient Critical care time = 38 minutes in directly providing and coordinating critical care and extensive data review.  No time overlap and excludes procedures.

## 2019-12-31 NOTE — PROGRESS NOTES
[facility] Progress Note      Admit Date: 12/24/2019    Resident(s): Kat Garcia M.D.   Attending:  ABNER MONTALVO/ Dr. Gonda    Patient ID:    Name:  Jacinto Loyola     YOB: 1970  Age:  49 y.o.  male   MRN:  [id number]    Hospital Course (carried forward and updated):  Jacinto Loyola is a 49 y.o. male with past medical history of hypertension, JAYASHREE, medications noncompliance, and meth abuse was brought in via EMS after he was found down at outside of his home at around 1600 on 12/24/2019.  As per chart review, patient parked his car, took a few steps and collapsed down on his front yard. Down for approximately 30 minutes before being found by friend.  Patient noted for left lower and upper extremity weakness as well as left facial weakness/numbness.      In the ED, blood pressure in the 190s/110-130s, heart rate in the 70s, patient saturating above 90% on 2 liters nasal cannula.  No leukocytosis or anemia on CBC.  Chemistry, troponin, INR and diagnostic alcohol normal.  Checks x-ray without acute cardiopulmonary process.  Head CT showed a 2.7 cm acute right basal ganglia/thalamic hemorrhage extravasating into the right lateral ventricle along with small right-sided mass-effect with minimal right-to-left midline shift.  No hydrocephalus. This was confirmed on Head CTA. Neurology was consulted by EDP.  He was given a one-time dose of labetalol and was started on a Cardene drip.  As per neurology goal MAP of .  No indication for neurosurgical involvement at this time.  Patient was admitted to ICU for intraparenchymal hemorrhage management and hypertensive crisis control.         Consultants:  Critical Care  Neurology     Interval Events:  EEG yesterday without evidence of seizure  BP at goal after starting amlodipine, Neuro rec SBP ~130  Last day of abx today for aspiration PNA  Vent day 5. SBT this am. Extubate.   BUE edema and mild pulm edema on XR, bedside US w mildly dilated  IVC.  Mobilize  DC fentanyl, propofol, haldol  LBM 12/30  PERRL. Moves right side spontaneously.       Review of Systems   Unable to perform ROS: Intubated       PHYSICAL EXAM:  Vitals:    12/31/19 0434 12/31/19 0500 12/31/19 0600 12/31/19 0641   BP:  131/90 128/87    Pulse: (!) 54 (!) 56 (!) 57 (!) 59   Resp:  19 20    Temp:       TempSrc:       SpO2: 96% 96% 96% 99%   Weight:       Height:        Body mass index is 29.54 kg/m².  Latest Vitals:  /87   Pulse (!) 59   Temp (!) 38.9 °C (102 °F) (Temporal)   Resp 20   Ht 1.829 m (6')   Wt 98.8 kg (217 lb 13 oz)   SpO2 99%   BMI 29.54 kg/m²   O2 therapy: Pulse Oximetry: 99 %, O2 Delivery: Ventilator  Vitals Range last 24h:  Pulse:  [54-98] 59  Resp:  [17-56] 20  BP: (119-216)/() 128/87  SpO2:  [96 %-100 %] 99 %       Intake/Output Summary (Last 24 hours) at 12/31/2019 0735  Last data filed at 12/31/2019 0600  Gross per 24 hour   Intake 2585.1 ml   Output 1860 ml   Net 725.1 ml         Physical Exam   Constitutional: He is well-developed, well-nourished, and in no distress. He is intubated.   HENT:   Head: Normocephalic and atraumatic.   Eyes: Pupils are equal, round, and reactive to light. No scleral icterus.   Neck: Normal range of motion. Neck supple.   Cardiovascular: Normal rate and regular rhythm.   Pulmonary/Chest: He is intubated. He has rhonchi. He has rales.   Abdominal: Soft. He exhibits no distension. There is no tenderness.   Musculoskeletal:         General: Edema (BUE) present.   Neurological:   Arouses to auditory stimuli. Purposeful movements on right side only.   Skin: Skin is warm and dry.       Recent Labs     12/29/19  0347 12/30/19  0536 12/31/19  0331   ISTATAPH 7.404 7.427 7.421   ISTATAPCO2 37.2* 36.8 39.3*   ISTATAPO2 91* 95* 90*   ISTATATCO2 24 25 27   LGBDPJD4HNE 97 98 97   ISTATARTHCO3 23.3 24.3 25.5*   ISTATARTBE -1 0 1   ISTATTEMP 100.0 F 99.3 F 99.5 F   ISTATFIO2 30 .30 30   ISTATSPEC Arterial Arterial Arterial    ISTATAPHTC 7.393* 7.422 7.414   URFOLIUZ1PM 95* 97* 93*     Recent Labs     12/29/19 0345 12/30/19 0405 12/31/19  0410   SODIUM 142 140 140   POTASSIUM 3.8 3.6 4.4   CHLORIDE 113* 108 109   CO2 25 26 25   BUN 34* 24* 28*   CREATININE 0.93 0.80 0.75   MAGNESIUM 2.0 1.9 2.1   PHOSPHORUS 1.9* 2.5 2.7   CALCIUM 8.0* 8.3* 8.3*     Recent Labs     12/29/19 0345 12/30/19  0405 12/31/19  0410   PREALBUMIN  --  13.0*  --    GLUCOSE 113* 113* 129*     Recent Labs     12/29/19 0345 12/30/19 0405 12/31/19 0410   RBC 4.14* 4.52* 4.52*   HEMOGLOBIN 12.3* 13.2* 13.2*   HEMATOCRIT 37.6* 40.9* 40.8*   PLATELETCT 186 223 225     Recent Labs     12/29/19 0345 12/30/19 0405 12/31/19 0410   WBC 10.3 9.7 7.8   NEUTSPOLYS 66.80 66.90 63.80   LYMPHOCYTES 19.80* 19.10* 21.60*   MONOCYTES 11.20 12.00 11.30   EOSINOPHILS 1.40 1.30 2.30   BASOPHILS 0.30 0.30 0.40       Meds:  • dexmedetomidine (PRECEDEX) infusion  0.1-1.5 mcg/kg/hr 0.3 mcg/kg/hr (12/31/19 5854)   • haloperidol lactate  2-5 mg     • oxyCODONE immediate-release  5-10 mg     • amLODIPine  5 mg     • labetalol  10-20 mg     • fentaNYL  25 mcg      Or   • fentaNYL  50 mcg      Or   • fentaNYL  100 mcg     • ceFAZolin  2 g Stopped (12/31/19 6881)   • hydrALAZINE  10-20 mg     • propofol  0-80 mcg/kg/min Stopped (12/30/19 3404)   • fentaNYL   Stopped (12/29/19 4708)   • lidocaine  1-2 mL     • famotidine  20 mg      Or   • famotidine  20 mg     • Pharmacy  1 Each     • senna-docusate  2 Tab      And   • polyethylene glycol/lytes  1 Packet      And   • magnesium hydroxide  30 mL      And   • bisacodyl  10 mg     • acetaminophen  650 mg      Or   • acetaminophen  650 mg     • ondansetron  4 mg      Or   • ondansetron  4 mg     • MD Alert...Adult ICU Electrolyte Replacement per Pharmacy       • Respiratory Care per Protocol            Procedures:  12/28/19 Bronchoscopy with Therapeutic suctioning and BAL  12/27/19 Endotracheal intubation  12/27/19 Bronchoscopy with  bronchoalveolar lavage and therapeutic aspiration of secretions    Imaging:  DX-CHEST-PORTABLE (1 VIEW)   Final Result      Number no change      DX-CHEST-PORTABLE (1 VIEW)   Final Result      1.  No significant change in right basilar opacification.      2.  Left basilar opacification has improved.      DX-CHEST-PORTABLE (1 VIEW)   Final Result         1. No significant interval change.      CT-HEAD W/O   Final Result      Stable right colonic intraparenchymal hematoma with intraventricular extension.      DX-CHEST-PORTABLE (1 VIEW)   Final Result         1. No significant interval change.      DX-CHEST-PORTABLE (1 VIEW)   Final Result      Interval intubation with decreasing lung volumes and increasing perihilar, basilar opacity worrisome for aspiration, pneumonia over edema      DX-CHEST-PORTABLE (1 VIEW)   Final Result      Increasing infrahilar opacity is worrisome for aspiration      DX-ABDOMEN FOR TUBE PLACEMENT   Final Result      Enteric tube terminates over the stomach.      DX-ABDOMEN FOR TUBE PLACEMENT   Final Result      Enteric tube tip projects over the distal stomach.      DX-ABDOMEN FOR TUBE PLACEMENT   Final Result      Enteric tube is coiled in the neck.      CT-HEAD W/O   Final Result      Considerable motion and misregistration artifact on the images.   3.5 cm acute right thalamic/right basal ganglia hemorrhage slightly larger than previous.   Extravasation of hemorrhage into the right lateral ventricle and dependent hemorrhage within the left lateral ventricle.   Right-sided mass effect with 2.7 mm right to left midline shift.         DX-ABDOMEN FOR TUBE PLACEMENT   Final Result      Enteric tube tip projects over the stomach.      EC-ECHOCARDIOGRAM COMPLETE W/O CONT   Final Result      CT-HEAD W/O   Final Result      Stable to slight interval enlargement of right thalamic intra-axial hematoma which extravasates into the lateral ventricle and results in unchanged trace leftward midline shift       Mild worsening vasogenic edema but there is no hydrocephalus or herniation      Motion degraded      DX-CHEST-PORTABLE (1 VIEW)   Final Result      No evidence of acute cardiopulmonary process.      CT-CTA NECK WITH & W/O-POST PROCESSING   Final Result      CT angiogram of the neck within normal limits. No large vessel occlusion or stenosis. No dissection.      CT-CTA HEAD WITH & W/O-POST PROCESS   Final Result      1.  No evidence of intracranial vascular occlusion or aneurysm.      2.  Large right basal ganglial intraparenchymal hemorrhage with extension into the right lateral ventricle again noted.      CT-HEAD W/O   Final Result      2.7 cm acute right basal ganglia/thalamic hemorrhage extravasating into the right lateral ventricle.   No hydrocephalus.   Mild right-sided mass effect with minimal right-to-left midline shift.   Central white matter low attenuation consistent with microvascular ischemic changes.          Problem and Plan:  * Intraparenchymal hemorrhage of brain (HCC)- (present on admission)  Assessment & Plan  History of blood pressure medication noncompliance  History of methamphetamine abuse  Presented with left upper extremity, left lower extremity, and left facial weakness as well as slurred speech  Head CT showed a 2.7 cm acute right basal ganglia/thalamic hemorrhage extravasating into the right lateral ventricle along with small right-sided mass-effect with minimal right-to-left midline shift.  No hydrocephalus  Head CTA showed above  Head/neck CT without vessel occlusions  2/2 uncontrolled hypertension   Received a total of 20 mg of Labetalol push and was started on a Nicardipine drip  NPO  Continue OG  Keep head of bed 30 degrees  Continue RT  Continue amlodipine to MAP , outpatient SBP ~130  Neurology following, appreciate recommendations    Acute respiratory failure with hypoxia and hypercapnia (HCC)- (present on admission)  Assessment & Plan  Intubated 12/26. Clinically mildly  hypervolemic.  Extubate today  Diuresis today  Treat PNA as noted  RT per protocol    Aspiration into airway- (present on admission)  Assessment & Plan  MSSA pneumonia  Complete cefazolin 5 day course  Aspiration precautions  O2/RT    Methamphetamine abuse (HCC)- (present on admission)  Assessment & Plan  HTN emergency/ICH  Counseling, future  Consult SW    JAYASHREE (obstructive sleep apnea)- (present on admission)  Assessment & Plan  Not on CPAP  Continue O2 supplementation    Hypertensive emergency- (present on admission)  Assessment & Plan  Presented with blood pressure in the 190s/110-130s  Intraparenchymal hemorrhage, no BEVERLY on labs or EKG changes/trops elevation  Discontinued nicardipine drip (Goal MAP )  UDS+ for amphetamines   Continue amlodipine    DISPO: continue ICU    CODE STATUS: Full code    Quality Measures:  Feeding: NPO, TF  Analgesia: acetaminophen, prn fentanyl  Sedation: precedex  Thromboprophylaxis: SCDs, pharmacologic ppx held for bleeding  Head of bed: >30 degrees  Ulcer prophylaxis: famotidine  Glycemic control: n/a  Bowel care: bowel regimen  Indwelling lines: ETT, OG, [place]  Deescalation of antibiotics: continue cefazolin      Kat Garcia M.D.

## 2019-12-31 NOTE — CARE PLAN
Problem: Skin Integrity  Goal: Risk for impaired skin integrity will decrease  Intervention: Assess risk factors for impaired skin integrity and/or pressure ulcers  Note:   Q 2 turns in place, skin assessed q 2, padding under medical devices.      Problem: Respiratory:  Goal: Respiratory status will improve  Note:   Patient following some commands at this time, plan to extubate today per Dr. Gonda.

## 2019-12-31 NOTE — CARE PLAN
Problem: Safety  Goal: Will remain free from falls  Outcome: PROGRESSING AS EXPECTED     Problem: Infection  Goal: Will remain free from infection  Outcome: PROGRESSING AS EXPECTED     Problem: Venous Thromboembolism (VTW)/Deep Vein Thrombosis (DVT) Prevention:  Goal: Patient will participate in Venous Thrombosis (VTE)/Deep Vein Thrombosis (DVT)Prevention Measures  Outcome: PROGRESSING AS EXPECTED     Problem: Bowel/Gastric:  Goal: Will not experience complications related to bowel motility  Outcome: PROGRESSING AS EXPECTED     Problem: Communication  Goal: The ability to communicate needs accurately and effectively will improve  Outcome: PROGRESSING SLOWER THAN EXPECTED

## 2019-12-31 NOTE — RESPIRATORY CARE
Extubation    Cuff leak noted yes  Stridor present no  Patient toleration pt tolerated well  Events/Summary/Plan: extubated per MD order, placed pt on 3L NC, pt tolerated well (12/31/19 1220)

## 2019-12-31 NOTE — ASSESSMENT & PLAN NOTE
- Resolved.  - Patient was Intubated 12/26- 12/31/2019 (for aspiration and airway protection)  - Extubate successfully 12/31/2019  - Was treated with Abxs for MSSA/ PNA (Ancef x5 days)  - PT/OT/SLP  - Aspiration precautions.  - Diet advanced to Dysphagia II /NTL  - Encourage more fluid intake between meals.  - 1-1 feeding, HOB at 90 degrees  - Oral care daily 2-3 times a day  - No need of Oxygen  - O2 sats WNL on RA  - DC SNF

## 2020-01-01 ENCOUNTER — APPOINTMENT (OUTPATIENT)
Dept: RADIOLOGY | Facility: MEDICAL CENTER | Age: 50
DRG: 064 | End: 2020-01-01
Attending: STUDENT IN AN ORGANIZED HEALTH CARE EDUCATION/TRAINING PROGRAM
Payer: MEDICAID

## 2020-01-01 LAB
ANION GAP SERPL CALC-SCNC: 7 MMOL/L (ref 0–11.9)
BACTERIA BLD CULT: NORMAL
BACTERIA BLD CULT: NORMAL
BASOPHILS # BLD AUTO: 0.4 % (ref 0–1.8)
BASOPHILS # BLD: 0.05 K/UL (ref 0–0.12)
BUN SERPL-MCNC: 32 MG/DL (ref 8–22)
CALCIUM SERPL-MCNC: 9.5 MG/DL (ref 8.5–10.5)
CHLORIDE SERPL-SCNC: 102 MMOL/L (ref 96–112)
CO2 SERPL-SCNC: 27 MMOL/L (ref 20–33)
CREAT SERPL-MCNC: 0.84 MG/DL (ref 0.5–1.4)
EOSINOPHIL # BLD AUTO: 0.04 K/UL (ref 0–0.51)
EOSINOPHIL NFR BLD: 0.3 % (ref 0–6.9)
ERYTHROCYTE [DISTWIDTH] IN BLOOD BY AUTOMATED COUNT: 40.6 FL (ref 35.9–50)
GLUCOSE SERPL-MCNC: 133 MG/DL (ref 65–99)
HCT VFR BLD AUTO: 49.8 % (ref 42–52)
HGB BLD-MCNC: 16.4 G/DL (ref 14–18)
IMM GRANULOCYTES # BLD AUTO: 0.33 K/UL (ref 0–0.11)
IMM GRANULOCYTES NFR BLD AUTO: 2.4 % (ref 0–0.9)
LYMPHOCYTES # BLD AUTO: 1.53 K/UL (ref 1–4.8)
LYMPHOCYTES NFR BLD: 11 % (ref 22–41)
MCH RBC QN AUTO: 29.3 PG (ref 27–33)
MCHC RBC AUTO-ENTMCNC: 33.8 G/DL (ref 33.7–35.3)
MCV RBC AUTO: 86.7 FL (ref 81.4–97.8)
MONOCYTES # BLD AUTO: 1.57 K/UL (ref 0–0.85)
MONOCYTES NFR BLD AUTO: 11.2 % (ref 0–13.4)
NEUTROPHILS # BLD AUTO: 10.44 K/UL (ref 1.82–7.42)
NEUTROPHILS NFR BLD: 74.7 % (ref 44–72)
NRBC # BLD AUTO: 0 K/UL
NRBC BLD-RTO: 0 /100 WBC
PLATELET # BLD AUTO: 300 K/UL (ref 164–446)
PMV BLD AUTO: 9.6 FL (ref 9–12.9)
POTASSIUM SERPL-SCNC: 3.8 MMOL/L (ref 3.6–5.5)
RBC # BLD AUTO: 5.63 M/UL (ref 4.7–6.1)
SIGNIFICANT IND 70042: NORMAL
SIGNIFICANT IND 70042: NORMAL
SITE SITE: NORMAL
SITE SITE: NORMAL
SODIUM SERPL-SCNC: 136 MMOL/L (ref 135–145)
SOURCE SOURCE: NORMAL
SOURCE SOURCE: NORMAL
WBC # BLD AUTO: 14 K/UL (ref 4.8–10.8)

## 2020-01-01 PROCEDURE — 700102 HCHG RX REV CODE 250 W/ 637 OVERRIDE(OP): Performed by: INTERNAL MEDICINE

## 2020-01-01 PROCEDURE — 700111 HCHG RX REV CODE 636 W/ 250 OVERRIDE (IP): Performed by: INTERNAL MEDICINE

## 2020-01-01 PROCEDURE — 97162 PT EVAL MOD COMPLEX 30 MIN: CPT

## 2020-01-01 PROCEDURE — A9270 NON-COVERED ITEM OR SERVICE: HCPCS | Performed by: STUDENT IN AN ORGANIZED HEALTH CARE EDUCATION/TRAINING PROGRAM

## 2020-01-01 PROCEDURE — 97167 OT EVAL HIGH COMPLEX 60 MIN: CPT

## 2020-01-01 PROCEDURE — A9270 NON-COVERED ITEM OR SERVICE: HCPCS | Performed by: INTERNAL MEDICINE

## 2020-01-01 PROCEDURE — 700101 HCHG RX REV CODE 250: Performed by: INTERNAL MEDICINE

## 2020-01-01 PROCEDURE — 71045 X-RAY EXAM CHEST 1 VIEW: CPT

## 2020-01-01 PROCEDURE — 85025 COMPLETE CBC W/AUTO DIFF WBC: CPT

## 2020-01-01 PROCEDURE — 700102 HCHG RX REV CODE 250 W/ 637 OVERRIDE(OP): Performed by: STUDENT IN AN ORGANIZED HEALTH CARE EDUCATION/TRAINING PROGRAM

## 2020-01-01 PROCEDURE — 99291 CRITICAL CARE FIRST HOUR: CPT | Performed by: INTERNAL MEDICINE

## 2020-01-01 PROCEDURE — 80048 BASIC METABOLIC PNL TOTAL CA: CPT

## 2020-01-01 PROCEDURE — 770022 HCHG ROOM/CARE - ICU (200)

## 2020-01-01 RX ORDER — CLONIDINE HYDROCHLORIDE 0.1 MG/1
0.1 TABLET ORAL TWICE DAILY
Status: DISCONTINUED | OUTPATIENT
Start: 2020-01-01 | End: 2020-01-02

## 2020-01-01 RX ORDER — RISPERIDONE 1 MG/1
0.5 TABLET ORAL 2 TIMES DAILY
Status: DISCONTINUED | OUTPATIENT
Start: 2020-01-01 | End: 2020-01-04

## 2020-01-01 RX ORDER — ENALAPRIL MALEATE 10 MG/1
5 TABLET ORAL
Status: DISCONTINUED | OUTPATIENT
Start: 2020-01-02 | End: 2020-01-02

## 2020-01-01 RX ORDER — ENALAPRIL MALEATE 2.5 MG/1
2.5 TABLET ORAL ONCE
Status: COMPLETED | OUTPATIENT
Start: 2020-01-01 | End: 2020-01-01

## 2020-01-01 RX ADMIN — LABETALOL HYDROCHLORIDE 15 MG: 5 INJECTION INTRAVENOUS at 08:03

## 2020-01-01 RX ADMIN — LABETALOL HYDROCHLORIDE 20 MG: 5 INJECTION INTRAVENOUS at 22:39

## 2020-01-01 RX ADMIN — ENALAPRIL MALEATE 2.5 MG: 2.5 TABLET ORAL at 10:45

## 2020-01-01 RX ADMIN — HYDRALAZINE HYDROCHLORIDE 20 MG: 20 INJECTION INTRAMUSCULAR; INTRAVENOUS at 17:50

## 2020-01-01 RX ADMIN — RISPERIDONE 0.5 MG: 1 TABLET ORAL at 10:50

## 2020-01-01 RX ADMIN — DEXMEDETOMIDINE HYDROCHLORIDE 0.1 MCG/KG/HR: 100 INJECTION, SOLUTION INTRAVENOUS at 15:53

## 2020-01-01 RX ADMIN — CLONIDINE HYDROCHLORIDE 0.1 MG: 0.1 TABLET ORAL at 17:52

## 2020-01-01 RX ADMIN — HYDRALAZINE HYDROCHLORIDE 20 MG: 20 INJECTION INTRAMUSCULAR; INTRAVENOUS at 09:05

## 2020-01-01 RX ADMIN — ENALAPRIL MALEATE 2.5 MG: 2.5 TABLET ORAL at 05:25

## 2020-01-01 RX ADMIN — CLONIDINE HYDROCHLORIDE 0.1 MG: 0.1 TABLET ORAL at 10:50

## 2020-01-01 RX ADMIN — AMLODIPINE BESYLATE 10 MG: 10 TABLET ORAL at 05:13

## 2020-01-01 RX ADMIN — RISPERIDONE 0.5 MG: 1 TABLET ORAL at 17:52

## 2020-01-01 RX ADMIN — FAMOTIDINE 20 MG: 20 TABLET ORAL at 05:13

## 2020-01-01 RX ADMIN — SENNOSIDES AND DOCUSATE SODIUM 2 TABLET: 8.6; 5 TABLET ORAL at 05:13

## 2020-01-01 RX ADMIN — SENNOSIDES AND DOCUSATE SODIUM 2 TABLET: 8.6; 5 TABLET ORAL at 17:53

## 2020-01-01 RX ADMIN — LABETALOL HYDROCHLORIDE 20 MG: 5 INJECTION INTRAVENOUS at 16:45

## 2020-01-01 RX ADMIN — POTASSIUM BICARBONATE 25 MEQ: 978 TABLET, EFFERVESCENT ORAL at 07:08

## 2020-01-01 RX ADMIN — DEXMEDETOMIDINE HYDROCHLORIDE 0.4 MCG/KG/HR: 100 INJECTION, SOLUTION INTRAVENOUS at 01:24

## 2020-01-01 RX ADMIN — HYDRALAZINE HYDROCHLORIDE 20 MG: 20 INJECTION INTRAMUSCULAR; INTRAVENOUS at 02:11

## 2020-01-01 RX ADMIN — LABETALOL HYDROCHLORIDE 20 MG: 5 INJECTION INTRAVENOUS at 02:09

## 2020-01-01 ASSESSMENT — COGNITIVE AND FUNCTIONAL STATUS - GENERAL
TOILETING: TOTAL
STANDING UP FROM CHAIR USING ARMS: TOTAL
DRESSING REGULAR UPPER BODY CLOTHING: TOTAL
MOBILITY SCORE: 6
PERSONAL GROOMING: TOTAL
CLIMB 3 TO 5 STEPS WITH RAILING: TOTAL
DRESSING REGULAR LOWER BODY CLOTHING: TOTAL
SUGGESTED CMS G CODE MODIFIER DAILY ACTIVITY: CN
DAILY ACTIVITIY SCORE: 6
EATING MEALS: TOTAL
SUGGESTED CMS G CODE MODIFIER MOBILITY: CN
MOVING TO AND FROM BED TO CHAIR: UNABLE
TURNING FROM BACK TO SIDE WHILE IN FLAT BAD: UNABLE
WALKING IN HOSPITAL ROOM: TOTAL
MOVING FROM LYING ON BACK TO SITTING ON SIDE OF FLAT BED: UNABLE
HELP NEEDED FOR BATHING: TOTAL

## 2020-01-01 ASSESSMENT — ACTIVITIES OF DAILY LIVING (ADL): TOILETING: INDEPENDENT

## 2020-01-01 ASSESSMENT — GAIT ASSESSMENTS: GAIT LEVEL OF ASSIST: UNABLE TO PARTICIPATE

## 2020-01-01 NOTE — THERAPY
SPEECH PATHOLOGY:  Received order for swallow evaluation.  Patient extubated 12/31.  Attempted swallow evaluation this morning, but patient very lethargic and not able to sustain arousal for swallow eval.  Has alex.  Will complete eval as patient is able.

## 2020-01-01 NOTE — CARE PLAN
Started on ng  risperidol for agitation/restlessness,trying to wean precedex back down/off as disc in am rounds

## 2020-01-01 NOTE — PROGRESS NOTES
Critical Care Progress Note    Date of admission  12/24/2019    Chief Complaint  Jacinto Loyola, a 49 y.o. male for evaluation and management of the above problem. The history is mostly obtained from healthcare providers and the medical record as this gentleman cannot provide me with any history.  This gentleman has a history of primary hypertension, medical noncompliance, methamphetamine abuse and JAYASHREE.  At approximately 1600 hrs. today he was found down in his yard near his car.  He had left-sided weakness and slurred speech.  EMS was activated.  He complained of a headache en route to the hospital.  On arrival at [facility], he underwent CT imaging which revealed a 2.7 cm right basal ganglia hemorrhage with extension into the right lateral ventricle.  He was emergently seen by Dr. Graff from neurology.  CT imaging of his head and neck did not reveal any evidence of large vessel occlusion, aneurysms or AVMs.  At this point, Dr. Graff does not believe that neurosurgery needs to be involved.  This is likely a hypertensive hemorrhage. Taken from Dr Ledesma note.     Hospital Course  - Extubated 12/31    Interval Problem Update  Reviewed last 24 hour events:   - extubated without complications   - remains agitated and confused requiring restraints and precedex gtt titration   - AF, increased WBC   - remains HTN requiring prn labetalol and hydralazine   - Hgb up to 16 from 13   - restless on R side and follows, dysphasia (says hello)   - low K   - increase BUN/crt slightly but still WNL   - posturing in LUE, LLE stiff   - completed abx yesterday     Review of Systems  Review of Systems   Unable to perform ROS: Patient nonverbal        Vital Signs for last 24 hours   Temp:  [36.9 °C (98.5 °F)] 36.9 °C (98.5 °F)  Pulse:  [] 68  Resp:  [15-45] 25  BP: (124-213)/() 183/117  SpO2:  [92 %-100 %] 99 %    Respiratory Information for the last 24 hours  [place] Vent Mode: APVCMV  Rate (breaths/min): 16  Vt  Target (mL): 470  PEEP/CPAP: 8  FiO2: 30  P MEAN: 10  Control VTE (exp VT): 22 --> extubated 12/31 to 2 lpm n/c, encourage IS    Physical Exam   Physical Exam  Vitals signs and nursing note reviewed.   Constitutional:       General: He is not in acute distress.     Appearance: He is normal weight. He is ill-appearing. He is not diaphoretic.   HENT:      Head: Normocephalic and atraumatic.      Right Ear: External ear normal.      Left Ear: External ear normal.      Nose: No congestion.      Comments: Nasal feeding tube in place     Mouth/Throat:      Mouth: Mucous membranes are moist.      Pharynx: Oropharynx is clear.   Eyes:      General: No scleral icterus.     Conjunctiva/sclera: Conjunctivae normal.      Pupils: Pupils are equal, round, and reactive to light.   Neck:      Musculoskeletal: Neck supple. No neck rigidity.   Cardiovascular:      Rate and Rhythm: Normal rate and regular rhythm. Occasional extrasystoles are present.     Chest Wall: PMI is not displaced.      Pulses: Normal pulses.      Heart sounds: Heart sounds not distant. No murmur.      Comments: Remains hypertensive  Pulmonary:      Effort: Pulmonary effort is normal. No tachypnea or respiratory distress.      Breath sounds: No stridor. Examination of the right-lower field reveals rhonchi and rales. Examination of the left-lower field reveals rhonchi and rales. Rhonchi and rales present. No decreased breath sounds.      Comments: Strong cough  Abdominal:      General: Bowel sounds are normal. There is no distension.      Palpations: Abdomen is soft.      Tenderness: There is no tenderness.   Genitourinary:     Comments: Villasenor catheter in place  Musculoskeletal:         General: No tenderness.      Right lower leg: No edema.      Left lower leg: No edema.   Skin:     General: Skin is warm and dry.      Capillary Refill: Capillary refill takes less than 2 seconds.      Findings: No rash.   Neurological:      Mental Status: He is easily aroused.       Comments: Follows commands on right side of body, posturing left upper extremity, withdraws in left lower extremity, mumbles and confused   Psychiatric:      Comments: Unable to assess given current clinical condition         Medications  Current Facility-Administered Medications   Medication Dose Route Frequency Provider Last Rate Last Dose   • amLODIPine (NORVASC) tablet 10 mg  10 mg Enteral Tube Q DAY Jeremy M Gonda, M.D.   10 mg at 01/01/20 0513   • enalapril (VASOTEC) tablet 2.5 mg  2.5 mg Enteral Tube Q DAY Jeremy M Gonda, M.D.   2.5 mg at 01/01/20 0525   • dexmedetomidine (PRECEDEX) 400 mcg/100mL NS premix infusion  0.1-1.5 mcg/kg/hr Intravenous Continuous Jeremy M Gonda, M.D. 5.4 mL/hr at 01/01/20 0800 0.2 mcg/kg/hr at 01/01/20 0800   • oxyCODONE immediate-release (ROXICODONE) tablet 5-10 mg  5-10 mg Enteral Tube Q4HRS PRN Jeremy M Gonda, M.D.   10 mg at 12/30/19 1407   • labetalol (NORMODYNE/TRANDATE) injection 10-20 mg  10-20 mg Intravenous Q4HRS PRN Jeremy M Gonda, M.D.   15 mg at 01/01/20 0803   • fentaNYL (SUBLIMAZE) injection 25 mcg  25 mcg Intravenous Q HOUR PRN Constatnin Avina M.D.        Or   • fentaNYL (SUBLIMAZE) injection 50 mcg  50 mcg Intravenous Q HOUR PRN Constantin Avina M.D.        Or   • fentaNYL (SUBLIMAZE) injection 100 mcg  100 mcg Intravenous Q HOUR PRN Constantin Avina M.D.   100 mcg at 12/29/19 2301   • hydrALAZINE (APRESOLINE) injection 10-20 mg  10-20 mg Intravenous Q4HRS PRN Brian Ledesma M.D.   20 mg at 01/01/20 0211   • lidocaine (XYLOCAINE) 1 % injection 1-2 mL  1-2 mL Tracheal Tube Q30 MIN PRN Wilberto Ness M.D.       • famotidine (PEPCID) tablet 20 mg  20 mg Enteral Tube BID Constantin Avina M.D.   20 mg at 01/01/20 0513    Or   • famotidine (PEPCID) injection 20 mg  20 mg Intravenous BID Constantin Avina M.D.       • Pharmacy Consult: Enteral tube insertion - review meds/change route/product selection  1 Each Other PHARMACY TO DOSE Constantin GREY  MARY Avina       • senna-docusate (PERICOLACE or SENOKOT S) 8.6-50 MG per tablet 2 Tab  2 Tab Enteral Tube BID Constantin Avina M.D.   2 Tab at 01/01/20 0513    And   • polyethylene glycol/lytes (MIRALAX) PACKET 1 Packet  1 Packet Enteral Tube QDAY PRTAMEKA Avina M.D.        And   • magnesium hydroxide (MILK OF MAGNESIA) suspension 30 mL  30 mL Enteral Tube QDAY PRN Constantin Avina M.D.        And   • bisacodyl (DULCOLAX) suppository 10 mg  10 mg Rectal QDAY PRN Constantin Avina M.D.       • acetaminophen (TYLENOL) tablet 650 mg  650 mg Enteral Tube Q4HRS PRTAMEKA Avina M.D.   650 mg at 12/30/19 1937    Or   • acetaminophen (TYLENOL) suppository 650 mg  650 mg Rectal Q4HRS PRTAMEKA Avina M.D.   650 mg at 12/26/19 2341   • ondansetron (ZOFRAN ODT) dispertab 4 mg  4 mg Enteral Tube Q4HRS PRTAMEKA Avina M.D.        Or   • ondansetron (ZOFRAN) syringe/vial injection 4 mg  4 mg Intravenous Q4HRS PRTAMEKA Avina M.D.       • MD Alert...ICU Electrolyte Replacement per Pharmacy   Other PHARMACY TO DOSE Wilberto Ness M.D.       • Respiratory Care per Protocol   Nebulization Continuous RT Wilberto Ness M.D.           Fluids    Intake/Output Summary (Last 24 hours) at 1/1/2020 0822  Last data filed at 1/1/2020 0800  Gross per 24 hour   Intake 1667.15 ml   Output 5950 ml   Net -4282.85 ml       Laboratory  Recent Labs     12/30/19  0536 12/31/19  0331   ISTATAPH 7.427 7.421   ISTATAPCO2 36.8 39.3*   ISTATAPO2 95* 90*   ISTATATCO2 25 27   [id number] 98 97   ISTATARTHCO3 24.3 25.5*   ISTATARTBE 0 1   ISTATTEMP 99.3 F 99.5 F   ISTATFIO2 .30 30   ISTATSPEC Arterial Arterial   ISTATAPHTC 7.422 7.414   [id number] 97* 93*         Recent Labs     12/30/19  0405 12/31/19  0410 01/01/20  0515   SODIUM 140 140 136   POTASSIUM 3.6 4.4 3.8   CHLORIDE 108 109 102   CO2 26 25 27   BUN 24* 28* 32*   CREATININE 0.80 0.75 0.84   MAGNESIUM 1.9 2.1  --    PHOSPHORUS 2.5 2.7  --     CALCIUM 8.3* 8.3* 9.5     Recent Labs     12/30/19  0405 12/31/19  0410 01/01/20  0515   PREALBUMIN 13.0*  --   --    GLUCOSE 113* 129* 133*     Recent Labs     12/30/19  0405 12/31/19  0410 01/01/20  0515   WBC 9.7 7.8 14.0*   NEUTSPOLYS 66.90 63.80 74.70*   LYMPHOCYTES 19.10* 21.60* 11.00*   MONOCYTES 12.00 11.30 11.20   EOSINOPHILS 1.30 2.30 0.30   BASOPHILS 0.30 0.40 0.40     Recent Labs     12/30/19  0405 12/31/19  0410 01/01/20  0515   RBC 4.52* 4.52* 5.63   HEMOGLOBIN 13.2* 13.2* 16.4   HEMATOCRIT 40.9* 40.8* 49.8   PLATELETCT 223 225 300       Imaging  X-Ray:  I have personally reviewed the images and compared with prior images. and My impression is: Mild increase in bibasilar atelectasis with low lung volumes, mild edema pattern, gastric tube in good position    Assessment/Plan  * Intraparenchymal hemorrhage of brain (HCC)- (present on admission)  Assessment & Plan  Secondary to uncontrolled hypertension -unchanged  Neurology consulted and signed off  Avoid anticoagulants, SCDs only  I will continue strict blood pressure control with goal SBP less than 160  PT/OT/SLP  Continue to limit sedatives with close neurologic monitoring  Rehab consult    Acute respiratory failure with hypoxia and hypercapnia (HCC)- (present on admission)  Assessment & Plan  Intubated date: 12/26-12/31  Aggressive pulmonary toiletry  RT/O2 protocol  Limit sedatives (titrate off Precedex drip as tolerates)  Mobilization  Aspiration precautions  Forced diuresis when renal function improves    Aspiration into airway- (present on admission)  Assessment & Plan  MSSA pneumonia  Completed Ancef x5 days (12/31)  Aspiration precautions    Febrile  Assessment & Plan  Multifactorial -improving slowly, noted increased white blood cell count  Continue PRN antipyretics  Completed antibiotics 12/31, monitor off antibiotics for now    Methamphetamine abuse (HCC)- (present on admission)  Assessment & Plan  Cessation counseling when clinically  appropriate  Start Risperdal to control agitation/delirium    JAYASHREE (obstructive sleep apnea)- (present on admission)  Assessment & Plan  Head of bed at 30-45 degrees   Unable to use Bipap with mental status and stroke  Will need outpatient sleep study   Nocturnal oxygen as needed in the meantime    Hypertensive emergency- (present on admission)  Assessment & Plan  Increase scheduled amlodipine  Start scheduled clonidine  PRN hydralazine and labetalol for goal SBP less than 160    Hypomagnesemia- (present on admission)  Assessment & Plan  Repleted    Hypokalemia- (present on admission)  Assessment & Plan  Repletion and monitor daily       VTE:  Contraindicated, SCDs only  Ulcer: H2 Antagonist  Lines: Villasenor Catheter  Ongoing indication addressed    I have performed a physical exam and reviewed and updated ROS and Plan today (1/1/2020). In review of yesterday's note (12/31/2019), there are no changes except as documented above.     Patient remains critically ill today requiring active titration of his Precedex drip as well as strict blood pressure control with tenuous respiratory status. High risk of deterioration and worsening vital organ dysfunction and death without the above critical care interventions.    Discussed patient condition and risk of morbidity and/or mortality with RN, RT, Therapies, Pharmacy, [employer] resident, Charge nurse / hot rounds and Patient Critical care time = 31 minutes in directly providing and coordinating critical care and extensive data review.  No time overlap and excludes procedures.

## 2020-01-01 NOTE — THERAPY
"Occupational Therapy Evaluation completed.   Functional Status:  Pt admitted to [facility] following R basal ganglia/thalamic hemorrhage after being found down outside his home. Pt currently requires total assist for all ADLs, functional mobility and total assist to maintain upright midline positioning,  R sided posteriolateral lean noted without support, no volitional or spontaneous movement noted on L side, following simple 1 step commands ~75% of the time on the R side, mostly grunting but once verbalized \"hello\". Pt will benefit from acute skilled OT services while in house and post acute recommended at this time.   Plan of Care: Will benefit from Occupational Therapy 3 times per week  Discharge Recommendations:  Equipment: Will Continue to Assess for Equipment Needs. Post-acute therapy Recommend post-acute placement for additional occupational therapy services prior to discharge home. Patient can tolerate post-acute therapies at a 5x/week frequency.    See \"Rehab Therapy-Acute\" Patient Summary Report for complete documentation.    "

## 2020-01-01 NOTE — PROGRESS NOTES
[facility] Progress Note      Admit Date: 12/24/2019    Resident(s): Sebastián Trent M.D.   Attending:  ABNER MONTALVO/ Dr. Gonda    Patient ID:    Name:  Jacinto Loyola     YOB: 1970  Age:  49 y.o.  male   MRN:  [id number]    Hospital Course (carried forward and updated):  Jacinto Loyola is a 49 y.o. male with past medical history of hypertension, JAYASHREE, medications noncompliance, and meth abuse was brought in via EMS after he was found down at outside of his home at around 1600 on 12/24/2019.  As per chart review, patient parked his car, took a few steps and collapsed down on his front yard. Down for approximately 30 minutes before being found by friend.  Patient noted for left lower and upper extremity weakness as well as left facial weakness/numbness.      In the ED, blood pressure in the 190s/110-130s, heart rate in the 70s, patient saturating above 90% on 2 liters nasal cannula.  No leukocytosis or anemia on CBC.  Chemistry, troponin, INR and diagnostic alcohol normal.  Checks x-ray without acute cardiopulmonary process.  Head CT showed a 2.7 cm acute right basal ganglia/thalamic hemorrhage extravasating into the right lateral ventricle along with small right-sided mass-effect with minimal right-to-left midline shift.  No hydrocephalus. This was confirmed on Head CTA. Neurology was consulted by EDP.  He was given a one-time dose of labetalol and was started on a Cardene drip.  As per neurology goal MAP of .  No indication for neurosurgical involvement at this time.  Patient was admitted to ICU for intraparenchymal hemorrhage management and hypertensive crisis control.         Consultants:   Critical Care  Neurology     Interval Events:  - No acute events overnight  - Increased Bp and tachycardia this morning, probably sedation withdrawal after extubation, new order for scheduled clonidine 0.1 , resperidone 0.5 and low dose enalapril 2.5 mg.  - Active orders for non-violent  restrains  - Extubated successfully 12/31/2019, currently sat well on 2-3L NC, still lethargic though  - Moving RLE, decerebrate posturing LUE/LLE  - SLP  1/1/2020 failed swallow eval/lethargic ( not able to sustain arousal for swallow eval).  - EEG 12/30/2019, abnormal/consistent with ICH, without evidence of epileptiform discharges or subclinical seizures.  - BP at goal after starting amlodipine/enalapril, Neuro rec SBP ~130  - Completed course for abx 12/31/2019 for aspiration PNA   - BUE edema and mild pulm edema on XR, bedside US w mildly dilated IVC, had 20mg if IV lasix on 12/31/2020  - Off fentanyl, propofol, haldol  - LBM 12/30  PERRL. Moves right side spontaneously.       Review of Systems   Unable to perform ROS: Acuity of condition       PHYSICAL EXAM:  Vitals:    01/01/20 0000 01/01/20 0300 01/01/20 0400 01/01/20 0500   BP: 153/83 157/101 129/82 124/74   Pulse: 65 98 66 (!) 55   Resp: (!) 27 (!) 38 (!) 27 (!) 24   Temp:       TempSrc: Bladder Bladder  Bladder   SpO2: 97% 98% 99% 99%   Weight:       Height:        Body mass index is 29.54 kg/m².  Latest Vitals:  /74   Pulse (!) 55   Temp (!) 38.9 °C (102 °F) (Temporal)   Resp (!) 24   Ht 1.829 m (6')   Wt 98.8 kg (217 lb 13 oz)   SpO2 99%   BMI 29.54 kg/m²   O2 therapy: Pulse Oximetry: 99 %, O2 (LPM): 2, O2 Delivery: Silicone Nasal Cannula  Vitals Range last 24h:  Pulse:  [] 55  Resp:  [15-45] 24  BP: (124-213)/() 124/74  SpO2:  [92 %-100 %] 99 %       Intake/Output Summary (Last 24 hours) at 1/1/2020 0712  Last data filed at 1/1/2020 0400  Gross per 24 hour   Intake 1670.55 ml   Output 6300 ml   Net -4629.45 ml         Physical Exam   Constitutional: He is not intubated.   HENT:   Head: Normocephalic and atraumatic.   Eyes: Pupils are equal, round, and reactive to light. No scleral icterus.   Neck: Normal range of motion. Neck supple.   Cardiovascular: Normal rate and regular rhythm.   Pulmonary/Chest: He is not intubated. He  has rhonchi. He has rales.   Abdominal: Soft. He exhibits no distension. There is no tenderness.   Musculoskeletal:         General: Edema (BUE) present.   Neurological:   Lethargic, Arouses to auditory stimuli. Purposeful movements on right side only.   Skin: Skin is warm and dry.       Recent Labs     12/30/19  0536 12/31/19  0331   ISTATAPH 7.427 7.421   ISTATAPCO2 36.8 39.3*   ISTATAPO2 95* 90*   ISTATATCO2 25 27   GUHPCHY6ENU 98 97   ISTATARTHCO3 24.3 25.5*   ISTATARTBE 0 1   ISTATTEMP 99.3 F 99.5 F   ISTATFIO2 .30 30   ISTATSPEC Arterial Arterial   ISTATAPHTC 7.422 7.414   URHOFGUQ6QO 97* 93*     Recent Labs     12/30/19  0405 12/31/19  0410 01/01/20  0515   SODIUM 140 140 136   POTASSIUM 3.6 4.4 3.8   CHLORIDE 108 109 102   CO2 26 25 27   BUN 24* 28* 32*   CREATININE 0.80 0.75 0.84   MAGNESIUM 1.9 2.1  --    PHOSPHORUS 2.5 2.7  --    CALCIUM 8.3* 8.3* 9.5     Recent Labs     12/30/19  0405 12/31/19  0410 01/01/20  0515   PREALBUMIN 13.0*  --   --    GLUCOSE 113* 129* 133*     Recent Labs     12/30/19  0405 12/31/19  0410 01/01/20  0515   RBC 4.52* 4.52* 5.63   HEMOGLOBIN 13.2* 13.2* 16.4   HEMATOCRIT 40.9* 40.8* 49.8   PLATELETCT 223 225 300     Recent Labs     12/30/19  0405 12/31/19  0410 01/01/20  0515   WBC 9.7 7.8 14.0*   NEUTSPOLYS 66.90 63.80 74.70*   LYMPHOCYTES 19.10* 21.60* 11.00*   MONOCYTES 12.00 11.30 11.20   EOSINOPHILS 1.30 2.30 0.30   BASOPHILS 0.30 0.40 0.40       Meds:  • amLODIPine  10 mg     • enalapril  2.5 mg     • dexmedetomidine (PRECEDEX) infusion  0.1-1.5 mcg/kg/hr 0.4 mcg/kg/hr (01/01/20 0124)   • oxyCODONE immediate-release  5-10 mg     • labetalol  10-20 mg     • fentaNYL  25 mcg      Or   • fentaNYL  50 mcg      Or   • fentaNYL  100 mcg     • hydrALAZINE  10-20 mg     • lidocaine  1-2 mL     • famotidine  20 mg      Or   • famotidine  20 mg     • Pharmacy  1 Each     • senna-docusate  2 Tab      And   • polyethylene glycol/lytes  1 Packet      And   • magnesium hydroxide  30 mL       And   • bisacodyl  10 mg     • acetaminophen  650 mg      Or   • acetaminophen  650 mg     • ondansetron  4 mg      Or   • ondansetron  4 mg     • MD Alert...Adult ICU Electrolyte Replacement per Pharmacy       • Respiratory Care per Protocol            Procedures:  12/28/19 Bronchoscopy with Therapeutic suctioning and BAL  12/27/19 Endotracheal intubation  12/27/19 Bronchoscopy with bronchoalveolar lavage and therapeutic aspiration of secretions    Imaging:  DX-CHEST-PORTABLE (1 VIEW)   Final Result         1.  Findings on chest radiograph appear stable since the prior radiograph.  No new abnormalities are identified.      2.  Endotracheal tube is no longer identified.      DX-CHEST-PORTABLE (1 VIEW)   Final Result      Number no change      DX-CHEST-PORTABLE (1 VIEW)   Final Result      1.  No significant change in right basilar opacification.      2.  Left basilar opacification has improved.      DX-CHEST-PORTABLE (1 VIEW)   Final Result         1. No significant interval change.      CT-HEAD W/O   Final Result      Stable right colonic intraparenchymal hematoma with intraventricular extension.      DX-CHEST-PORTABLE (1 VIEW)   Final Result         1. No significant interval change.      DX-CHEST-PORTABLE (1 VIEW)   Final Result      Interval intubation with decreasing lung volumes and increasing perihilar, basilar opacity worrisome for aspiration, pneumonia over edema      DX-CHEST-PORTABLE (1 VIEW)   Final Result      Increasing infrahilar opacity is worrisome for aspiration      DX-ABDOMEN FOR TUBE PLACEMENT   Final Result      Enteric tube terminates over the stomach.      DX-ABDOMEN FOR TUBE PLACEMENT   Final Result      Enteric tube tip projects over the distal stomach.      DX-ABDOMEN FOR TUBE PLACEMENT   Final Result      Enteric tube is coiled in the neck.      CT-HEAD W/O   Final Result      Considerable motion and misregistration artifact on the images.   3.5 cm acute right thalamic/right basal  ganglia hemorrhage slightly larger than previous.   Extravasation of hemorrhage into the right lateral ventricle and dependent hemorrhage within the left lateral ventricle.   Right-sided mass effect with 2.7 mm right to left midline shift.         DX-ABDOMEN FOR TUBE PLACEMENT   Final Result      Enteric tube tip projects over the stomach.      EC-ECHOCARDIOGRAM COMPLETE W/O CONT   Final Result      CT-HEAD W/O   Final Result      Stable to slight interval enlargement of right thalamic intra-axial hematoma which extravasates into the lateral ventricle and results in unchanged trace leftward midline shift      Mild worsening vasogenic edema but there is no hydrocephalus or herniation      Motion degraded      DX-CHEST-PORTABLE (1 VIEW)   Final Result      No evidence of acute cardiopulmonary process.      CT-CTA NECK WITH & W/O-POST PROCESSING   Final Result      CT angiogram of the neck within normal limits. No large vessel occlusion or stenosis. No dissection.      CT-CTA HEAD WITH & W/O-POST PROCESS   Final Result      1.  No evidence of intracranial vascular occlusion or aneurysm.      2.  Large right basal ganglial intraparenchymal hemorrhage with extension into the right lateral ventricle again noted.      CT-HEAD W/O   Final Result      2.7 cm acute right basal ganglia/thalamic hemorrhage extravasating into the right lateral ventricle.   No hydrocephalus.   Mild right-sided mass effect with minimal right-to-left midline shift.   Central white matter low attenuation consistent with microvascular ischemic changes.          Problem and Plan:    * Intraparenchymal hemorrhage of brain (HCC)- (present on admission)  Assessment & Plan  2/2 poorly controlled HTN/HTN emergency, H/O methamphetamine abuse  Head CT showed a 2.7 cm acute right basal ganglia/thalamic hemorrhage, no indications for neurosurgery intervention  Neurologist on board, signed off 12/30/2019   Aspiration precaution  Feeding tube  SBP control around  130 mmHg per neurologist (always less than  mmHg) Continue amlodipine/enalapril tab   Increased Bp and tachycardia this morning, probably sedation withdrawal after extubation, new order for scheduled clonidine 0.1 , resperidone 0.5 and low dose enalapril 2.5 mg.  Keep head of bed 30 degrees  Continue RT  PT/OT/SLP  Failed SLP swallow eval today, lethargic, will repeat when appropriate  Limiting sedation, Off fentanyl, propofol, haldol, low dose precedex     Acute respiratory failure with hypoxia and hypercapnia (HCC)- (present on admission)  Assessment & Plan  Intubated 12/26- 12/31/2019 (aspiration and airway protection)  Extubate successfully 12/31/2019  Complete Abxs for MSSA/ PNA (Ancef x5 days)  Early mobilization, PT/OT/SLP  Aspiration precautions  RT/O2 per protocol     Aspiration into airway- (present on admission)  Assessment & Plan  MSSA pneumonia  Completed cefazolin 5 day course  Aspiration precautions  O2/RT     Methamphetamine abuse (HCC)- (present on admission)  Assessment & Plan  Counseling, when appropriate  Consult SW     JAYASHREE (obstructive sleep apnea)- (present on admission)  Assessment & Plan  Not on CPAP at home  Unable to use Bipap with mental status and stroke  Continue O2 supplementation     Hypertensive emergency- (present on admission)  Assessment & Plan  Presented with blood pressure in the 190s/110-130s  Intraparenchymal hemorrhage  Discontinued nicardipine drip (Goal MAP )  UDS+ for amphetamines   Continue amlodipine/enalapril tab   Increased Bp and tachycardia this morning, probably sedation withdrawal after extubation, new order for scheduled clonidine 0.1 , resperidone 0.5 and low dose enalapril 2.5 mg.    DISPO: continue ICU    CODE STATUS: Full code    Quality Measures:  Feeding: NPO, TF, fail Swallow eval today per SLP  Analgesia: acetaminophen  Sedation: precedex  Thromboprophylaxis: SCDs, pharmacologic ppx held for bleeding  Head of bed: >30 degrees  Ulcer  prophylaxis: famotidine  Glycemic control: n/a  Bowel care: bowel regimen  Indwelling lines: ETT, OG, Vill[place]or  Deescalation of antibiotics: Completed 5 days of cefazolin      Sebastián Trent M.D.

## 2020-01-01 NOTE — THERAPY
Physical Therapy Note    Attempted PT evaluation. SBP > 160 throughout, per neurology SBP to be < 160. Did not perform sitting EOB. Assessed ROM, command following, attempted to obtain hx, asissted RN with rolling for linen change. Required total A to roll bilaterally. May be limited by lethargy/cognition. At this time pt was very lethargic and was unable to provide hx. Followed 1 step commands 50% of the time including thumbs up, grasping, moving RLE. No volitional movement of LLE, appears to have decerebrate posturing LUE/LLE. Will re-attempt formal evaluation tomorrow as able/appropriate.

## 2020-01-01 NOTE — THERAPY
"Physical Therapy Evaluation completed.   Bed Mobility:  Supine to Sit: Total Assist X 2  Transfers: Sit to Stand: Unable to Participate  Gait: Level Of Assist: Unable to Participate   Plan of Care: Will benefit from Physical Therapy 4 times per week  Discharge Recommendations: Equipment: Will Continue to Assess for Equipment Needs. Recommend post-acute placement for continued physical therapy services prior to discharge home. Patient can tolerate post-acute therapies at a 5x/week frequency.       See \"Rehab Therapy-Acute\" Patient Summary Report for complete documentation.     Pt is a 48yo male presenting to acute after being found down outside his home. Pt admitted with R basal ganglia/thalamic hemorrhage and hypertension. Per neurology, SBP to be < 160. BP sitting /95. Pt seen for PT evaluation. Was unable to obtain hx re: PLOF or living situation. Presents with L sided deficits; no volitional movement and decerebrate posturing. Pt was able to follow 1 step commands on R side 75% of the time. Pt required total A x2 for supine <> sit. Tolerated EOB sitting ~10 minutes with total A to maintain upright. Pt with preference for L head turn. Required assist to bring and maintain in midline. Demo'd limited AROM of RLE while sitting EOB but able to follow. Pt tending to lean R and anteriorly, no righting reactions. Pt occasionally grunting throughout to respond to therapist, one audible \"hello.\" Pt will benefit from acute PT to progress mobility. Recommend postacute placement for continued therapy prior to return home.   "

## 2020-01-01 NOTE — ASSESSMENT & PLAN NOTE
- PNA resolved  - MSSA pneumonia  - Patient Completed cefazolin 5 day course  - Aspiration precautions  - O2/RT   - Peg Tube placed on 1/13.  - Barium exam completed 1/15 patient getting fed now  - Patient tolerating Oral intake w/o aspirating  - Advancing his Diet to Dysphagia 2/NTL  - 1-1 feeding, 90 degree HOB  - Speech therapy following   - NO straws

## 2020-01-02 LAB
ANION GAP SERPL CALC-SCNC: 10 MMOL/L (ref 0–11.9)
BASOPHILS # BLD AUTO: 0.6 % (ref 0–1.8)
BASOPHILS # BLD: 0.08 K/UL (ref 0–0.12)
BUN SERPL-MCNC: 37 MG/DL (ref 8–22)
CALCIUM SERPL-MCNC: 9.4 MG/DL (ref 8.5–10.5)
CHLORIDE SERPL-SCNC: 103 MMOL/L (ref 96–112)
CO2 SERPL-SCNC: 25 MMOL/L (ref 20–33)
CREAT SERPL-MCNC: 0.82 MG/DL (ref 0.5–1.4)
EOSINOPHIL # BLD AUTO: 0.16 K/UL (ref 0–0.51)
EOSINOPHIL NFR BLD: 1.3 % (ref 0–6.9)
ERYTHROCYTE [DISTWIDTH] IN BLOOD BY AUTOMATED COUNT: 42.1 FL (ref 35.9–50)
GLUCOSE SERPL-MCNC: 124 MG/DL (ref 65–99)
HCT VFR BLD AUTO: 47.7 % (ref 42–52)
HGB BLD-MCNC: 16.1 G/DL (ref 14–18)
IMM GRANULOCYTES # BLD AUTO: 0.25 K/UL (ref 0–0.11)
IMM GRANULOCYTES NFR BLD AUTO: 2 % (ref 0–0.9)
LYMPHOCYTES # BLD AUTO: 2.3 K/UL (ref 1–4.8)
LYMPHOCYTES NFR BLD: 18 % (ref 22–41)
MCH RBC QN AUTO: 29.7 PG (ref 27–33)
MCHC RBC AUTO-ENTMCNC: 33.8 G/DL (ref 33.7–35.3)
MCV RBC AUTO: 87.8 FL (ref 81.4–97.8)
MONOCYTES # BLD AUTO: 1.68 K/UL (ref 0–0.85)
MONOCYTES NFR BLD AUTO: 13.2 % (ref 0–13.4)
NEUTROPHILS # BLD AUTO: 8.3 K/UL (ref 1.82–7.42)
NEUTROPHILS NFR BLD: 64.9 % (ref 44–72)
NRBC # BLD AUTO: 0 K/UL
NRBC BLD-RTO: 0 /100 WBC
PLATELET # BLD AUTO: 342 K/UL (ref 164–446)
PMV BLD AUTO: 9.4 FL (ref 9–12.9)
POTASSIUM SERPL-SCNC: 4.3 MMOL/L (ref 3.6–5.5)
RBC # BLD AUTO: 5.43 M/UL (ref 4.7–6.1)
SODIUM SERPL-SCNC: 138 MMOL/L (ref 135–145)
WBC # BLD AUTO: 12.8 K/UL (ref 4.8–10.8)

## 2020-01-02 PROCEDURE — 700102 HCHG RX REV CODE 250 W/ 637 OVERRIDE(OP): Performed by: INTERNAL MEDICINE

## 2020-01-02 PROCEDURE — 700111 HCHG RX REV CODE 636 W/ 250 OVERRIDE (IP): Performed by: INTERNAL MEDICINE

## 2020-01-02 PROCEDURE — 770001 HCHG ROOM/CARE - MED/SURG/GYN PRIV*

## 2020-01-02 PROCEDURE — 80048 BASIC METABOLIC PNL TOTAL CA: CPT

## 2020-01-02 PROCEDURE — 92610 EVALUATE SWALLOWING FUNCTION: CPT

## 2020-01-02 PROCEDURE — A9270 NON-COVERED ITEM OR SERVICE: HCPCS | Performed by: INTERNAL MEDICINE

## 2020-01-02 PROCEDURE — 85025 COMPLETE CBC W/AUTO DIFF WBC: CPT

## 2020-01-02 PROCEDURE — 99233 SBSQ HOSP IP/OBS HIGH 50: CPT | Performed by: INTERNAL MEDICINE

## 2020-01-02 RX ORDER — CLONIDINE HYDROCHLORIDE 0.1 MG/1
0.1 TABLET ORAL ONCE
Status: COMPLETED | OUTPATIENT
Start: 2020-01-02 | End: 2020-01-02

## 2020-01-02 RX ORDER — ENALAPRIL MALEATE 10 MG/1
5 TABLET ORAL ONCE
Status: COMPLETED | OUTPATIENT
Start: 2020-01-02 | End: 2020-01-02

## 2020-01-02 RX ORDER — ENALAPRIL MALEATE 10 MG/1
10 TABLET ORAL
Status: DISCONTINUED | OUTPATIENT
Start: 2020-01-03 | End: 2020-01-17

## 2020-01-02 RX ORDER — FUROSEMIDE 10 MG/ML
20 INJECTION INTRAMUSCULAR; INTRAVENOUS ONCE
Status: COMPLETED | OUTPATIENT
Start: 2020-01-02 | End: 2020-01-02

## 2020-01-02 RX ORDER — CLONIDINE HYDROCHLORIDE 0.1 MG/1
0.2 TABLET ORAL TWICE DAILY
Status: DISCONTINUED | OUTPATIENT
Start: 2020-01-02 | End: 2020-01-05

## 2020-01-02 RX ADMIN — CLONIDINE HYDROCHLORIDE 0.1 MG: 0.1 TABLET ORAL at 14:15

## 2020-01-02 RX ADMIN — ENALAPRIL MALEATE 5 MG: 10 TABLET ORAL at 14:15

## 2020-01-02 RX ADMIN — LABETALOL HYDROCHLORIDE 10 MG: 5 INJECTION INTRAVENOUS at 04:08

## 2020-01-02 RX ADMIN — FUROSEMIDE 20 MG: 10 INJECTION, SOLUTION INTRAMUSCULAR; INTRAVENOUS at 09:53

## 2020-01-02 RX ADMIN — OXYCODONE HYDROCHLORIDE 10 MG: 5 TABLET ORAL at 08:03

## 2020-01-02 RX ADMIN — POLYETHYLENE GLYCOL 3350 1 PACKET: 17 POWDER, FOR SOLUTION ORAL at 17:27

## 2020-01-02 RX ADMIN — RISPERIDONE 0.5 MG: 1 TABLET ORAL at 17:27

## 2020-01-02 RX ADMIN — CLONIDINE HYDROCHLORIDE 0.1 MG: 0.1 TABLET ORAL at 05:58

## 2020-01-02 RX ADMIN — RISPERIDONE 0.5 MG: 1 TABLET ORAL at 05:58

## 2020-01-02 RX ADMIN — HYDRALAZINE HYDROCHLORIDE 20 MG: 20 INJECTION INTRAMUSCULAR; INTRAVENOUS at 04:29

## 2020-01-02 RX ADMIN — CLONIDINE HYDROCHLORIDE 0.2 MG: 0.1 TABLET ORAL at 17:27

## 2020-01-02 RX ADMIN — OXYCODONE HYDROCHLORIDE 10 MG: 5 TABLET ORAL at 14:15

## 2020-01-02 RX ADMIN — HYDRALAZINE HYDROCHLORIDE 20 MG: 20 INJECTION INTRAMUSCULAR; INTRAVENOUS at 22:07

## 2020-01-02 RX ADMIN — AMLODIPINE BESYLATE 10 MG: 10 TABLET ORAL at 05:59

## 2020-01-02 RX ADMIN — ENALAPRIL MALEATE 5 MG: 10 TABLET ORAL at 05:58

## 2020-01-02 RX ADMIN — SENNOSIDES AND DOCUSATE SODIUM 2 TABLET: 8.6; 5 TABLET ORAL at 06:00

## 2020-01-02 RX ADMIN — SENNOSIDES AND DOCUSATE SODIUM 2 TABLET: 8.6; 5 TABLET ORAL at 17:27

## 2020-01-02 RX ADMIN — OXYCODONE HYDROCHLORIDE 10 MG: 5 TABLET ORAL at 23:58

## 2020-01-02 NOTE — CARE PLAN
Problem: Communication  Goal: The ability to communicate needs accurately and effectively will improve  Outcome: PROGRESSING SLOWER THAN EXPECTED  Patient's white board is updated. Patient is updated on plan of care.      Problem: Bowel/Gastric:  Goal: Will not experience complications related to bowel motility  Outcome: PROGRESSING SLOWER THAN EXPECTED  Bowel protocol followed.

## 2020-01-02 NOTE — RESPIRATORY CARE
COPD EDUCATION by COPD CLINICAL EDUCATOR  1/2/2020 at 3:12 PM by Vicente Alexander     Patient reviewed by COPD education team. Patient does not have a history or diagnosis of COPD and is a non-smoker, therefore does not qualify for the COPD program.

## 2020-01-02 NOTE — DIETARY
Nutrition Support Weekly Update: Day 9 of admit.  Jacinto Loyola is a 49 y.o. male with admitting DX of Intraparenchymal hemorrhage of brain, Hypertensive crisis. Tube feeding initiated on . Current TF via Gastric Cortrak is Impact Peptide 1.5 @ 55 ml/hr  ml/hr, providing 1980 kcals, 124 grams protein, 1016 mL free water per day.      Assessment:  Wt 1/2: 97.3 kg via bed scale - wt decreased since admit, could be related to fluids. Per I/Os pt +5.8 L     Calculation/Equation: REE per MSJ x 1.2 = 2259 kcal/day  Total Calories / day: 2200 - 2500  (Calories / k - 26)  Total Grams Protein / day: 97 - 126  (Grams Protein / k - 1.3)  Total Fluids ml / day: 2436.8 ml    Evaluation:   1. TF running @ goal.   2. Pt was extubated on   3. Labs: Glucose 124, BUN 37  4. Meds: norvasc, electrolyte replacement, risperdal, pericolace, roxicodone (prn)  5. Last BM:   6. Will transition to standard TF formula.      Malnutrition Risk: Potential risk due to wt loss since admit. No other criteria noted at this time.      Recommendations/Plan:  Change TF to Fibersource HN @ 25 ml/hr and advance per protocol to 80 ml/hr (goal rate) to provide 2304 kcal (24 kcal/kg), 104 grams protein (1.1 gm/kg), and 1555 ml free water per day.   Fluids per MD.   Diet upgrades per SLP as feasible.      RD following

## 2020-01-02 NOTE — PROGRESS NOTES
[facility] Progress Note      Admit Date: 12/24/2019    Resident(s): Kat Garcia M.D.   Attending:  ABNER MONTALVO/ Dr. Gonda    Patient ID:    Name:  Jacinto Loyola     YOB: 1970  Age:  49 y.o.  male   MRN:  [id number]    Hospital Course (carried forward and updated):  Jacinto Loyola is a 49 y.o. male with past medical history of hypertension, JAYASHREE, medications noncompliance, and meth abuse was brought in via EMS after he was found down at outside of his home at around 1600 on 12/24/2019.  As per chart review, patient parked his car, took a few steps and collapsed down on his front yard. Down for approximately 30 minutes before being found by friend.  Patient noted for left lower and upper extremity weakness as well as left facial weakness/numbness.      In the ED, blood pressure in the 190s/110-130s, heart rate in the 70s, patient saturating above 90% on 2 liters nasal cannula.  No leukocytosis or anemia on CBC.  Chemistry, troponin, INR and diagnostic alcohol normal.  Checks x-ray without acute cardiopulmonary process.  Head CT showed a 2.7 cm acute right basal ganglia/thalamic hemorrhage extravasating into the right lateral ventricle along with small right-sided mass-effect with minimal right-to-left midline shift.  No hydrocephalus. This was confirmed on Head CTA. Neurology was consulted by EDP.  He was given a one-time dose of labetalol and was started on a Cardene drip.  As per neurology goal MAP of .  No indication for neurosurgical involvement at this time.  Patient was admitted to ICU for intraparenchymal hemorrhage management and hypertensive crisis control.         Consultants:   Critical Care  Neurology     Interval Events:  No acute events overnight  No change in breathing, snores while sleeping  Some agitation with sedation vacations, may be due to chronic back pain, started on oxycodone  Moves RLE, decerebrate posturing on left side  Failed swallow eval due to  "lethargy  BP at goal after starting amlodipine/enalapril, Neuro rec SBP ~130  Completed course for abx 12/31/2019 for aspiration PNA   BUE edema and mild pulm edema on XR, bedside US w mildly dilated IVC, had 20mg if IV lasix on 12/31/2020. Additional diuresis this am.      Review of Systems   Unable to perform ROS: Acuity of condition       PHYSICAL EXAM:  Vitals:    01/02/20 1150 01/02/20 1200 01/02/20 1300 01/02/20 1400   BP:  132/66 137/73 (!) 161/84   Pulse: 68 72 75 (!) 128   Resp: 16 14 15 (!) 33   Temp:       TempSrc:       SpO2: 98% 99% 99% 99%   Weight:       Height:        Body mass index is 29.09 kg/m².  Latest Vitals:  BP (!) 161/84   Pulse (!) 128   Temp 37.2 °C (99 °F) (Temporal)   Resp (!) 33   Ht 1.829 m (6' 0.01\")   Wt 97.3 kg (214 lb 8.1 oz)   SpO2 99%   BMI 29.09 kg/m²   O2 therapy: Pulse Oximetry: 99 %, O2 (LPM): 4, O2 Delivery: Oxymask  Vitals Range last 24h:  Temp:  [36.8 °C (98.3 °F)-37.2 °C (99 °F)] 37.2 °C (99 °F)  Pulse:  [] 128  Resp:  [14-41] 33  BP: (117-187)/() 161/84  SpO2:  [93 %-99 %] 99 %  Date 01/02/20 0700 - 01/03/20 0659   Shift 0578-6218 3492-4364 2771-3256 24 Hour Total   INTAKE   I.V. 12.5   12.5     Precedex Volume 12.5   12.5   Other 90   90     Medications (PO/Enteral Liquids) 90   90   NG/   220     Intake (mL) (Enteral Tube 12/26/19 Cortrak - Gastric Right nare) 220   220   Enteral 30   30     Free Water / Tube Flush 30   30   Shift Total 352.5   352.5   OUTPUT   Urine 375   375     Output (mL) (Urethral Catheter) 375   375   Shift Total 375   375   NET -22.5   -22.5        Intake/Output Summary (Last 24 hours) at 1/2/2020 1543  Last data filed at 1/2/2020 1000  Gross per 24 hour   Intake 1466.67 ml   Output 1800 ml   Net -333.33 ml         Physical Exam   Constitutional: He is not intubated.   HENT:   Head: Normocephalic and atraumatic.   Eyes: Pupils are equal, round, and reactive to light. No scleral icterus.   Neck: Normal range of motion. " Neck supple.   Cardiovascular: Normal rate and regular rhythm.   Pulmonary/Chest: He is not intubated. He has rhonchi. He has rales.   Abdominal: Soft. He exhibits no distension. There is no tenderness.   Musculoskeletal:         General: Edema (BUE) present.   Neurological:   Lethargic, Arouses to auditory stimuli. Purposeful movements on right side only.   Skin: Skin is warm and dry.       Recent Labs     12/31/19  0331   ISTATAPH 7.421   ISTATAPCO2 39.3*   ISTATAPO2 90*   ISTATATCO2 27   QEJENQE0YBD 97   ISTATARTHCO3 25.5*   ISTATARTBE 1   ISTATTEMP 99.5 F   ISTATFIO2 30   ISTATSPEC Arterial   ISTATAPHTC 7.414   QOCLKYVL6QN 93*     Recent Labs     12/31/19 0410 01/01/20  0515 01/02/20  0520   SODIUM 140 136 138   POTASSIUM 4.4 3.8 4.3   CHLORIDE 109 102 103   CO2 25 27 25   BUN 28* 32* 37*   CREATININE 0.75 0.84 0.82   MAGNESIUM 2.1  --   --    PHOSPHORUS 2.7  --   --    CALCIUM 8.3* 9.5 9.4     Recent Labs     12/31/19  0410 01/01/20  0515 01/02/20  0520   GLUCOSE 129* 133* 124*     Recent Labs     12/31/19  0410 01/01/20  0515 01/02/20  0520   RBC 4.52* 5.63 5.43   HEMOGLOBIN 13.2* 16.4 16.1   HEMATOCRIT 40.8* 49.8 47.7   PLATELETCT 225 300 342     Recent Labs     12/31/19 0410 01/01/20  0515 01/02/20  0520   WBC 7.8 14.0* 12.8*   NEUTSPOLYS 63.80 74.70* 64.90   LYMPHOCYTES 21.60* 11.00* 18.00*   MONOCYTES 11.30 11.20 13.20   EOSINOPHILS 2.30 0.30 1.30   BASOPHILS 0.40 0.40 0.60       Meds:  • influenza vaccine quad  0.5 mL     • [START ON 1/3/2020] enalapril  10 mg     • cloNIDine  0.2 mg     • risperiDONE  0.5 mg     • amLODIPine  10 mg     • oxyCODONE immediate-release  5-10 mg     • labetalol  10-20 mg     • hydrALAZINE  10-20 mg     • lidocaine  1-2 mL     • Pharmacy  1 Each     • senna-docusate  2 Tab      And   • polyethylene glycol/lytes  1 Packet      And   • magnesium hydroxide  30 mL      And   • bisacodyl  10 mg     • acetaminophen  650 mg      Or   • acetaminophen  650 mg     • ondansetron  4 mg       Or   • ondansetron  4 mg     • MD Alert...Adult ICU Electrolyte Replacement per Pharmacy       • Respiratory Care per Protocol            Procedures:  12/28/19 Bronchoscopy with Therapeutic suctioning and BAL  12/27/19 Endotracheal intubation  12/27/19 Bronchoscopy with bronchoalveolar lavage and therapeutic aspiration of secretions    Imaging:  DX-CHEST-PORTABLE (1 VIEW)   Final Result         1.  Findings on chest radiograph appear stable since the prior radiograph.  No new abnormalities are identified.      2.  Endotracheal tube is no longer identified.      DX-CHEST-PORTABLE (1 VIEW)   Final Result      Number no change      DX-CHEST-PORTABLE (1 VIEW)   Final Result      1.  No significant change in right basilar opacification.      2.  Left basilar opacification has improved.      DX-CHEST-PORTABLE (1 VIEW)   Final Result         1. No significant interval change.      CT-HEAD W/O   Final Result      Stable right colonic intraparenchymal hematoma with intraventricular extension.      DX-CHEST-PORTABLE (1 VIEW)   Final Result         1. No significant interval change.      DX-CHEST-PORTABLE (1 VIEW)   Final Result      Interval intubation with decreasing lung volumes and increasing perihilar, basilar opacity worrisome for aspiration, pneumonia over edema      DX-CHEST-PORTABLE (1 VIEW)   Final Result      Increasing infrahilar opacity is worrisome for aspiration      DX-ABDOMEN FOR TUBE PLACEMENT   Final Result      Enteric tube terminates over the stomach.      DX-ABDOMEN FOR TUBE PLACEMENT   Final Result      Enteric tube tip projects over the distal stomach.      DX-ABDOMEN FOR TUBE PLACEMENT   Final Result      Enteric tube is coiled in the neck.      CT-HEAD W/O   Final Result      Considerable motion and misregistration artifact on the images.   3.5 cm acute right thalamic/right basal ganglia hemorrhage slightly larger than previous.   Extravasation of hemorrhage into the right lateral ventricle and  dependent hemorrhage within the left lateral ventricle.   Right-sided mass effect with 2.7 mm right to left midline shift.         DX-ABDOMEN FOR TUBE PLACEMENT   Final Result      Enteric tube tip projects over the stomach.      EC-ECHOCARDIOGRAM COMPLETE W/O CONT   Final Result      CT-HEAD W/O   Final Result      Stable to slight interval enlargement of right thalamic intra-axial hematoma which extravasates into the lateral ventricle and results in unchanged trace leftward midline shift      Mild worsening vasogenic edema but there is no hydrocephalus or herniation      Motion degraded      DX-CHEST-PORTABLE (1 VIEW)   Final Result      No evidence of acute cardiopulmonary process.      CT-CTA NECK WITH & W/O-POST PROCESSING   Final Result      CT angiogram of the neck within normal limits. No large vessel occlusion or stenosis. No dissection.      CT-CTA HEAD WITH & W/O-POST PROCESS   Final Result      1.  No evidence of intracranial vascular occlusion or aneurysm.      2.  Large right basal ganglial intraparenchymal hemorrhage with extension into the right lateral ventricle again noted.      CT-HEAD W/O   Final Result      2.7 cm acute right basal ganglia/thalamic hemorrhage extravasating into the right lateral ventricle.   No hydrocephalus.   Mild right-sided mass effect with minimal right-to-left midline shift.   Central white matter low attenuation consistent with microvascular ischemic changes.          Problem and Plan:    * Intraparenchymal hemorrhage of brain (HCC)- (present on admission)  Assessment & Plan  2/2 poorly controlled HTN/HTN emergency, H/O methamphetamine abuse  Head CT showed a 2.7 cm acute right basal ganglia/thalamic hemorrhage, no indications for neurosurgery intervention  Neurologist on board, signed off 12/30/2019   Aspiration precaution  Feeding tube  SBP control around 130 mmHg per neurologist (always less than  mmHg) Continue amlodipine/enalapril tab   Increased Bp and  tachycardia this morning, probably sedation withdrawal after extubation, new order for scheduled clonidine 0.1 , resperidone 0.5 and low dose enalapril 2.5 mg.  Keep head of bed 30 degrees  Continue RT  PT/OT/SLP  Failed SLP swallow eval today, lethargic, will repeat when appropriate  Limiting sedation, Off fentanyl, propofol, haldol, low dose precedex     Acute respiratory failure with hypoxia and hypercapnia (HCC)- (present on admission)  Assessment & Plan  Intubated 12/26- 12/31/2019 (aspiration and airway protection)  Extubate successfully 12/31/2019  Complete Abxs for MSSA/ PNA (Ancef x5 days)  Early mobilization, PT/OT/SLP  Aspiration precautions  RT/O2 per protocol     Aspiration into airway- (present on admission)  Assessment & Plan  MSSA pneumonia  Completed cefazolin 5 day course  Aspiration precautions  O2/RT     Methamphetamine abuse (HCC)- (present on admission)  Assessment & Plan  Counseling, when appropriate  Consult SW     JAYASHREE (obstructive sleep apnea)- (present on admission)  Assessment & Plan  Not on CPAP at home  Unable to use Bipap with mental status and stroke  Continue O2 supplementation     Hypertensive emergency- (present on admission)  Assessment & Plan  Presented with blood pressure in the 190s/110-130s  Intraparenchymal hemorrhage  Discontinued nicardipine drip (Goal MAP )  UDS+ for amphetamines   Continue amlodipine/enalapril tab   Increased Bp and tachycardia this morning, probably sedation withdrawal after extubation, new order for scheduled clonidine 0.1 , resperidone 0.5 and low dose enalapril 2.5 mg.    DISPO: transfer to neuro    CODE STATUS: Full code    Quality Measures:  Feeding: NPO, TF, failed Swallow eval per SLP  Analgesia: acetaminophen  Sedation: precedex  Thromboprophylaxis: SCDs, pharmacologic ppx held for bleeding  Head of bed: >30 degrees  Ulcer prophylaxis: famotidine  Glycemic control: n/a  Bowel care: bowel regimen  Indwelling lines: ETT, OG,  [place]  Deescalation of antibiotics: Completed 5 days of cefazolin      Kat Garcia M.D.

## 2020-01-02 NOTE — PROGRESS NOTES
Received report from previous nurse regarding prior 12 hours.  POC reviewed with pt, white board updated, call light within reach.  Bed in lowest position, treaded slippers on, bed alarm on.

## 2020-01-02 NOTE — WOUND TEAM
"[facility]  Inpatient Services  Initial Wound and Skin Care Evaluation    Admission Date: 12/24/2019     Consult Date: 1/1/2020  HPI, PMH, SH: Reviewed    Unit where seen by Wound Team: [id number]     WOUND CONSULT RELATED TO:  \"wound on coccyx\"    Self Report / Pain Level: No s/s of pain.    OBJECTIVE:  Pt in bed, very restless, bedside RN in room.     WOUND TYPE, LOCATION, CHARACTERISTICS (Pressure Injuries: location, stage, POA or date identified)    Wound 01/01/20 Partial Thickness Wound Buttocks (Active)   Wound Image   1/1/2020     Site Assessment Red;Fragile    Christina-wound Assessment Blanchable erythema    Margins Defined edges;Attached edges    Drainage Amount None    Non-staged Wound Description Not applicable    Treatments Cleansed;Site care    Cleansing Normal Saline Irrigation    Periwound Protectant Barrier Paste    Dressing Options Mepilex    Dressing Changed New    Dressing Status Clean;Dry;Intact    Dressing Change Frequency Daily    NEXT Dressing Change  01/02/20    NEXT Weekly Photo (Inpatient Only) 01/08/20    WOUND NURSE ONLY - Odor None    WOUND NURSE ONLY - Exposed Structures None    WOUND NURSE ONLY - Tissue Type and Percentage Diffuse open areas r/t friction/sheer on bilateral buttocks           Vascular: n/a    Lab Values:    Lab Results   Component Value Date/Time    WBC 14.0 (H) 01/01/2020 05:15 AM    RBC 5.63 01/01/2020 05:15 AM    HEMOGLOBIN 16.4 01/01/2020 05:15 AM    HEMATOCRIT 49.8 01/01/2020 05:15 AM      Results from last 7 days   Lab Units 12/30/19  0405 12/26/19  0240   C REACTIVE PROTEIN 4596 mg/dL 4.74* 0.37     Lab Results   Component Value Date/Time    HBA1C 5.4 12/25/2019 05:00 AM           Culture: n/a      INTERVENTIONS BY WOUND TEAM:  Pt rolled to L side, Mepilex removed from buttocks. Cleansed. Scattered open areas along bilateral buttocks. No drainage, no odor, pink and blanching. Mepilex placed.    Interdisciplinary consultation: Patient, Bedside " RN    EVALUATION: Wounds appear to be related to moisture / friction, Mepilex to prevent further damage unless unable to keep on - then barrier cream.    Goals: Steady decrease in wound area and depth weekly.    NURSING PLAN OF CARE ORDERS (X):    Dressing changes: See Dressing Care orders:   Skin care: See Skin Care orders: X  Rectal tube care: See Rectal Tube Care orders:   Other orders:    RSKIN:   CURRENTLY IN PLACE (X), APPLIED THIS VISIT (A), ORDERED (O):   Q shift Kyle:  X  Q shift pressure point assessments:  X  Pressure redistribution mattress   X         Low Airloss          Bariatric BLANCO         Bariatric foam           Heel float boots     Heel Silicone dressing        Float Heels off Bed with Pillows               Barrier wipes         Barrier Cream         Barrier paste          Sacral silicone dressing         Silicone O2 tubing         Anchorfast         Cannula fixation Device (Tender )          Gray Foam Ear protectors           Trach with Optifoam split foam                 Waffle cushion        Waffle Overlay         Rectal tube or BMS    Purwick/Condom Cath          Antifungal tx      Interdry          Reposition q 2 hours        Up to chair        Ambulate      PT/OT        Dietician        Diabetes Education      PO     TF  X   TPN     NPO   # days   Other        WOUND TEAM PLAN OF CARE (X):   Dressing changes by wound team:   Follow up 1 - 2 times weekly:  X - wound may evolve  NPWT change 3x weekly:     Follow up as needed:     Other (explain):     Anticipated discharge plans (X):   LTACH:  SNF/Rehab:           Home Care:           Outpatient Wound Center:            Self Care:            Other:        TBD

## 2020-01-02 NOTE — PROGRESS NOTES
Critical Care Progress Note    Date of admission  12/24/2019    Chief Complaint  Jacinto Loyola, a 49 y.o. male for evaluation and management of the above problem. The history is mostly obtained from healthcare providers and the medical record as this gentleman cannot provide me with any history.  This gentleman has a history of primary hypertension, medical noncompliance, methamphetamine abuse and JAYASHREE.  At approximately 1600 hrs. today he was found down in his yard near his car.  He had left-sided weakness and slurred speech.  EMS was activated.  He complained of a headache en route to the hospital.  On arrival at [facility], he underwent CT imaging which revealed a 2.7 cm right basal ganglia hemorrhage with extension into the right lateral ventricle.  He was emergently seen by Dr. Graff from neurology.  CT imaging of his head and neck did not reveal any evidence of large vessel occlusion, aneurysms or AVMs.  At this point, Dr. Graff does not believe that neurosurgery needs to be involved.  This is likely a hypertensive hemorrhage. Taken from Dr Ledesma note.     Hospital Course   - Extubated 12/31    Interval Problem Update  Reviewed last 24 hour events:   - able to be weaned off precedex gtt this morning   - awakens and follows on R, spastic on left   - sinus josy, -180   - labetalol and hydralazine needed x 2   - kavin TFs at goal   - AF, decreasing WBC     Review of Systems  Review of Systems   Unable to perform ROS: Patient nonverbal        Vital Signs for last 24 hours   Temp:  [36.8 °C (98.2 °F)-37.1 °C (98.8 °F)] 37.1 °C (98.7 °F)  Pulse:  [] 66  Resp:  [15-44] 31  BP: ()/() 187/117  SpO2:  [93 %-98 %] 94 %    Respiratory Information for the last 24 hours    Remains on 2-3 lpm n/c, encourage IS/PEP    Physical Exam   Physical Exam  Vitals signs and nursing note reviewed.   Constitutional:       General: He is not in acute distress.     Appearance: He is normal weight. He is  ill-appearing.   HENT:      Head: Normocephalic and atraumatic.      Nose: Nose normal. No congestion.      Comments: Nasal feeding tube in place     Mouth/Throat:      Mouth: Mucous membranes are moist.      Pharynx: No oropharyngeal exudate.   Eyes:      General:         Right eye: No discharge.         Left eye: No discharge.      Pupils: Pupils are equal, round, and reactive to light.   Neck:      Musculoskeletal: Neck supple.      Vascular: No carotid bruit.   Cardiovascular:      Rate and Rhythm: Regular rhythm. Bradycardia present. Occasional extrasystoles are present.     Chest Wall: PMI is not displaced.      Pulses: Normal pulses.      Heart sounds: Heart sounds not distant. No murmur.      Comments: Erratic blood pressure with ongoing hypertensive episodes  Pulmonary:      Effort: Pulmonary effort is normal. No accessory muscle usage or respiratory distress.      Breath sounds: No stridor. Examination of the right-lower field reveals rhonchi. Examination of the left-lower field reveals rhonchi and rales. Rhonchi and rales present. No decreased breath sounds.   Abdominal:      General: Bowel sounds are normal. There is no distension.      Palpations: Abdomen is soft.      Tenderness: There is no guarding.   Genitourinary:     Comments: Villasenor catheter in place  Musculoskeletal:         General: No tenderness or deformity.   Skin:     General: Skin is warm and dry.      Capillary Refill: Capillary refill takes less than 2 seconds.      Coloration: Skin is not jaundiced.   Neurological:      Mental Status: He is easily aroused.      Comments: Follows commands on right side of body, posturing left upper extremity, withdraws in left lower extremity, mumbles and confused -unchanged   Psychiatric:      Comments: Unable to assess given current clinical condition         Medications  Current Facility-Administered Medications   Medication Dose Route Frequency Provider Last Rate Last Dose   • influenza vaccine quad  injection 0.5 mL  0.5 mL Intramuscular Once PRN Jeremy M Gonda, M.D.       • enalapril (VASOTEC) tablet 5 mg  5 mg Enteral Tube Q DAY Jeremy M Gonda, M.D.   5 mg at 01/02/20 0558   • cloNIDine (CATAPRES) tablet 0.1 mg  0.1 mg Enteral Tube TWICE DAILY Jeremy M Gonda, M.D.   0.1 mg at 01/02/20 0558   • risperiDONE (RISPERDAL) tablet 0.5 mg  0.5 mg Enteral Tube BID Jeremy M Gonda, M.D.   0.5 mg at 01/02/20 0558   • amLODIPine (NORVASC) tablet 10 mg  10 mg Enteral Tube Q DAY Jeremy M Gonda, M.D.   10 mg at 01/02/20 0559   • dexmedetomidine (PRECEDEX) 400 mcg/100mL NS premix infusion  0.1-1.5 mcg/kg/hr Intravenous Continuous Jeremy M Gonda, M.D. 5.4 mL/hr at 01/01/20 1643 0.2 mcg/kg/hr at 01/01/20 1643   • oxyCODONE immediate-release (ROXICODONE) tablet 5-10 mg  5-10 mg Enteral Tube Q4HRS PRN Jeremy M Gonda, M.D.   10 mg at 12/30/19 1407   • labetalol (NORMODYNE/TRANDATE) injection 10-20 mg  10-20 mg Intravenous Q4HRS PRN Jeremy M Gonda, M.D.   10 mg at 01/02/20 0408   • hydrALAZINE (APRESOLINE) injection 10-20 mg  10-20 mg Intravenous Q4HRS PRN Brian Ledesma M.D.   20 mg at 01/02/20 0429   • lidocaine (XYLOCAINE) 1 % injection 1-2 mL  1-2 mL Tracheal Tube Q30 MIN PRN Wilberto Ness M.D.       • Pharmacy Consult: Enteral tube insertion - review meds/change route/product selection  1 Each Other PHARMACY TO DOSE Constantin Avina M.D.       • senna-docusate (PERICOLACE or SENOKOT S) 8.6-50 MG per tablet 2 Tab  2 Tab Enteral Tube BID Constantin Avina M.D.   2 Tab at 01/02/20 0600    And   • polyethylene glycol/lytes (MIRALAX) PACKET 1 Packet  1 Packet Enteral Tube QDAY PRN Constantin Avina M.D.        And   • magnesium hydroxide (MILK OF MAGNESIA) suspension 30 mL  30 mL Enteral Tube QDAY PRN Constantin Avina M.D.        And   • bisacodyl (DULCOLAX) suppository 10 mg  10 mg Rectal QDAY PRN Constantin Avina M.D.       • acetaminophen (TYLENOL) tablet 650 mg  650 mg Enteral Tube Q4HRS PRN Constantin GREY  MARY Avina   650 mg at 12/30/19 1937    Or   • acetaminophen (TYLENOL) suppository 650 mg  650 mg Rectal Q4HRS PRN Constantin Avina M.D.   650 mg at 12/26/19 2341   • ondansetron (ZOFRAN ODT) dispertab 4 mg  4 mg Enteral Tube Q4HRS PRN Constantin Avina M.D.        Or   • ondansetron (ZOFRAN) syringe/vial injection 4 mg  4 mg Intravenous Q4HRS PRN Constantin Avina M.D.       • MD Alert...ICU Electrolyte Replacement per Pharmacy   Other PHARMACY TO DOSE Wilberto Ness M.D.       • Respiratory Care per Protocol   Nebulization Continuous RT Wilberto Ness M.D.           Fluids    Intake/Output Summary (Last 24 hours) at 1/2/2020 0729  Last data filed at 1/2/2020 0600  Gross per 24 hour   Intake 1867.97 ml   Output 2075 ml   Net -207.03 ml       Laboratory  Recent Labs     12/31/19  0331   ISTATAPH 7.421   ISTATAPCO2 39.3*   ISTATAPO2 90*   ISTATATCO2 27   UOCLAIF1OFT 97   ISTATARTHCO3 25.5*   ISTATARTBE 1   ISTATTEMP 99.5 F   ISTATFIO2 30   ISTATSPEC Arterial   ISTATAPHTC 7.414   AJVZVBJH6XC 93*         Recent Labs     12/31/19  0410 01/01/20  0515 01/02/20  0520   SODIUM 140 136 138   POTASSIUM 4.4 3.8 4.3   CHLORIDE 109 102 103   CO2 25 27 25   BUN 28* 32* 37*   CREATININE 0.75 0.84 0.82   MAGNESIUM 2.1  --   --    PHOSPHORUS 2.7  --   --    CALCIUM 8.3* 9.5 9.4     Recent Labs     12/31/19  0410 01/01/20  0515 01/02/20  0520   GLUCOSE 129* 133* 124*     Recent Labs     12/31/19  0410 01/01/20  0515 01/02/20  0520   WBC 7.8 14.0* 12.8*   NEUTSPOLYS 63.80 74.70* 64.90   LYMPHOCYTES 21.60* 11.00* 18.00*   MONOCYTES 11.30 11.20 13.20   EOSINOPHILS 2.30 0.30 1.30   BASOPHILS 0.40 0.40 0.60     Recent Labs     12/31/19  0410 01/01/20  0515 01/02/20  0520   RBC 4.52* 5.63 5.43   HEMOGLOBIN 13.2* 16.4 16.1   HEMATOCRIT 40.8* 49.8 47.7   PLATELETCT 225 300 342       Imaging  X-Ray:  I have personally reviewed the images and compared with prior images. and No film today 1/2    Assessment/Plan  *  Intraparenchymal hemorrhage of brain (HCC)- (present on admission)  Assessment & Plan  Secondary to uncontrolled hypertension   Neurology consulted and signed off  Avoid anticoagulants, SCDs only  Continue strict blood pressure control with goal SBP less than 160  PT/OT/SLP  Rehab consult    Acute respiratory failure with hypoxia and hypercapnia (HCC)- (present on admission)  Assessment & Plan  Intubated date: 12/26-12/31  Aggressive pulmonary toiletry  RT/O2 protocol  Limit sedatives (discontinue Precedex drip)  Mobilization  Aspiration precautions  Begin forced diuresis    Aspiration into airway- (present on admission)  Assessment & Plan  MSSA pneumonia  Completed Ancef x5 days (12/31)  Aspiration precautions    Febrile  Assessment & Plan  Improved for now  Continue PRN antipyretics  Completed antibiotics 12/31, monitor off antibiotics for now    Methamphetamine abuse (HCC)- (present on admission)  Assessment & Plan  Cessation counseling when clinically appropriate  Continue Risperdal to control agitation/delirium    JAYASHREE (obstructive sleep apnea)- (present on admission)  Assessment & Plan  Head of bed at 30-45 degrees   Unable to use Bipap with mental status and stroke  Will need outpatient sleep study   Nocturnal oxygen as needed in the meantime    Hypertensive emergency- (present on admission)  Assessment & Plan  Increase scheduled amlodipine and clonidine  PRN hydralazine and labetalol for goal SBP less than 160    Hypomagnesemia- (present on admission)  Assessment & Plan  Repleted    Hypokalemia- (present on admission)  Assessment & Plan  Repleted       VTE:  Contraindicated, SCDs only for now  Ulcer: H2 Antagonist  Lines: Villasenor Catheter  Ongoing indication addressed    I have performed a physical exam and reviewed and updated ROS and Plan today (1/2/2020). In review of yesterday's note (1/1/2020), there are no changes except as documented above.     Discussed patient condition and risk of morbidity and/or  mortality with Family, RN, RT, Pharmacy, , [facility] resident, Charge nurse / hot rounds and Patient .  Critical care will sign off when patient leaves ICU.

## 2020-01-02 NOTE — THERAPY
"Speech Language Therapy Clinical Swallow Evaluation completed.  Functional Status: Pt seen this date for clinical swallow evaluation 2/2 s/p extubation 12/31/19 (intubated 12/27/19). Pt was asleep upon entrance, but awakened with verbal and tactile cues. Pt maintained alertness throughout evaluation independently. Pt did not follow directions, and phonated/grunted upon exhale, however produced no intelligible words. Pt with Cortrak in place, soft wrist restraint on right wrist, and on 4LO2 via Oxymask. Extensive oral care provided, multiple thick dried secretions removed from oral cavity. Informal oral mechanism examination completed 2/2 poor direction following. Jaw strength appreciated to be WNL. Pt demonstrated labial protrusion WNL when accepting ice cube from spoon. Query inability to follow oral motor directives despite verbal, visual and tactile cues is a result of comprehension deficits vs motor planning deficits. Pt demonstrated strong cough before and after PO trials, cough does not appear related to PO. PO trials of ice x2 assessed. Pt masticated both trials but did not initiate a swallow. Both PO trials removed from oral cavity via suctioning. Recommend STRICT NPO/TF and prefeeding trials 1:1 with SLP only, with adherence to the following strategies: diligent oral care, suction pt secretions to reduce risk of aspirating secretions. SLP following.   Recommendations - Diet: Diet / Liquid Recommendation: (P) NPO, Pre-Feeding Trials with SLP Only                          Strategies: Strict 1:1 feeding  and Head of Bed at 90 Degrees                          Medication Administration: Medication Administration : (P) Via Gastric Tube  Plan of Care: Will benefit from Speech Therapy 3 times per week  Post-Acute Therapy: Recommend inpatient transitional care services for continued speech therapy services.        See \"Rehab Therapy-Acute\" Patient Summary Report for complete documentation.   "

## 2020-01-02 NOTE — DISCHARGE PLANNING
[facility] Transitional Care Coordination     Referral from:  Dr. Santillan   Facesheet indicates: No provider identified  Potential Rehab Diagnosis:  TBI    Limited tolerance to therapy intervention at this time   D/C support: Limited information      Physiatry consultation pended per protocol.          Thank you for the referral.

## 2020-01-02 NOTE — CARE PLAN
Problem: Fluid Volume:  Goal: Will maintain balanced intake and output  Outcome: PROGRESSING AS EXPECTED  Q2 I&Os with palmer catheter in place     Problem: Safety - Medical Restraint  Goal: Remains free of injury from restraints (Restraint for Interference with Medical Device)  Description  INTERVENTIONS:  1. Determine that other, less restrictive measures have been tried or would not be effective before applying the restraint  2. Evaluate the patient's condition at the time of restraint application  3. Inform patient/family regarding the reason for restraint  4. Q2H: Monitor safety, psychosocial status, comfort, nutrition and hydration  Outcome: PROGRESSING AS EXPECTED  Q2 checks on restraints

## 2020-01-02 NOTE — PROGRESS NOTES
Pt  Has been following commands with rt extremties this shift ie holding up 2 fingers to comand and raising rt leg to command ,he nods no he does not have a headache, scoots butt all around with right leg/ restless and does slide down in bed a lot. Trying to wean off sedation

## 2020-01-02 NOTE — PROGRESS NOTES
Wound care nurse at bedside removed the mepilex I put on earlier and are coming up with a plan of care

## 2020-01-03 LAB
ANION GAP SERPL CALC-SCNC: 6 MMOL/L (ref 0–11.9)
BASOPHILS # BLD AUTO: 0.4 % (ref 0–1.8)
BASOPHILS # BLD: 0.06 K/UL (ref 0–0.12)
BUN SERPL-MCNC: 42 MG/DL (ref 8–22)
CALCIUM SERPL-MCNC: 9 MG/DL (ref 8.5–10.5)
CHLORIDE SERPL-SCNC: 103 MMOL/L (ref 96–112)
CO2 SERPL-SCNC: 28 MMOL/L (ref 20–33)
CREAT SERPL-MCNC: 0.77 MG/DL (ref 0.5–1.4)
EOSINOPHIL # BLD AUTO: 0.16 K/UL (ref 0–0.51)
EOSINOPHIL NFR BLD: 1.1 % (ref 0–6.9)
ERYTHROCYTE [DISTWIDTH] IN BLOOD BY AUTOMATED COUNT: 42.8 FL (ref 35.9–50)
GLUCOSE SERPL-MCNC: 126 MG/DL (ref 65–99)
HCT VFR BLD AUTO: 45.2 % (ref 42–52)
HGB BLD-MCNC: 15 G/DL (ref 14–18)
IMM GRANULOCYTES # BLD AUTO: 0.22 K/UL (ref 0–0.11)
IMM GRANULOCYTES NFR BLD AUTO: 1.6 % (ref 0–0.9)
LYMPHOCYTES # BLD AUTO: 2.34 K/UL (ref 1–4.8)
LYMPHOCYTES NFR BLD: 16.7 % (ref 22–41)
MCH RBC QN AUTO: 29.4 PG (ref 27–33)
MCHC RBC AUTO-ENTMCNC: 33.2 G/DL (ref 33.7–35.3)
MCV RBC AUTO: 88.5 FL (ref 81.4–97.8)
MONOCYTES # BLD AUTO: 1.64 K/UL (ref 0–0.85)
MONOCYTES NFR BLD AUTO: 11.7 % (ref 0–13.4)
NEUTROPHILS # BLD AUTO: 9.56 K/UL (ref 1.82–7.42)
NEUTROPHILS NFR BLD: 68.5 % (ref 44–72)
NRBC # BLD AUTO: 0 K/UL
NRBC BLD-RTO: 0 /100 WBC
PLATELET # BLD AUTO: 349 K/UL (ref 164–446)
PMV BLD AUTO: 9.3 FL (ref 9–12.9)
POTASSIUM SERPL-SCNC: 4 MMOL/L (ref 3.6–5.5)
RBC # BLD AUTO: 5.11 M/UL (ref 4.7–6.1)
SODIUM SERPL-SCNC: 137 MMOL/L (ref 135–145)
WBC # BLD AUTO: 14 K/UL (ref 4.8–10.8)

## 2020-01-03 PROCEDURE — 3E02340 INTRODUCTION OF INFLUENZA VACCINE INTO MUSCLE, PERCUTANEOUS APPROACH: ICD-10-PCS | Performed by: INTERNAL MEDICINE

## 2020-01-03 PROCEDURE — 700102 HCHG RX REV CODE 250 W/ 637 OVERRIDE(OP): Performed by: INTERNAL MEDICINE

## 2020-01-03 PROCEDURE — 700111 HCHG RX REV CODE 636 W/ 250 OVERRIDE (IP): Performed by: INTERNAL MEDICINE

## 2020-01-03 PROCEDURE — 94669 MECHANICAL CHEST WALL OSCILL: CPT

## 2020-01-03 PROCEDURE — A9270 NON-COVERED ITEM OR SERVICE: HCPCS | Performed by: INTERNAL MEDICINE

## 2020-01-03 PROCEDURE — 90471 IMMUNIZATION ADMIN: CPT

## 2020-01-03 PROCEDURE — 85025 COMPLETE CBC W/AUTO DIFF WBC: CPT

## 2020-01-03 PROCEDURE — 90686 IIV4 VACC NO PRSV 0.5 ML IM: CPT | Performed by: INTERNAL MEDICINE

## 2020-01-03 PROCEDURE — 80048 BASIC METABOLIC PNL TOTAL CA: CPT

## 2020-01-03 PROCEDURE — 770001 HCHG ROOM/CARE - MED/SURG/GYN PRIV*

## 2020-01-03 PROCEDURE — 94667 MNPJ CHEST WALL 1ST: CPT

## 2020-01-03 PROCEDURE — 99233 SBSQ HOSP IP/OBS HIGH 50: CPT | Performed by: INTERNAL MEDICINE

## 2020-01-03 RX ORDER — FUROSEMIDE 10 MG/ML
20 INJECTION INTRAMUSCULAR; INTRAVENOUS ONCE
Status: COMPLETED | OUTPATIENT
Start: 2020-01-03 | End: 2020-01-03

## 2020-01-03 RX ADMIN — RISPERIDONE 0.5 MG: 1 TABLET ORAL at 05:29

## 2020-01-03 RX ADMIN — ENALAPRIL MALEATE 10 MG: 10 TABLET ORAL at 05:30

## 2020-01-03 RX ADMIN — CLONIDINE HYDROCHLORIDE 0.2 MG: 0.1 TABLET ORAL at 05:30

## 2020-01-03 RX ADMIN — INFLUENZA A VIRUS A/BRISBANE/02/2018 IVR-190 (H1N1) ANTIGEN (FORMALDEHYDE INACTIVATED), INFLUENZA A VIRUS A/KANSAS/14/2017 X-327 (H3N2) ANTIGEN (FORMALDEHYDE INACTIVATED), INFLUENZA B VIRUS B/PHUKET/3073/2013 ANTIGEN (FORMALDEHYDE INACTIVATED), AND INFLUENZA B VIRUS B/MARYLAND/15/2016 BX-69A ANTIGEN (FORMALDEHYDE INACTIVATED) 0.5 ML: 15; 15; 15; 15 INJECTION, SUSPENSION INTRAMUSCULAR at 07:00

## 2020-01-03 RX ADMIN — CLONIDINE HYDROCHLORIDE 0.2 MG: 0.1 TABLET ORAL at 18:02

## 2020-01-03 RX ADMIN — FUROSEMIDE 20 MG: 10 INJECTION, SOLUTION INTRAMUSCULAR; INTRAVENOUS at 11:03

## 2020-01-03 RX ADMIN — OXYCODONE HYDROCHLORIDE 10 MG: 5 TABLET ORAL at 06:59

## 2020-01-03 RX ADMIN — RISPERIDONE 0.5 MG: 1 TABLET ORAL at 18:02

## 2020-01-03 RX ADMIN — LABETALOL HYDROCHLORIDE 20 MG: 5 INJECTION INTRAVENOUS at 02:03

## 2020-01-03 RX ADMIN — MAGNESIUM HYDROXIDE 30 ML: 400 SUSPENSION ORAL at 18:02

## 2020-01-03 RX ADMIN — OXYCODONE HYDROCHLORIDE 10 MG: 5 TABLET ORAL at 14:49

## 2020-01-03 RX ADMIN — SENNOSIDES AND DOCUSATE SODIUM 2 TABLET: 8.6; 5 TABLET ORAL at 05:30

## 2020-01-03 RX ADMIN — AMLODIPINE BESYLATE 10 MG: 10 TABLET ORAL at 05:30

## 2020-01-03 RX ADMIN — SENNOSIDES AND DOCUSATE SODIUM 2 TABLET: 8.6; 5 TABLET ORAL at 18:02

## 2020-01-03 RX ADMIN — OXYCODONE HYDROCHLORIDE 10 MG: 5 TABLET ORAL at 20:42

## 2020-01-03 NOTE — PROGRESS NOTES
[facility] ICU Progress Note      Admit Date: 12/24/2019    Resident(s): Kat Garcia M.D.   Attending:  ABNER MONTALVO/ Dr. Gonda    Patient ID:    Name:  Jacinto Loyola     YOB: 1970  Age:  49 y.o.  male   MRN:  [id number]    Hospital Course (carried forward and updated):  Jacinto Loyola is a 49 y.o. male with past medical history of hypertension, JAYASHREE, medications noncompliance, and meth abuse was brought in via EMS after he was found down at outside of his home at around 1600 on 12/24/2019.  As per chart review, patient parked his car, took a few steps and collapsed down on his front yard. Down for approximately 30 minutes before being found by friend.  Patient noted for left lower and upper extremity weakness as well as left facial weakness/numbness.      In the ED, blood pressure in the 190s/110-130s, heart rate in the 70s, patient saturating above 90% on 2 liters nasal cannula.  No leukocytosis or anemia on CBC.  Chemistry, troponin, INR and diagnostic alcohol normal.  Checks x-ray without acute cardiopulmonary process.  Head CT showed a 2.7 cm acute right basal ganglia/thalamic hemorrhage extravasating into the right lateral ventricle along with small right-sided mass-effect with minimal right-to-left midline shift.  No hydrocephalus. This was confirmed on Head CTA. Neurology was consulted by EDP.  He was given a one-time dose of labetalol and was started on a Cardene drip.  As per neurology goal MAP of .  No indication for neurosurgical involvement at this time.  Patient was admitted to ICU for intraparenchymal hemorrhage management and hypertensive crisis control.         Consultants:   Critical Care  Neurology     Interval Events:  Intermittently hypertensive, seems to be associated with pain  HR 50-120s  BP 110s-150s on amlodipine, enalapril, clonidine. Neuro rec SBP ~130  LBM 12/30, encourage BM via bowel protocol  Lasix 20 today  Completed course for abx 12/31/2019 for  "aspiration PNA       Review of Systems   Unable to perform ROS: Mental status change       PHYSICAL EXAM:  Vitals:    01/03/20 0000 01/03/20 0200 01/03/20 0400 01/03/20 0600   BP: 145/83 (!) 177/96 146/70 142/69   Pulse: 88 77 65 67   Resp: 16 (!) 26 14 17   Temp: 36.8 °C (98.3 °F) 36.8 °C (98.2 °F) 36.8 °C (98.3 °F) 37 °C (98.6 °F)   TempSrc: Temporal Temporal Temporal Temporal   SpO2: 97% 95% 95% 93%   Weight:       Height:        Body mass index is 29.09 kg/m².  Latest Vitals:  /69   Pulse 67   Temp 37 °C (98.6 °F) (Temporal)   Resp 17   Ht 1.829 m (6' 0.01\")   Wt 97.3 kg (214 lb 8.1 oz)   SpO2 93%   BMI 29.09 kg/m²   O2 therapy: Pulse Oximetry: 93 %, O2 (LPM): 2, O2 Delivery: Oxymask  Vitals Range last 24h:  Temp:  [36.8 °C (98.2 °F)-37.2 °C (99 °F)] 37 °C (98.6 °F)  Pulse:  [] 67  Resp:  [14-33] 17  BP: (118-194)/(58-96) 142/69  SpO2:  [93 %-100 %] 93 %       Intake/Output Summary (Last 24 hours) at 1/3/2020 0703  Last data filed at 1/3/2020 0600  Gross per 24 hour   Intake 2042.51 ml   Output 2385 ml   Net -342.49 ml         Physical Exam   Constitutional: He is not intubated.   HENT:   Head: Normocephalic and atraumatic.   Eyes: Pupils are equal, round, and reactive to light. No scleral icterus.   Neck: Normal range of motion. Neck supple.   Cardiovascular: Normal rate and regular rhythm.   Pulmonary/Chest: He is not intubated. He has wheezes. He has rhonchi. He has no rales.   Abdominal: Soft. He exhibits no distension. There is no tenderness.   Musculoskeletal:         General: Edema (BUE, improving) present.   Neurological:   Lethargic, Arouses to auditory stimuli. Purposeful movements on right side only.   Skin: Skin is warm and dry.           Recent Labs     01/01/20 0515 01/02/20 0520 01/03/20  0430   SODIUM 136 138 137   POTASSIUM 3.8 4.3 4.0   CHLORIDE 102 103 103   CO2 27 25 28   BUN 32* 37* 42*   CREATININE 0.84 0.82 0.77   CALCIUM 9.5 9.4 9.0     Recent Labs     01/01/20  0515 " 01/02/20  0520 01/03/20  0430   GLUCOSE 133* 124* 126*     Recent Labs     01/01/20  0515 01/02/20  0520 01/03/20  0430   RBC 5.63 5.43 5.11   HEMOGLOBIN 16.4 16.1 15.0   HEMATOCRIT 49.8 47.7 45.2   PLATELETCT 300 342 349     Recent Labs     01/01/20  0515 01/02/20  0520 01/03/20  0430   WBC 14.0* 12.8* 14.0*   NEUTSPOLYS 74.70* 64.90 68.50   LYMPHOCYTES 11.00* 18.00* 16.70*   MONOCYTES 11.20 13.20 11.70   EOSINOPHILS 0.30 1.30 1.10   BASOPHILS 0.40 0.60 0.40       Meds:  • enalapril  10 mg     • cloNIDine  0.2 mg     • risperiDONE  0.5 mg     • amLODIPine  10 mg     • oxyCODONE immediate-release  5-10 mg     • labetalol  10-20 mg     • hydrALAZINE  10-20 mg     • lidocaine  1-2 mL     • Pharmacy  1 Each     • senna-docusate  2 Tab      And   • polyethylene glycol/lytes  1 Packet      And   • magnesium hydroxide  30 mL      And   • bisacodyl  10 mg     • acetaminophen  650 mg      Or   • acetaminophen  650 mg     • ondansetron  4 mg      Or   • ondansetron  4 mg     • MD Alert...Adult ICU Electrolyte Replacement per Pharmacy       • Respiratory Care per Protocol            Procedures:  12/30/19 Electroencephalogram   12/28/19 Bronchoscopy with Therapeutic suctioning and BAL  12/27/19 Endotracheal intubation  12/27/19 Bronchoscopy with bronchoalveolar lavage and therapeutic aspiration of secretions    Imaging:  DX-CHEST-PORTABLE (1 VIEW)   Final Result         1.  Findings on chest radiograph appear stable since the prior radiograph.  No new abnormalities are identified.      2.  Endotracheal tube is no longer identified.      DX-CHEST-PORTABLE (1 VIEW)   Final Result      Number no change      DX-CHEST-PORTABLE (1 VIEW)   Final Result      1.  No significant change in right basilar opacification.      2.  Left basilar opacification has improved.      DX-CHEST-PORTABLE (1 VIEW)   Final Result         1. No significant interval change.      CT-HEAD W/O   Final Result      Stable right colonic intraparenchymal hematoma  with intraventricular extension.      DX-CHEST-PORTABLE (1 VIEW)   Final Result         1. No significant interval change.      DX-CHEST-PORTABLE (1 VIEW)   Final Result      Interval intubation with decreasing lung volumes and increasing perihilar, basilar opacity worrisome for aspiration, pneumonia over edema      DX-CHEST-PORTABLE (1 VIEW)   Final Result      Increasing infrahilar opacity is worrisome for aspiration      DX-ABDOMEN FOR TUBE PLACEMENT   Final Result      Enteric tube terminates over the stomach.      DX-ABDOMEN FOR TUBE PLACEMENT   Final Result      Enteric tube tip projects over the distal stomach.      DX-ABDOMEN FOR TUBE PLACEMENT   Final Result      Enteric tube is coiled in the neck.      CT-HEAD W/O   Final Result      Considerable motion and misregistration artifact on the images.   3.5 cm acute right thalamic/right basal ganglia hemorrhage slightly larger than previous.   Extravasation of hemorrhage into the right lateral ventricle and dependent hemorrhage within the left lateral ventricle.   Right-sided mass effect with 2.7 mm right to left midline shift.         DX-ABDOMEN FOR TUBE PLACEMENT   Final Result      Enteric tube tip projects over the stomach.      EC-ECHOCARDIOGRAM COMPLETE W/O CONT   Final Result      CT-HEAD W/O   Final Result      Stable to slight interval enlargement of right thalamic intra-axial hematoma which extravasates into the lateral ventricle and results in unchanged trace leftward midline shift      Mild worsening vasogenic edema but there is no hydrocephalus or herniation      Motion degraded      DX-CHEST-PORTABLE (1 VIEW)   Final Result      No evidence of acute cardiopulmonary process.      CT-CTA NECK WITH & W/O-POST PROCESSING   Final Result      CT angiogram of the neck within normal limits. No large vessel occlusion or stenosis. No dissection.      CT-CTA HEAD WITH & W/O-POST PROCESS   Final Result      1.  No evidence of intracranial vascular occlusion  or aneurysm.      2.  Large right basal ganglial intraparenchymal hemorrhage with extension into the right lateral ventricle again noted.      CT-HEAD W/O   Final Result      2.7 cm acute right basal ganglia/thalamic hemorrhage extravasating into the right lateral ventricle.   No hydrocephalus.   Mild right-sided mass effect with minimal right-to-left midline shift.   Central white matter low attenuation consistent with microvascular ischemic changes.          Problem and Plan:  * Intraparenchymal hemorrhage of brain (HCC)- (present on admission)  Assessment & Plan  2/2 poorly controlled HTN/HTN emergency, H/O methamphetamine abuse  Head CT showed a 2.7 cm acute right basal ganglia/thalamic hemorrhage, no indications for neurosurgery intervention  Neurologist on board, signed off 12/30/2019   Aspiration precaution  Feeding tube  SBP control around 130 mmHg per neurologist (always less than  mmHg) Continue amlodipine/enalapril, clonidine   Keep head of bed 30 degrees  Continue RT  PT/OT/SLP  Failed SLP swallow eval due to lethargy, will repeat when appropriate  Minimizing sedation    Hypertensive emergency- (present on admission)  Assessment & Plan  Presented with blood pressure in the 190s/110-130s  Intraparenchymal hemorrhage  Discontinued nicardipine drip (Goal MAP )  UDS+ for amphetamines   Continue amlodipine/enalapril, clonidine  PRN hydralazine, labetalol    Acute respiratory failure with hypoxia and hypercapnia (HCC)- (present on admission)  Assessment & Plan  Intubated 12/26- 12/31/2019 (aspiration and airway protection)  Extubate successfully 12/31/2019  Complete Abxs for MSSA/ PNA (Ancef x5 days)  Early mobilization, PT/OT/SLP  Aspiration precautions  RT/O2 per protocol    Aspiration into airway- (present on admission)  Assessment & Plan  MSSA pneumonia  Completed cefazolin 5 day course  Aspiration precautions  O2/RT    Methamphetamine abuse (HCC)- (present on admission)  Assessment &  Plan  Counseling, when appropriate  Consulted SW    JAYASHREE (obstructive sleep apnea)- (present on admission)  Assessment & Plan  Not on CPAP at home  Unable to use Bipap with mental status and stroke  Continue O2 supplementation      DISPO: transfer to neuro    CODE STATUS: Full code    Quality Measures:  Feeding: NPO, TF, failed Swallow eval per SLP  Analgesia: acetaminophen  Sedation: precedex  Thromboprophylaxis: SCDs, pharmacologic ppx held for bleeding  Head of bed: >30 degrees  Ulcer prophylaxis: famotidine  Glycemic control: n/a  Bowel care: bowel regimen  Indwelling lines: Hamilton johnson  Deescalation of antibiotics: Completed 5 days of cefazolin      Kat Garcia M.D.

## 2020-01-03 NOTE — PROGRESS NOTES
Beni returned RN's phone call. RN gave him Davonte's phone number. Beni states he will call him now. Beni states he has only met Davonte once in his life, Davonte corroborated this, and Beni would like to figure out Davonte and Jacinto's relationship prior to giving him the password. Both gentleman seemed reasonable, and appeared to have pt's best interest in mind.

## 2020-01-03 NOTE — PROGRESS NOTES
RN spoke with pt's father, Davonte Loyola [phone]. Unfortunately Davonte does not have the password so this RN was unable to give him information. Davonte states that he would like to visit on Monday. RN reiterated that without the password we cannot pass on information and/or allow him in to the unit. RN encouraged him to wait until he has the password before attempting to visit. Davonte verbalized understanding, though he states being very frustrated. RN encouraged pt to continue to reach out to pt's son Beni. RN also called Beni again to see if he would reach out to Davonte, Beni did not answer, nor was RN able to leave a message as this was not an available option on his cell phone. RN updated charge RN, supervisors and . Davonte did state that there is a hx of seizures, HTN, CA, and that pt has a hx of meth abuse. He asked that this information be passed on to the MD.

## 2020-01-03 NOTE — CARE PLAN
Problem: Communication  Goal: The ability to communicate needs accurately and effectively will improve  Outcome: PROGRESSING AS EXPECTED  Patient able to relay his needs to staff     Problem: Psychosocial Needs:  Goal: Level of anxiety will decrease  Outcome: PROGRESSING AS EXPECTED  Decreased restlessness

## 2020-01-03 NOTE — PROGRESS NOTES
Critical Care Progress Note    Date of admission  12/24/2019    Chief Complaint  Jacinto Loyola, a 49 y.o. male for evaluation and management of the above problem. The history is mostly obtained from healthcare providers and the medical record as this gentleman cannot provide me with any history.  This gentleman has a history of primary hypertension, medical noncompliance, methamphetamine abuse and JAYASHREE.  At approximately 1600 hrs. today he was found down in his yard near his car.  He had left-sided weakness and slurred speech.  EMS was activated.  He complained of a headache en route to the hospital.  On arrival at [facility], he underwent CT imaging which revealed a 2.7 cm right basal ganglia hemorrhage with extension into the right lateral ventricle.  He was emergently seen by Dr. Graff from neurology.  CT imaging of his head and neck did not reveal any evidence of large vessel occlusion, aneurysms or AVMs.  At this point, Dr. Graff does not believe that neurosurgery needs to be involved.  This is likely a hypertensive hemorrhage. Taken from Dr Ledesma note.     Hospital Course   - Extubated 12/31   - transfer orders placed 1/2    Interval Problem Update  Reviewed last 24 hour events:   - AF, increased WBC off abx   - NSR, -190, req'd one prn anti-HTN   - no neuro changes, not following in L, following on R   - oxycodone x 1   - kavin TFs , last BM 12/30   - good UOP   - tolerating low dose risperdal   - lasix x 1     Review of Systems  Review of Systems   Unable to perform ROS: Patient nonverbal        Vital Signs for last 24 hours   Temp:  [36.8 °C (98.2 °F)-37.2 °C (99 °F)] 37 °C (98.6 °F)  Pulse:  [] 67  Resp:  [14-33] 17  BP: (118-194)/(58-96) 142/69  SpO2:  [93 %-100 %] 93 %    Respiratory Information for the last 24 hours    2 lpm n/c, PEP QID    Physical Exam   Physical Exam  Vitals signs and nursing note reviewed.   Constitutional:       Appearance: He is normal weight. He is not  ill-appearing.      Comments: Up in cardiac chair, picking at things with his right hand   HENT:      Head: Normocephalic and atraumatic.      Right Ear: External ear normal.      Left Ear: External ear normal.      Nose: Nose normal.      Comments: Nasal feeding tube in place     Mouth/Throat:      Mouth: Mucous membranes are moist.      Pharynx: Oropharynx is clear.   Eyes:      Conjunctiva/sclera: Conjunctivae normal.      Pupils: Pupils are equal, round, and reactive to light.   Neck:      Musculoskeletal: Neck supple. No neck rigidity.   Cardiovascular:      Rate and Rhythm: Normal rate and regular rhythm. Occasional extrasystoles are present.     Chest Wall: PMI is not displaced.      Pulses: Normal pulses.      Heart sounds: Normal heart sounds. Heart sounds not distant.      Comments: Improving hypertension  Pulmonary:      Effort: Pulmonary effort is normal. No accessory muscle usage or respiratory distress.      Breath sounds: Examination of the right-lower field reveals rhonchi. Examination of the left-lower field reveals rhonchi. Rhonchi present. No decreased breath sounds or rales.      Comments: Strong cough  Abdominal:      General: Bowel sounds are normal.      Palpations: Abdomen is soft.      Tenderness: There is no tenderness. There is no guarding.   Genitourinary:     Comments: Villasenor catheter in place  Musculoskeletal:         General: No swelling or tenderness.   Skin:     General: Skin is warm and dry.      Capillary Refill: Capillary refill takes less than 2 seconds.      Coloration: Skin is not pale.   Neurological:      Mental Status: He is alert and easily aroused.      Comments: Continues to follow commands on right side of body, posturing/spastic left upper extremity, withdraws in left lower extremity, minimal speaking today   Psychiatric:      Comments: Unable to assess given current clinical condition         Medications  Current Facility-Administered Medications   Medication Dose Route  Frequency Provider Last Rate Last Dose   • enalapril (VASOTEC) tablet 10 mg  10 mg Enteral Tube Q DAY Jeremy M Gonda, M.D.   10 mg at 01/03/20 0530   • cloNIDine (CATAPRES) tablet 0.2 mg  0.2 mg Enteral Tube TWICE DAILY Jeremy M Gonda, M.D.   0.2 mg at 01/03/20 0530   • risperiDONE (RISPERDAL) tablet 0.5 mg  0.5 mg Enteral Tube BID Jeremy M Gonda, M.D.   0.5 mg at 01/03/20 0529   • amLODIPine (NORVASC) tablet 10 mg  10 mg Enteral Tube Q DAY Jeremy M Gonda, M.D.   10 mg at 01/03/20 0530   • oxyCODONE immediate-release (ROXICODONE) tablet 5-10 mg  5-10 mg Enteral Tube Q4HRS PRN Jeremy M Gonda, M.D.   10 mg at 01/03/20 0659   • labetalol (NORMODYNE/TRANDATE) injection 10-20 mg  10-20 mg Intravenous Q4HRS PRN Jeremy M Gonda, M.D.   20 mg at 01/03/20 0203   • hydrALAZINE (APRESOLINE) injection 10-20 mg  10-20 mg Intravenous Q4HRS PRN Brian Ledesma M.D.   20 mg at 01/02/20 2207   • lidocaine (XYLOCAINE) 1 % injection 1-2 mL  1-2 mL Tracheal Tube Q30 MIN PRN Wilberto Ness M.D.       • Pharmacy Consult: Enteral tube insertion - review meds/change route/product selection  1 Each Other PHARMACY TO DOSE Constantin Avina M.D.       • senna-docusate (PERICOLACE or SENOKOT S) 8.6-50 MG per tablet 2 Tab  2 Tab Enteral Tube BID Constantin Avina M.D.   2 Tab at 01/03/20 0530    And   • polyethylene glycol/lytes (MIRALAX) PACKET 1 Packet  1 Packet Enteral Tube QDAY PRN Constantin Avina M.D.   1 Packet at 01/02/20 1727    And   • magnesium hydroxide (MILK OF MAGNESIA) suspension 30 mL  30 mL Enteral Tube QDAY PRN Constantin Avina M.D.        And   • bisacodyl (DULCOLAX) suppository 10 mg  10 mg Rectal QDAY PRN Constantin Avina M.D.       • acetaminophen (TYLENOL) tablet 650 mg  650 mg Enteral Tube Q4HRS PRTAMEKA Avina M.D.   650 mg at 12/30/19 1937    Or   • acetaminophen (TYLENOL) suppository 650 mg  650 mg Rectal Q4HRS PRTAMEKA Avina M.D.   650 mg at 12/26/19 2341   • ondansetron (ZOFRAN ODT)  dispertab 4 mg  4 mg Enteral Tube Q4HRS PRN Constantin Avina M.D.        Or   • ondansetron (ZOFRAN) syringe/vial injection 4 mg  4 mg Intravenous Q4HRS PRN Constantin Avina M.D.       • MD Alert...ICU Electrolyte Replacement per Pharmacy   Other PHARMACY TO DOSE Wilberto Ness M.D.       • Respiratory Care per Protocol   Nebulization Continuous RT Wilberto Ness M.D.           Fluids    Intake/Output Summary (Last 24 hours) at 1/3/2020 0756  Last data filed at 1/3/2020 0600  Gross per 24 hour   Intake 2042.51 ml   Output 2385 ml   Net -342.49 ml       Laboratory          Recent Labs     01/01/20 0515 01/02/20 0520 01/03/20  0430   SODIUM 136 138 137   POTASSIUM 3.8 4.3 4.0   CHLORIDE 102 103 103   CO2 27 25 28   BUN 32* 37* 42*   CREATININE 0.84 0.82 0.77   CALCIUM 9.5 9.4 9.0     Recent Labs     01/01/20 0515 01/02/20  0520 01/03/20  0430   GLUCOSE 133* 124* 126*     Recent Labs     01/01/20 0515 01/02/20  0520 01/03/20  0430   WBC 14.0* 12.8* 14.0*   NEUTSPOLYS 74.70* 64.90 68.50   LYMPHOCYTES 11.00* 18.00* 16.70*   MONOCYTES 11.20 13.20 11.70   EOSINOPHILS 0.30 1.30 1.10   BASOPHILS 0.40 0.60 0.40     Recent Labs     01/01/20 0515 01/02/20 0520 01/03/20  0430   RBC 5.63 5.43 5.11   HEMOGLOBIN 16.4 16.1 15.0   HEMATOCRIT 49.8 47.7 45.2   PLATELETCT 300 342 349       Imaging  X-Ray:  I have personally reviewed the images and compared with prior images. and No film today 1/3    Assessment/Plan  * Intraparenchymal hemorrhage of brain (HCC)- (present on admission)  Assessment & Plan  Secondary to uncontrolled hypertension   Neurology consulted and signed off  Avoid anticoagulants, SCDs only  Continue strict blood pressure control with goal SBP less than 160  PT/OT/SLP  Rehab consulted  Up to chair, n.p.o. (may eventually need PEG)    Hypertensive emergency- (present on admission)  Assessment & Plan  Improving slowly  Continue with current dose of scheduled amlodipine, clonidine and  Vasotec  PRN hydralazine and labetalol for goal SBP less than 160    Acute respiratory failure with hypoxia and hypercapnia (HCC)- (present on admission)  Assessment & Plan  Intubated date: 12/26-12/31  Continue aggressive pulmonary toiletry  RT/O2 protocol  Limit sedatives  Mobilization  Aspiration precautions  Continue forced diuresis for 1 additional dose    Aspiration into airway- (present on admission)  Assessment & Plan  MSSA pneumonia  Completed Ancef x5 days (12/31)  Aspiration precautions    Febrile  Assessment & Plan  Improved  Continue PRN antipyretics  Completed antibiotics 12/31, monitor off antibiotics for now    Methamphetamine abuse (HCC)- (present on admission)  Assessment & Plan  Cessation counseling when clinically appropriate  Continue Risperdal to control agitation/delirium (can likely wean off in the next few days)    JAYASHREE (obstructive sleep apnea)- (present on admission)  Assessment & Plan  Keep head of bed at 30-45 degrees   Will need outpatient sleep study   Nocturnal oxygen as needed in the meantime    Hypomagnesemia- (present on admission)  Assessment & Plan  Repleted    Hypokalemia- (present on admission)  Assessment & Plan  Repleted       VTE:  Contraindicated, SCDs only for now  Ulcer: Not Indicated  Lines: Villasenor Catheter  Ongoing indication addressed    I have performed a physical exam and reviewed and updated ROS and Plan today (1/3/2020). In review of yesterday's note (1/2/2020), there are no changes except as documented above.     Discussed patient condition and risk of morbidity and/or mortality with RN, RT, Therapies, Pharmacy, , [facility] resident, Charge nurse / hot rounds and Patient .  Critical care will sign off when patient leaves ICU.

## 2020-01-04 ENCOUNTER — APPOINTMENT (OUTPATIENT)
Dept: RADIOLOGY | Facility: MEDICAL CENTER | Age: 50
DRG: 064 | End: 2020-01-04
Attending: INTERNAL MEDICINE
Payer: MEDICAID

## 2020-01-04 PROBLEM — R41.82 ALTERED MENTAL STATUS: Status: ACTIVE | Noted: 2020-01-04

## 2020-01-04 LAB
ANION GAP SERPL CALC-SCNC: 7 MMOL/L (ref 0–11.9)
BASOPHILS # BLD AUTO: 0.7 % (ref 0–1.8)
BASOPHILS # BLD: 0.09 K/UL (ref 0–0.12)
BUN SERPL-MCNC: 43 MG/DL (ref 8–22)
CALCIUM SERPL-MCNC: 9.2 MG/DL (ref 8.5–10.5)
CHLORIDE SERPL-SCNC: 101 MMOL/L (ref 96–112)
CO2 SERPL-SCNC: 27 MMOL/L (ref 20–33)
CREAT SERPL-MCNC: 0.93 MG/DL (ref 0.5–1.4)
EOSINOPHIL # BLD AUTO: 0.23 K/UL (ref 0–0.51)
EOSINOPHIL NFR BLD: 1.9 % (ref 0–6.9)
ERYTHROCYTE [DISTWIDTH] IN BLOOD BY AUTOMATED COUNT: 43.2 FL (ref 35.9–50)
GLUCOSE SERPL-MCNC: 121 MG/DL (ref 65–99)
HCT VFR BLD AUTO: 49.4 % (ref 42–52)
HGB BLD-MCNC: 16 G/DL (ref 14–18)
IMM GRANULOCYTES # BLD AUTO: 0.23 K/UL (ref 0–0.11)
IMM GRANULOCYTES NFR BLD AUTO: 1.9 % (ref 0–0.9)
LYMPHOCYTES # BLD AUTO: 2.6 K/UL (ref 1–4.8)
LYMPHOCYTES NFR BLD: 21.1 % (ref 22–41)
MCH RBC QN AUTO: 29.1 PG (ref 27–33)
MCHC RBC AUTO-ENTMCNC: 32.4 G/DL (ref 33.7–35.3)
MCV RBC AUTO: 89.8 FL (ref 81.4–97.8)
MONOCYTES # BLD AUTO: 1.56 K/UL (ref 0–0.85)
MONOCYTES NFR BLD AUTO: 12.6 % (ref 0–13.4)
NEUTROPHILS # BLD AUTO: 7.63 K/UL (ref 1.82–7.42)
NEUTROPHILS NFR BLD: 61.8 % (ref 44–72)
NRBC # BLD AUTO: 0 K/UL
NRBC BLD-RTO: 0 /100 WBC
PLATELET # BLD AUTO: 389 K/UL (ref 164–446)
PMV BLD AUTO: 8.8 FL (ref 9–12.9)
POTASSIUM SERPL-SCNC: 4.5 MMOL/L (ref 3.6–5.5)
RBC # BLD AUTO: 5.5 M/UL (ref 4.7–6.1)
SODIUM SERPL-SCNC: 135 MMOL/L (ref 135–145)
WBC # BLD AUTO: 12.3 K/UL (ref 4.8–10.8)

## 2020-01-04 PROCEDURE — 770006 HCHG ROOM/CARE - MED/SURG/GYN SEMI*

## 2020-01-04 PROCEDURE — A9270 NON-COVERED ITEM OR SERVICE: HCPCS | Performed by: INTERNAL MEDICINE

## 2020-01-04 PROCEDURE — 99233 SBSQ HOSP IP/OBS HIGH 50: CPT | Performed by: INTERNAL MEDICINE

## 2020-01-04 PROCEDURE — 700101 HCHG RX REV CODE 250: Performed by: INTERNAL MEDICINE

## 2020-01-04 PROCEDURE — 85025 COMPLETE CBC W/AUTO DIFF WBC: CPT

## 2020-01-04 PROCEDURE — 700102 HCHG RX REV CODE 250 W/ 637 OVERRIDE(OP): Performed by: INTERNAL MEDICINE

## 2020-01-04 PROCEDURE — 80048 BASIC METABOLIC PNL TOTAL CA: CPT

## 2020-01-04 PROCEDURE — 94668 MNPJ CHEST WALL SBSQ: CPT

## 2020-01-04 PROCEDURE — 700111 HCHG RX REV CODE 636 W/ 250 OVERRIDE (IP): Performed by: INTERNAL MEDICINE

## 2020-01-04 RX ADMIN — ENALAPRIL MALEATE 10 MG: 10 TABLET ORAL at 06:28

## 2020-01-04 RX ADMIN — CLONIDINE HYDROCHLORIDE 0.2 MG: 0.1 TABLET ORAL at 06:28

## 2020-01-04 RX ADMIN — RISPERIDONE 0.5 MG: 1 TABLET ORAL at 06:28

## 2020-01-04 RX ADMIN — OXYCODONE HYDROCHLORIDE 10 MG: 5 TABLET ORAL at 14:35

## 2020-01-04 RX ADMIN — HYDRALAZINE HYDROCHLORIDE 20 MG: 20 INJECTION INTRAMUSCULAR; INTRAVENOUS at 14:13

## 2020-01-04 RX ADMIN — AMLODIPINE BESYLATE 10 MG: 10 TABLET ORAL at 06:28

## 2020-01-04 RX ADMIN — SENNOSIDES AND DOCUSATE SODIUM 2 TABLET: 8.6; 5 TABLET ORAL at 17:35

## 2020-01-04 RX ADMIN — SENNOSIDES AND DOCUSATE SODIUM 2 TABLET: 8.6; 5 TABLET ORAL at 06:28

## 2020-01-04 RX ADMIN — HYDRALAZINE HYDROCHLORIDE 20 MG: 20 INJECTION INTRAMUSCULAR; INTRAVENOUS at 02:27

## 2020-01-04 RX ADMIN — OXYCODONE HYDROCHLORIDE 10 MG: 5 TABLET ORAL at 06:28

## 2020-01-04 RX ADMIN — CLONIDINE HYDROCHLORIDE 0.2 MG: 0.1 TABLET ORAL at 17:35

## 2020-01-04 RX ADMIN — BISACODYL 10 MG: 10 SUPPOSITORY RECTAL at 02:23

## 2020-01-04 NOTE — CARE PLAN
Problem: Safety  Goal: Will remain free from falls  Outcome: PROGRESSING AS EXPECTED     Problem: Venous Thromboembolism (VTW)/Deep Vein Thrombosis (DVT) Prevention:  Goal: Patient will participate in Venous Thrombosis (VTE)/Deep Vein Thrombosis (DVT)Prevention Measures  Outcome: PROGRESSING AS EXPECTED     Problem: Skin Integrity  Goal: Risk for impaired skin integrity will decrease  Outcome: PROGRESSING AS EXPECTED     Problem: Safety - Medical Restraint  Goal: Remains free of injury from restraints (Restraint for Interference with Medical Device)  Description  INTERVENTIONS:  1. Determine that other, less restrictive measures have been tried or would not be effective before applying the restraint  2. Evaluate the patient's condition at the time of restraint application  3. Inform patient/family regarding the reason for restraint  4. Q2H: Monitor safety, psychosocial status, comfort, nutrition and hydration  Outcome: PROGRESSING AS EXPECTED  Goal: Free from restraint(s) (Restraint for Interference with Medical Device)  Description  INTERVENTIONS:  1. ONCE/SHIFT or MINIMUM Q12H: Assess and document the continuing need for restraints  2. Q24H: Continued use of restraint requires LIP to perform face to face examination and written order  3. Identify and implement measures to help patient regain control  Outcome: PROGRESSING AS EXPECTED

## 2020-01-04 NOTE — PROGRESS NOTES
Critical Care Progress Note    Date of admission  12/24/2019    Chief Complaint  Jacinto Loyola, a 49 y.o. male for evaluation and management of the above problem. The history is mostly obtained from healthcare providers and the medical record as this gentleman cannot provide me with any history.  This gentleman has a history of primary hypertension, medical noncompliance, methamphetamine abuse and JAYASHREE.  At approximately 1600 hrs. today he was found down in his yard near his car.  He had left-sided weakness and slurred speech.  EMS was activated.  He complained of a headache en route to the hospital.  On arrival at [facility], he underwent CT imaging which revealed a 2.7 cm right basal ganglia hemorrhage with extension into the right lateral ventricle.  He was emergently seen by Dr. Graff from neurology.  CT imaging of his head and neck did not reveal any evidence of large vessel occlusion, aneurysms or AVMs.  At this point, Dr. Graff does not believe that neurosurgery needs to be involved.  This is likely a hypertensive hemorrhage. Taken from Dr Ledesma note.     Hospital Course   - Extubated 12/31   - transfer orders placed 1/2   - pulled core track x2, discontinue Risperdal    Interval Problem Update  Reviewed last 24 hour events:   - pulled coretrak x 2 --> bridle today   - still no BM recently --> increase bowel protocol   - follows on R, withdraws on L, remains dysarthric   - AF, improving WBC   - NSR (sinus josy with sleep), -160   - mild increase in BUN/crt (lasix held)   - d/c risperdal   - d/c palmer and daily labs     Review of Systems  Review of Systems   Unable to perform ROS: Patient nonverbal        Vital Signs for last 24 hours   Temp:  [35.9 °C (96.7 °F)-37 °C (98.6 °F)] 36.7 °C (98 °F)  Pulse:  [] 92  Resp:  [12-28] 12  BP: (111-189)/() 134/80  SpO2:  [89 %-100 %] 99 %    Respiratory Information for the last 24 hours    2-4 lpm FM, PEP QID, not cooperative with  IS    Physical Exam   Physical Exam  Vitals signs and nursing note reviewed.   Constitutional:       Appearance: He is not ill-appearing.      Comments: Up in cardiac chair, pulled out core track, drowsy at times   HENT:      Head: Normocephalic and atraumatic.      Nose: Nose normal. No congestion.      Mouth/Throat:      Mouth: Mucous membranes are moist.      Pharynx: No oropharyngeal exudate.   Eyes:      General: No scleral icterus.     Pupils: Pupils are equal, round, and reactive to light.   Neck:      Musculoskeletal: Neck supple.   Cardiovascular:      Rate and Rhythm: Normal rate and regular rhythm. Occasional extrasystoles are present.     Chest Wall: PMI is not displaced.      Heart sounds: Normal heart sounds. Heart sounds not distant. No murmur.   Pulmonary:      Effort: Pulmonary effort is normal. No accessory muscle usage or respiratory distress.      Breath sounds: Normal breath sounds. No decreased breath sounds or rhonchi.   Abdominal:      General: Bowel sounds are normal. There is no distension.      Palpations: Abdomen is soft.      Tenderness: There is no tenderness.   Genitourinary:     Comments: Villasenor catheter in place  Musculoskeletal:      Right lower leg: No edema.      Left lower leg: No edema.   Skin:     General: Skin is warm and dry.      Capillary Refill: Capillary refill takes less than 2 seconds.      Coloration: Skin is not jaundiced.   Neurological:      Mental Status: He is alert and easily aroused.      Comments: Continues to follow commands on right side of body, posturing/spastic left upper extremity, withdraws in left lower extremity, minimal speaking again today   Psychiatric:      Comments: Unable to assess given current clinical condition         Medications  Current Facility-Administered Medications   Medication Dose Route Frequency Provider Last Rate Last Dose   • enalapril (VASOTEC) tablet 10 mg  10 mg Enteral Tube Q DAY Jeremy M Gonda, M.D.   10 mg at 01/04/20 0618    • cloNIDine (CATAPRES) tablet 0.2 mg  0.2 mg Enteral Tube TWICE DAILY Jeremy M Gonda, M.D.   0.2 mg at 01/04/20 0628   • risperiDONE (RISPERDAL) tablet 0.5 mg  0.5 mg Enteral Tube BID Jeremy M Gonda, M.D.   0.5 mg at 01/04/20 0628   • amLODIPine (NORVASC) tablet 10 mg  10 mg Enteral Tube Q DAY Jeremy M Gonda, M.D.   10 mg at 01/04/20 0628   • oxyCODONE immediate-release (ROXICODONE) tablet 5-10 mg  5-10 mg Enteral Tube Q4HRS PRN Jeremy M Gonda, M.D.   10 mg at 01/04/20 0628   • labetalol (NORMODYNE/TRANDATE) injection 10-20 mg  10-20 mg Intravenous Q4HRS PRN Jeremy M Gonda, M.D.   20 mg at 01/03/20 0203   • hydrALAZINE (APRESOLINE) injection 10-20 mg  10-20 mg Intravenous Q4HRS PRN Brian Ledesma M.D.   20 mg at 01/04/20 0227   • lidocaine (XYLOCAINE) 1 % injection 1-2 mL  1-2 mL Tracheal Tube Q30 MIN PRN Wilberto Ness M.D.       • Pharmacy Consult: Enteral tube insertion - review meds/change route/product selection  1 Each Other PHARMACY TO DOSE Constantin Avina M.D.       • senna-docusate (PERICOLACE or SENOKOT S) 8.6-50 MG per tablet 2 Tab  2 Tab Enteral Tube BID Constantin Avina M.D.   2 Tab at 01/04/20 0628    And   • polyethylene glycol/lytes (MIRALAX) PACKET 1 Packet  1 Packet Enteral Tube QDAY PRN Constantin Avina M.D.   1 Packet at 01/02/20 1727    And   • magnesium hydroxide (MILK OF MAGNESIA) suspension 30 mL  30 mL Enteral Tube QDAY PRN Constantin Avina M.D.   30 mL at 01/03/20 1802    And   • bisacodyl (DULCOLAX) suppository 10 mg  10 mg Rectal QDAY PRN Constantin Avina M.D.   10 mg at 01/04/20 0223   • acetaminophen (TYLENOL) tablet 650 mg  650 mg Enteral Tube Q4HRS PRTAMEKA Avina M.D.   650 mg at 12/30/19 1937    Or   • acetaminophen (TYLENOL) suppository 650 mg  650 mg Rectal Q4HRS PRN Constantin Avina M.D.   650 mg at 12/26/19 2341   • ondansetron (ZOFRAN ODT) dispertab 4 mg  4 mg Enteral Tube Q4HRS PRTAMEKA Avina M.D.        Or   • ondansetron (ZOFRAN)  syringe/vial injection 4 mg  4 mg Intravenous Q4HRS PRN Constantin Avina M.D.       • MD Alert...ICU Electrolyte Replacement per Pharmacy   Other PHARMACY TO DOSE Wilberto Ness M.D.       • Respiratory Care per Protocol   Nebulization Continuous RT Wilberto Ness M.D.           Fluids    Intake/Output Summary (Last 24 hours) at 1/4/2020 0810  Last data filed at 1/4/2020 0600  Gross per 24 hour   Intake 1890 ml   Output 2315 ml   Net -425 ml       Laboratory          Recent Labs     01/02/20  0520 01/03/20  0430 01/04/20  0300   SODIUM 138 137 135   POTASSIUM 4.3 4.0 4.5   CHLORIDE 103 103 101   CO2 25 28 27   BUN 37* 42* 43*   CREATININE 0.82 0.77 0.93   CALCIUM 9.4 9.0 9.2     Recent Labs     01/02/20 0520 01/03/20  0430 01/04/20  0300   GLUCOSE 124* 126* 121*     Recent Labs     01/02/20  0520 01/03/20  0430 01/04/20  0300   WBC 12.8* 14.0* 12.3*   NEUTSPOLYS 64.90 68.50 61.80   LYMPHOCYTES 18.00* 16.70* 21.10*   MONOCYTES 13.20 11.70 12.60   EOSINOPHILS 1.30 1.10 1.90   BASOPHILS 0.60 0.40 0.70     Recent Labs     01/02/20 0520 01/03/20  0430 01/04/20  0300   RBC 5.43 5.11 5.50   HEMOGLOBIN 16.1 15.0 16.0   HEMATOCRIT 47.7 45.2 49.4   PLATELETCT 342 349 389       Imaging  X-Ray:  I have personally reviewed the images and compared with prior images. and No film today 1/4    Assessment/Plan  * Intraparenchymal hemorrhage of brain (HCC)- (present on admission)  Assessment & Plan  Secondary to uncontrolled hypertension   Neurology consulted and signed off  Avoid anticoagulants, SCDs only  Continue strict blood pressure control with goal SBP less than 160  PT/OT/SLP  Rehab consulted  Up to chair, n.p.o. (may eventually need PEG), chema core track    Hypertensive emergency- (present on admission)  Assessment & Plan  Improving again today  Continue with current dose of scheduled amlodipine, clonidine and Vasotec  PRN hydralazine and labetalol for goal SBP less than 160    Acute respiratory  failure with hypoxia and hypercapnia (HCC)- (present on admission)  Assessment & Plan  Intubated date: 12/26-12/31  Continue aggressive pulmonary toiletry  RT/O2 protocol  Limit sedatives  Mobilization  Aspiration precautions  Discontinue diuresis    Aspiration into airway- (present on admission)  Assessment & Plan  MSSA pneumonia  Completed Ancef x5 days (12/31)  Aspiration precautions    Febrile  Assessment & Plan  Resolved  Completed antibiotics 12/31, monitor off antibiotics for now    Methamphetamine abuse (HCC)- (present on admission)  Assessment & Plan  Cessation counseling when clinically appropriate  Discontinue Risperdal and monitor    JAYASHREE (obstructive sleep apnea)- (present on admission)  Assessment & Plan  Keep head of bed at 30-45 degrees   Will need outpatient sleep study   Nocturnal oxygen as needed in the meantime    Hypomagnesemia- (present on admission)  Assessment & Plan  Repleted    Hypokalemia- (present on admission)  Assessment & Plan  Repletion and monitor       VTE:  Contraindicated, SCDs only for now  Ulcer: Not Indicated  Lines: Villasenor Catheter  Remove and trial condom catheter    I have performed a physical exam and reviewed and updated ROS and Plan today (1/4/2020). In review of yesterday's note (1/3/2020), there are no changes except as documented above.     Discussed patient condition and risk of morbidity and/or mortality with RN, RT, Pharmacy, [facility] resident, Charge nurse / hot rounds and Patient .  Awaiting transfer out of the ICU.  SNF consult

## 2020-01-04 NOTE — ASSESSMENT & PLAN NOTE
-Resolved, patient does not get confused at all, sometimes he gets tired and does not want to get much interested with nursing personnel, but he has not been altered at all.  -Intraparenchymal hemorrhage.   - Modafinil daily.  - Bowel protocol  - Improvement, he is alert and oriented most of the day.  - Dysarthria Improving  - Speech well understood   - Upgraded today 1/20 to Dysphagia 2/NTL diet.  - Evaluate in near future to advanced diet  - Peg tube placement on 1/13, not in use patient eating  - CTM  - No changes in mental status

## 2020-01-04 NOTE — PROGRESS NOTES
"Pt lethargic, arouses to stimuli, limited verbal responses, Pt states \"NO\" and shakes head no to having pain at this time.   "

## 2020-01-04 NOTE — PROGRESS NOTES
AM bedside report received from NOC RN. Plan of care discussed. Lines labs med's and orders reviewed. Neuro assessment completed with NOC RN. Pt followed commands with Right side,  Left side weak movement noted to noxious stimuli. Pt non verbal but nodded head No to having pain.

## 2020-01-04 NOTE — PROGRESS NOTES
Cortrak Placement    Tube Team verified patient name and medical record number prior to tube placement.  Cortrak tube (43 inches, 10 Belgian) placed at 68 cm in right nare.  Per Cortrak picture, tube appears to be in the stomach.  Nursing Instructions: Awaiting KUB to confirm placement before use for medications or feeding. Once placement confirmed, flush tube with 30 ml of water, and then remove and save stylet, in patient medication drawer.

## 2020-01-04 NOTE — PROGRESS NOTES
Dr Garcia updated on continued fluctuations of Pt's BP. /113. MD at bedside. Plan to hold transfer to Neuro floor for a few hours to re eval PT.

## 2020-01-04 NOTE — CARE PLAN
Problem: Venous Thromboembolism (VTW)/Deep Vein Thrombosis (DVT) Prevention:  Goal: Patient will participate in Venous Thrombosis (VTE)/Deep Vein Thrombosis (DVT)Prevention Measures  Intervention: Ensure patient wears graduated elastic stockings (AIDAN hose) and/or SCDs, if ordered, when in bed or chair (Remove at least once per shift for skin check)  Note:   Explained importance of SCD's. No evidence of learning, SCD's in place.     Problem: Skin Integrity  Goal: Risk for impaired skin integrity will decrease  Intervention: Implement precautions to protect skin integrity in collaboration with the interdisciplinary team  Note:   Patient turned/repositioned Q2 hours, tubes/lines off of skin, extremities floating on pillows, mepilex to sacrum.

## 2020-01-04 NOTE — PROGRESS NOTES
Pt's son Beni got in touch with Davonte, pt's father. Beni gave Davonte the password, and states that Davonte will be here to visit Monday likely.

## 2020-01-04 NOTE — PROGRESS NOTES
[facility] Progress Note      Admit Date: 12/24/2019    Resident(s): Kat Garcia M.D.   Attending:  ABNER MONTALVO/ Dr. Gonda    Patient ID:    Name:  Jacinto Loyola     YOB: 1970  Age:  49 y.o.  male   MRN:  [id number]    Hospital Course (carried forward and updated):  Jacinto Loyola is a 49 y.o. male with past medical history of hypertension, JAYASHREE, medications noncompliance, and meth abuse was brought in via EMS after he was found down at outside of his home at around 1600 on 12/24/2019.  As per chart review, patient parked his car, took a few steps and collapsed down on his front yard. Down for approximately 30 minutes before being found by friend.  Patient noted for left lower and upper extremity weakness as well as left facial weakness/numbness.      In the ED, blood pressure in the 190s/110-130s, heart rate in the 70s, patient saturating above 90% on 2 liters nasal cannula.  No leukocytosis or anemia on CBC.  Chemistry, troponin, INR and diagnostic alcohol normal.  Checks x-ray without acute cardiopulmonary process.  Head CT showed a 2.7 cm acute right basal ganglia/thalamic hemorrhage extravasating into the right lateral ventricle along with small right-sided mass-effect with minimal right-to-left midline shift.  No hydrocephalus. This was confirmed on Head CTA. Neurology was consulted by EDP.  He was given a one-time dose of labetalol and was started on a Cardene drip.  As per neurology goal MAP of .  No indication for neurosurgical involvement at this time.  Patient was admitted to ICU for intraparenchymal hemorrhage management and hypertensive crisis control.         Consultants:   Critical Care  Neurology     Interval Events:  Blood pressure varies around goal  HR 80-90s  -150s on amlodipine, enalapril, clonidine. Neuro rec SBP ~130  Pulled cortrak out overnight x2, bridled this am  Continue TF  Mental status may be worsened by constipation, risperdal, or delerium  LBM  "12/30 despite bowel protocol, enema this afternoon if remains ineffective  Discontinue Risperdal  Discontinue palmer, minimize lines  No lasix today  Completed course for abx 12/31/2019 for aspiration PNA   Stable for transfer out of ICU, order placed 1/2    Review of Systems   Unable to perform ROS: Mental status change       PHYSICAL EXAM:  Vitals:    01/04/20 0400 01/04/20 0500 01/04/20 0600 01/04/20 0810   BP: 134/80      Pulse: 94 (!) 104 92 100   Resp: 12 (!) 22 12 20   Temp: 36.7 °C (98.1 °F)  36.7 °C (98 °F)    TempSrc: Temporal  Temporal    SpO2: 93% 92% 99% 94%   Weight:       Height:        Body mass index is 29.09 kg/m².  Latest Vitals:  /80   Pulse 100   Temp 36.7 °C (98 °F) (Temporal)   Resp 20   Ht 1.829 m (6' 0.01\")   Wt 97.3 kg (214 lb 8.1 oz)   SpO2 94%   BMI 29.09 kg/m²   O2 therapy: Pulse Oximetry: 94 %, O2 (LPM): 3, O2 Delivery: Oxymask  Vitals Range last 24h:  Temp:  [35.9 °C (96.7 °F)-37 °C (98.6 °F)] 36.7 °C (98 °F)  Pulse:  [] 100  Resp:  [12-28] 20  BP: (111-189)/() 134/80  SpO2:  [89 %-100 %] 94 %       Intake/Output Summary (Last 24 hours) at 1/4/2020 0850  Last data filed at 1/4/2020 0600  Gross per 24 hour   Intake 1890 ml   Output 2315 ml   Net -425 ml         Physical Exam   Constitutional: He is not intubated.   HENT:   Head: Normocephalic and atraumatic.   Eyes: Pupils are equal, round, and reactive to light. No scleral icterus.   Neck: Normal range of motion. Neck supple.   Cardiovascular: Normal rate and regular rhythm.   Pulmonary/Chest: He is not intubated. He has wheezes (improved). He has no rhonchi. He has no rales.   Abdominal: Soft. He exhibits no distension. There is no tenderness.   Musculoskeletal:         General: No edema (minimal BUE).   Neurological:   Lethargic, Arouses to auditory stimuli. Purposeful movements, follows on right side only.   Skin: Skin is warm and dry.           Recent Labs     01/02/20  0520 01/03/20  0430 01/04/20  0300 "   SODIUM 138 137 135   POTASSIUM 4.3 4.0 4.5   CHLORIDE 103 103 101   CO2 25 28 27   BUN 37* 42* 43*   CREATININE 0.82 0.77 0.93   CALCIUM 9.4 9.0 9.2     Recent Labs     01/02/20 0520 01/03/20 0430 01/04/20  0300   GLUCOSE 124* 126* 121*     Recent Labs     01/02/20 0520 01/03/20 0430 01/04/20  0300   RBC 5.43 5.11 5.50   HEMOGLOBIN 16.1 15.0 16.0   HEMATOCRIT 47.7 45.2 49.4   PLATELETCT 342 349 389     Recent Labs     01/02/20 0520 01/03/20 0430 01/04/20  0300   WBC 12.8* 14.0* 12.3*   NEUTSPOLYS 64.90 68.50 61.80   LYMPHOCYTES 18.00* 16.70* 21.10*   MONOCYTES 13.20 11.70 12.60   EOSINOPHILS 1.30 1.10 1.90   BASOPHILS 0.60 0.40 0.70       Meds:  • enalapril  10 mg     • cloNIDine  0.2 mg     • risperiDONE  0.5 mg     • amLODIPine  10 mg     • oxyCODONE immediate-release  5-10 mg     • labetalol  10-20 mg     • hydrALAZINE  10-20 mg     • lidocaine  1-2 mL     • Pharmacy  1 Each     • senna-docusate  2 Tab      And   • polyethylene glycol/lytes  1 Packet      And   • magnesium hydroxide  30 mL      And   • bisacodyl  10 mg     • acetaminophen  650 mg      Or   • acetaminophen  650 mg     • ondansetron  4 mg      Or   • ondansetron  4 mg     • MD Alert...Adult ICU Electrolyte Replacement per Pharmacy       • Respiratory Care per Protocol            Procedures:  12/30/19 Electroencephalogram   12/28/19 Bronchoscopy with Therapeutic suctioning and BAL  12/27/19 Endotracheal intubation  12/27/19 Bronchoscopy with bronchoalveolar lavage and therapeutic aspiration of secretions    Imaging:  DX-ABDOMEN FOR TUBE PLACEMENT   Final Result      Feeding tube in place as noted above.      DX-CHEST-PORTABLE (1 VIEW)   Final Result         1.  Findings on chest radiograph appear stable since the prior radiograph.  No new abnormalities are identified.      2.  Endotracheal tube is no longer identified.      DX-CHEST-PORTABLE (1 VIEW)   Final Result      Number no change      DX-CHEST-PORTABLE (1 VIEW)   Final Result      1.   No significant change in right basilar opacification.      2.  Left basilar opacification has improved.      DX-CHEST-PORTABLE (1 VIEW)   Final Result         1. No significant interval change.      CT-HEAD W/O   Final Result      Stable right colonic intraparenchymal hematoma with intraventricular extension.      DX-CHEST-PORTABLE (1 VIEW)   Final Result         1. No significant interval change.      DX-CHEST-PORTABLE (1 VIEW)   Final Result      Interval intubation with decreasing lung volumes and increasing perihilar, basilar opacity worrisome for aspiration, pneumonia over edema      DX-CHEST-PORTABLE (1 VIEW)   Final Result      Increasing infrahilar opacity is worrisome for aspiration      DX-ABDOMEN FOR TUBE PLACEMENT   Final Result      Enteric tube terminates over the stomach.      DX-ABDOMEN FOR TUBE PLACEMENT   Final Result      Enteric tube tip projects over the distal stomach.      DX-ABDOMEN FOR TUBE PLACEMENT   Final Result      Enteric tube is coiled in the neck.      CT-HEAD W/O   Final Result      Considerable motion and misregistration artifact on the images.   3.5 cm acute right thalamic/right basal ganglia hemorrhage slightly larger than previous.   Extravasation of hemorrhage into the right lateral ventricle and dependent hemorrhage within the left lateral ventricle.   Right-sided mass effect with 2.7 mm right to left midline shift.         DX-ABDOMEN FOR TUBE PLACEMENT   Final Result      Enteric tube tip projects over the stomach.      EC-ECHOCARDIOGRAM COMPLETE W/O CONT   Final Result      CT-HEAD W/O   Final Result      Stable to slight interval enlargement of right thalamic intra-axial hematoma which extravasates into the lateral ventricle and results in unchanged trace leftward midline shift      Mild worsening vasogenic edema but there is no hydrocephalus or herniation      Motion degraded      DX-CHEST-PORTABLE (1 VIEW)   Final Result      No evidence of acute cardiopulmonary process.       CT-CTA NECK WITH & W/O-POST PROCESSING   Final Result      CT angiogram of the neck within normal limits. No large vessel occlusion or stenosis. No dissection.      CT-CTA HEAD WITH & W/O-POST PROCESS   Final Result      1.  No evidence of intracranial vascular occlusion or aneurysm.      2.  Large right basal ganglial intraparenchymal hemorrhage with extension into the right lateral ventricle again noted.      CT-HEAD W/O   Final Result      2.7 cm acute right basal ganglia/thalamic hemorrhage extravasating into the right lateral ventricle.   No hydrocephalus.   Mild right-sided mass effect with minimal right-to-left midline shift.   Central white matter low attenuation consistent with microvascular ischemic changes.          Problem and Plan:  * Intraparenchymal hemorrhage of brain (HCC)- (present on admission)  Assessment & Plan  2/2 poorly controlled HTN/HTN emergency, H/O methamphetamine abuse  Head CT showed a 2.7 cm acute right basal ganglia/thalamic hemorrhage, no indications for neurosurgery intervention  Neurologist on board, signed off 12/30/2019   Aspiration precaution  Feeding tube, requiring bridle  SBP control around 130 mmHg per neurologist (always less than  mmHg) Continue amlodipine/enalapril, clonidine   Keep head of bed 30 degrees  Continue RT  PT/OT/SLP  Failed SLP swallow eval due to lethargy, will repeat when appropriate  No sedation    Hypertensive emergency- (present on admission)  Assessment & Plan  Presented with blood pressure in the 190s/110-130s  Intraparenchymal hemorrhage  Discontinued nicardipine drip (Goal MAP )  UDS+ for amphetamines   Continue amlodipine/enalapril, clonidine  PRN hydralazine, labetalol     Altered mental status  Assessment & Plan  Intraparenchymal hemorrhage this admission. Mental status may be worsened by constipation, risperdal, or delerium. Protecting airway, follows on right side.  LBM 12/30 despite bowel protocol, enema this afternoon if  remains ineffective  Discontinue Risperdal  Discontinue palmer, minimize lines      Acute respiratory failure with hypoxia and hypercapnia (HCC)- (present on admission)  Assessment & Plan  Intubated 12/26- 12/31/2019 (aspiration and airway protection)  Extubate successfully 12/31/2019  Completed Abxs for MSSA/ PNA (Ancef x5 days)  Early mobilization, PT/OT/SLP  Aspiration precautions  RT/O2 per protocol     Aspiration into airway- (present on admission)  Assessment & Plan  MSSA pneumonia  Completed cefazolin 5 day course  Aspiration precautions  O2/RT     Methamphetamine abuse (HCC)- (present on admission)  Assessment & Plan  Counseling, when appropriate  Consulted SW    JAYASHREE (obstructive sleep apnea)- (present on admission)  Assessment & Plan  Not on CPAP at home  Unable to use Bipap with mental status and stroke  Continue O2 supplementation      DISPO: transfer to neuro [facility] 1/2    CODE STATUS: Full code    Quality Measures:  Feeding: NPO, TF, failed Swallow eval per SLP  Analgesia: acetaminophen  Sedation: none  Thromboprophylaxis: SCDs, pharmacologic ppx held for bleeding  Head of bed: >30 degrees  Ulcer prophylaxis: famotidine  Glycemic control: n/a  Bowel care: bowel regimen  Indwelling lines: alex d/c Palmer  Deescalation of antibiotics: Completed 5 days of cefazolin      Kat Garcia M.D.

## 2020-01-05 ENCOUNTER — APPOINTMENT (OUTPATIENT)
Dept: RADIOLOGY | Facility: MEDICAL CENTER | Age: 50
DRG: 064 | End: 2020-01-05
Attending: STUDENT IN AN ORGANIZED HEALTH CARE EDUCATION/TRAINING PROGRAM
Payer: MEDICAID

## 2020-01-05 LAB
BASE EXCESS BLDA CALC-SCNC: 3 MMOL/L (ref -4–3)
BODY TEMPERATURE: ABNORMAL CENTIGRADE
HCO3 BLDA-SCNC: 26 MMOL/L (ref 17–25)
PCO2 BLDA: 34.6 MMHG (ref 26–37)
PH BLDA: 7.49 [PH] (ref 7.4–7.5)
PO2 BLDA: 68 MMHG (ref 64–87)
SAO2 % BLDA: 94.1 % (ref 93–99)

## 2020-01-05 PROCEDURE — 700102 HCHG RX REV CODE 250 W/ 637 OVERRIDE(OP): Performed by: STUDENT IN AN ORGANIZED HEALTH CARE EDUCATION/TRAINING PROGRAM

## 2020-01-05 PROCEDURE — A9270 NON-COVERED ITEM OR SERVICE: HCPCS | Performed by: STUDENT IN AN ORGANIZED HEALTH CARE EDUCATION/TRAINING PROGRAM

## 2020-01-05 PROCEDURE — A9270 NON-COVERED ITEM OR SERVICE: HCPCS | Performed by: INTERNAL MEDICINE

## 2020-01-05 PROCEDURE — 700111 HCHG RX REV CODE 636 W/ 250 OVERRIDE (IP): Performed by: STUDENT IN AN ORGANIZED HEALTH CARE EDUCATION/TRAINING PROGRAM

## 2020-01-05 PROCEDURE — 82803 BLOOD GASES ANY COMBINATION: CPT

## 2020-01-05 PROCEDURE — 700102 HCHG RX REV CODE 250 W/ 637 OVERRIDE(OP): Performed by: INTERNAL MEDICINE

## 2020-01-05 PROCEDURE — 70450 CT HEAD/BRAIN W/O DYE: CPT

## 2020-01-05 PROCEDURE — 770006 HCHG ROOM/CARE - MED/SURG/GYN SEMI*

## 2020-01-05 PROCEDURE — 99233 SBSQ HOSP IP/OBS HIGH 50: CPT | Mod: GC | Performed by: INTERNAL MEDICINE

## 2020-01-05 RX ORDER — CLONIDINE HYDROCHLORIDE 0.1 MG/1
0.1 TABLET ORAL TWICE DAILY
Status: DISCONTINUED | OUTPATIENT
Start: 2020-01-05 | End: 2020-01-07

## 2020-01-05 RX ORDER — DOXAZOSIN MESYLATE 1 MG/1
1 TABLET ORAL
Status: DISCONTINUED | OUTPATIENT
Start: 2020-01-05 | End: 2020-01-06

## 2020-01-05 RX ORDER — DOXAZOSIN MESYLATE 1 MG/1
1 TABLET ORAL
Status: DISCONTINUED | OUTPATIENT
Start: 2020-01-05 | End: 2020-01-05

## 2020-01-05 RX ORDER — CHLORTHALIDONE 25 MG/1
25 TABLET ORAL
Status: DISCONTINUED | OUTPATIENT
Start: 2020-01-05 | End: 2020-01-06

## 2020-01-05 RX ORDER — CHLORTHALIDONE 25 MG/1
25 TABLET ORAL
Status: DISCONTINUED | OUTPATIENT
Start: 2020-01-05 | End: 2020-01-05

## 2020-01-05 RX ADMIN — ENALAPRIL MALEATE 10 MG: 10 TABLET ORAL at 04:37

## 2020-01-05 RX ADMIN — SENNOSIDES AND DOCUSATE SODIUM 2 TABLET: 8.6; 5 TABLET ORAL at 18:00

## 2020-01-05 RX ADMIN — SENNOSIDES AND DOCUSATE SODIUM 2 TABLET: 8.6; 5 TABLET ORAL at 06:00

## 2020-01-05 RX ADMIN — CLONIDINE HYDROCHLORIDE 0.2 MG: 0.1 TABLET ORAL at 04:38

## 2020-01-05 RX ADMIN — OXYCODONE HYDROCHLORIDE 5 MG: 5 TABLET ORAL at 06:26

## 2020-01-05 RX ADMIN — CLONIDINE HYDROCHLORIDE 0.1 MG: 0.1 TABLET ORAL at 18:00

## 2020-01-05 RX ADMIN — CHLORTHALIDONE 25 MG: 25 TABLET ORAL at 12:09

## 2020-01-05 RX ADMIN — BISACODYL 10 MG: 10 SUPPOSITORY RECTAL at 04:40

## 2020-01-05 RX ADMIN — HYDRALAZINE HYDROCHLORIDE 20 MG: 20 INJECTION INTRAMUSCULAR; INTRAVENOUS at 21:39

## 2020-01-05 RX ADMIN — AMLODIPINE BESYLATE 10 MG: 10 TABLET ORAL at 04:39

## 2020-01-05 RX ADMIN — DOXAZOSIN 1 MG: 1 TABLET ORAL at 20:51

## 2020-01-05 ASSESSMENT — COGNITIVE AND FUNCTIONAL STATUS - GENERAL
MOVING FROM LYING ON BACK TO SITTING ON SIDE OF FLAT BED: UNABLE
EATING MEALS: TOTAL
SUGGESTED CMS G CODE MODIFIER DAILY ACTIVITY: CN
TURNING FROM BACK TO SIDE WHILE IN FLAT BAD: UNABLE
CLIMB 3 TO 5 STEPS WITH RAILING: TOTAL
MOBILITY SCORE: 6
SUGGESTED CMS G CODE MODIFIER MOBILITY: CN
WALKING IN HOSPITAL ROOM: TOTAL
DRESSING REGULAR LOWER BODY CLOTHING: TOTAL
MOVING TO AND FROM BED TO CHAIR: UNABLE
DRESSING REGULAR UPPER BODY CLOTHING: TOTAL
DAILY ACTIVITIY SCORE: 6
PERSONAL GROOMING: TOTAL
TOILETING: TOTAL
HELP NEEDED FOR BATHING: TOTAL
STANDING UP FROM CHAIR USING ARMS: TOTAL

## 2020-01-05 NOTE — DISCHARGE PLANNING
[facility]  DX:  right basal ganglia   No insurance provider confirmed at this time  Limited support.

## 2020-01-05 NOTE — PROGRESS NOTES
Report called to Janice Noriega on Neuroscience floor. Plan for Pt to transfer with transport tech to [address].

## 2020-01-05 NOTE — CARE PLAN
Problem: Communication  Goal: The ability to communicate needs accurately and effectively will improve  Outcome: PROGRESSING SLOWER THAN EXPECTED  Note:   Pt. Is A&0 x 2, not oriented to person or event. Pt. Is verbal and non-verbal, as he tires quickly. Will continue to round hourly and encourage the Pt. To ask questions and voice feelings.            Problem: Safety  Goal: Will remain free from falls  Note:   Pt is bed bound and in a soft wrist restraint on the right wrist. Pt. Has left sided weakness. Q 2 turns are in place. Room has adequate lighting, is free of clutter, call light is within reach, and bed is locked and in the lowest position. Will continue to hourly round.

## 2020-01-05 NOTE — PROGRESS NOTES
[place] [address] [place], )2 3 L oxy mask, Pt belongings bags x2 with transport tech.

## 2020-01-05 NOTE — PROGRESS NOTES
[facility] ICU Transfer Note                                                                                         ICU Admit Date: 12/24/2019       ICU Discharge Date:  01/05/20        Primary Diagnosis:   Intraparenchymal hemorrhage of brain (HCC)        ICU Course Summary (Brief Narrative):  In the ED, blood pressure in the 190s/110-130s, heart rate in the 70s, patient saturating above 90% on 2 liters nasal cannula.  No leukocytosis or anemia on CBC.  Chemistry, troponin, INR and diagnostic alcohol normal.  Checks x-ray without acute cardiopulmonary process.  Head CT showed a 2.7 cm acute right basal ganglia/thalamic hemorrhage extravasating into the right lateral ventricle along with small right-sided mass-effect with minimal right-to-left midline shift.  No hydrocephalus. This was confirmed on Head CTA. Neurology was consulted by EDP.  He was given a one-time dose of labetalol and was started on a Cardene drip.  As per neurology goal MAP of .  No indication for neurosurgical involvement at this time.  Patient was admitted to ICU for intraparenchymal hemorrhage management and hypertensive crisis control.       12/25/2019  - Still has dysarthria with left side weakness, no change in mentation  - Replete electrolytes per pharmacy  - UDS positive for ampthetamine    12/26/19  Continue BP control    12/27/19  - Overnight Pt aspirated tube feed. Pt was more obtunded (bedside nurse noted tube feed in the oropharynx), patient was intubated, bronched/BAL.  - Intubated for respiratory distress  - Hold norvasc since hypotension; Use more nicardipine gtt as needed  - Unasyn for Aspiration pneumonia, pending BAL cell count/culture results    12/28/19  - Intubated and sedated APVCMV  - Febrile T Max 101.3F, -149 mmHg, keep MAP     12/29/19  - Territory of ICH is in thalamus and likely hypothalamus that could be contributing to decreased MS, neurologist will consider EEG although seizures are less likely to  explain AMS    12/30/19  - Cefazolin started for aspiration PNA MSSA  - Withdraws to pain  - Getting fentanyl, propofol for agitation with hypertension  - Son has been visiting, will discuss goals when appropriate    12/31/19  EEG yesterday without evidence of seizure  Extubated successfully    1/1/20  - Off fentanyl, propofol, haldol    1/2/20  Moves RLE, decerebrate posturing on left side  Failed swallow eval due to lethargy    1/3/20  No major events    1/4/20  Pulled cortrak out overnight x2, bridled this am  Mental status may be worsened by constipation, risperdal, or delerium  Transferred to floor    Important Events in the ICU:   - Central Line: None   - Intubation: 12/28/19 - 12/31/19  - Pressors:   - Villasenor catheter: y  - Tube feeding: y  - Antibiotics: complete  - Other procedures:   12/30/19 Electroencephalogram   12/28/19 Bronchoscopy with Therapeutic suctioning and BAL  12/27/19 Endotracheal intubation  12/27/19 Bronchoscopy with bronchoalveolar lavage and therapeutic aspiration of secretions     Labs and imaging studies to be continued with their indications:  - CBC: for leukocytosis  - CMP or BMP: Yes   - Magnesium: Yes  - Phosphorus: No   - Chest Xray: No; unless having worsening respiratory status  - Other studies: None      Things to follow:   -  Failed SLP swallow eval due to lethargy, will repeat when appropriate  - BP control Continue amlodipine/enalapril, clonidine  PRN hydralazine, labetalol   -LBM 12/30 despite bowel protocol --> need to advance  -substance abuse counseling when appropriate  -f/u on JAYASHREE if appropriate on d/c

## 2020-01-05 NOTE — CARE PLAN
Problem: Communication  Goal: The ability to communicate needs accurately and effectively will improve  Outcome: PROGRESSING SLOWER THAN EXPECTED     Problem: Discharge Barriers/Planning  Goal: Patient's continuum of care needs will be met  Outcome: PROGRESSING SLOWER THAN EXPECTED     Problem: Skin Integrity  Goal: Risk for impaired skin integrity will decrease  Outcome: PROGRESSING SLOWER THAN EXPECTED     Problem: Respiratory:  Goal: Respiratory status will improve  Outcome: PROGRESSING SLOWER THAN EXPECTED     Problem: Safety - Medical Restraint  Goal: Remains free of injury from restraints (Restraint for Interference with Medical Device)  Description  INTERVENTIONS:  1. Determine that other, less restrictive measures have been tried or would not be effective before applying the restraint  2. Evaluate the patient's condition at the time of restraint application  3. Inform patient/family regarding the reason for restraint  4. Q2H: Monitor safety, psychosocial status, comfort, nutrition and hydration  Outcome: PROGRESSING SLOWER THAN EXPECTED  Goal: Free from restraint(s) (Restraint for Interference with Medical Device)  Description  INTERVENTIONS:  1. ONCE/SHIFT or MINIMUM Q12H: Assess and document the continuing need for restraints  2. Q24H: Continued use of restraint requires LIP to perform face to face examination and written order  3. Identify and implement measures to help patient regain control  Outcome: PROGRESSING SLOWER THAN EXPECTED     Problem: Safety  Goal: Will remain free from falls  Outcome: PROGRESSING AS EXPECTED     Problem: Venous Thromboembolism (VTW)/Deep Vein Thrombosis (DVT) Prevention:  Goal: Patient will participate in Venous Thrombosis (VTE)/Deep Vein Thrombosis (DVT)Prevention Measures  Outcome: PROGRESSING AS EXPECTED     Problem: Bowel/Gastric:  Goal: Will not experience complications related to bowel motility  Outcome: PROGRESSING AS EXPECTED  Note:   Had a bowel movement this am

## 2020-01-05 NOTE — PROGRESS NOTES
Pt. Has abrasion to left cheek open to air. Mepilex reinforced on coccyx covering two tears to left cheek. Pt. Has been diaphoretic throughout the night, leaving skin at the face, head, neck, and upper torso moist. Mepilex on left heel to prevent pressure ulcer. Bilateral heals clean, dry and intact. Skin otherwise intact.

## 2020-01-05 NOTE — PROGRESS NOTES
Internal Medicine Interval Note  Note Author: Rachel Gray M.D.     Name Jacinto Loyola       1970   Age/Sex 49 y.o. male   MRN [id number]   Code Status FULL     After 5PM or if no immediate response to page, please call for cross-coverage  Attending/Team: Dr. De Souza/Lindsay See Patient List for primary contact information  Call [phone] to page    1st Call - Day Intern (R1):   Dr. Ferrer 2nd Call - Day Sr. Resident (R2/R3):   Dr. Gray         Reason for interval visit  (Principal Problem)   Intraparenchymal hemorrhage of the right basal ganglia secondary to hypertensive emergency; persistent left hemiparesis, dysarthria, and hypersomnolence    Interval Problem Daily Status Update  (24 hours, problem oriented, brief subjective history, new lab/imaging data pertinent to that problem)   - 49M PMHx HTN, medical non-compliance, and meth use presented on  after being found down and admitted for HTN emergency with intraparenchymal ICH in the right basal ganglia. Serial CTs showed hemorrhage was stable; last was .   - Now with persistent somnolence/encephalopathy, left-sided hemiparesis, and severe dysarthria limiting ROS. Per ICU team despite the encephalopathy/hypersomnolence he has been protecting his airway off of ventilator for several days.    - HTN currently controlled with amlodipine, clonidine, enalapril, and PRNs--though he does have occasional peaks to HTN urgency range, he responds to the PRNs on board. His goal SBP is <160 inpatient (<130 outpatient) according to Neuro recs; they have since signed off.   - today was noted to be hypersomnolent, to the point of not initially responding to aggressive sternal rub. Per transferring ICU team this occurs intermittently and appears to be his new baseline. However as he has not had CT head non-con since  we will get repeat this afternoon if the hypersomnolence persists; RN made aware and will call if sx continue.   -  additionally given hypersomnolence will begin transitioning off of clonidine, down-titrating today from 0.2 mg bid to 0.1 mg bid, and adding adjunct doxazosin at 1 mg Qdaily as well as chlorthalidone at 25 mg Qdaily.  - SNF referral placed prior to transfer to floor. Given length of time since initial ICH event, neurological prognosis looking poorer and poorer. Anticipate discussion of goals of care with family this week if possible.        Review of Systems   Unable to perform ROS: Medical condition   Severe dysarthria related to ICH as above    Disposition/Barriers to discharge:   - awaiting SNF placement  - goals of care discussion  - titrating HTN medications    Consultants/Specialty  Neurology  Critical Care      Quality Measures  Quality-Core Measures   Reviewed items::  Labs reviewed and Medications reviewed  Villasenor catheter::  No Villasenor  DVT prophylaxis pharmacological::  Contraindicated - High bleeding risk (ICH)  DVT prophylaxis - mechanical:  SCDs          Physical Exam       Vitals:    01/05/20 0000 01/05/20 0400 01/05/20 0405 01/05/20 0538   BP: 136/93 (!) 176/110 152/97 120/77   Pulse: 75 85 72 77   Resp: 20 20     Temp: 36.6 °C (97.9 °F) 36.9 °C (98.4 °F)     TempSrc: Temporal Temporal     SpO2: 95% 98%     Weight:       Height:         Body mass index is 29.09 kg/m².    Oxygen Therapy:  Pulse Oximetry: 98 %, O2 (LPM): 3.5, O2 Delivery: Oxymask    Physical Exam   Constitutional: He is well-developed, well-nourished, and in no distress. No distress.   Hypersomnolent. Initially arousable on first exam this morning and could at least nod or shake head to orientation questions (nodded to person-place-time-situation and attempted to say own name); however on reassessment was more hypersomnolent, with difficulty rousing even on sternal rub (though he did eventually rouse).   HENT:   Head: Normocephalic and atraumatic.   Eyes: Conjunctivae are normal. Right eye exhibits no discharge. Left eye exhibits no  discharge. No scleral icterus.   Cardiovascular: Normal rate and regular rhythm. Exam reveals no gallop and no friction rub.   No murmur heard.  Pulmonary/Chest: Effort normal and breath sounds normal. No respiratory distress. He has no wheezes. He has no rales.   Abdominal: Soft. Bowel sounds are normal. He exhibits no distension. There is no tenderness. There is no rebound and no guarding.   Musculoskeletal:         General: No tenderness or deformity.   Neurological:   See constitutional re: orientation. Left-sided hemiparesis. 4/5 strength RUE, 3/5 strength RLE.    Skin: Skin is warm and dry. No rash noted. He is not diaphoretic. No erythema. No pallor.   Psychiatric:   Cannot assess given somnolence and dysarthria         Assessment/Plan     * Intraparenchymal hemorrhage of brain (HCC)- (present on admission)  Assessment & Plan  2/2 poorly controlled HTN/HTN emergency, H/O methamphetamine abuse  Head CT showed a 2.7 cm acute right basal ganglia/thalamic hemorrhage, no indications for neurosurgery intervention  Serial head CT negative for ICH progression; last CT head 12/28. Per Neurology: recommend inpatient SBP control <160, outpatient <130, LDL <70, A1C <7%. EEG negative for seizure. Neuro signed off on 12/30.  Aspiration precaution  Feeding tube, requiring bridle  Keep head of bed 30 degrees  Continue RT  PT/OT/SLP  Failed SLP swallow eval due to lethargy, will repeat when appropriate  No sedation  - HTN medication changes today: continuing amlodipine 10 and enalapril 10 as well as PRNs; down-titrating clonidine from 0.2 mg bid to 0.1 mg bid given somnolence with plan to titrate off slowly; adding doxazosin 1 mg Qdaily (can increase to 2 mg Qdaily as needed); adding chlorthalidone 25 mg Qdaily (can increase to 50 mg Qdaily if needed)  - consider goals of care discussion with family this week given neurological prognosis poor  - SNF referral placed prior to transfer from ICU; pending    Hypertensive  emergency- (present on admission)  Assessment & Plan  Presented with blood pressure in the 190s/110-130s  Intraparenchymal hemorrhage  Discontinued nicardipine drip (Goal MAP )  UDS+ for amphetamines   HTN medication changes today: continuing amlodipine 10 and enalapril 10 as well as PRNs; down-titrating clonidine from 0.2 mg bid to 0.1 mg bid given somnolence with plan to titrate off slowly; adding doxazosin 1 mg Qdaily (can increase to 2 mg Qdaily as needed); adding chlorthalidone 25 mg Qdaily (can increase to 50 mg Qdaily if needed)    Altered mental status  Assessment & Plan  Intraparenchymal hemorrhage this admission. Mental status may be worsened by constipation, risperdal, or delerium. Protecting airway, follows on right side.  LBM 12/30 despite bowel protocol, enema this afternoon if remains ineffective  Discontinue Risperdal; now also down-titrating clonidine as above.   Discontinue palmer, minimize lines      Acute respiratory failure with hypoxia and hypercapnia (HCC)- (present on admission)  Assessment & Plan  Intubated 12/26- 12/31/2019 (aspiration and airway protection)  Extubate successfully 12/31/2019  Completed Abxs for MSSA/ PNA (Ancef x5 days)  Early mobilization, PT/OT/SLP  Aspiration precautions  RT/O2 per protocol     Aspiration into airway- (present on admission)  Assessment & Plan  MSSA pneumonia  Completed cefazolin 5 day course  Aspiration precautions  O2/RT     Methamphetamine abuse (HCC)- (present on admission)  Assessment & Plan  Counseling, when appropriate  Consulted SW    JAYASHREE (obstructive sleep apnea)- (present on admission)  Assessment & Plan  Not on CPAP at home  Unable to use Bipap with mental status and stroke  Continue O2 supplementation  Currently protecting airway x several days off ventilator despite hypersomnolence per discussion with ICU team. Will continue to monitor closely

## 2020-01-05 NOTE — PROGRESS NOTES
[facility] gold paged regarding patient not having bowel movement 24+ hours after dulcolax suppository administration (per order notify provider if no bowel movement 24 hours after administration). Orders received to repeat suppository this AM.

## 2020-01-06 LAB
CRP SERPL HS-MCNC: 0.76 MG/DL (ref 0–0.75)
PREALB SERPL-MCNC: 32 MG/DL (ref 18–38)

## 2020-01-06 PROCEDURE — 700102 HCHG RX REV CODE 250 W/ 637 OVERRIDE(OP): Performed by: INTERNAL MEDICINE

## 2020-01-06 PROCEDURE — 51798 US URINE CAPACITY MEASURE: CPT

## 2020-01-06 PROCEDURE — A9270 NON-COVERED ITEM OR SERVICE: HCPCS | Performed by: INTERNAL MEDICINE

## 2020-01-06 PROCEDURE — 97530 THERAPEUTIC ACTIVITIES: CPT

## 2020-01-06 PROCEDURE — 700111 HCHG RX REV CODE 636 W/ 250 OVERRIDE (IP): Performed by: STUDENT IN AN ORGANIZED HEALTH CARE EDUCATION/TRAINING PROGRAM

## 2020-01-06 PROCEDURE — 97112 NEUROMUSCULAR REEDUCATION: CPT

## 2020-01-06 PROCEDURE — 770006 HCHG ROOM/CARE - MED/SURG/GYN SEMI*

## 2020-01-06 PROCEDURE — 99232 SBSQ HOSP IP/OBS MODERATE 35: CPT | Mod: GC | Performed by: INTERNAL MEDICINE

## 2020-01-06 PROCEDURE — 700102 HCHG RX REV CODE 250 W/ 637 OVERRIDE(OP): Performed by: STUDENT IN AN ORGANIZED HEALTH CARE EDUCATION/TRAINING PROGRAM

## 2020-01-06 PROCEDURE — 84134 ASSAY OF PREALBUMIN: CPT

## 2020-01-06 PROCEDURE — A9270 NON-COVERED ITEM OR SERVICE: HCPCS | Performed by: STUDENT IN AN ORGANIZED HEALTH CARE EDUCATION/TRAINING PROGRAM

## 2020-01-06 PROCEDURE — 36415 COLL VENOUS BLD VENIPUNCTURE: CPT

## 2020-01-06 PROCEDURE — 86140 C-REACTIVE PROTEIN: CPT

## 2020-01-06 RX ORDER — CHLORTHALIDONE 50 MG/1
50 TABLET ORAL
Status: DISCONTINUED | OUTPATIENT
Start: 2020-01-07 | End: 2020-01-17

## 2020-01-06 RX ORDER — DOXAZOSIN 2 MG/1
2 TABLET ORAL
Status: DISCONTINUED | OUTPATIENT
Start: 2020-01-07 | End: 2020-01-17

## 2020-01-06 RX ADMIN — OXYCODONE HYDROCHLORIDE 10 MG: 5 TABLET ORAL at 02:46

## 2020-01-06 RX ADMIN — CHLORTHALIDONE 25 MG: 25 TABLET ORAL at 04:26

## 2020-01-06 RX ADMIN — AMLODIPINE BESYLATE 10 MG: 10 TABLET ORAL at 04:23

## 2020-01-06 RX ADMIN — HYDRALAZINE HYDROCHLORIDE 10 MG: 20 INJECTION INTRAMUSCULAR; INTRAVENOUS at 19:36

## 2020-01-06 RX ADMIN — OXYCODONE HYDROCHLORIDE 10 MG: 5 TABLET ORAL at 14:43

## 2020-01-06 RX ADMIN — CLONIDINE HYDROCHLORIDE 0.1 MG: 0.1 TABLET ORAL at 04:23

## 2020-01-06 RX ADMIN — SENNOSIDES AND DOCUSATE SODIUM 2 TABLET: 8.6; 5 TABLET ORAL at 16:30

## 2020-01-06 RX ADMIN — SENNOSIDES AND DOCUSATE SODIUM 2 TABLET: 8.6; 5 TABLET ORAL at 04:26

## 2020-01-06 RX ADMIN — CLONIDINE HYDROCHLORIDE 0.1 MG: 0.1 TABLET ORAL at 16:30

## 2020-01-06 RX ADMIN — DOXAZOSIN 1 MG: 1 TABLET ORAL at 19:36

## 2020-01-06 RX ADMIN — OXYCODONE HYDROCHLORIDE 10 MG: 5 TABLET ORAL at 19:36

## 2020-01-06 RX ADMIN — ENALAPRIL MALEATE 10 MG: 10 TABLET ORAL at 04:23

## 2020-01-06 ASSESSMENT — COGNITIVE AND FUNCTIONAL STATUS - GENERAL
WALKING IN HOSPITAL ROOM: TOTAL
SUGGESTED CMS G CODE MODIFIER MOBILITY: CN
TURNING FROM BACK TO SIDE WHILE IN FLAT BAD: UNABLE
CLIMB 3 TO 5 STEPS WITH RAILING: TOTAL
STANDING UP FROM CHAIR USING ARMS: TOTAL
MOBILITY SCORE: 6
MOVING FROM LYING ON BACK TO SITTING ON SIDE OF FLAT BED: UNABLE
MOVING TO AND FROM BED TO CHAIR: UNABLE

## 2020-01-06 ASSESSMENT — GAIT ASSESSMENTS: GAIT LEVEL OF ASSIST: UNABLE TO PARTICIPATE

## 2020-01-06 NOTE — PROGRESS NOTES
Internal Medicine Interval Note  Note Author: Alex Lozoya M.D.     Name Jacinto Loyola       1970   Age/Sex 49 y.o. male   MRN [id number]   Code Status Full Code     After 5PM or if no immediate response to page, please call for cross-coverage  Attending/Team: Dr De Souza/Lindsay See Patient List for primary contact information  Call [phone] to page    1st Call - Day Intern (R1):   Dr Lozoya 2nd Call - Day Sr. Resident (R2/R3):   Dr Gray         Reason for interval visit  (Principal Problem)   Intraparenchymal Hemorrhage of R Basal Ganglia secondary to Hypertensive Emergency      Interval Problem Daily Status Update  (24 hours, problem oriented, brief subjective history, new lab/imaging data pertinent to that problem)     Mr Loyola is a 50 y/o M ICU transfer after stabilization for the Above.  Patient with persistent left hemiparesis, dysarthria, and Hypersomnolence.  Per nursing personnel patient was awake and mild agitated last night, he also had 3 BP readings above ideal limits per Neuro recommendations, required PRN BP meds once last night at 9:40 pm. His BP was 188/98.  Patient was difficult to awake this morning, he is alert to person and place, he is very difficult to understand, he is able to follow commands.  Team discussing adjustment in BP management.  We also requested moving the patient next to window to have him exposed to daylight and brake the cycle of disrupted sleep pattern.   Morning vitals stable, he is afebrile.  Family talk to discuss goals of care is pending.  SNF referral placed.       Review of Systems   Unable to perform ROS: Mental status change        Disposition/Barriers to discharge:     - Transfer  - Family goals of care talk  - Adjusting BP management    Consultants/Specialty    - Neurology, Critical care.      PCP: Pcp Pt States None      Quality Measures  Quality-Core Measures   Reviewed items::  Labs reviewed, Medications reviewed and Radiology images  reviewed  Villasenor catheter::  No Villasenor  DVT prophylaxis - mechanical:  SCDs  Ulcer Prophylaxis::  No          Physical Exam       Vitals:    01/06/20 0618 01/06/20 0900 01/06/20 1600 01/06/20 1630   BP:  146/68 130/76 (!) 165/102   Pulse: 85 70 68 76   Resp: 17 16 15    Temp:  36.6 °C (97.8 °F) 37.1 °C (98.8 °F)    TempSrc:  Temporal Temporal    SpO2:  96% 93%    Weight:       Height:         Body mass index is 29.09 kg/m².    Oxygen Therapy:  Pulse Oximetry: 93 %, O2 (LPM): 0, O2 Delivery: None (Room Air)    Physical Exam   Constitutional: No distress.   HENT:   Head: Normocephalic and atraumatic.   Eyes: Pupils are equal, round, and reactive to light. Conjunctivae and EOM are normal.   Neck: Normal range of motion. Neck supple. No JVD present.   Cardiovascular: Normal rate, regular rhythm and normal heart sounds.   No murmur heard.  Pulmonary/Chest: Effort normal and breath sounds normal. No stridor. No respiratory distress. He has no wheezes. He has no rales.   Abdominal: Soft. Bowel sounds are normal. He exhibits no distension. There is no tenderness. There is no rebound and no guarding.   Musculoskeletal:         General: No edema.      Comments: Hemiparesis left side, unable to do any movement of arm or leg. Responsive to pain stimuli.  Right side no deficits.   Neurological: He is alert.   Alert to person and place (knows he is in the hospital)   Skin: Skin is warm. No rash noted. No erythema.             Assessment/Plan     * Intraparenchymal hemorrhage of brain (HCC)- (present on admission)  Assessment & Plan  - Intracranial Hemorrhage sec to poorly controlled HTN  -HTN emergency  - H/O methamphetamine abuse  - Head CT showed a 2.7 cm acute right basal ganglia/thalamic hemorrhage, no indications for neurosurgery intervention  - Repeated CT scan on 1/5 showed stable hemorrhage, no new bleed.  - Per Neurology: recommend inpatient SBP control <160, outpatient <130, LDL <70, A1C <7%.  - EEG negative for seizure.  Neuro signed off on 12/30.               PLAN:  -Aspiration precaution  -Feeding tube, requiring bridle  -Keep head of bed 30 degrees  -Continue RT  -PT/OT/SLP  -Failed SLP swallow eval due to lethargy  --Turn patient Q2 hrs to prevent bed sores  - HTN medication changes for better BP control  - SNF referral placed prior to transfer from ICU; pending    Altered mental status  Assessment & Plan  -Intraparenchymal hemorrhage.   -Mental status may be worsened by constipation, risperdal, or delerium.  - Bowel protocol  - Reserved prognosis     Methamphetamine abuse (HCC)- (present on admission)  Assessment & Plan  Counseling, when appropriate  Consulted SW    JAYASHREE (obstructive sleep apnea)- (present on admission)  Assessment & Plan  -Not on CPAP at home  -Unable to use Bipap with mental status and stroke  -Continue O2 supplementation as needed  - CTM    Hypertensive emergency- (present on admission)  Assessment & Plan  -Presented with blood pressures of  190s  SBP; and 110-130s DBP  - Intraparenchymal hemorrhage Imaging  - BP last night 188/98, RN gave PRN mds once  - CTM BP  - Assessing changes in BP regimen for better BP control  - Increasing Chlortalidone to 50 and Doxazosin to 2mg    Acute respiratory failure with hypoxia and hypercapnia (HCC)- (present on admission)  Assessment & Plan  -Patient was Intubated 12/26- 12/31/2019 (for aspiration and airway protection)  -Extubate successfully 12/31/2019  -Was treated with Abxs for MSSA/ PNA (Ancef x5 days)  - PT/OT/SLP  -Aspiration precautions  -RT/O2 per protocol     Aspiration into airway- (present on admission)  Assessment & Plan  -MSSA pneumonia  - Patient Completed cefazolin 5 day course  - Aspiration precautions  - O2/RT   - Feeding tube

## 2020-01-06 NOTE — PROGRESS NOTES
Close and good coordination with care team appreciated.  Pt was extremely lethargic at times and responsive at other times earlier in the day.  Oral care done multiple times today by this nurse and by CNA, however pt has significant build up and did continue to bite mouth swabs.    Later in the evening pt woke up, became verbal with slurred speech, and was appropriate and able to follow commands.  He was confused about the use of the call light but otherwise interactive. Pt's son aware of pt condition this morning, and updated this evening on change.

## 2020-01-06 NOTE — CARE PLAN
Problem: Communication  Goal: The ability to communicate needs accurately and effectively will improve  1/5/2020 2021 by Aydee Marks R.N.  Outcome: PROGRESSING SLOWER THAN EXPECTED  Patient lethargic at times, able to speak single words occasionally with slurred speech. Encouraging patient to use call light.      Problem: Safety  Goal: Will remain free from falls  1/5/2020 2021 by Aydee Marks R.N.  Outcome: PROGRESSING SLOWER THAN EXPECTED  Bed alarm on, bed locked in lowest position, upper side rails up, call light and personal items within reach, non skids socks on.

## 2020-01-06 NOTE — CARE PLAN
Problem: Safety  Goal: Will remain free from falls  Outcome: PROGRESSING AS EXPECTED  Intervention: Implement fall precautions  Note:   Fall precautions in place.     Problem: Skin Integrity  Goal: Risk for impaired skin integrity will decrease  Outcome: PROGRESSING AS EXPECTED  Intervention: Assess and monitor skin integrity, appearance and/or temperature  Note:   Turn q2hrs     Problem: Safety - Medical Restraint  Goal: Remains free of injury from restraints (Restraint for Interference with Medical Device)  Description  INTERVENTIONS:  1. Determine that other, less restrictive measures have been tried or would not be effective before applying the restraint  2. Evaluate the patient's condition at the time of restraint application  3. Inform patient/family regarding the reason for restraint  4. Q2H: Monitor safety, psychosocial status, comfort, nutrition and hydration  Outcome: PROGRESSING AS EXPECTED  Note:   Monitor q2hrs

## 2020-01-06 NOTE — PROGRESS NOTES
Assumed patient care at 1900 and received bedside report.    Patient alert and oriented X 3, disoriented to time, patient has slurred speech. Patient extremely restless throughout shift. Patient has left sided weakness. Patient denies chest pain, sob, nausea and vomiting, headache, and blurry or double vision. Patient denies numbness and tingling. Patient denies pain. Patient on waffle mattress, Q2 turns in place. R wrist in restraint d/t pulling at lines. Condom cath in place.  POC discussed and education provided on administered medications. All questions and concerns addressed. Fall precautions, hourly rounding and Q4 hour neuro checks in place.

## 2020-01-07 PROBLEM — R33.9 URINARY RETENTION: Status: ACTIVE | Noted: 2020-01-07

## 2020-01-07 PROCEDURE — 97535 SELF CARE MNGMENT TRAINING: CPT

## 2020-01-07 PROCEDURE — 700102 HCHG RX REV CODE 250 W/ 637 OVERRIDE(OP): Performed by: INTERNAL MEDICINE

## 2020-01-07 PROCEDURE — 700102 HCHG RX REV CODE 250 W/ 637 OVERRIDE(OP): Performed by: STUDENT IN AN ORGANIZED HEALTH CARE EDUCATION/TRAINING PROGRAM

## 2020-01-07 PROCEDURE — 770006 HCHG ROOM/CARE - MED/SURG/GYN SEMI*

## 2020-01-07 PROCEDURE — A9270 NON-COVERED ITEM OR SERVICE: HCPCS | Performed by: STUDENT IN AN ORGANIZED HEALTH CARE EDUCATION/TRAINING PROGRAM

## 2020-01-07 PROCEDURE — 97112 NEUROMUSCULAR REEDUCATION: CPT

## 2020-01-07 PROCEDURE — A9270 NON-COVERED ITEM OR SERVICE: HCPCS | Performed by: INTERNAL MEDICINE

## 2020-01-07 PROCEDURE — 51798 US URINE CAPACITY MEASURE: CPT

## 2020-01-07 PROCEDURE — 92526 ORAL FUNCTION THERAPY: CPT

## 2020-01-07 PROCEDURE — 99232 SBSQ HOSP IP/OBS MODERATE 35: CPT | Mod: GC | Performed by: INTERNAL MEDICINE

## 2020-01-07 RX ORDER — CLONIDINE HYDROCHLORIDE 0.1 MG/1
0.1 TABLET ORAL DAILY
Status: DISCONTINUED | OUTPATIENT
Start: 2020-01-08 | End: 2020-01-10

## 2020-01-07 RX ORDER — MODAFINIL 100 MG/1
100 TABLET ORAL EVERY MORNING
Status: DISCONTINUED | OUTPATIENT
Start: 2020-01-07 | End: 2020-01-17

## 2020-01-07 RX ORDER — MODAFINIL 100 MG/1
100 TABLET ORAL EVERY MORNING
Status: DISCONTINUED | OUTPATIENT
Start: 2020-01-07 | End: 2020-01-07

## 2020-01-07 RX ADMIN — OXYCODONE HYDROCHLORIDE 10 MG: 5 TABLET ORAL at 00:36

## 2020-01-07 RX ADMIN — DOXAZOSIN 2 MG: 2 TABLET ORAL at 17:14

## 2020-01-07 RX ADMIN — AMLODIPINE BESYLATE 10 MG: 10 TABLET ORAL at 04:18

## 2020-01-07 RX ADMIN — CLONIDINE HYDROCHLORIDE 0.1 MG: 0.1 TABLET ORAL at 04:18

## 2020-01-07 RX ADMIN — CHLORTHALIDONE 50 MG: 50 TABLET ORAL at 04:17

## 2020-01-07 RX ADMIN — ENALAPRIL MALEATE 10 MG: 10 TABLET ORAL at 04:17

## 2020-01-07 RX ADMIN — MODAFINIL 100 MG: 100 TABLET ORAL at 10:06

## 2020-01-07 RX ADMIN — OXYCODONE HYDROCHLORIDE 10 MG: 5 TABLET ORAL at 17:14

## 2020-01-07 RX ADMIN — SENNOSIDES AND DOCUSATE SODIUM 2 TABLET: 8.6; 5 TABLET ORAL at 04:18

## 2020-01-07 RX ADMIN — OXYCODONE HYDROCHLORIDE 10 MG: 5 TABLET ORAL at 21:31

## 2020-01-07 RX ADMIN — SENNOSIDES AND DOCUSATE SODIUM 2 TABLET: 8.6; 5 TABLET ORAL at 17:13

## 2020-01-07 ASSESSMENT — COGNITIVE AND FUNCTIONAL STATUS - GENERAL
PERSONAL GROOMING: A LOT
TOILETING: TOTAL
HELP NEEDED FOR BATHING: A LOT
SUGGESTED CMS G CODE MODIFIER DAILY ACTIVITY: CL
DRESSING REGULAR UPPER BODY CLOTHING: A LOT
EATING MEALS: TOTAL
DAILY ACTIVITIY SCORE: 9
DRESSING REGULAR LOWER BODY CLOTHING: TOTAL

## 2020-01-07 NOTE — WOUND TEAM
"[facility]  Inpatient Services  Wound and Skin Care Progress Note    Admission Date:  12/24/2019   HPI, PMH, SH: Reviewed  Unit where seen by Wound Team: [id number]/[id number]    WOUND TEAM FOLLOW UP: F/U sacrum/coccyx    SUBJECTIVE:   \"Oh.\"    Self Report / Pain Level:no c/o pain      OBJECTIVE: mepilex with wear on outside surface, waffle overlay  WOUND TYPE, LOCATION, CHARACTERISTICS (Pressure ulcers: location, stage, POA or date identified)    Wound 01/01/20 Partial Thickness Wound Buttocks (Active)         Site Assessment Red;Pink;Brown    Christina-wound Assessment Dry;Intact    Margins Defined edges    Wound Length (cm) 5 cm 1/6/2020  6:25 PM   Wound Width (cm) 9 cm 1/6/2020  6:25 PM   Wound Depth (cm) 0.1 cm 1/6/2020  6:25 PM   Wound Surface Area (cm^2) 45 cm^2 1/6/2020  6:25 PM   Tunneling 0 cm 1/6/2020  6:25 PM   Undermining 0 cm 1/6/2020  6:25 PM   Drainage Amount None    Non-staged Wound Description Partial thickness    Treatments Cleansed    Cleansing Normal Saline Irrigation    Periwound Protectant Not Applicable    Dressing Options Mepilex    Dressing Cleansing/Solutions Not Applicable    Dressing Changed Changed    Dressing Status Intact    Dressing Change Frequency Every 72 hrs    NEXT Dressing Change  01/03/20    NEXT Weekly Photo (Inpatient Only) 01/13/20    Odor None     Exposed Structures None     Tissue Type and Percentage 100% pink, reddish brown dry      Vascular:  Wounds not r/t vascular problem    Lab Values:    WBC:       Lab Results   Component Value Date/Time    WBC 12.3 (H) 01/04/2020 03:00 AM    RBC 5.50 01/04/2020 03:00 AM      AIC:      Lab Results   Component Value Date/Time    HBA1C 5.4 12/25/2019 05:00 AM          Culture:   na    INTERVENTIONS BY WOUND TEAM:pt turned to L side, peeled back mepilex, cleaned wound with NS, dried. Reapplied mepilex. Pad moved down to underneath buttocks, pt repositioned.  Dressing Options: Mepilex    Interdisciplinary consultation:   With Nursing;With " Patient     EVALUATION:pt has partial thickness wound from friction, mepilex with wear on outside surface.    Factors affecting wound healing: friction, methamphetamine abuse   Goals:  Steady decrease in wound area and depth weekly     NURSING PLAN OF CARE:    Dressing changes: Continue previous Dressing Maintenance orders:   x     See new Dressing Maintenance orders:       Skin care: See Skin Care orders:        Rectal tube care: See Rectal Tube Care orders:      Other orders:           WOUND TEAM PLAN OF CARE (X):   NPWT change 3 x week:        Dressing changes:       Follow up as needed:   X if wound worsens, Nsg to notify    Other:    Anticipated discharge plans (X):  SNF:           Home Care:           Outpatient Wound Center:            Self Care:            Other:  Unsure of needs @ this time

## 2020-01-07 NOTE — PROGRESS NOTES
Pt AOx2, severe dysarthria- hard to understand patient. Left sided weakness. Restraints to right wrist and right ankle r/t OOB unsafely and pulling lines/tubes. ROM performed Q2H. Hourly rounding in place. Fall, seizure, and aspiration precautions in place. Frequent oral care provided. Villasenor placed, draining clear, yellow urine.

## 2020-01-07 NOTE — DISCHARGE PLANNING
Anticipated Discharge Disposition: TBD    Action: LSW emailed [facility] asking for pt to get screened for Medicaid. Pt's facesheet shows that pt does not have insurance listed.     Barriers to Discharge: None    Plan: f/u with pt, f/u with [facility], LSW to assist as needed

## 2020-01-07 NOTE — PROGRESS NOTES
Internal Medicine Interval Note  Note Author: Alex Lozoya M.D.     Name Jacinto Loyola       1970   Age/Sex 49 y.o. male   MRN [id number]   Code Status Full Code     After 5PM or if no immediate response to page, please call for cross-coverage  Attending/Team: Dr De Souza/Lindsay See Patient List for primary contact information  Call [phone] to page    1st Call - Day Intern (R1):   Dr Lozoya 2nd Call - Day Sr. Resident (R2/R3):   Dr Gray         Reason for interval visit  (Principal Problem)   Intraparenchymal Hemorrhage of R Basal Ganglia secondary to Hypertensive Emergency.    Interval Problem Daily Status Update  (24 hours, problem oriented, brief subjective history, new lab/imaging data pertinent to that problem)   - Night shift RN reported Urinary Retention twice in his Shift, at 11pm and early morning today, both times patient had more than 500 cc by US.  - Morning Nurse continued checking for spontaneous voiding, but unable. Scan again showed more than 500 cc. Villasenor Cath was placed.  - Nurses also reporting no progress in patient's cycle of disrupt sleep pattern. Patient mostly awake and agitated at night and very lethargic and sleepy during the day.  - I suggested again putting patient by Window, to expose him to Daylight.  - Team also discussed use of Modafinil.  - We are also following up with speech therapy to schedule another evaluation.      Review of Systems   Unable to perform ROS: Mental status change       Disposition/Barriers to discharge:   Placement, Family goals of care.    Consultants/Specialty  Neurology, Critical Care.      PCP: Pcp Pt States None      Quality Measures  Quality-Core Measures   Reviewed items::  Labs reviewed and Medications reviewed  Villasenor Catheter: Placed today for Retention   DVT prophylaxis - mechanical:  SCDs  Ulcer Prophylaxis::  No          Physical Exam       Vitals:    20 0000 20 0400 20 0700 20 1200   BP: 140/82  152/70 122/61 124/86   Pulse: 84 83 74 91   Resp: 18 20 17 19   Temp: 37.3 °C (99.1 °F) 36.8 °C (98.3 °F) 36.5 °C (97.7 °F) 36.8 °C (98.3 °F)   TempSrc: Temporal Temporal Temporal Temporal   SpO2: 95% 94% 96% 94%   Weight:       Height:         Body mass index is 29.09 kg/m².    Oxygen Therapy:  Pulse Oximetry: 94 %, O2 (LPM): 0, O2 Delivery: None (Room Air)    Physical Exam   Constitutional: He is well-developed, well-nourished, and in no distress. No distress.   HENT:   Head: Normocephalic and atraumatic.   Eyes: Pupils are equal, round, and reactive to light. EOM are normal. No scleral icterus.   Neck: Normal range of motion. Neck supple. No JVD present.   Cardiovascular: Normal rate, regular rhythm and normal heart sounds.   No murmur heard.  Pulmonary/Chest: Effort normal and breath sounds normal. No respiratory distress. He has no wheezes.   Abdominal: Soft. Bowel sounds are normal. He exhibits no distension. There is no tenderness. There is no rebound and no guarding.   Musculoskeletal:         General: No deformity or edema.      Comments: Left upper and lower extremities no mobility. Patient responds to pain stimuli.   Neurological: He is alert.   Very sleepy/lethargic this morning. Responsive to pain stimuli.  Per RN patient alertness waxes and wanes.   Skin: Skin is warm. He is not diaphoretic. No erythema.   Psychiatric:   Unable to assess             Assessment/Plan     * Intraparenchymal hemorrhage of brain (HCC)- (present on admission)  Assessment & Plan  - Intracranial Hemorrhage sec to poorly controlled HTN  -HTN emergency  - H/O methamphetamine abuse  - Head CT showed a 2.7 cm acute right basal ganglia/thalamic hemorrhage, no indications for neurosurgery intervention  - Repeated CT scan on 1/5 showed stable hemorrhage, no new bleed.  - Per Neurology: recommend inpatient SBP control <160, outpatient <130, LDL <70, A1C <7%.  - EEG negative for seizure. Neuro signed off on 12/30.                  PLAN:  -Aspiration precaution  -Feeding tube, requiring bridle  -Keep head of bed 30 degrees  -Continue RT  -PT/OT/SLP  -Failed SLP swallow eval due to lethargy  --Turn patient Q2 hrs to prevent new bed sores  - HTN medication changes for better BP control  - SNF referral placed prior to transfer from ICU; pending  - Speech eval again needed    Urinary retention  Assessment & Plan  - Night Shift RN reported Urinary retention   - Patient needed twice Straight Cath over 500 cc on Bladder scan.  - This morning patient continued retaining urine, no voids during the morning noon bladder scan over 400 ml   - Villasenor Cath placed                PLAN:  - FC for 2-3 days  - Clamp catheter after 24 hrs 3 times a day  - Flomax  - Assess spontaneous voiding after 48 hrs post FC    Altered mental status  Assessment & Plan  -Intraparenchymal hemorrhage.   -Mental status may be worsened by constipation, risperdal, or delerium.  - Alertness waxes and wanes throughout the day  - Bowel protocol  - Reserved prognosis     Methamphetamine abuse (HCC)- (present on admission)  Assessment & Plan  Counseling, when appropriate  Consulted SW    JAYASHREE (obstructive sleep apnea)- (present on admission)  Assessment & Plan  -Not on CPAP at home  -Unable to use Bipap with mental status and stroke  -Continue O2 supplementation as needed  - CTM    Hypertensive emergency- (present on admission)  Assessment & Plan  -Presented with blood pressures of  190s  SBP; and 110-130s DBP  - Intraparenchymal hemorrhage Imaging  - BP's better last night, not requiring PRN BP meds  - CT BP  - Assessing changes in BP regimen for better BP control  - Increasing Chlortalidone to 50 and Doxazosin to 2mg    Acute respiratory failure with hypoxia and hypercapnia (HCC)- (present on admission)  Assessment & Plan  -Patient was Intubated 12/26- 12/31/2019 (for aspiration and airway protection)  -Extubate successfully 12/31/2019  -Was treated with Abxs for MSSA/ PNA (Ancef x5  days)  - PT/OT/SLP  -Aspiration precautions  -RT/O2 per protocol   - O2 sats WNL    Aspiration into airway- (present on admission)  Assessment & Plan  -MSSA pneumonia  - Patient Completed cefazolin 5 day course  - Aspiration precautions  - O2/RT   - Feeding tube  - Contacting speech therapy for new assessment

## 2020-01-07 NOTE — THERAPY
"Occupational Therapy Treatment completed with focus on ADLs, patient education and upper extremity function.  Functional Status:  Pt seen for OT session. Progressing with alertness and command following. Demo'd spontaneous movement in triceps; beginning of subluxation in L shoulder. Continues to req max-total A for ADLs and req mod-max A for sitting balance, and is limited by decreased functional mobility, activity tolerance, cognition, sensation, strength, AROM, coordination, balance, and L inattention which are currently affecting pt's ability to complete ADLs/IADLs at baseline. Currently recommend acute OT, and Recommend post-acute placement for additional occupational therapy services prior to discharge home.    Plan of Care: Will benefit from Occupational Therapy 3 times per week  Discharge Recommendations:  Equipment Will Continue to Assess for Equipment Needs. Post-acute therapy: Recommend post-acute placement for additional occupational therapy services prior to discharge home.     See \"Rehab Therapy-Acute\" Patient Summary Report for complete documentation.   "

## 2020-01-07 NOTE — ASSESSMENT & PLAN NOTE
- Failed 2 times void trials  - 3rd Void trial successful   - Patient's Villasenor cath was removed on 1/24/2020  - Voiding well

## 2020-01-07 NOTE — CARE PLAN
Problem: Safety  Goal: Will remain free from falls  Outcome: PROGRESSING AS EXPECTED   Fall precautions in place. Bed alarm on.      Problem: Knowledge Deficit  Goal: Knowledge of the prescribed therapeutic regimen will improve  Outcome: PROGRESSING SLOWER THAN EXPECTED    Reviewing plan of care, activities, and medication with patient.  Encouraging patient to ask questions and participate in plan of care.  Providing answers to all questions.  Continuing with current plan of care.  Hourly rounding in practice.

## 2020-01-07 NOTE — PROGRESS NOTES
Pt has not voided this AM. Bladder scan results= 440ml. Called and notified Dr. Gray via telephone, orders received to place palmer. Also asked if team could look at pt tongue r/t thick, white coating- she states they will follow-up on it.

## 2020-01-07 NOTE — CARE PLAN
Problem: Safety - Medical Restraint  Goal: Remains free of injury from restraints (Restraint for Interference with Medical Device)  Description  INTERVENTIONS:  1. Determine that other, less restrictive measures have been tried or would not be effective before applying the restraint  2. Evaluate the patient's condition at the time of restraint application  3. Inform patient/family regarding the reason for restraint  4. Q2H: Monitor safety, psychosocial status, comfort, nutrition and hydration  Outcome: PROGRESSING AS EXPECTED  Flowsheets (Taken 1/7/2020 1241)  Addressed this shift: Remains free of injury from restraints (restraint for interference with medical device): Every 2 hours: Monitor safety, psychosocial status, comfort, nutrition and hydration  Note:   Right wrist and ankle restraints in place r/t OOB unsafely and pulling at lines/tubes. Monitoring pt closely. Less restrictive measures in place.      Problem: Acute Care of the Stroke Patient  Goal: Optimal Outcome for the Stroke patient  Outcome: PROGRESSING AS EXPECTED  Intervention: PT/OT/SLP needs: LIP must assess the need for Rehab Services. If none needed, LIP must document reason.  Note:   Stroke pt seen by neuro and evaluated by PT/OT/ST. Stroke education provided. Pt AOx2, not fully understanding deficits. Working towards D/C to SNF when medically cleared.

## 2020-01-07 NOTE — THERAPY
"Physical Therapy Treatment completed.   Bed Mobility:  Supine to Sit: Maximal Assist(x2)  Transfers: Sit to Stand: Unable to Participate  Gait: Level Of Assist: Unable to Participate with No Equipment Needed       Plan of Care: Will benefit from Physical Therapy 4 times per week  Discharge Recommendations: Equipment: Will Continue to Assess for Equipment Needs. Post-acute therapy Discharge to a transitional care facility for continued skilled therapy services.    Pt inconsistently able to follow single step commands and appears to \"give up\" when given challenging task. In sitting, he required max A to remain upright and demonstrates an inconsistently L push. Cued for R UE reaching, but pt would not always reach to target despite previous ability. L hamstrings present hypertonic characterized by immediate catch and difficulty release throughout knee ROM. While here, PT will follow to address balance impairments, limited activity tolerance, and impaired motor control/processing. Recommend placement.      See \"Rehab Therapy-Acute\" Patient Summary Report for complete documentation.       "

## 2020-01-08 PROCEDURE — 700102 HCHG RX REV CODE 250 W/ 637 OVERRIDE(OP): Performed by: INTERNAL MEDICINE

## 2020-01-08 PROCEDURE — A9270 NON-COVERED ITEM OR SERVICE: HCPCS | Performed by: STUDENT IN AN ORGANIZED HEALTH CARE EDUCATION/TRAINING PROGRAM

## 2020-01-08 PROCEDURE — 700102 HCHG RX REV CODE 250 W/ 637 OVERRIDE(OP): Performed by: STUDENT IN AN ORGANIZED HEALTH CARE EDUCATION/TRAINING PROGRAM

## 2020-01-08 PROCEDURE — 770006 HCHG ROOM/CARE - MED/SURG/GYN SEMI*

## 2020-01-08 PROCEDURE — 97112 NEUROMUSCULAR REEDUCATION: CPT

## 2020-01-08 PROCEDURE — A9270 NON-COVERED ITEM OR SERVICE: HCPCS | Performed by: INTERNAL MEDICINE

## 2020-01-08 PROCEDURE — 97530 THERAPEUTIC ACTIVITIES: CPT

## 2020-01-08 PROCEDURE — 99232 SBSQ HOSP IP/OBS MODERATE 35: CPT | Mod: GC | Performed by: INTERNAL MEDICINE

## 2020-01-08 RX ADMIN — SENNOSIDES AND DOCUSATE SODIUM 2 TABLET: 8.6; 5 TABLET ORAL at 04:35

## 2020-01-08 RX ADMIN — MODAFINIL 100 MG: 100 TABLET ORAL at 04:35

## 2020-01-08 RX ADMIN — SENNOSIDES AND DOCUSATE SODIUM 2 TABLET: 8.6; 5 TABLET ORAL at 17:30

## 2020-01-08 RX ADMIN — OXYCODONE HYDROCHLORIDE 10 MG: 5 TABLET ORAL at 14:04

## 2020-01-08 RX ADMIN — CHLORTHALIDONE 50 MG: 50 TABLET ORAL at 04:35

## 2020-01-08 RX ADMIN — AMLODIPINE BESYLATE 10 MG: 10 TABLET ORAL at 04:34

## 2020-01-08 RX ADMIN — DOXAZOSIN 2 MG: 2 TABLET ORAL at 17:30

## 2020-01-08 RX ADMIN — OXYCODONE HYDROCHLORIDE 10 MG: 5 TABLET ORAL at 09:35

## 2020-01-08 RX ADMIN — POLYETHYLENE GLYCOL 3350 1 PACKET: 17 POWDER, FOR SOLUTION ORAL at 14:04

## 2020-01-08 RX ADMIN — ENALAPRIL MALEATE 10 MG: 10 TABLET ORAL at 04:35

## 2020-01-08 RX ADMIN — OXYCODONE HYDROCHLORIDE 10 MG: 5 TABLET ORAL at 18:56

## 2020-01-08 RX ADMIN — CLONIDINE HYDROCHLORIDE 0.1 MG: 0.1 TABLET ORAL at 04:35

## 2020-01-08 ASSESSMENT — COGNITIVE AND FUNCTIONAL STATUS - GENERAL
TURNING FROM BACK TO SIDE WHILE IN FLAT BAD: UNABLE
MOBILITY SCORE: 6
MOVING FROM LYING ON BACK TO SITTING ON SIDE OF FLAT BED: UNABLE
STANDING UP FROM CHAIR USING ARMS: TOTAL
MOVING TO AND FROM BED TO CHAIR: UNABLE
WALKING IN HOSPITAL ROOM: TOTAL
CLIMB 3 TO 5 STEPS WITH RAILING: TOTAL
SUGGESTED CMS G CODE MODIFIER MOBILITY: CN

## 2020-01-08 ASSESSMENT — GAIT ASSESSMENTS: GAIT LEVEL OF ASSIST: UNABLE TO PARTICIPATE

## 2020-01-08 NOTE — DISCHARGE PLANNING
[facility]  Day 14 limited tolerance to therapy mod to max assist with sitting balance. Anticipate skilled nursing for post acute services when medically cleared. Current documentation indicates limited tolerance to IRF level of therapy Limited support to return to community, No provider for post acute services.. No physiatry consult ordered per protocol.

## 2020-01-08 NOTE — CARE PLAN
Problem: Safety  Goal: Free from accidental injury  Outcome: PROGRESSING AS EXPECTED   Pt with ICH L side deficits. Fall precautions in on place.      Problem: Knowledge Deficit  Goal: Patient/Significant other demonstrates understanding of disease process, treatment plan, medications and discharge instructions  Outcome: PROGRESSING SLOWER THAN EXPECTED    Reviewing plan of care, activities, and medication with patient.  Encouraging patient to ask questions and participate in plan of care.  Providing answers to all questions.  Continuing with current plan of care.  Hourly rounding in practice.

## 2020-01-08 NOTE — PROGRESS NOTES
Pt AOx2, appears more alert this morning. Medicated for c/o back pain. Fall, seizure and aspiration precautions in place. Thorough oral care provided, pt participated. Continue Q2H turns and hourly rounding.

## 2020-01-08 NOTE — PROGRESS NOTES
2 RN skin check complete with ADALBERTO Fisher. Partial thickness wound to buttocks, picture placed in chart. Applied new Mepilex to area. Bilateral feet calloused/dry- applied moisturizer. Generalized bruising to bilateral upper extremities. Scabbing/abrasion to bilateral shins.

## 2020-01-08 NOTE — CARE PLAN
Problem: Urinary Elimination:  Goal: Ability to reestablish a normal urinary elimination pattern will improve  Outcome: PROGRESSING SLOWER THAN EXPECTED  Intervention: Evaluate need to continue indwelling urinary catheter  Note:   Villasenor catheter placed yesterday r/t urinary retention- assess for when it can be removed.      Problem: Risk of Aspiration  Goal: Absence of aspiration  Outcome: PROGRESSING SLOWER THAN EXPECTED  Intervention: NPO until medically cleared  Note:   Speech therapy saw pt again yesterday, still recommendations for NPO.

## 2020-01-08 NOTE — DISCHARGE PLANNING
"Anticipated Discharge Disposition: SNF    Action: LSW received email from Katia Kasper and was informed that [facility] attempted to screen pt for Medicaid. However, pt not alert and oriented at this time. Pt unable to provide information.   LSW tc pt's sonBeni ([phone]). Beni states that he has been visiting everyday and has been trying to meet with \"someone\" to help him apply for Medicaid. LSW informed Beni that it is very important for pt to get the Medicaid application started. LSW informed that PT recommending transitional care facility for continued skilled therapy services and patient has no payor source.   LSW tc [facility] to see if [facility] could arrange a time to meet with Beni to start Medicaid application. LSW spoke to Nathalie who suggested for [facility] to be tc when Beni at bedside and staff would come down to assist.   LSW to tc Beni at 1200 during his lunch time to provide P[facility] number.     Barriers to Discharge: Placement, No Payor Source    Plan: LSW to tc Beni and provide [facility] number, LSW to assist as needed       Care Transition Team Assessment  The information provided for this assessment was provided by pt's sonBeni and chart review. Beni states that prior to admit pt was living in an RV but pt is practically homeless. Prior to admit pt independent with ADL's/IADL's. Pt has no PCP, or insurance. LSW currently trying to coordinate for [facility] to meet with Beni to start Medicaid process. PT recommending SNF.      Information Source  Orientation : Disoriented to Event, Disoriented to Time  Information Given By: Other (Comments)  Informant's Name: (Pt's sonBeni)    Elopement Risk  Legal Hold: No  Ambulatory or Self Mobile in Wheelchair: No-Not an Elopement Risk  Elopement Risk: Not at Risk for Elopement    Interdisciplinary Discharge Planning  Prior Services: Unable To Determine At This Time    Discharge Preparedness  What is your plan after discharge?: Skilled nursing facility  What are your discharge " supports?: Child  Prior Functional Level: Independent with Activities of Daily Living  Difficulity with ADLs: None  Difficulity with IADLs: None    Functional Assesment  Prior Functional Level: Independent with Activities of Daily Living    Finances  Financial Barriers to Discharge: No  Prescription Coverage: No    Domestic Abuse  Have you ever been the victim of abuse or violence?: No    Discharge Risks or Barriers  Discharge risks or barriers?: No PCP, Uninsured / underinsured, Homeless / couch surfing  Patient risk factors: Homeless, No PCP, Uninsured or underinsured    Anticipated Discharge Information  Anticipated discharge disposition: SNF

## 2020-01-08 NOTE — THERAPY
"Physical Therapy Treatment completed.   Bed Mobility:  Supine to Sit: Maximal Assist  Transfers: Sit to Stand: Unable to Participate  Gait: Level Of Assist: Unable to Participate    Plan of Care: Will benefit from Physical Therapy 3 times per week  Discharge Recommendations: Equipment: Will Continue to Assess for Equipment Needs. Recommend post-acute placement for continued physical therapy services prior to discharge home. Patient can tolerate post-acute therapies at a 5x/week frequency.       Assessment: Patient motivated to work with PT today but limited by fatigue. Able to sit EOB for 10 minutes with ModA and follow simple one step commands. Demos consistent left-sided neglect and expressive aphasia. Able to state his name and if he's in pain. Delayed response. Will need placement for further PT.    See \"Rehab Therapy-Acute\" Patient Summary Report for complete documentation.     Renetta Mcdonald, PT, DPT, GCS  "

## 2020-01-08 NOTE — THERAPY
"Speech Language Therapy dysphagia treatment completed.   Functional Status:  Patient seen this date for dysphagia tx session, per MD request. Patient currently NPO with Cortrak. Patient noted to have moderate amount of white, thick coating on lingual surface, which appeared to be buildup of dried mucous. Patient was provided with extensive oral care by this SLP, but frequently had bite reflex on toothette and Yankokuer despite max cues to eliminate. Patient consumed PO trials of single ice chips and NTL via tsp. Patient presented with intermittent coughing (approximately 50% of PO trials of both consistencies), which is concerning for penetration/aspiration. Patient had intermittent ABSENT swallow trigger and when swallow was triggered, it was often palpated as weak and incomplete. Patient required max cues to close lips around spoon and trigger a swallow. All trials had to be suctioned via Yankouer. At this time, recommend patient continue strict NPO with Cortrak. Please continue to provide frequent oral care. RN aware. SLP is following.     Recommendations: At this time, recommend patient continue strict NPO with Cortrak. Please continue to provide frequent oral care.   Plan of Care: Will benefit from Speech Therapy 3 times per week  Post-Acute Therapy: Recommend inpatient transitional care services for continued speech therapy services.      See \"Rehab Therapy-Acute\" Patient Summary Report for complete documentation.     "

## 2020-01-08 NOTE — PROGRESS NOTES
Internal Medicine Interval Note  Note Author: Alex Lozoya M.D.     Name Jacinto Loyola       1970   Age/Sex 49 y.o. male   MRN [id number]   Code Status Full Code     After 5PM or if no immediate response to page, please call for cross-coverage  Attending/Team: Dr De Souza/Lindsay See Patient List for primary contact information  Call [phone] to page    1st Call - Day Intern (R1):   Dr Lozoya 2nd Call - Day Sr. Resident (R2/R3):   Dr Gray         Reason for interval visit  (Principal Problem)   Intraparenchymal Hemorrhage of R Basal Ganglia secondary to Hypertensive Emergency.      Interval Problem Daily Status Update  (24 hours, problem oriented, brief subjective history, new lab/imaging data pertinent to that problem)   - No acute overnight reported, per night shift Patient slept well overnight.  - This morning patient is awake and interactive.  - Vital signs have been stable.  - I spoke with his son  Beni, he is expected to come to speak with medical team at around 4pm.  - Social work trying to get arrangement and talking to son about Medicaid application.  - Speech therapy assessed patient yesterday, they recommend strict NPO continuing cortrak.  - Nurses were also concerned for possible oral candidiasis. I examined patient, there is no signs of oral candidiasis, patient presents dried up oral secretions. He needs more oral care per nursin personnel.    Review of Systems   Unable to perform ROS: Other            Disposition/Barriers to discharge:   Placement    Consultants/Specialty  Neurology, Critical care.    PCP: Pcp Pt States None      Quality Measures  Quality-Core Measures   Reviewed items::  Medications reviewed  Villasenor catheter::  No Villasenor  DVT prophylaxis - mechanical:  SCDs          Physical Exam       Vitals:    20 2000 20 0000 20 0400 20 0700   BP: 128/107 129/81 150/90 101/65   Pulse: 76 80 74 76   Resp: 18 18 20 16   Temp: 36.8 °C (98.2 °F) 36.5  °C (97.7 °F) 36.4 °C (97.6 °F) 36.7 °C (98.1 °F)   TempSrc: Temporal Temporal Temporal Temporal   SpO2: 93% 94% 93% 92%   Weight:       Height:         Body mass index is 29.09 kg/m².    Oxygen Therapy:  Pulse Oximetry: 92 %, O2 (LPM): 0, O2 Delivery: None (Room Air)    Physical Exam   Constitutional: He is well-developed, well-nourished, and in no distress. No distress.   HENT:   Head: Normocephalic and atraumatic.   Severe dryness oral mucosa, moderate plaque formation from oral secretions.   Eyes: Pupils are equal, round, and reactive to light. EOM are normal. No scleral icterus.   Neck: Normal range of motion. Neck supple. No JVD present.   Cardiovascular: Normal rate, regular rhythm and normal heart sounds.   No murmur heard.  Pulmonary/Chest: Effort normal and breath sounds normal. No respiratory distress. He has no wheezes. He has no rales.   Abdominal: Soft. Bowel sounds are normal. He exhibits no distension. There is no tenderness. There is no rebound and no guarding.   Musculoskeletal: Normal range of motion.         General: No deformity or edema.   Neurological: He is alert.   Dysarthria, hemiparesis left side.   Skin: Skin is warm. No rash noted. He is not diaphoretic. No erythema.             Assessment/Plan     * Intraparenchymal hemorrhage of brain (HCC)- (present on admission)  Assessment & Plan  - Intracranial Hemorrhage sec to poorly controlled HTN  -HTN emergency  - H/O methamphetamine abuse  - Head CT showed a 2.7 cm acute right basal ganglia/thalamic hemorrhage, no indications for neurosurgery intervention  - Repeated CT scan on 1/5 showed stable hemorrhage, no new bleed.  - Per Neurology: recommend inpatient SBP control <160, outpatient <130, LDL <70, A1C <7%.  - EEG negative for seizure. Neuro signed off on 12/30.  - BP is better past 24 hrs                 PLAN:  -Aspiration precaution  -Feeding tube, requiring bridle  -Keep head of bed 30 degrees  -Continue RT  -PT/OT/SLP  -Failed SLP  swallow eval due to lethargy  --Turn patient Q2 hrs to prevent new bed sores  - HTN medication changes for better BP control  - SNF referral placed prior to transfer from ICU; pending  - Speech eval 1/7 recommended continue strict NPO/Cortrak    Urinary retention  Assessment & Plan  - Night Shift RN reported Urinary retention   - Patient needed twice Straight Cath over 500 cc on Bladder scan.  - This morning patient continued retaining urine, no voids during the morning noon bladder scan over 400 ml   - Villasenor Cath placed                PLAN:  - FC for 2-3 days  - Clamp catheter after 24 hrs 3 times a day  - Assess spontaneous voiding after 48 hrs post FC      Altered mental status  Assessment & Plan  -Intraparenchymal hemorrhage.   - Alertness waxes and wanes throughout the day  - Bowel protocol  - Unclear if patient will recover more functionality    Methamphetamine abuse (HCC)- (present on admission)  Assessment & Plan  Counseling, when appropriate  Consulted SW    JAYASHREE (obstructive sleep apnea)- (present on admission)  Assessment & Plan  - Not on CPAP at home  - Unable to use Bipap with mental status and stroke  - Continue O2 supplementation as needed  - CTM    Hypertensive emergency- (present on admission)  Assessment & Plan  -Presented with blood pressures of  190s  SBP; and 110-130s DBP  - Intraparenchymal hemorrhage Imaging  - BP's better last night, not requiring PRN BP meds  - CTM BP  - Assessing changes in BP regimen for better BP control  - Increasing Chlortalidone to 50 and Doxazosin to 2mg  - BP is better past 24 hrs    Acute respiratory failure with hypoxia and hypercapnia (HCC)- (present on admission)  Assessment & Plan  -Patient was Intubated 12/26- 12/31/2019 (for aspiration and airway protection)  -Extubate successfully 12/31/2019  -Was treated with Abxs for MSSA/ PNA (Ancef x5 days)  - PT/OT/SLP  -Aspiration precautions  -RT/O2 per protocol   - O2 sats WNL  - CTM    Aspiration into airway- (present  on admission)  Assessment & Plan  -MSSA pneumonia  - Patient Completed cefazolin 5 day course  - Aspiration precautions  - O2/RT   - Feeding tube  - Speech therapy following 3 times per week  - Most recent eval Jan 7th patient still must be strict NPO/cortrak

## 2020-01-09 ENCOUNTER — APPOINTMENT (OUTPATIENT)
Dept: RADIOLOGY | Facility: MEDICAL CENTER | Age: 50
DRG: 064 | End: 2020-01-09
Attending: INTERNAL MEDICINE
Payer: MEDICAID

## 2020-01-09 PROCEDURE — 99232 SBSQ HOSP IP/OBS MODERATE 35: CPT | Mod: GC | Performed by: INTERNAL MEDICINE

## 2020-01-09 PROCEDURE — A9270 NON-COVERED ITEM OR SERVICE: HCPCS | Performed by: STUDENT IN AN ORGANIZED HEALTH CARE EDUCATION/TRAINING PROGRAM

## 2020-01-09 PROCEDURE — 700102 HCHG RX REV CODE 250 W/ 637 OVERRIDE(OP): Performed by: INTERNAL MEDICINE

## 2020-01-09 PROCEDURE — 770006 HCHG ROOM/CARE - MED/SURG/GYN SEMI*

## 2020-01-09 PROCEDURE — A9270 NON-COVERED ITEM OR SERVICE: HCPCS | Performed by: INTERNAL MEDICINE

## 2020-01-09 PROCEDURE — 700102 HCHG RX REV CODE 250 W/ 637 OVERRIDE(OP): Performed by: STUDENT IN AN ORGANIZED HEALTH CARE EDUCATION/TRAINING PROGRAM

## 2020-01-09 RX ADMIN — CHLORTHALIDONE 50 MG: 50 TABLET ORAL at 06:47

## 2020-01-09 RX ADMIN — CLONIDINE HYDROCHLORIDE 0.1 MG: 0.1 TABLET ORAL at 06:47

## 2020-01-09 RX ADMIN — ENALAPRIL MALEATE 10 MG: 10 TABLET ORAL at 06:48

## 2020-01-09 RX ADMIN — DOXAZOSIN 2 MG: 2 TABLET ORAL at 20:03

## 2020-01-09 RX ADMIN — POLYETHYLENE GLYCOL 3350 1 PACKET: 17 POWDER, FOR SOLUTION ORAL at 12:32

## 2020-01-09 RX ADMIN — OXYCODONE HYDROCHLORIDE 10 MG: 5 TABLET ORAL at 12:32

## 2020-01-09 RX ADMIN — OXYCODONE HYDROCHLORIDE 10 MG: 5 TABLET ORAL at 17:22

## 2020-01-09 RX ADMIN — MODAFINIL 100 MG: 100 TABLET ORAL at 06:48

## 2020-01-09 RX ADMIN — MAGNESIUM HYDROXIDE 30 ML: 400 SUSPENSION ORAL at 06:40

## 2020-01-09 RX ADMIN — SENNOSIDES AND DOCUSATE SODIUM 2 TABLET: 8.6; 5 TABLET ORAL at 17:22

## 2020-01-09 RX ADMIN — OXYCODONE HYDROCHLORIDE 10 MG: 5 TABLET ORAL at 06:46

## 2020-01-09 RX ADMIN — SENNOSIDES AND DOCUSATE SODIUM 2 TABLET: 8.6; 5 TABLET ORAL at 06:46

## 2020-01-09 RX ADMIN — AMLODIPINE BESYLATE 10 MG: 10 TABLET ORAL at 06:40

## 2020-01-09 ASSESSMENT — LIFESTYLE VARIABLES: EVER_SMOKED: YES

## 2020-01-09 NOTE — PROGRESS NOTES
2 RN skin check complete with ADALBERTO Jordan. Partial thickness wound to buttocks,  New mepilex appiled. Bilateral feet calloused/dry- applied moisturizer. Generalized bruising to bilateral upper extremities. Scabbing/abrasion to bilateral shins.

## 2020-01-09 NOTE — CARE PLAN
Problem: Safety  Goal: Will remain free from falls  Outcome: PROGRESSING AS EXPECTED   Fall precautions in place.      Problem: Knowledge Deficit  Goal: Patient/Significant other demonstrates understanding of disease process, treatment plan, medications and discharge instructions  Outcome: PROGRESSING AS EXPECTED  Reviewing plan of care, activities, and medication with patient.  Encouraging patient to ask questions and participate in plan of care.  Providing answers to all questions.  Continuing with current plan of care.  Hourly rounding in practice.       Problem: Risk of Aspiration  Goal: Absence of aspiration  Outcome: PROGRESSING AS EXPECTED  Pt NPO tolerating TFs. Will CTM.

## 2020-01-09 NOTE — PROGRESS NOTES
Pt AOx3, more communicative today. C/o lower back pain- medicated, see MAR. Showered pt in shower room. Fall, seizure, and aspiration precautions in place.

## 2020-01-09 NOTE — DIETARY
Nutrition support weekly update:  Day 16 of admit.  Jacinto Loyola is a 49 y.o. male with admitting DX of intraparenchymal hemorrhage of brain and hypertensive crisis..      Tube feeding initiated on 12/26. Current TF via gastric cortrak is Fibersource HN with goal rate 80 ml/hr to provide 2304 kcals, 104 gm protein, and 1555 ml free water.    Assessment:  Weight 1/2: 97.3 kg via bed scale, which is 7.8 kg weight decrease from admit weight of 105.1 kg on 12/24. Pt is currently +2.6 L fluids per I/O flowsheet.      Evaluation:   1. Pt seen getting transported, per RN pt was receiving TF at goal rate and tolerating without issues.  2. SLP last seen on 1/7 and recs NPO.  3. Cortrak replaced today.  4. Anticipated discharge to SNF per MD notes.  5. Partial thickness wound noted of the buttocks, no staging. Current TF meets 100% of daily recommended RDI/DRI of vitamins and minerals.  6. Labs: glucose 121-133, BUN 43  7. Meds: colace, zofran prn, bowel protocol  8. Last BM: 1/5 incontinence after bowel protocol started  9. Current feeding is meeting patient's needs.      Malnutrition risk: No new risk identified.    Recommendations/Plan:  1. Continue TF formula and rate.  2. Fluids per MD    RD to follow.

## 2020-01-09 NOTE — PROGRESS NOTES
Cortrak Placement    Tube Team verified patient name and medical record number prior to tube placement.  Cortrak tube (43 inches, 10 Mexican) placed at 65 cm in right nare.  Per Cortrak picture, tube appears to be in the stomach.  Nursing Instructions: Awaiting KUB to confirm placement before use for medications or feeding. Once placement confirmed, flush tube with 30 ml of water, and then remove and save stylet, in patient medication drawer.

## 2020-01-09 NOTE — PROGRESS NOTES
2 rn skin check    Wound to buttocks noted. Applied new mepilex.   bilat feet calloused and dry, mepilex in place.   Generalized bruising to BUE noted.   Scabbing to bilat shins noted.    Waffle overlay and Q2hr turns in place.

## 2020-01-09 NOTE — CARE PLAN
Problem: Hemodynamic Status  Goal: Stable Vital Signs and Fluid Balance  Outcome: PROGRESSING AS EXPECTED  Intervention: Vital Signs, Cardiac Monitoring, Pulse Oximetry  Note:   Vitals stable, keeping SBP <160.      Problem: Discharge Barriers/Planning  Goal: Patient's Continuum of care needs are met  Outcome: PROGRESSING SLOWER THAN EXPECTED  Intervention: Identify potential discharge barriers on admission and throughout hospital stay  Note:   Pt son working on getting Medicaid paperwork started- spoke with  and financial aid.

## 2020-01-10 PROCEDURE — 97530 THERAPEUTIC ACTIVITIES: CPT

## 2020-01-10 PROCEDURE — 51798 US URINE CAPACITY MEASURE: CPT

## 2020-01-10 PROCEDURE — A9270 NON-COVERED ITEM OR SERVICE: HCPCS | Performed by: INTERNAL MEDICINE

## 2020-01-10 PROCEDURE — 770006 HCHG ROOM/CARE - MED/SURG/GYN SEMI*

## 2020-01-10 PROCEDURE — 99232 SBSQ HOSP IP/OBS MODERATE 35: CPT | Mod: GC | Performed by: INTERNAL MEDICINE

## 2020-01-10 PROCEDURE — 700102 HCHG RX REV CODE 250 W/ 637 OVERRIDE(OP): Performed by: INTERNAL MEDICINE

## 2020-01-10 PROCEDURE — 700102 HCHG RX REV CODE 250 W/ 637 OVERRIDE(OP): Performed by: STUDENT IN AN ORGANIZED HEALTH CARE EDUCATION/TRAINING PROGRAM

## 2020-01-10 PROCEDURE — A9270 NON-COVERED ITEM OR SERVICE: HCPCS | Performed by: STUDENT IN AN ORGANIZED HEALTH CARE EDUCATION/TRAINING PROGRAM

## 2020-01-10 PROCEDURE — 97112 NEUROMUSCULAR REEDUCATION: CPT

## 2020-01-10 PROCEDURE — 97535 SELF CARE MNGMENT TRAINING: CPT

## 2020-01-10 RX ADMIN — MODAFINIL 100 MG: 100 TABLET ORAL at 05:02

## 2020-01-10 RX ADMIN — CLONIDINE HYDROCHLORIDE 0.1 MG: 0.1 TABLET ORAL at 05:02

## 2020-01-10 RX ADMIN — DOXAZOSIN 2 MG: 2 TABLET ORAL at 21:13

## 2020-01-10 RX ADMIN — OXYCODONE HYDROCHLORIDE 10 MG: 5 TABLET ORAL at 14:08

## 2020-01-10 RX ADMIN — CHLORTHALIDONE 50 MG: 50 TABLET ORAL at 05:04

## 2020-01-10 RX ADMIN — OXYCODONE HYDROCHLORIDE 10 MG: 5 TABLET ORAL at 05:02

## 2020-01-10 RX ADMIN — AMLODIPINE BESYLATE 10 MG: 10 TABLET ORAL at 05:02

## 2020-01-10 RX ADMIN — OXYCODONE HYDROCHLORIDE 5 MG: 5 TABLET ORAL at 18:22

## 2020-01-10 RX ADMIN — OXYCODONE HYDROCHLORIDE 10 MG: 5 TABLET ORAL at 09:20

## 2020-01-10 RX ADMIN — SENNOSIDES AND DOCUSATE SODIUM 2 TABLET: 8.6; 5 TABLET ORAL at 16:50

## 2020-01-10 RX ADMIN — SENNOSIDES AND DOCUSATE SODIUM 2 TABLET: 8.6; 5 TABLET ORAL at 05:02

## 2020-01-10 RX ADMIN — ENALAPRIL MALEATE 10 MG: 10 TABLET ORAL at 05:02

## 2020-01-10 RX ADMIN — MAGNESIUM HYDROXIDE 30 ML: 400 SUSPENSION ORAL at 16:46

## 2020-01-10 RX ADMIN — ACETAMINOPHEN 650 MG: 325 TABLET, FILM COATED ORAL at 05:02

## 2020-01-10 ASSESSMENT — COGNITIVE AND FUNCTIONAL STATUS - GENERAL
DRESSING REGULAR UPPER BODY CLOTHING: A LOT
WALKING IN HOSPITAL ROOM: TOTAL
SUGGESTED CMS G CODE MODIFIER DAILY ACTIVITY: CL
DAILY ACTIVITIY SCORE: 9
MOBILITY SCORE: 7
STANDING UP FROM CHAIR USING ARMS: A LOT
TOILETING: TOTAL
EATING MEALS: TOTAL
MOVING TO AND FROM BED TO CHAIR: UNABLE
CLIMB 3 TO 5 STEPS WITH RAILING: TOTAL
SUGGESTED CMS G CODE MODIFIER MOBILITY: CM
TURNING FROM BACK TO SIDE WHILE IN FLAT BAD: UNABLE
DRESSING REGULAR LOWER BODY CLOTHING: TOTAL
PERSONAL GROOMING: A LOT
MOVING FROM LYING ON BACK TO SITTING ON SIDE OF FLAT BED: UNABLE
HELP NEEDED FOR BATHING: A LOT

## 2020-01-10 ASSESSMENT — GAIT ASSESSMENTS: GAIT LEVEL OF ASSIST: UNABLE TO PARTICIPATE

## 2020-01-10 NOTE — CARE PLAN
Problem: Venous Thromboembolism (VTW)/Deep Vein Thrombosis (DVT) Prevention:  Goal: Patient will participate in Venous Thrombosis (VTE)/Deep Vein Thrombosis (DVT)Prevention Measures  Outcome: PROGRESSING AS EXPECTED  Intervention: Ensure patient wears graduated elastic stockings (AIDAN hose) and/or SCDs, if ordered, when in bed or chair (Remove at least once per shift for skin check)  Note:   SCDs are in use.      Problem: Skin Integrity  Goal: Risk for impaired skin integrity will decrease  Note:   Q 2 turns are in place, waffle mattress is in use, pillows are in use to float heels.

## 2020-01-10 NOTE — PROGRESS NOTES
"       Internal Medicine Interval Note  Note Author: Rachel Gray M.D.     Name Jacinto Loyola       1970   Age/Sex 49 y.o. male   MRN [id number]   Code Status Full Code     After 5PM or if no immediate response to page, please call for cross-coverage  Attending/Team: Dr De Souza/Lindsay See Patient List for primary contact information  Call [phone] to page    1st Call - Day Intern (R1):   Dr Lozoya 2nd Call - Day Sr. Resident (R2/R3):   Dr Gray         Reason for interval visit  (Principal Problem)   Intraparenchymal Hemorrhage of R Basal Ganglia secondary to Hypertensive Emergency.      Interval Problem Daily Status Update  (24 hours, problem oriented, brief subjective history, new lab/imaging data pertinent to that problem)   - today, patient more somnolent  - yesterday there was family meeting during which patient stated he did not wish to be a vegetable, son reported wanting to keep patient full code/full care.   - will reassess patient's wishes tomorrow and see if he can define what he means by \"being a vegetable,\" and if he understands that he will require 24 hour care for likely the remainder of his life including feeding via PEG tube (etc). If he states he does not want this, will likely seek Ethics consult due to differential desires stated by son and patient.      Review of Systems   Unable to perform ROS: Other      Severe dysarthria, also somnolent today      Disposition/Barriers to discharge:   Placement    Consultants/Specialty  Neurology, Critical care.    PCP: Pcp Pt States None      Quality Measures  Quality-Core Measures   Reviewed items::  Medications reviewed  Villasenor catheter::  No Villasenor  DVT prophylaxis - mechanical:  SCDs          Physical Exam       Vitals:    20 0400 20 0757 20 1153 20 1615   BP: 144/85 129/79 105/86 117/94   Pulse: 87 80 73 70   Resp: 20 20 18 18   Temp: 36.6 °C (97.9 °F) 36.7 °C (98.1 °F) 37.4 °C (99.4 °F) 37 °C (98.6 °F) "   TempSrc: Temporal Temporal Temporal Temporal   SpO2: 95% 90% 93% 94%   Weight:       Height:         Body mass index is 29.09 kg/m².    Oxygen Therapy:  Pulse Oximetry: 94 %, O2 (LPM): 0, O2 Delivery: None (Room Air)    Physical Exam   Constitutional: He is well-developed, well-nourished, and in no distress. No distress.   Patient somnolent, snoring loudly today   HENT:   Head: Normocephalic and atraumatic.   Eyes: EOM are normal.   Neck: No JVD present.   Pulmonary/Chest: Effort normal. No respiratory distress.   Abdominal: He exhibits no distension.   Musculoskeletal:         General: No deformity or edema.   Neurological:   Dysarthria, hemiparesis left side.   Skin: Skin is warm. No rash noted. He is not diaphoretic. No erythema.             Assessment/Plan     * Intraparenchymal hemorrhage of brain (HCC)- (present on admission)  Assessment & Plan  - Intracranial Hemorrhage sec to poorly controlled HTN  -HTN emergency  - H/O methamphetamine abuse  - Head CT showed a 2.7 cm acute right basal ganglia/thalamic hemorrhage, no indications for neurosurgery intervention  - Repeated CT scan on 1/5 showed stable hemorrhage, no new bleed.  - Per Neurology: recommend inpatient SBP control <160, outpatient <130, LDL <70, A1C <7%.  - EEG negative for seizure. Neuro signed off on 12/30.                 PLAN:  -Aspiration precaution  -Feeding tube, requiring bridle  -Keep head of bed 30 degrees  -Continue RT  -PT/OT/SLP  -Failed SLP swallow eval due to lethargy  --Turn patient Q2 hrs to prevent new bed sores  - HTN medication changes for better BP control  - SNF referral placed prior to transfer from ICU; pending  - Speech eval 1/7 recommended continue strict NPO/Cortrak    Urinary retention  Assessment & Plan  - Night Shift RN reported Urinary retention   - Patient needed twice Straight Cath over 500 cc on Bladder scan.  - This morning patient continued retaining urine, no voids during the morning noon bladder scan over 400  ml   - Villasenor Cath placed                PLAN:  - FC for 2-3 days  - Clamp catheter after 24 hrs 3 times a day  - Assess spontaneous voiding after 48 hrs post FC      Altered mental status  Assessment & Plan  -Intraparenchymal hemorrhage.   - Alertness waxes and wanes throughout the day  - Bowel protocol  - Unclear if patient will recover more functionality    Methamphetamine abuse (HCC)- (present on admission)  Assessment & Plan  Counseling, when appropriate  Consulted SW    JAYASHREE (obstructive sleep apnea)- (present on admission)  Assessment & Plan  - Not on CPAP at home  - Unable to use Bipap with mental status and stroke  - Continue O2 supplementation as needed  - CTM    Hypertensive emergency- (present on admission)  Assessment & Plan  -Presented with blood pressures of  190s  SBP; and 110-130s DBP  - Intraparenchymal hemorrhage Imaging  - BP's better last night, not requiring PRN BP meds  - CT BP  - Assessing changes in BP regimen for better BP control  - Increasing Chlortalidone to 50 and Doxazosin to 2mg  - BP is better past 24 hrs    Acute respiratory failure with hypoxia and hypercapnia (HCC)- (present on admission)  Assessment & Plan  -Patient was Intubated 12/26- 12/31/2019 (for aspiration and airway protection)  -Extubate successfully 12/31/2019  -Was treated with Abxs for MSSA/ PNA (Ancef x5 days)  - PT/OT/SLP  -Aspiration precautions  -RT/O2 per protocol   - O2 sats WNL  - CTM    Aspiration into airway- (present on admission)  Assessment & Plan  -MSSA pneumonia  - Patient Completed cefazolin 5 day course  - Aspiration precautions  - O2/RT   - Feeding tube  - Speech therapy following 3 times per week  - Most recent eval Jan 7th patient still must be strict NPO/cortrak

## 2020-01-10 NOTE — PROGRESS NOTES
2 Rn skin check completed with ADALBERTO Martinez.   A area of healing skin breakdown noted to sacrum and buttocks peeling, red and blanching. Mepilex is in place.   Pt has heeling scrapes to right shin and rash to chest and back.   Generalized bruising to BUE.

## 2020-01-10 NOTE — PROGRESS NOTES
Received report from night nurse at the bedside. Assume care of Pt at 0700. Assessment completed Pt A&O X 3 disoriented to time. Pt denies numbness and tingling, but does not feel left side of body when touched. Pt  C/o pain 5/10,  Assist of 2. Pt in bed, bed in lowest position, call light in place,  Q 2 turns are in place, treaded slipper socks on.

## 2020-01-10 NOTE — CARE PLAN
Problem: Safety  Goal: Will remain free from falls  Outcome: PROGRESSING AS EXPECTED  Note:   Hourly rounding in place, room close to nursing station, bed alarm on      Problem: Communication  Goal: The ability to communicate needs accurately and effectively will improve  Outcome: PROGRESSING SLOWER THAN EXPECTED  Note:   Reinforced education and safety measures during care, yes simple language and allow adequate time for patient response.

## 2020-01-10 NOTE — DISCHARGE PLANNING
Anticipated Discharge Disposition: SNF     Action: LSW tc pt's sonBeni and informed that he came in yesterday and worked with PFA to fill out the Medicaid application.   Pt's facesheet now showing pending Medicaid.   LSW and Beni discussed SNF CHOICE. Beni would like SNF list for [facility] emailed to [email or web address]  LSW emailed SNF list.     Barriers to Discharge: Payor Source    Plan: LSW to email SNF CHOICE to pt's son, LSW to assist as needed

## 2020-01-10 NOTE — THERAPY
"Occupational Therapy Treatment completed with focus on ADLs, ADL transfers and upper extremity function.  Functional Status:  Pt seen for OT session. Progressing with command following. Continues to req max-total A for seated ADLs. Max A for sitting balance, improved to intermittent min A, but max A as fatigued. STS with max A. Continued education on joint protection. Continues to be limited by decreased functional mobility, activity tolerance, cognition, sensation, strength, AROM, coordination, balance, and pain which are currently affecting pt's ability to complete ADLs/IADLs at baseline. Currently recommend post-acute placement for additional occupational therapy services prior to discharge home.    Plan of Care: Will benefit from Occupational Therapy 3 times per week  Discharge Recommendations:  Equipment Will Continue to Assess for Equipment Needs. Post-acute therapy: Recommend post-acute placement for additional occupational therapy services prior to discharge home.     See \"Rehab Therapy-Acute\" Patient Summary Report for complete documentation.   "

## 2020-01-11 PROCEDURE — 700102 HCHG RX REV CODE 250 W/ 637 OVERRIDE(OP): Performed by: STUDENT IN AN ORGANIZED HEALTH CARE EDUCATION/TRAINING PROGRAM

## 2020-01-11 PROCEDURE — A9270 NON-COVERED ITEM OR SERVICE: HCPCS | Performed by: INTERNAL MEDICINE

## 2020-01-11 PROCEDURE — 99232 SBSQ HOSP IP/OBS MODERATE 35: CPT | Mod: GC | Performed by: INTERNAL MEDICINE

## 2020-01-11 PROCEDURE — 51798 US URINE CAPACITY MEASURE: CPT

## 2020-01-11 PROCEDURE — 770006 HCHG ROOM/CARE - MED/SURG/GYN SEMI*

## 2020-01-11 PROCEDURE — A9270 NON-COVERED ITEM OR SERVICE: HCPCS | Performed by: STUDENT IN AN ORGANIZED HEALTH CARE EDUCATION/TRAINING PROGRAM

## 2020-01-11 PROCEDURE — 700102 HCHG RX REV CODE 250 W/ 637 OVERRIDE(OP): Performed by: INTERNAL MEDICINE

## 2020-01-11 RX ADMIN — ENALAPRIL MALEATE 10 MG: 10 TABLET ORAL at 05:06

## 2020-01-11 RX ADMIN — MODAFINIL 100 MG: 100 TABLET ORAL at 05:06

## 2020-01-11 RX ADMIN — OXYCODONE HYDROCHLORIDE 10 MG: 5 TABLET ORAL at 09:27

## 2020-01-11 RX ADMIN — AMLODIPINE BESYLATE 10 MG: 10 TABLET ORAL at 05:06

## 2020-01-11 RX ADMIN — SENNOSIDES AND DOCUSATE SODIUM 2 TABLET: 8.6; 5 TABLET ORAL at 16:19

## 2020-01-11 RX ADMIN — ACETAMINOPHEN 650 MG: 325 TABLET, FILM COATED ORAL at 09:27

## 2020-01-11 RX ADMIN — BISACODYL 10 MG: 10 SUPPOSITORY RECTAL at 16:19

## 2020-01-11 RX ADMIN — CHLORTHALIDONE 50 MG: 50 TABLET ORAL at 05:06

## 2020-01-11 RX ADMIN — OXYCODONE HYDROCHLORIDE 10 MG: 5 TABLET ORAL at 14:36

## 2020-01-11 RX ADMIN — SENNOSIDES AND DOCUSATE SODIUM 2 TABLET: 8.6; 5 TABLET ORAL at 05:06

## 2020-01-11 RX ADMIN — MAGNESIUM HYDROXIDE 30 ML: 400 SUSPENSION ORAL at 05:08

## 2020-01-11 NOTE — PROGRESS NOTES
Received report from night nurse at the bedside. Assume care of Pt at 0700. Assessment completed Pt A&O X 4. Pt denies numbness on left side. Pt denies pain,  Assist of two. No Mepilex in place as patient keep discarding it. Pt in bed, bed in lowest position, call light in place, bed alarm is on, waffle mattress in place, Q 2 turns are in place, treaded slipper socks on.

## 2020-01-11 NOTE — PROGRESS NOTES
Internal Medicine Interval Note  Note Author: Alex Lozoya M.D.     Name Jacinto Loyola       1970   Age/Sex 49 y.o. male   MRN [id number]   Code Status Full Code     After 5PM or if no immediate response to page, please call for cross-coverage  Attending/Team: Ap/[place] See Patient List for primary contact information  Call [phone] to page    1st Call - Day Intern (R1):   Dr Lozoya 2nd Call - Day Sr. Resident (R2/R3):   Dr Gray         Reason for interval visit  (Principal Problem)   Intraparenchymal Hemorrhage of R Basal Ganglia secondary to Hypertensive Emergency.      Interval Problem Daily Status Update  (24 hours, problem oriented, brief subjective history, new lab/imaging data pertinent to that problem)   No acute events overnight reported, patient was able to sleep well overnight.  During morning rounds patient was very alert and cooperative.  Vital signs have been stable, well BP control with BP in the desirable range per Neuro rec.  Oral mucosa is dry, better oral care has been promoted, patient's tongue looks a lot better than previous days.  Villasenor Catheter was DCed for spontaneous void trial, patient was unable to void in his own with moderate amount of urine on bladder scans. RN instructed to put FC after retaining two times more than 400 cc.  Goals of care discussed with his Son Bnei, apparently conflicting wishes in code status.  Dr Gray and I tried to have this conversation with Mrs Loyola before noon, but patient was not very awake and cooperative at that time, we will assessed this topic tomorrow if possible.     Review of Systems   Unable to perform ROS: Mental status change       Disposition/Barriers to discharge:   Placement    Consultants/Specialty  Neurology, Intensivist    PCP: Pcp Pt States None      Quality Measures  Quality-Core Measures   Reviewed items::  Labs reviewed and Medications reviewed  Villasenor catheter::  No Villasenor  DVT prophylaxis - mechanical:   SCDs  Ulcer Prophylaxis::  No          Physical Exam       Vitals:    01/10/20 0700 01/10/20 1408 01/10/20 1600 01/10/20 1822   BP: 134/74  120/72    Pulse: 68  69    Resp: 16 16 16 15   Temp: 36.4 °C (97.6 °F)  36.4 °C (97.6 °F)    TempSrc: Temporal  Temporal    SpO2: 92%  98%    Weight:       Height:         Body mass index is 29.09 kg/m².    Oxygen Therapy:  Pulse Oximetry: 98 %, O2 Delivery: None (Room Air)    Physical Exam   Constitutional: He is well-developed, well-nourished, and in no distress. No distress.   HENT:   Head: Normocephalic and atraumatic.   Mouth/Throat: No oropharyngeal exudate.   Dry mucosa, dry oral plaque barely noticeable.    Eyes: Pupils are equal, round, and reactive to light. EOM are normal. No scleral icterus.   Neck: Normal range of motion. Neck supple. No JVD present.   Cardiovascular: Normal rate, regular rhythm and normal heart sounds.   No murmur heard.  Pulmonary/Chest: Effort normal and breath sounds normal. No stridor. No respiratory distress. He has no wheezes. He has no rales.   Abdominal: Soft. Bowel sounds are normal. He exhibits no distension and no mass. There is no tenderness. There is no rebound and no guarding.   Musculoskeletal: Normal range of motion.         General: No edema.      Comments: Left hemiparesis unchanged.   Neurological: He is alert.   Oriented to place and person.   Skin: Skin is warm. He is not diaphoretic. No erythema.   Psychiatric: Mood normal.             Assessment/Plan     * Intraparenchymal hemorrhage of brain (HCC)- (present on admission)  Assessment & Plan  - Intracranial Hemorrhage sec to poorly controlled HTN  -HTN emergency  - H/O methamphetamine abuse  - Head CT showed a 2.7 cm acute right basal ganglia/thalamic hemorrhage, no indications for neurosurgery intervention  - Repeated CT scan on 1/5 showed stable hemorrhage, no new bleed.  - Per Neurology: recommend inpatient SBP control <160, outpatient <130, LDL <70, A1C <7%.  - EEG  negative for seizure. Neuro signed off on 12/30.  - Unchanged Dysarthria, Hemiparesis                 PLAN:  -Aspiration precaution  -Feeding tube, requiring bridle  -Keep head of bed 30 degrees  -Continue RT  -PT/OT/SLP  -Failed SLP swallow eval due to lethargy  --Turn patient Q2 hrs to prevent new bed sores  - HTN medication changes for better BP control  - SNF referral placed prior to transfer from ICU; pending  - Speech eval 1/7 recommended continue strict NPO/Cortrak    Urinary retention  Assessment & Plan  - Villasenor Cath DCed for spontaneous void trial  - RN reported retention several hours post FC removed  - Place Cath if Retains more than 400 Ml twice or more        Altered mental status  Assessment & Plan  -Intraparenchymal hemorrhage.   - Alertness waxes and wanes throughout the day  - Bowel protocol  - Unclear if patient will recover more functionality  - Unchanged dysarthria    Methamphetamine abuse (HCC)- (present on admission)  Assessment & Plan  Counseling, when appropriate  Consulted SW    JAYASHREE (obstructive sleep apnea)- (present on admission)  Assessment & Plan  - Not on CPAP at home  - Unable to use Bipap with mental status and stroke  - Continue O2 supplementation as needed  - CTM    Hypertensive emergency- (present on admission)  Assessment & Plan  -Presented with blood pressures of  190s  SBP; and 110-130s DBP  - Intraparenchymal hemorrhage Imaging  - BP's better last night, not requiring PRN BP meds  - CTM BP  - Assessing changes in BP regimen for better BP control  - Increasing Chlortalidone to 50 and Doxazosin to 2mg  - BP has been WNL past couple days    Acute respiratory failure with hypoxia and hypercapnia (HCC)- (present on admission)  Assessment & Plan  - Resolved  -Patient was Intubated 12/26- 12/31/2019 (for aspiration and airway protection)  -Extubate successfully 12/31/2019  -Was treated with Abxs for MSSA/ PNA (Ancef x5 days)  - PT/OT/SLP  -Aspiration precautions  -RT/O2 per protocol    - O2 sats WNL  - CTM    Aspiration into airway- (present on admission)  Assessment & Plan  - PNA resolved  -MSSA pneumonia  - Patient Completed cefazolin 5 day course  - Aspiration precautions  - O2/RT   - Feeding tube  - Speech therapy following 3 times per week  - Most recent eval Jan 7th patient still must be strict NPO/cortrak

## 2020-01-11 NOTE — PROGRESS NOTES
Pt's son Beni requested letter for his  stating that he was needed as a caregiver for his father. Pt informed that we are unable to provide him this letter. Situation discussed with supervisor on-call [place].

## 2020-01-11 NOTE — PROGRESS NOTES
2 RN skin check completed with ADALBERTO Martinez.  Abrasion to R. Tran  Rash to chest and back.  Generalized bruising.  An area of healing skin breakdown to sacrum and buttocks peeling, red and blanching with one small spot of drainage to left inner buttock.   Mepilex not in place as patient continually rips them off and discards them.

## 2020-01-11 NOTE — THERAPY
"Physical Therapy Treatment completed.   Bed Mobility:  Supine to Sit: Maximal Assist  Transfers: Sit to Stand: Maximal Assist  Gait: Level Of Assist: Unable to Participate     Plan of Care: Will benefit from Physical Therapy 4 times per week  Discharge Recommendations: Equipment: Will Continue to Assess for Equipment Needs. Post-acute therapy Recommend post-acute placement for continued physical therapy services prior to discharge home. Patient can tolerate post-acute therapies at a 5x/week frequency.       See \"Rehab Therapy-Acute\" Patient Summary Report for complete documentation.     Pt progressing well w/ therapy. Pt has good strength on the R but was unable to coordinate use of it to assist more w/ mobility. Pt inititially w/ a L lean in sitting. He was able to state that he wasn't in midline but only corrected after cued by therapist. With increased sitting time, pt balance improving. He has a lot of tone in the L LE that limited his ability to get to full stand. Pt needed L knee blocking for standing but was able to maintain upright posture even w/ weight shifts. Pt able to take 2 side steps along the bed w/ therapist advancing the L LE. Pt continues to be at a level in which he will benefit from post acute therapy.  "

## 2020-01-11 NOTE — PROGRESS NOTES
Internal Medicine Interval Note  Note Author: Alex Lozoya M.D.     Name Jacinto Loyola       1970   Age/Sex 49 y.o. male   MRN [id number]   Code Status Full code     After 5PM or if no immediate response to page, please call for cross-coverage  Attending/Team: Dr De Souza/Lindsay See Patient List for primary contact information  Call [phone] to page    1st Call - Day Intern (R1):   Dr Lozoya 2nd Call - Day Sr. Resident (R2/R3):   Dr Gray         Reason for interval visit  (Principal Problem)     Intraparenchymal Hemorrhage of R Basal Ganglia secondary to Hypertensive Emergency.    Interval Problem Daily Status Update  (24 hours, problem oriented, brief subjective history, new lab/imaging data pertinent to that problem)   - Nursing personnel reporting urine retention, has not been able to void on his own a single time, bladder reports were 700 and 500 ml previous to straight catheter. Villasenor catheter placed.  - Patient mildly somnolent this morning during rounds, awakens to voice, but he is very sllepy and falls a sleep right away, not interested in communicating with us.  - Vital sings have been stable, he is afebrile, nursing personnel turning him Q2 hours, previous bed sores healing well.  - We will attempt to have a conversation with patient about code status when more awake.  - Social work following insurance request.  - Team spoke with patient and son. Patient was very awake and talking in full sentences just before noon, this is the best response and mental state we have seen. Patient would like temporarily Peg tube. Code status he would like to take more time to think about it.     Review of Systems   Unable to perform ROS: Mental acuity       Disposition/Barriers to discharge:   Placement     Consultants/Specialty  Neurology  Intensivist    PCP: Pcp Pt States None      Quality Measures  Quality-Core Measures   Reviewed items::  Radiology images reviewed and Medications  reviewed  Villasenor Catheter: Villasenor placed again, void tial unsuccesful   DVT prophylaxis - mechanical:  SCDs  Ulcer Prophylaxis::  No          Physical Exam       Vitals:    01/11/20 0400 01/11/20 0800 01/11/20 0927 01/11/20 1147   BP: 113/89 117/85     Pulse: 80 78     Resp: 16 14 14    Temp: 37.2 °C (98.9 °F) 36.5 °C (97.7 °F)     TempSrc: Temporal Temporal     SpO2: 92% 94%     Weight:    83.1 kg (183 lb 3.2 oz)   Height:         Body mass index is 24.84 kg/m². Weight: 83.1 kg (183 lb 3.2 oz)  Oxygen Therapy:  Pulse Oximetry: 94 %, O2 Delivery: None (Room Air)    Physical Exam   Constitutional: He is well-developed, well-nourished, and in no distress. No distress.   HENT:   Head: Normocephalic and atraumatic.   Dry mucous membranes.   Eyes: Pupils are equal, round, and reactive to light. EOM are normal. No scleral icterus.   Neck: Normal range of motion. Neck supple. No JVD present.   Cardiovascular: Normal rate, regular rhythm and normal heart sounds.   No murmur heard.  Pulmonary/Chest: Effort normal and breath sounds normal. No respiratory distress. He has no wheezes. He has no rales.   Abdominal: Soft. Bowel sounds are normal. He exhibits no distension. There is no tenderness. There is no rebound and no guarding.   Musculoskeletal: Normal range of motion.         General: No edema.      Comments: Left hemiparesis unchanged.   Neurological: He is alert.   Very sleepy this am, awakes to voice but falls asleep right away, not participating or answering questions.   Skin: Skin is warm. No rash noted. He is not diaphoretic. No erythema.   Psychiatric:   Unable to assess             Assessment/Plan     * Intraparenchymal hemorrhage of brain (HCC)- (present on admission)  Assessment & Plan  - Intracranial Hemorrhage sec to poorly controlled HTN  -HTN emergency  - H/O methamphetamine abuse  - Head CT showed a 2.7 cm acute right basal ganglia/thalamic hemorrhage, no indications for neurosurgery intervention  - Repeated  CT scan on 1/5 showed stable hemorrhage, no new bleed.  - Per Neurology: recommend inpatient SBP control <160, outpatient <130, LDL <70, A1C <7%.  - EEG negative for seizure. Neuro signed off on 12/30.  - Unchanged Dysarthria, Hemiparesis                 PLAN:  -Aspiration precaution  -Feeding tube, requiring bridle  -Keep head of bed 30 degrees  -Continue RT  -PT/OT/SLP  -Failed SLP swallow eval due to lethargy  --Turn patient Q2 hrs to prevent new bed sores  - HTN medication changes for better BP control  - SNF referral placed prior to transfer from ICU; pending  - Speech eval 1/7 recommended continue strict NPO/Cortrak  - PEG tube ok with patient     Urinary retention  Assessment & Plan  - Villasenor Cath was DCed for spontaneous void trial on 1/10  - RN reported retention of 700, 500 ml  - Villasenor cath placement due to void trial failure        Altered mental status  Assessment & Plan  -Intraparenchymal hemorrhage.   - Alertness waxes and wanes throughout the day  - Bowel protocol  - Unclear if patient will recover more functionality  - Unchanged dysarthria  - CTM    Methamphetamine abuse (HCC)- (present on admission)  Assessment & Plan  Counseling, when appropriate  Consulted SW    JAYASHREE (obstructive sleep apnea)- (present on admission)  Assessment & Plan  - Not on CPAP at home  - Unable to use Bipap with mental status and stroke  - Continue O2 supplementation as needed  - CT    Hypertensive emergency- (present on admission)  Assessment & Plan  -Presented with blood pressures of  190s  SBP; and 110-130s DBP  - Intraparenchymal hemorrhage Imaging  - BP's better last night, not requiring PRN BP meds  - CT BP  - Assessing changes in BP regimen for better BP control  - Increasing Chlortalidone to 50 and Doxazosin to 2mg  - BP well control past few days    Acute respiratory failure with hypoxia and hypercapnia (HCC)- (present on admission)  Assessment & Plan  - Resolved  -Patient was Intubated 12/26- 12/31/2019 (for  aspiration and airway protection)  -Extubate successfully 12/31/2019  -Was treated with Abxs for MSSA/ PNA (Ancef x5 days)  - PT/OT/SLP  -Aspiration precautions  -RT/O2 per protocol   - O2 sats WNL on RA  - CTM    Aspiration into airway- (present on admission)  Assessment & Plan  - PNA resolved  -MSSA pneumonia  - Patient Completed cefazolin 5 day course  - Aspiration precautions  - O2/RT   - Feeding tube  - Speech therapy following 3 times per week  - Most recent eval Jan 7th patient still must be strict NPO/cortrak

## 2020-01-11 NOTE — CARE PLAN
Problem: Skin Integrity  Goal: Risk for impaired skin integrity will decrease  Outcome: PROGRESSING AS EXPECTED  Intervention: Assess risk factors for impaired skin integrity and/or pressure ulcers  Note:   Pt is at high risk for skin breakdown due to immobility from stroke.   Q 2 turns are in place, waffle mattress in use.      Problem: Knowledge Deficit  Goal: Knowledge of disease process/condition, treatment plan, diagnostic tests, and medications will improve  Outcome: PROGRESSING SLOWER THAN EXPECTED  Intervention: Assess knowledge level of disease process/condition, treatment plan, diagnostic tests, and medications  Note:   Pt educated about reason for being in the hospital and stroke and stroke risk factors.        Problem: Pain Management  Goal: Pain level will decrease to patient's comfort goal  Outcome: PROGRESSING SLOWER THAN EXPECTED  Intervention: Follow pain managment plan developed in collaboration with patient and Interdisciplinary Team  Note:   Pt c/o chronic back pain.   Medicated per mar with oxycodone.

## 2020-01-11 NOTE — PROGRESS NOTES
Bladder scan completed. Pt has 713 mL in bladder. [facility] purple paged to update and get orders for straight cath.

## 2020-01-11 NOTE — PROGRESS NOTES
2 RN skin check completed with ADALBERTO Kumar.   - Abrasions to R shin  - Rash to chest and back, R side of neck  - Generalized bruising to BUE, specifically R hand and R AC  - Incontinence dermatitis to sacrum and buttocks. Mepilex placed.

## 2020-01-11 NOTE — CARE PLAN
Problem: Communication  Goal: The ability to communicate needs accurately and effectively will improve  Outcome: PROGRESSING AS EXPECTED  Intervention: [place] patient and significant other/support system to call light to alert staff of needs  Flowsheets (Taken 1/11/2020 0834)  Oriented to:: All of the Following : Location of Bathroom, Visiting Policy, Unit Routine, Call Light and Bedside Controls, Bedside Rail Policy, Smoking Policy, Rights and Responsibilities, Bedside Report, and Patient Education Notebook  Note:   Pt A&Ox3, calls appropriately using call light. Pt has difficulty making needs known secondary to expressive aphasia. Call light within reach, hourly rounding in place.      Problem: Safety  Goal: Will remain free from falls  Outcome: PROGRESSING AS EXPECTED  Intervention: Assess risk factors for falls  Note:   Pt is a high fall risk, fall interventions in place. Bed locked and in lowest position. Bed alarm and frame alarm on. Call light within reach. Non skid socks in place.

## 2020-01-12 PROCEDURE — 700105 HCHG RX REV CODE 258: Performed by: STUDENT IN AN ORGANIZED HEALTH CARE EDUCATION/TRAINING PROGRAM

## 2020-01-12 PROCEDURE — 770006 HCHG ROOM/CARE - MED/SURG/GYN SEMI*

## 2020-01-12 PROCEDURE — 700111 HCHG RX REV CODE 636 W/ 250 OVERRIDE (IP): Performed by: INTERNAL MEDICINE

## 2020-01-12 PROCEDURE — 700111 HCHG RX REV CODE 636 W/ 250 OVERRIDE (IP): Performed by: STUDENT IN AN ORGANIZED HEALTH CARE EDUCATION/TRAINING PROGRAM

## 2020-01-12 PROCEDURE — 99232 SBSQ HOSP IP/OBS MODERATE 35: CPT | Mod: GC | Performed by: INTERNAL MEDICINE

## 2020-01-12 RX ORDER — CEFAZOLIN SODIUM 2 G/100ML
2 INJECTION, SOLUTION INTRAVENOUS
Status: COMPLETED | OUTPATIENT
Start: 2020-01-13 | End: 2020-01-13

## 2020-01-12 RX ORDER — ENALAPRILAT 1.25 MG/ML
0.62 INJECTION INTRAVENOUS EVERY 6 HOURS PRN
Status: DISCONTINUED | OUTPATIENT
Start: 2020-01-12 | End: 2020-01-25 | Stop reason: HOSPADM

## 2020-01-12 RX ORDER — SODIUM CHLORIDE 9 MG/ML
INJECTION, SOLUTION INTRAVENOUS CONTINUOUS
Status: DISCONTINUED | OUTPATIENT
Start: 2020-01-12 | End: 2020-01-18

## 2020-01-12 RX ADMIN — SODIUM CHLORIDE: 9 INJECTION, SOLUTION INTRAVENOUS at 12:03

## 2020-01-12 RX ADMIN — CEFAZOLIN SODIUM 2 G: 2 INJECTION, SOLUTION INTRAVENOUS at 23:38

## 2020-01-12 RX ADMIN — ENALAPRILAT 0.62 MG: 1.25 INJECTION INTRAVENOUS at 23:39

## 2020-01-12 ASSESSMENT — ENCOUNTER SYMPTOMS
DIAPHORESIS: 1
SENSORY CHANGE: 1
STRIDOR: 0
SHORTNESS OF BREATH: 0
BLURRED VISION: 0
ORTHOPNEA: 0
VOMITING: 0
NECK PAIN: 0
DEPRESSION: 0
FOCAL WEAKNESS: 1
HEADACHES: 0
INSOMNIA: 0
CHILLS: 0
NAUSEA: 0
PALPITATIONS: 0
SPEECH CHANGE: 1
PHOTOPHOBIA: 0
HEARTBURN: 0
COUGH: 0
ABDOMINAL PAIN: 0
FEVER: 0
DOUBLE VISION: 0
DIZZINESS: 0

## 2020-01-12 NOTE — PROGRESS NOTES
"1930: Pt pulled out Cortrak. Bridle still in place.     2000: Another RN attempted Cortrak reinsertion. Pt refusing Cortrak placement at this time.     2321: Cortrak placement attempted three separate times. Dr. Martel notified of pt's refusal to have Cortrak placed at this time. Pt currently A&Ox3, oriented to year but states month is \"March\".     0737: Updated Dr. Lozoya and Day RN on pt's refusal of Cortrak placement. Pt's BP did not require antihypertensive PRN medications.   "

## 2020-01-12 NOTE — CARE PLAN
Problem: Communication  Goal: The ability to communicate needs accurately and effectively will improve  Outcome: PROGRESSING AS EXPECTED  Intervention: [place] patient and significant other/support system to call light to alert staff of needs  Flowsheets (Taken 1/11/2020 0139)  Oriented to:: All of the Following : Location of Bathroom, Visiting Policy, Unit Routine, Call Light and Bedside Controls, Bedside Rail Policy, Smoking Policy, Rights and Responsibilities, Bedside Report, and Patient Education Notebook  Note:   Pt A&Ox3, disoriented to month. Pt able to make needs known to staff and is able to answer questions appropriately.      Problem: Safety  Goal: Will remain free from falls  Outcome: PROGRESSING AS EXPECTED  Intervention: Assess risk factors for falls  Note:   Pt is a high fall risk, fall precautions in place. Bed locked and in lowest position. Bed and frame alarm on. Non skid socks in place, three side rails up. Call light within reach. Hourly rounding in place.

## 2020-01-12 NOTE — CONSULTS
Gastroenterology Consult Note     Date of Consult: 1/12/2020     Reason for consult: PEG tube placement     HPI:   49-year-old male admitted with hemorrhagic CVA and left hemiparesis.  Patient has oropharyngeal dysphagia requiring tube feeds.  Nasoenteral feeding tube was previously placed but has been pulled out by the patient.  PEG tube placement has been requested for long-term enteral feeding support.  Patient does not have any history of abdominal surgeries.  He is not on anticoagulation.     PMHX:  Past Medical History:   Diagnosis Date   • Depression 8/28/2009   • HTN 8/28/2009   • Migraines, neuralgic 8/28/2009          PSurgHx:   Past Surgical History:   Procedure Laterality Date   • APPENDECTOMY     • KNEE RECONSTRUCTION      x5       Current Facility-Administered Medications:   •  NS infusion, , Intravenous, Continuous, Rachel Gray M.D., Last Rate: 50 mL/hr at 01/12/20 1203  •  enalaprilat (VASOTEC) injection 0.625 mg, 0.625 mg, Intravenous, Q6HRS PRN, Rachel Gray M.D.  •  modafinil (PROVIGIL) tablet 100 mg, 100 mg, Enteral Tube, QAM, Rachel Gray M.D., Stopped at 01/12/20 0600  •  chlorthalidone (HYGROTON) tablet 50 mg, 50 mg, Enteral Tube, Q DAY, Rachel Gray M.D., Stopped at 01/12/20 0600  •  doxazosin (CARDURA) tablet 2 mg, 2 mg, Enteral Tube, QHS, Alex Lozoya M.D., Stopped at 01/11/20 2100  •  enalapril (VASOTEC) tablet 10 mg, 10 mg, Enteral Tube, Q DAY, Jeremy M Gonda, M.D., Stopped at 01/12/20 0600  •  amLODIPine (NORVASC) tablet 10 mg, 10 mg, Enteral Tube, Q DAY, Jeremy M Gonda, M.D., Stopped at 01/12/20 0600  •  oxyCODONE immediate-release (ROXICODONE) tablet 5-10 mg, 5-10 mg, Enteral Tube, Q4HRS PRN, Jeremy M Gonda, M.D., 10 mg at 01/11/20 1436  •  labetalol (NORMODYNE/TRANDATE) injection 10-20 mg, 10-20 mg, Intravenous, Q4HRS PRN, Rachel Gray M.D., 20 mg at 01/03/20 0203  •  hydrALAZINE (APRESOLINE) injection 10-20 mg, 10-20 mg,  Intravenous, Q4HRS PRN, Rachel Gray M.D., 10 mg at 01/06/20 1936  •  lidocaine (XYLOCAINE) 1 % injection 1-2 mL, 1-2 mL, Tracheal Tube, Q30 MIN PRN, Wilberto Ness M.D.  •  Pharmacy Consult: Enteral tube insertion - review meds/change route/product selection, 1 Each, Other, PHARMACY TO DOSE, Constantin Avina M.D.  •  senna-docusate (PERICOLACE or SENOKOT S) 8.6-50 MG per tablet 2 Tab, 2 Tab, Enteral Tube, BID, Stopped at 01/12/20 0600 **AND** polyethylene glycol/lytes (MIRALAX) PACKET 1 Packet, 1 Packet, Enteral Tube, QDAY PRN, 1 Packet at 01/09/20 1232 **AND** magnesium hydroxide (MILK OF MAGNESIA) suspension 30 mL, 30 mL, Enteral Tube, QDAY PRN, 30 mL at 01/11/20 0508 **AND** bisacodyl (DULCOLAX) suppository 10 mg, 10 mg, Rectal, QDAY PRN, Constantin Avina M.D., 10 mg at 01/11/20 1619  •  acetaminophen (TYLENOL) tablet 650 mg, 650 mg, Enteral Tube, Q4HRS PRN, 650 mg at 01/11/20 0927 **OR** acetaminophen (TYLENOL) suppository 650 mg, 650 mg, Rectal, Q4HRS PRN, Constantin Avina M.D., 650 mg at 12/26/19 2341  •  ondansetron (ZOFRAN ODT) dispertab 4 mg, 4 mg, Enteral Tube, Q4HRS PRN **OR** ondansetron (ZOFRAN) syringe/vial injection 4 mg, 4 mg, Intravenous, Q4HRS PRN, Constantin Avina M.D.  •  Respiratory Care per Protocol, , Nebulization, Continuous RT, Wilberto Ness M.D.     ALLERGIES:Patient has no known allergies.     SocHx:   Social History     Socioeconomic History   • Marital status:      Spouse name: Not on file   • Number of children: Not on file   • Years of education: Not on file   • Highest education level: Not on file   Occupational History   • Not on file   Social Needs   • Financial resource strain: Not on file   • Food insecurity:     Worry: Not on file     Inability: Not on file   • Transportation needs:     Medical: Not on file     Non-medical: Not on file   Tobacco Use   • Smoking status: Never Smoker   • Smokeless tobacco: Never Used   Substance and Sexual  Activity   • Alcohol use: Yes   • Drug use: Yes     Types: Intravenous     Comment: daily marijuana use   • Sexual activity: Not on file   Lifestyle   • Physical activity:     Days per week: Not on file     Minutes per session: Not on file   • Stress: Not on file   Relationships   • Social connections:     Talks on phone: Not on file     Gets together: Not on file     Attends Taoist service: Not on file     Active member of club or organization: Not on file     Attends meetings of clubs or organizations: Not on file     Relationship status: Not on file   • Intimate partner violence:     Fear of current or ex partner: Not on file     Emotionally abused: Not on file     Physically abused: Not on file     Forced sexual activity: Not on file   Other Topics Concern   • Not on file   Social History Narrative   • Not on file        FAMHx: History reviewed. No pertinent family history.     ROS:  Not obtained due to patient factors     PE:  Vitals:    01/11/20 2000 01/12/20 0000 01/12/20 0445 01/12/20 0800   BP: 159/91 136/92 136/81 137/91   Pulse: 83 74 85 71   Resp: 16  16 16   Temp: 37.4 °C (99.4 °F)  36.6 °C (97.8 °F) 36.5 °C (97.7 °F)   TempSrc: Temporal  Temporal Temporal   SpO2: 91%  95% 94%   Weight:       Height:         Gen: Patient is awake alert and cooperative with examination.  He is able to communicate with short answers.  HEENT: PERRL, EOMI, nares patent, Mucous membranes moist  Neck: supple, no cervical or supraclavicular adenopathy  CVS: regular rhythm, normal rate, no MRG  Pulm: CTAB, no crackles  Abd: soft, Nd, NT, no surgical scars present, no guarding or rebound  Ext: no edema, normal sensation  NEURO: Alert and oriented, left hemiparesis  Skin: warm, no rash     LABS:  Lab Results   Component Value Date/Time    SODIUM 135 01/04/2020 03:00 AM    POTASSIUM 4.5 01/04/2020 03:00 AM    CHLORIDE 101 01/04/2020 03:00 AM    CO2 27 01/04/2020 03:00 AM    GLUCOSE 121 (H) 01/04/2020 03:00 AM    BUN 43 (H)  01/04/2020 03:00 AM    CREATININE 0.93 01/04/2020 03:00 AM    CREATININE 1.0 05/24/2007 09:55 PM      Lab Results   Component Value Date/Time    WBC 12.3 (H) 01/04/2020 03:00 AM    RBC 5.50 01/04/2020 03:00 AM    HEMOGLOBIN 16.0 01/04/2020 03:00 AM    HEMATOCRIT 49.4 01/04/2020 03:00 AM    MCV 89.8 01/04/2020 03:00 AM    MCH 29.1 01/04/2020 03:00 AM    MCHC 32.4 (L) 01/04/2020 03:00 AM    MPV 8.8 (L) 01/04/2020 03:00 AM    NEUTSPOLYS 61.80 01/04/2020 03:00 AM    LYMPHOCYTES 21.10 (L) 01/04/2020 03:00 AM    MONOCYTES 12.60 01/04/2020 03:00 AM    EOSINOPHILS 1.90 01/04/2020 03:00 AM    BASOPHILS 0.70 01/04/2020 03:00 AM        Lab Results   Component Value Date/Time    PROTHROMBTM 12.8 12/24/2019 06:18 PM    INR 0.95 12/24/2019 06:18 PM           ASSESSMENT:   49-year-old male with hemorrhagic CVA and oropharyngeal dysphagia in need of feeding tube placement for long-term enteral feeding support.  Discussed endoscopy PEG tube placement, benefits and risks including risk of perforation bleeding and infection with his son by telephone.  All questions answered.  Particularly discussed risk of peritonitis if the feeding tube was accidentally dislodged prior to formation of a tract which could take between 4 to 6 weeks.  All precautions including application of an abdominal binder should be considered to prevent patient accidentally pulling out the PEG tube.  In order to minimize risk of infection, he will receive preop antibiotics.      PROBLEMS:  Patient Active Problem List   Diagnosis   • HTN (hypertension)   • Migraines, neuralgic   • Depression   • BEVERLY (acute kidney injury) ([facility]ly [facility])   • Hypertensive emergency   • Intraparenchymal hemorrhage of brain ([facility])   • JAYASHREE (obstructive sleep apnea)   • Methamphetamine abuse ([facility]ly [facility])   • Hypokalemia   • Febrile   • Aspiration into airway   • Acute respiratory failure with hypoxia and hypercapnia ([facility])   • Hypomagnesemia   • Altered mental status   • Urinary retention        PLAN:   1.   EGD with PEG tube placement tomorrow  2.  Ancef 2 g preop.        Thank you for this consult.

## 2020-01-12 NOTE — PROGRESS NOTES
2 RN skin check completed with ADALBERTO Kumar.   - Abrasions to R shin  - Rash to chest and back, R side of neck  - Generalized bruising to BUE, specifically R hand and R AC  - Incontinence dermatitis to sacrum and buttocks. Attempted to place mepilex twice, pt removed mepilex both times.

## 2020-01-12 NOTE — PROGRESS NOTES
Pt A/Ox3, says month and year are March 2020. Explained the need for cortrak and pt states that he understands but insists that he does not want staff to place tube. Paging MD with updates.

## 2020-01-12 NOTE — PROGRESS NOTES
Internal Medicine Interval Note  Note Author: Alex Lozoya M.D.     Name Jacinto Loyola       1970   Age/Sex 49 y.o. male   MRN [id number]   Code Status Full Code     After 5PM or if no immediate response to page, please call for cross-coverage  Attending/Team: Dr De Souza/Lindsay See Patient List for primary contact information  Call [phone] to page    1st Call - Day Intern (R1):   Dr Lozoya 2nd Call - Day Sr. Resident (R2/R3):   Dr Gray         Reason for interval visit  (Principal Problem)   Intraparenchymal Hemorrhage of R Basal Ganglia secondary to Hypertensive Emergency.      Interval Problem Daily Status Update  (24 hours, problem oriented, brief subjective history, new lab/imaging data pertinent to that problem)   - Last night patient removed Cortrak, nurses attempted put it back, but patient has been refusing. I spoke with patient this morning stating that he would allow nurses to put the tube later this morning when he feels more awake and rested.  - Yesterday we discussed code status patient did not make a final decision, we will ask again when pertinent. We also spoke with son about peg tube and code status.  - Patient fail void trial, FC likely stay for a long period or time.  - Patient would like to move forward with Peg tube placing. We will consult with GI for procedure to be schedule as soon as they are available.    Review of Systems   Constitutional: Positive for diaphoresis. Negative for chills and fever.   HENT: Negative for congestion, hearing loss and tinnitus.    Eyes: Negative for blurred vision, double vision and photophobia.   Respiratory: Negative for cough, shortness of breath and stridor.    Cardiovascular: Negative for chest pain, palpitations and orthopnea.   Gastrointestinal: Negative for abdominal pain, heartburn, nausea and vomiting.   Genitourinary:        Villasenor cath, patient unable to void   Musculoskeletal: Negative for joint pain and neck pain.    Skin: Negative for itching and rash.   Neurological: Positive for sensory change, speech change and focal weakness. Negative for dizziness and headaches.        Unchanged neurologic symptoms from hemorrhage.   Psychiatric/Behavioral: Negative for depression and suicidal ideas. The patient does not have insomnia.        Disposition/Barriers to discharge:   Placement    Consultants/Specialty  Critical Care  Neurology  GI    PCP: Pcp Pt States None      Quality Measures  Quality-Core Measures   Reviewed items::  Medications reviewed  Villasenor Catheter: Villasenor Catheter.  DVT prophylaxis - mechanical:  SCDs  Ulcer Prophylaxis::  No          Physical Exam       Vitals:    01/11/20 1800 01/11/20 2000 01/12/20 0000 01/12/20 0445   BP: 131/91 159/91 136/92 136/81   Pulse: 78 83 74 85   Resp: 14 16  16   Temp: 36.5 °C (97.7 °F) 37.4 °C (99.4 °F)  36.6 °C (97.8 °F)   TempSrc: Temporal Temporal  Temporal   SpO2: 93% 91%  95%   Weight:       Height:         Body mass index is 24.84 kg/m². Weight: 83.1 kg (183 lb 3.2 oz)  Oxygen Therapy:  Pulse Oximetry: 95 %, O2 Delivery: None (Room Air)    Physical Exam   Constitutional: He is well-developed, well-nourished, and in no distress. No distress.   HENT:   Head: Normocephalic and atraumatic.   Mouth/Throat: No oropharyngeal exudate.   Eyes: Pupils are equal, round, and reactive to light. EOM are normal. No scleral icterus.   Neck: Normal range of motion. Neck supple. No JVD present.   Cardiovascular: Normal rate, regular rhythm and normal heart sounds.   No murmur heard.  Pulmonary/Chest: Effort normal and breath sounds normal. No respiratory distress. He has no wheezes. He has no rales.   Abdominal: Soft. Bowel sounds are normal. He exhibits no distension. There is no tenderness. There is no rebound and no guarding.   Musculoskeletal: Normal range of motion.         General: No edema.      Comments: Left side hemiparesis unchanged.   Neurological: He is alert.   Oriented to person and  place.   Skin: Skin is warm. No rash noted. He is diaphoretic. No erythema.   Psychiatric: Mood normal.             Assessment/Plan     * Intraparenchymal hemorrhage of brain (HCC)- (present on admission)  Assessment & Plan  - Intracranial Hemorrhage sec to poorly controlled HTN  -HTN emergency  - H/O methamphetamine abuse  - Head CT showed a 2.7 cm acute right basal ganglia/thalamic hemorrhage, no indications for neurosurgery intervention  - Repeated CT scan on 1/5 showed stable hemorrhage, no new bleed.  - Per Neurology: recommend inpatient SBP control <160, outpatient <130, LDL <70, A1C <7%.  - EEG negative for seizure. Neuro signed off on 12/30.  - Unchanged Dysarthria and Hemiparesis.  - Patient has not improved in gaining mobility at all in his extremities since I have been in the service.                 PLAN:  -Aspiration precaution  -Feeding tube, requiring bridle  -Keep head of bed 30 degrees  -Continue RT  -PT/OT/SLP  -Failed SLP swallow eval due to lethargy  --Turn patient Q2 hrs to prevent new bed sores  - HTN medication changes for better BP control  - SNF referral placed prior to transfer from ICU; pending  - Speech eval 1/7 recommended continue strict NPO/Cortrak  - GI consult place for Temporary Peg Tube   - IV fluids if patient refuses Cortrak placement    Urinary retention  Assessment & Plan  - Villasenor Cath was DCed for spontaneous void trial on 1/10  - RN reported retention of 700, 500 ml  - Void trial Failure  - Villasenor cath placement         Altered mental status  Assessment & Plan  - Intraparenchymal hemorrhage.   - Alertness waxes and wanes throughout the day  - Bowel protocol  - Unclear if patient will recover more functionality  - Unchanged dysarthria  - CTM    Methamphetamine abuse (HCC)- (present on admission)  Assessment & Plan  Counseling, when appropriate  Consulted SW    JAYASHREE (obstructive sleep apnea)- (present on admission)  Assessment & Plan  - Not on CPAP at home  - Unable to use Bipap  with mental status and stroke  - Continue O2 supplementation as needed  - CTM    Hypertensive emergency- (present on admission)  Assessment & Plan  -Presented with blood pressures of  190s  SBP; and 110-130s DBP  - Intraparenchymal hemorrhage Imaging  - CTM BP  - Assessing changes in BP regimen for better BP control  - Increasing Chlortalidone to 50 and Doxazosin to 2mg  - BP has been stable    Acute respiratory failure with hypoxia and hypercapnia (HCC)- (present on admission)  Assessment & Plan  - Resolved  -Patient was Intubated 12/26- 12/31/2019 (for aspiration and airway protection)  -Extubate successfully 12/31/2019  -Was treated with Abxs for MSSA/ PNA (Ancef x5 days)  - PT/OT/SLP  -Aspiration precautions  - Oral care daily 2-3 times a day  -RT/O2 per protocol   - O2 sats WNL on RA  - CTM    Aspiration into airway- (present on admission)  Assessment & Plan  - PNA resolved  -MSSA pneumonia  - Patient Completed cefazolin 5 day course  - Aspiration precautions  - O2/RT   - Patient removed Cortrak last night, Nurses attempted placement but patient refused.  - We will Consult GI for Peg Tube placement  - Speech therapy following 3 times per week  - Failed last swallow eval

## 2020-01-13 ENCOUNTER — ANESTHESIA (OUTPATIENT)
Dept: SURGERY | Facility: MEDICAL CENTER | Age: 50
DRG: 064 | End: 2020-01-13
Payer: MEDICAID

## 2020-01-13 ENCOUNTER — ANESTHESIA EVENT (OUTPATIENT)
Dept: SURGERY | Facility: MEDICAL CENTER | Age: 50
DRG: 064 | End: 2020-01-13
Payer: MEDICAID

## 2020-01-13 PROBLEM — Z93.1 S/P PERCUTANEOUS ENDOSCOPIC GASTROSTOMY (PEG) TUBE PLACEMENT (HCC): Status: ACTIVE | Noted: 2020-01-13

## 2020-01-13 LAB
ALBUMIN SERPL BCP-MCNC: 4 G/DL (ref 3.2–4.9)
ALBUMIN/GLOB SERPL: 1.2 G/DL
ALP SERPL-CCNC: 87 U/L (ref 30–99)
ALT SERPL-CCNC: 109 U/L (ref 2–50)
ANION GAP SERPL CALC-SCNC: 12 MMOL/L (ref 0–11.9)
AST SERPL-CCNC: 42 U/L (ref 12–45)
BILIRUB SERPL-MCNC: 1 MG/DL (ref 0.1–1.5)
BUN SERPL-MCNC: 46 MG/DL (ref 8–22)
CALCIUM SERPL-MCNC: 9.8 MG/DL (ref 8.5–10.5)
CHLORIDE SERPL-SCNC: 97 MMOL/L (ref 96–112)
CO2 SERPL-SCNC: 24 MMOL/L (ref 20–33)
CREAT SERPL-MCNC: 1.03 MG/DL (ref 0.5–1.4)
CRP SERPL HS-MCNC: 0.25 MG/DL (ref 0–0.75)
GLOBULIN SER CALC-MCNC: 3.4 G/DL (ref 1.9–3.5)
GLUCOSE SERPL-MCNC: 102 MG/DL (ref 65–99)
INR PPP: 1 (ref 0.87–1.13)
POTASSIUM SERPL-SCNC: 3.4 MMOL/L (ref 3.6–5.5)
PREALB SERPL-MCNC: 36 MG/DL (ref 18–38)
PROT SERPL-MCNC: 7.4 G/DL (ref 6–8.2)
PROTHROMBIN TIME: 13.4 SEC (ref 12–14.6)
SODIUM SERPL-SCNC: 133 MMOL/L (ref 135–145)

## 2020-01-13 PROCEDURE — 700102 HCHG RX REV CODE 250 W/ 637 OVERRIDE(OP): Performed by: STUDENT IN AN ORGANIZED HEALTH CARE EDUCATION/TRAINING PROGRAM

## 2020-01-13 PROCEDURE — 110372 HCHG SHELL REV 278: Performed by: INTERNAL MEDICINE

## 2020-01-13 PROCEDURE — 160009 HCHG ANES TIME/MIN: Performed by: INTERNAL MEDICINE

## 2020-01-13 PROCEDURE — 700101 HCHG RX REV CODE 250: Performed by: ANESTHESIOLOGY

## 2020-01-13 PROCEDURE — 99232 SBSQ HOSP IP/OBS MODERATE 35: CPT | Mod: GC | Performed by: INTERNAL MEDICINE

## 2020-01-13 PROCEDURE — A9270 NON-COVERED ITEM OR SERVICE: HCPCS | Performed by: INTERNAL MEDICINE

## 2020-01-13 PROCEDURE — 770006 HCHG ROOM/CARE - MED/SURG/GYN SEMI*

## 2020-01-13 PROCEDURE — 160047 HCHG PACU  - EA ADDL 30 MINS PHASE II: Performed by: INTERNAL MEDICINE

## 2020-01-13 PROCEDURE — 700101 HCHG RX REV CODE 250: Performed by: STUDENT IN AN ORGANIZED HEALTH CARE EDUCATION/TRAINING PROGRAM

## 2020-01-13 PROCEDURE — 0DH63UZ INSERTION OF FEEDING DEVICE INTO STOMACH, PERCUTANEOUS APPROACH: ICD-10-PCS | Performed by: INTERNAL MEDICINE

## 2020-01-13 PROCEDURE — 700102 HCHG RX REV CODE 250 W/ 637 OVERRIDE(OP): Performed by: INTERNAL MEDICINE

## 2020-01-13 PROCEDURE — 700111 HCHG RX REV CODE 636 W/ 250 OVERRIDE (IP): Performed by: ANESTHESIOLOGY

## 2020-01-13 PROCEDURE — A9270 NON-COVERED ITEM OR SERVICE: HCPCS | Performed by: STUDENT IN AN ORGANIZED HEALTH CARE EDUCATION/TRAINING PROGRAM

## 2020-01-13 PROCEDURE — 160002 HCHG RECOVERY MINUTES (STAT): Performed by: INTERNAL MEDICINE

## 2020-01-13 PROCEDURE — 160025 RECOVERY II MINUTES (STATS): Performed by: INTERNAL MEDICINE

## 2020-01-13 PROCEDURE — 700111 HCHG RX REV CODE 636 W/ 250 OVERRIDE (IP): Performed by: STUDENT IN AN ORGANIZED HEALTH CARE EDUCATION/TRAINING PROGRAM

## 2020-01-13 PROCEDURE — 700105 HCHG RX REV CODE 258: Performed by: STUDENT IN AN ORGANIZED HEALTH CARE EDUCATION/TRAINING PROGRAM

## 2020-01-13 PROCEDURE — 36415 COLL VENOUS BLD VENIPUNCTURE: CPT

## 2020-01-13 PROCEDURE — 84134 ASSAY OF PREALBUMIN: CPT

## 2020-01-13 PROCEDURE — 80053 COMPREHEN METABOLIC PANEL: CPT

## 2020-01-13 PROCEDURE — 85610 PROTHROMBIN TIME: CPT

## 2020-01-13 PROCEDURE — 500066 HCHG BITE BLOCK, ECT: Performed by: INTERNAL MEDICINE

## 2020-01-13 PROCEDURE — 160203 HCHG ENDO MINUTES - 1ST 30 MINS LEVEL 4: Performed by: INTERNAL MEDICINE

## 2020-01-13 PROCEDURE — 700105 HCHG RX REV CODE 258: Performed by: INTERNAL MEDICINE

## 2020-01-13 PROCEDURE — 160035 HCHG PACU - 1ST 60 MINS PHASE I: Performed by: INTERNAL MEDICINE

## 2020-01-13 PROCEDURE — 86140 C-REACTIVE PROTEIN: CPT

## 2020-01-13 PROCEDURE — 160048 HCHG OR STATISTICAL LEVEL 1-5: Performed by: INTERNAL MEDICINE

## 2020-01-13 PROCEDURE — 160046 HCHG PACU - 1ST 60 MINS PHASE II: Performed by: INTERNAL MEDICINE

## 2020-01-13 DEVICE — KIT 20 FRENCH PULL SAFETY PEG: Type: IMPLANTABLE DEVICE | Site: ABDOMEN | Status: FUNCTIONAL

## 2020-01-13 RX ORDER — SODIUM CHLORIDE, SODIUM LACTATE, POTASSIUM CHLORIDE, CALCIUM CHLORIDE 600; 310; 30; 20 MG/100ML; MG/100ML; MG/100ML; MG/100ML
INJECTION, SOLUTION INTRAVENOUS CONTINUOUS
Status: ACTIVE | OUTPATIENT
Start: 2020-01-13 | End: 2020-01-13

## 2020-01-13 RX ORDER — HYDROMORPHONE HYDROCHLORIDE 1 MG/ML
0.2 INJECTION, SOLUTION INTRAMUSCULAR; INTRAVENOUS; SUBCUTANEOUS
Status: DISCONTINUED | OUTPATIENT
Start: 2020-01-13 | End: 2020-01-13 | Stop reason: HOSPADM

## 2020-01-13 RX ORDER — OXYCODONE HCL 5 MG/5 ML
5 SOLUTION, ORAL ORAL
Status: DISCONTINUED | OUTPATIENT
Start: 2020-01-13 | End: 2020-01-13 | Stop reason: HOSPADM

## 2020-01-13 RX ORDER — HALOPERIDOL 5 MG/ML
1 INJECTION INTRAMUSCULAR
Status: DISCONTINUED | OUTPATIENT
Start: 2020-01-13 | End: 2020-01-13 | Stop reason: HOSPADM

## 2020-01-13 RX ORDER — ONDANSETRON 2 MG/ML
4 INJECTION INTRAMUSCULAR; INTRAVENOUS
Status: DISCONTINUED | OUTPATIENT
Start: 2020-01-13 | End: 2020-01-13 | Stop reason: HOSPADM

## 2020-01-13 RX ORDER — MEPERIDINE HYDROCHLORIDE 25 MG/ML
12.5 INJECTION INTRAMUSCULAR; INTRAVENOUS; SUBCUTANEOUS
Status: DISCONTINUED | OUTPATIENT
Start: 2020-01-13 | End: 2020-01-13 | Stop reason: HOSPADM

## 2020-01-13 RX ORDER — HYDRALAZINE HYDROCHLORIDE 20 MG/ML
5 INJECTION INTRAMUSCULAR; INTRAVENOUS
Status: DISCONTINUED | OUTPATIENT
Start: 2020-01-13 | End: 2020-01-13 | Stop reason: HOSPADM

## 2020-01-13 RX ORDER — HYDROMORPHONE HYDROCHLORIDE 1 MG/ML
0.1 INJECTION, SOLUTION INTRAMUSCULAR; INTRAVENOUS; SUBCUTANEOUS
Status: DISCONTINUED | OUTPATIENT
Start: 2020-01-13 | End: 2020-01-13 | Stop reason: HOSPADM

## 2020-01-13 RX ORDER — OXYCODONE HCL 5 MG/5 ML
10 SOLUTION, ORAL ORAL
Status: DISCONTINUED | OUTPATIENT
Start: 2020-01-13 | End: 2020-01-13 | Stop reason: HOSPADM

## 2020-01-13 RX ORDER — SODIUM CHLORIDE, SODIUM LACTATE, POTASSIUM CHLORIDE, CALCIUM CHLORIDE 600; 310; 30; 20 MG/100ML; MG/100ML; MG/100ML; MG/100ML
INJECTION, SOLUTION INTRAVENOUS CONTINUOUS
Status: DISCONTINUED | OUTPATIENT
Start: 2020-01-13 | End: 2020-01-13 | Stop reason: HOSPADM

## 2020-01-13 RX ORDER — POTASSIUM CHLORIDE 20 MEQ/1
40 TABLET, EXTENDED RELEASE ORAL ONCE
Status: COMPLETED | OUTPATIENT
Start: 2020-01-13 | End: 2020-01-13

## 2020-01-13 RX ORDER — LABETALOL HYDROCHLORIDE 5 MG/ML
5 INJECTION, SOLUTION INTRAVENOUS
Status: DISCONTINUED | OUTPATIENT
Start: 2020-01-13 | End: 2020-01-13 | Stop reason: HOSPADM

## 2020-01-13 RX ORDER — CEFAZOLIN SODIUM 1 G/3ML
INJECTION, POWDER, FOR SOLUTION INTRAMUSCULAR; INTRAVENOUS PRN
Status: DISCONTINUED | OUTPATIENT
Start: 2020-01-13 | End: 2020-01-13 | Stop reason: SURG

## 2020-01-13 RX ORDER — HYDROMORPHONE HYDROCHLORIDE 1 MG/ML
0.4 INJECTION, SOLUTION INTRAMUSCULAR; INTRAVENOUS; SUBCUTANEOUS
Status: DISCONTINUED | OUTPATIENT
Start: 2020-01-13 | End: 2020-01-13 | Stop reason: HOSPADM

## 2020-01-13 RX ORDER — ONDANSETRON 2 MG/ML
INJECTION INTRAMUSCULAR; INTRAVENOUS PRN
Status: DISCONTINUED | OUTPATIENT
Start: 2020-01-13 | End: 2020-01-13 | Stop reason: SURG

## 2020-01-13 RX ORDER — METOPROLOL TARTRATE 1 MG/ML
1 INJECTION, SOLUTION INTRAVENOUS
Status: DISCONTINUED | OUTPATIENT
Start: 2020-01-13 | End: 2020-01-13 | Stop reason: HOSPADM

## 2020-01-13 RX ORDER — HEPARIN SODIUM 5000 [USP'U]/ML
5000 INJECTION, SOLUTION INTRAVENOUS; SUBCUTANEOUS EVERY 12 HOURS
Status: DISCONTINUED | OUTPATIENT
Start: 2020-01-13 | End: 2020-01-18

## 2020-01-13 RX ADMIN — LABETALOL HYDROCHLORIDE 20 MG: 5 INJECTION INTRAVENOUS at 01:38

## 2020-01-13 RX ADMIN — CEFAZOLIN 2 G: 330 INJECTION, POWDER, FOR SOLUTION INTRAMUSCULAR; INTRAVENOUS at 09:41

## 2020-01-13 RX ADMIN — PROPOFOL 100 MG: 10 INJECTION, EMULSION INTRAVENOUS at 09:41

## 2020-01-13 RX ADMIN — FENTANYL CITRATE 50 MCG: 0.05 INJECTION, SOLUTION INTRAMUSCULAR; INTRAVENOUS at 11:25

## 2020-01-13 RX ADMIN — ACETAMINOPHEN 650 MG: 325 TABLET, FILM COATED ORAL at 17:03

## 2020-01-13 RX ADMIN — SODIUM CHLORIDE: 9 INJECTION, SOLUTION INTRAVENOUS at 01:39

## 2020-01-13 RX ADMIN — SODIUM CHLORIDE, POTASSIUM CHLORIDE, SODIUM LACTATE AND CALCIUM CHLORIDE: 600; 310; 30; 20 INJECTION, SOLUTION INTRAVENOUS at 09:40

## 2020-01-13 RX ADMIN — ACETAMINOPHEN 650 MG: 325 TABLET, FILM COATED ORAL at 12:43

## 2020-01-13 RX ADMIN — HEPARIN SODIUM 5000 UNITS: 5000 INJECTION, SOLUTION INTRAVENOUS; SUBCUTANEOUS at 17:03

## 2020-01-13 RX ADMIN — PROPOFOL 150 MCG/KG/MIN: 10 INJECTION, EMULSION INTRAVENOUS at 09:41

## 2020-01-13 RX ADMIN — DOXAZOSIN 2 MG: 2 TABLET ORAL at 17:04

## 2020-01-13 RX ADMIN — SENNOSIDES AND DOCUSATE SODIUM 2 TABLET: 8.6; 5 TABLET ORAL at 17:03

## 2020-01-13 RX ADMIN — FENTANYL CITRATE 50 MCG: 0.05 INJECTION, SOLUTION INTRAMUSCULAR; INTRAVENOUS at 10:30

## 2020-01-13 RX ADMIN — OXYCODONE HYDROCHLORIDE 10 MG: 5 TABLET ORAL at 12:44

## 2020-01-13 RX ADMIN — POTASSIUM CHLORIDE 40 MEQ: 1500 TABLET, EXTENDED RELEASE ORAL at 17:03

## 2020-01-13 RX ADMIN — OXYCODONE HYDROCHLORIDE 10 MG: 5 TABLET ORAL at 17:03

## 2020-01-13 RX ADMIN — ONDANSETRON 4 MG: 2 INJECTION INTRAMUSCULAR; INTRAVENOUS at 09:41

## 2020-01-13 RX ADMIN — LIDOCAINE HYDROCHLORIDE 40 MG: 20 INJECTION, SOLUTION INFILTRATION; PERINEURAL at 09:41

## 2020-01-13 ASSESSMENT — PAIN SCALES - WONG BAKER
WONGBAKER_NUMERICALRESPONSE: HURTS EVEN MORE
WONGBAKER_NUMERICALRESPONSE: HURTS A WHOLE LOT

## 2020-01-13 ASSESSMENT — PATIENT HEALTH QUESTIONNAIRE - PHQ9
2. FEELING DOWN, DEPRESSED, IRRITABLE, OR HOPELESS: NOT AT ALL
SUM OF ALL RESPONSES TO PHQ9 QUESTIONS 1 AND 2: 0
SUM OF ALL RESPONSES TO PHQ9 QUESTIONS 1 AND 2: 0
2. FEELING DOWN, DEPRESSED, IRRITABLE, OR HOPELESS: NOT AT ALL
1. LITTLE INTEREST OR PLEASURE IN DOING THINGS: NOT AT ALL
1. LITTLE INTEREST OR PLEASURE IN DOING THINGS: NOT AT ALL

## 2020-01-13 NOTE — ANESTHESIA TIME REPORT
Anesthesia Start and Stop Event Times     Date Time Event    1/13/2020 0920 Ready for Procedure     0940 Anesthesia Start     1003 Anesthesia Stop        Responsible Staff  01/13/20    Name Role Begin End    Michele Kirby M.D. Anesth 0940 1003        Preop Diagnosis (Free Text):  Pre-op Diagnosis     PEG tube placement        Preop Diagnosis (Codes):    Post op Diagnosis  Dysphagia      Premium Reason  Non-Premium    Comments:

## 2020-01-13 NOTE — ANESTHESIA QCDR
2019 [facility] Clinical Data Registry (for Quality Improvement)     Postoperative nausea/vomiting risk protocol (Adult = 18 yrs and Pediatric 3-17 yrs)- (430 and 463)  General inhalation anesthetic (NOT TIVA) with PONV risk factors: No  Provision of anti-emetic therapy with at least 2 different classes of agents: N/A  Patient DID NOT receive anti-emetic therapy and reason is documented in Medical Record: N/A    Multimodal Pain Management- (477)  Non-emergent surgery AND patient age >= 18: Yes  Use of Multimodal Pain Management, two or more drugs and/or interventions, NOT including systemic opioids: No  Exception: Documented allergy to multiple classes of analgesics: No       Smoking Abstinence (404)  Patient is current smoker (cigarette, pipe, e-cig, marijuanna): No  Elective Surgery:   Abstinence instructions provided prior to day of surgery:   Patient abstained from smoking on day of surgery:     Pre-Op Beta-Blocker in Isolated CABG (44)  Isolated CABG AND patient age >= 18: No  Beta-blocker admin within 24 hours of surgical incision:   Exception:of medical reason(s) for not administering beta blocker within 24 hours prior to surgical incision (e.g., not  indicated,other medical reason):     PACU assessment of acute postoperative pain prior to Anesthesia Care End- Applies to Patients Age = 18- (ABG7)  Initial PACU pain score is which of the following: < 7/10  Patient unable to report pain score: N/A    Post-anesthetic transfer of care checklist/protocol to PACU/ICU- (426 and 427)  Upon conclusion of case, patient transferred to which of the following locations: PACU/Non-ICU  Use of transfer checklist/protocol: Yes  Exclusion: Service Performed in Patient Hospital Room (and thus did not require transfer): N/A  Unplanned admission to ICU related to anesthesia service up through end of PACU care- (MD51)  Unplanned admission to ICU (not initially anticipated at anesthesia start time): No

## 2020-01-13 NOTE — OR NURSING
1000 Pt arrived from OR, rec'd report from  and RN. VSS. No s/s of resp distress. Pt sleepy. Gauze and PEG tube visible in LQ, CDI.     1015 Pt awake, denies pain.

## 2020-01-13 NOTE — OR SURGEON
Immediate Post OP Note    PreOp Diagnosis: feeding difficulty    PostOp Diagnosis: s/p PEG.  Esophageal reflux ulcer.    Procedure(s):  GASTROSCOPY PEG TUBE - Wound Class: Clean Contaminated  INSERTION, PEG TUBE - Wound Class: Clean    Surgeon(s):  Lorenzo Samayoa M.D.    Anesthesiologist/Type of Anesthesia:  Anesthesiologist: Michele Kirby M.D./General    Surgical Staff:  Circulator: Alejandro Wiggins R.N.  Endoscopy Technician: Shweta De La O    Specimens removed if any:  * No specimens in log *    Estimated Blood Loss: minimal    Findings: as above, o/w normal    Complications: none    Plan:  Per orders        1/13/2020 10:01 AM Lorenzo Samayoa M.D.

## 2020-01-13 NOTE — OR NURSING
"1000 Pt arrived from OR, rec'd report from  and RN. VSS. No s/s of resp distress. Pt sleepy. Gauze and PEG tube visible in LQ, CDI.     1015 Pt awake, denies pain.     1035 Pt medicated for pain, unable to give a number, but states \"it hurts medium\". Pt's son called at pt's request and given updated on POC.     1100 Pt resting, appears comfortable. Phase 2.     1130 Pt repositioned in bed. Report called to ADALBERTO Jordan. Waiting transport.     1210 Transport here. Pt appeared stable as he was taken via gurney.   "

## 2020-01-13 NOTE — PROGRESS NOTES
Left heel boggy, slow to wade. Floated bilat heels on pillows x 3. Abrasions to right shin. Left CAMILLE. Rash to chest and back. Generalized bruising to BUE. Sacrum flaky with pinked partial thickness wound exposed.

## 2020-01-13 NOTE — PROGRESS NOTES
2 RN skin check completed with ADALBERTO Kumar.   - Abrasions to R shin  - Rash to chest and back, R side of neck  - Generalized bruising to BUE, specifically R hand and R AC  - Incontinence dermatitis to sacrum and buttocks.Pt refused mepilex.   - Redness to anterior neck, blanching.

## 2020-01-13 NOTE — PROCEDURES
DATE OF SERVICE:  01/13/2020    EGD WITH PEG TUBE PLACEMENT     INDICATION FOR PROCEDURE:  Feeding difficulty secondary to CVA.    CONSENT:  Informed consent was obtained directly from the patient verbally   after the patient had all of his questions answered, and the procedure was   described fully to him.    MEDICATIONS:  The patient received a propofol-based regimen from Dr. Kirby from   anesthesia, please see his notes for full details.      PROCEDURE DESCRIPTION:  In the supine position, and with oxygen delivered via   face mask, the upper endoscope was placed in the patient's mouth and advanced   easily and carefully to the esophagus and subsequently the stomach and   duodenum.    FINDINGS:  There was significant reflux esophagitis with a reflux ulcer   present just proximal to the GE junction.  There was no stigmata of high risk   of bleeding with this ulceration, which was approximately 1.5 cm in total   length.  In the stomach, the stomach was empty and appeared normal other than   the presence of a small hiatal hernia, best seen on retroflexed views of the   cardia.  The duodenum appeared normal to the second portion.  The scope was   then placed in the mid to distal stomach, and ballottement of the overlying   skin on the left side approximately 1.5 inches below the inferior costal   margin demonstrated an area of good 1:1 impression.  This site was prepped and   draped in the usual manner, I then anesthetized using 1% Xylocaine solution.    The site was then reconfirmed, and a small vertical incision was made,   through which a trocar was then placed with 1 pass into the gastric lumen.  A   trocar was then removed leaving the plastic sheath in place.  A wire was then   placed through the plastic sheath, and grasped by the scope using standard   snare technique.  The wire was pulled through the patient's mouth.  A PEG tube   was then affixed to the wire, and the entire apparatus was pulled in the   usual  manner across the abdominal and gastric walls without difficulty.  The   final resting position of the external marker on the PEG tube was 7 cm.  The   scope was then replaced back into the patient's stomach, and the internal   bumper was noted to be in good position adjacent the anterior gastric wall.    The PEG tube was then dressed and bandaged in standard fashion.      COMPLICATIONS:  No complications during or immediate postoperative period.    IMPRESSION:  1.  Successful placement of 20-Chinese tractional removable feeding gastrostomy   tube.  2.  Small hiatal hernia.  3.  Reflux ulceration.    RECOMMENDATION:  The patient will be kept at incline of 30 degrees or more in   bed for reflux precautions.  We may be able to start his medications now, and   we will start his tube feeds at 30 mL per hour, advancing to goal over the   next 24 hours.       ____________________________________     NIKOLAI PATINO MD    WP / NTS    DD:  01/13/2020 10:06:01  DT:  01/13/2020 10:25:36    D#:  [id number]  Job#:  [id number]

## 2020-01-13 NOTE — PROGRESS NOTES
2 RN skin check completed with ADALBERTO Young.  Partial thickness wound to bottom.  Generalized scabbing to BLE.  Generalized bruising to BULE.  Rash to chest and back.

## 2020-01-13 NOTE — ASSESSMENT & PLAN NOTE
- Placement on 1/13  - Stop peg tube feeling since patient tolerating oral intake  - Monitor for signs of Infection  - Speech following next Monday for evaluation to advance diet  - Do Not Remove Peg Tube  - Consider removal after one Month of successful oral feeding   - DC SNF

## 2020-01-13 NOTE — ANESTHESIA POSTPROCEDURE EVALUATION
Patient: Jacinto Loyola    Procedure Summary     Date:  01/13/20 Room / Location:  [facility] ROOM 26 / SURGERY SAME DAY [facility]    Anesthesia Start:  0940 Anesthesia Stop:  1003    Procedures:       GASTROSCOPY PEG TUBE (N/A Esophagus)      INSERTION, PEG TUBE (Left Abdomen) Diagnosis:  (Peg Tube placement)    Surgeon:  Lorenzo Samayoa M.D. Responsible Provider:  Michele Kirby M.D.    Anesthesia Type:  MAC ASA Status:  3          Final Anesthesia Type: MAC  Last vitals  BP   Blood Pressure: 146/96    Temp   36.7 °C (98 °F)    Pulse   Pulse: 68   Resp   16    SpO2   95 %      Anesthesia Post Evaluation    Patient location during evaluation: PACU  Patient participation: complete - patient participated  Level of consciousness: awake and alert    Airway patency: patent  Anesthetic complications: no  Cardiovascular status: hemodynamically stable  Respiratory status: acceptable  Hydration status: euvolemic    PONV: none           Nurse Pain Score: 8  (Andrade-Baker Scale)

## 2020-01-13 NOTE — CARE PLAN
Problem: Safety  Goal: Free from accidental injury  Outcome: PROGRESSING AS EXPECTED  Note:   Pt is a high fall risk, fall interventions in place. Pt is impulsive, education provided on fall risk. Bed locked and in lowest position. Bed alarm on, call light within reach, three side rails. Hourly rounding in place.      Problem: Risk for Impaired Mobility--Activity Intolerance  Goal: Mobilize and/or Transfer Safely with Maximum [place]  Outcome: PROGRESSING AS EXPECTED  Note:   Pt mobilized to cardiac chair for 30 minutes with assist of three people. Pt able to use RUE to help with mobilization.

## 2020-01-13 NOTE — OR NURSING
"1000 Pt arrived from OR, rec'd report from  and RN. VSS. No s/s of resp distress. Pt sleepy. Gauze and PEG tube visible in LQ, CDI.     1015 Pt awake, denies pain.     1035 Pt medicated for pain, unable to give a number, but states \"it hurts medium\". Pt's son called at pt's request and given updated on POC.   "

## 2020-01-13 NOTE — OR NURSING
Pt alert and oriented.  Does not know the exact date, but knows it is 2020.  Answers questions appropriately, and asks questions appropriately.  Pt gave verbal consent to anesthesia and surgical PEG tube placement.  Signed by two nurses.  Pt has hx of stroke and has left sided weakness, he also has a difficult time using a pen with right hand.  Verbal consent taken.   Pt on right side, visual inspection of skin breakdown on sacrum and buttocks assessed.  No open areas noted.  Just some very slight pink areas in healed portions.

## 2020-01-13 NOTE — PROGRESS NOTES
Internal Medicine Interval Note  Note Author: Alex Lozoya M.D.     Name Jacinto Loyola       1970   Age/Sex 49 y.o. male   MRN [id number]   Code Status Full Code     After 5PM or if no immediate response to page, please call for cross-coverage  Attending/Team: DR Pemberton/Lindsay See Patient List for primary contact information  Call [phone] to page    1st Call - Day Intern (R1):   DR Lozoya 2nd Call - Day Sr. Resident (R2/R3):   Dr Blanco         Reason for interval visit  (Principal Problem)   Intraparenchymal Hemorrhage of R Basal Ganglia secondary to Hypertensive Emergency.      Interval Problem Daily Status Update  (24 hours, problem oriented, brief subjective history, new lab/imaging data pertinent to that problem)   - No acute events overnight reported by nursing personnel. Some agitation last night, awake.  - Patient very sleepy during morning rounds, unable to interact much during PE.  - Patient underwent Peg Tube placement by GI without complications.  - Patient's vital signs have been stable SBP ranging 110's-130's  - Discussion about Code status pending.  -  working in getting Insurance for patient for future placement.  - We will start DVT prophylaxis.      Review of Systems   Unable to perform ROS: Mental acuity       Disposition/Barriers to discharge:   [place]    Consultants/Specialty  Critical Care  GI  Neurology    PCP: Pcp Pt States None      Quality Measures  Quality-Core Measures   Reviewed items::  Labs reviewed and Medications reviewed  Villasenor Catheter: yes.  DVT prophylaxis pharmacological::  Heparin  DVT prophylaxis - mechanical:  SCDs  Ulcer Prophylaxis::  Yes          Physical Exam       Vitals:    20 1220 20 1300 20 1330 20 1400   BP: 154/101 141/97 123/72 130/72   Pulse:  61 61 69   Resp: 20 14 16 16   Temp: 36.4 °C (97.6 °F) 36.4 °C (97.6 °F) 37.5 °C (99.5 °F) 36.6 °C (97.9 °F)   TempSrc: Temporal Temporal Temporal  Temporal   SpO2: 97% 98% 96% 94%   Weight:       Height:         Body mass index is 24.84 kg/m².    Oxygen Therapy:  Pulse Oximetry: 94 %, O2 (LPM): 0, O2 Delivery: None (Room Air)    Physical Exam   Constitutional: He is well-developed, well-nourished, and in no distress. No distress.   HENT:   Head: Normocephalic and atraumatic.   Mouth/Throat: No oropharyngeal exudate.   Eyes: Pupils are equal, round, and reactive to light. EOM are normal. No scleral icterus.   Neck: Normal range of motion. Neck supple. No JVD present.   Cardiovascular: Normal rate, regular rhythm and normal heart sounds.   No murmur heard.  Pulmonary/Chest: Effort normal and breath sounds normal. No respiratory distress. He has no wheezes. He has no rales.   Abdominal: Soft. Bowel sounds are normal. He exhibits no distension. There is no tenderness. There is no guarding.   Musculoskeletal:         General: No edema.      Comments: Unchanged left hemiparesis   Neurological:   Very sleepy this morning, not cooperating during exam.  Response to voice barely, patient tired looking.  Left hemiparesis unchanged.   Skin: Skin is warm. No rash noted. He is not diaphoretic. No erythema.             Assessment/Plan     * Intraparenchymal hemorrhage of brain (HCC)- (present on admission)  Assessment & Plan  - Intracranial Hemorrhage sec to poorly controlled HTN  -HTN emergency  - H/O methamphetamine abuse  - Head CT showed a 2.7 cm acute right basal ganglia/thalamic hemorrhage, no indications for neurosurgery intervention  - Repeated CT scan on 1/5 showed stable hemorrhage, no new bleed.  - Per Neurology: recommend inpatient SBP control <160, outpatient <130, LDL <70, A1C <7%.  - EEG negative for seizure. Neuro signed off on 12/30.  - Unchanged Dysarthria and Hemiparesis.  - Patient has not improved in gaining mobility at all in his extremities since I have been in the service.  - Peg Tube placed on 1/13                 PLAN:  -Aspiration  precaution  -Feeding tube, requiring bridle  -Keep head of bed 30 degrees  -Continue RT  -PT/OT/SLP  -Failed SLP swallow eval due to lethargy  --Turn patient Q2 hrs to prevent new bed sores  - HTN medication changes for better BP control  - SNF referral placed prior to transfer from ICU; pending  - Speech eval 1/7 recommended continue strict NPO/Cortrak  - IV fluids if patient refuses Cortrak placement    Urinary retention  Assessment & Plan  - Villasenor Cath was DCed for spontaneous void trial on 1/10  - RN reported retention of 700, 500 ml  - Void trial Failure  - Villasenor cath placement  - Assesses in the future Void Trial        Altered mental status  Assessment & Plan  - Intraparenchymal hemorrhage.   - Alertness waxes and wanes throughout the day  - Bowel protocol  - Unclear if patient will recover more functionality  - Unchanged dysarthria  - Strict NPO  - Peg tube placement on 1/13  - CTM    Methamphetamine abuse (HCC)- (present on admission)  Assessment & Plan  Counseling, when appropriate  Consulted SW    JAYASHREE (obstructive sleep apnea)- (present on admission)  Assessment & Plan  - Not on CPAP at home  - Unable to use Bipap with mental status and stroke  - Continue O2 supplementation as needed  - CT    Hypertensive emergency- (present on admission)  Assessment & Plan  -Presented with blood pressures of  190s  SBP; and 110-130s DBP  - Intraparenchymal hemorrhage Imaging  - CT BP  - Assessing changes in BP regimen for better BP control  - Increasing Chlortalidone to 50 and Doxazosin to 2mg  - BP has been stable WNL    S/P percutaneous endoscopic gastrostomy (PEG) tube placement (HCC)  Assessment & Plan  - Placement on 1/13  - May start using it for meds  - Monitor for signs of Infection    Acute respiratory failure with hypoxia and hypercapnia (HCC)- (present on admission)  Assessment & Plan  - Resolved  - Patient was Intubated 12/26- 12/31/2019 (for aspiration and airway protection)  - Extubate successfully  12/31/2019  - Was treated with Abxs for MSSA/ PNA (Ancef x5 days)  - PT/OT/SLP  - Aspiration precautions  - Strict NPO  - Oral care daily 2-3 times a day  - RT/O2 per protocol   - O2 sats WNL on RA  - CTM    Aspiration into airway- (present on admission)  Assessment & Plan  - PNA resolved  -MSSA pneumonia  - Patient Completed cefazolin 5 day course  - Aspiration precautions  - O2/RT   - Strict NPO  - Peg Tube placed on 1/13  - Speech therapy following 3 times per week  - Failed last swallow eval

## 2020-01-14 PROCEDURE — 700102 HCHG RX REV CODE 250 W/ 637 OVERRIDE(OP): Performed by: STUDENT IN AN ORGANIZED HEALTH CARE EDUCATION/TRAINING PROGRAM

## 2020-01-14 PROCEDURE — 92526 ORAL FUNCTION THERAPY: CPT

## 2020-01-14 PROCEDURE — 97535 SELF CARE MNGMENT TRAINING: CPT

## 2020-01-14 PROCEDURE — A9270 NON-COVERED ITEM OR SERVICE: HCPCS | Performed by: STUDENT IN AN ORGANIZED HEALTH CARE EDUCATION/TRAINING PROGRAM

## 2020-01-14 PROCEDURE — 97112 NEUROMUSCULAR REEDUCATION: CPT

## 2020-01-14 PROCEDURE — 700111 HCHG RX REV CODE 636 W/ 250 OVERRIDE (IP): Performed by: STUDENT IN AN ORGANIZED HEALTH CARE EDUCATION/TRAINING PROGRAM

## 2020-01-14 PROCEDURE — 770006 HCHG ROOM/CARE - MED/SURG/GYN SEMI*

## 2020-01-14 PROCEDURE — 700102 HCHG RX REV CODE 250 W/ 637 OVERRIDE(OP): Performed by: INTERNAL MEDICINE

## 2020-01-14 PROCEDURE — 700105 HCHG RX REV CODE 258: Performed by: STUDENT IN AN ORGANIZED HEALTH CARE EDUCATION/TRAINING PROGRAM

## 2020-01-14 PROCEDURE — 82270 OCCULT BLOOD FECES: CPT

## 2020-01-14 PROCEDURE — 99232 SBSQ HOSP IP/OBS MODERATE 35: CPT | Mod: GC | Performed by: INTERNAL MEDICINE

## 2020-01-14 PROCEDURE — A9270 NON-COVERED ITEM OR SERVICE: HCPCS | Performed by: INTERNAL MEDICINE

## 2020-01-14 PROCEDURE — 97116 GAIT TRAINING THERAPY: CPT

## 2020-01-14 RX ORDER — OMEPRAZOLE 20 MG/1
20 CAPSULE, DELAYED RELEASE ORAL DAILY
Status: DISCONTINUED | OUTPATIENT
Start: 2020-01-14 | End: 2020-01-14

## 2020-01-14 RX ADMIN — HEPARIN SODIUM 5000 UNITS: 5000 INJECTION, SOLUTION INTRAVENOUS; SUBCUTANEOUS at 18:03

## 2020-01-14 RX ADMIN — OXYCODONE HYDROCHLORIDE 10 MG: 5 TABLET ORAL at 00:13

## 2020-01-14 RX ADMIN — OMEPRAZOLE 40 MG: KIT at 09:00

## 2020-01-14 RX ADMIN — DOXAZOSIN 2 MG: 2 TABLET ORAL at 20:14

## 2020-01-14 RX ADMIN — OXYCODONE HYDROCHLORIDE 10 MG: 5 TABLET ORAL at 13:32

## 2020-01-14 RX ADMIN — MODAFINIL 100 MG: 100 TABLET ORAL at 04:47

## 2020-01-14 RX ADMIN — SODIUM CHLORIDE: 9 INJECTION, SOLUTION INTRAVENOUS at 02:34

## 2020-01-14 RX ADMIN — CHLORTHALIDONE 50 MG: 50 TABLET ORAL at 04:47

## 2020-01-14 RX ADMIN — HEPARIN SODIUM 5000 UNITS: 5000 INJECTION, SOLUTION INTRAVENOUS; SUBCUTANEOUS at 04:47

## 2020-01-14 RX ADMIN — SENNOSIDES AND DOCUSATE SODIUM 2 TABLET: 8.6; 5 TABLET ORAL at 18:03

## 2020-01-14 RX ADMIN — OXYCODONE HYDROCHLORIDE 5 MG: 5 TABLET ORAL at 18:03

## 2020-01-14 RX ADMIN — OXYCODONE HYDROCHLORIDE 10 MG: 5 TABLET ORAL at 08:00

## 2020-01-14 RX ADMIN — SODIUM CHLORIDE: 9 INJECTION, SOLUTION INTRAVENOUS at 12:37

## 2020-01-14 RX ADMIN — SENNOSIDES AND DOCUSATE SODIUM 2 TABLET: 8.6; 5 TABLET ORAL at 04:47

## 2020-01-14 RX ADMIN — ENALAPRIL MALEATE 10 MG: 10 TABLET ORAL at 04:47

## 2020-01-14 RX ADMIN — ACETAMINOPHEN 650 MG: 325 TABLET, FILM COATED ORAL at 20:14

## 2020-01-14 RX ADMIN — AMLODIPINE BESYLATE 10 MG: 10 TABLET ORAL at 04:47

## 2020-01-14 ASSESSMENT — ENCOUNTER SYMPTOMS
DIZZINESS: 0
HEADACHES: 0
FEVER: 0
PHOTOPHOBIA: 0
DIAPHORESIS: 0
ORTHOPNEA: 0
PALPITATIONS: 0
BLURRED VISION: 0
DOUBLE VISION: 0
SHORTNESS OF BREATH: 0
CHILLS: 0
FOCAL WEAKNESS: 1
COUGH: 0
MYALGIAS: 0
BRUISES/BLEEDS EASILY: 0
DEPRESSION: 0
NECK PAIN: 0
NAUSEA: 0
SENSORY CHANGE: 0
SORE THROAT: 0
VOMITING: 0
HEARTBURN: 0
BACK PAIN: 0
SINUS PAIN: 0

## 2020-01-14 ASSESSMENT — PATIENT HEALTH QUESTIONNAIRE - PHQ9
2. FEELING DOWN, DEPRESSED, IRRITABLE, OR HOPELESS: NOT AT ALL
1. LITTLE INTEREST OR PLEASURE IN DOING THINGS: NOT AT ALL
SUM OF ALL RESPONSES TO PHQ9 QUESTIONS 1 AND 2: 0

## 2020-01-14 ASSESSMENT — COGNITIVE AND FUNCTIONAL STATUS - GENERAL
SUGGESTED CMS G CODE MODIFIER MOBILITY: CM
PERSONAL GROOMING: A LOT
WALKING IN HOSPITAL ROOM: TOTAL
DAILY ACTIVITIY SCORE: 11
STANDING UP FROM CHAIR USING ARMS: A LOT
SUGGESTED CMS G CODE MODIFIER DAILY ACTIVITY: CL
MOVING TO AND FROM BED TO CHAIR: UNABLE
MOBILITY SCORE: 7
EATING MEALS: TOTAL
TURNING FROM BACK TO SIDE WHILE IN FLAT BAD: UNABLE
MOVING FROM LYING ON BACK TO SITTING ON SIDE OF FLAT BED: UNABLE
DRESSING REGULAR LOWER BODY CLOTHING: A LOT
DRESSING REGULAR UPPER BODY CLOTHING: A LOT
CLIMB 3 TO 5 STEPS WITH RAILING: TOTAL
TOILETING: A LOT
HELP NEEDED FOR BATHING: A LOT

## 2020-01-14 NOTE — CARE PLAN
Problem: Communication  Goal: The ability to communicate needs accurately and effectively will improve  Outcome: PROGRESSING AS EXPECTED     Problem: Safety  Goal: Will remain free from falls  Outcome: PROGRESSING AS EXPECTED     Problem: Infection  Goal: Will remain free from infection  Outcome: PROGRESSING AS EXPECTED     Problem: Venous Thromboembolism (VTW)/Deep Vein Thrombosis (DVT) Prevention:  Goal: Patient will participate in Venous Thrombosis (VTE)/Deep Vein Thrombosis (DVT)Prevention Measures  Outcome: PROGRESSING AS EXPECTED     Problem: Bowel/Gastric:  Goal: Will not experience complications related to bowel motility  Outcome: PROGRESSING AS EXPECTED     Problem: Knowledge Deficit  Goal: Knowledge of disease process/condition, treatment plan, diagnostic tests, and medications will improve  Outcome: PROGRESSING AS EXPECTED  Goal: Knowledge of the prescribed therapeutic regimen will improve  Outcome: PROGRESSING AS EXPECTED     Problem: Discharge Barriers/Planning  Goal: Patient's continuum of care needs will be met  Outcome: PROGRESSING AS EXPECTED     Problem: Fluid Volume:  Goal: Will maintain balanced intake and output  Outcome: PROGRESSING AS EXPECTED     Problem: Pain Management  Goal: Pain level will decrease to patient's comfort goal  Outcome: PROGRESSING AS EXPECTED     Problem: Skin Integrity  Goal: Risk for impaired skin integrity will decrease  Outcome: PROGRESSING AS EXPECTED     Problem: Respiratory:  Goal: Respiratory status will improve  Outcome: PROGRESSING AS EXPECTED     Problem: Psychosocial Needs:  Goal: Level of anxiety will decrease  Outcome: PROGRESSING AS EXPECTED     Problem: Acute Care of the Stroke Patient  Goal: Optimal Outcome for the Stroke patient  Outcome: PROGRESSING AS EXPECTED     Problem: Urinary Elimination:  Goal: Ability to reestablish a normal urinary elimination pattern will improve  Outcome: PROGRESSING AS EXPECTED     Problem: Safety  Goal: Free from accidental  injury  Outcome: PROGRESSING AS EXPECTED     Problem: Knowledge Deficit  Goal: Patient/Significant other demonstrates understanding of disease process, treatment plan, medications and discharge instructions  Outcome: PROGRESSING AS EXPECTED     Problem: Psychosocial needs  Goal: Anxiety reduction  Outcome: PROGRESSING AS EXPECTED     Problem: Discharge Barriers/Planning  Goal: Patient's Continuum of care needs are met  Outcome: PROGRESSING AS EXPECTED     Problem: Risk for Impaired Mobility--Activity Intolerance  Goal: Mobilize and/or Transfer Safely with Maximum [place]  Outcome: PROGRESSING AS EXPECTED  Goal: Activity Level appropriate for Discharge or Transfer  Outcome: PROGRESSING AS EXPECTED     Problem: Risk of Aspiration  Goal: Absence of aspiration  Outcome: PROGRESSING AS EXPECTED  Intervention: NPO until medically cleared  Note:   Aspiration precautions in place       Problem: Hemodynamic Status  Goal: Stable Vital Signs and Fluid Balance  Outcome: PROGRESSING AS EXPECTED

## 2020-01-14 NOTE — THERAPY
"Occupational Therapy Treatment completed with focus on ADLs, ADL transfers and patient education.  Functional Status:  Pt seen for OT tx. Max A supine > sit, able to maintain sitting balance w/o physical assist from staff, when fatigued pt required min A for seated balance. LUE continues to be weak w/ no active or purposeful movement and is subluxed 1 finger. Max A LB dressing. Pt pleasant and cooperative. Demonstrated improved strength and OOB activity tolerance. Mod A sit > stand, attempted weight shifting to initiate BSC transfers. Continues to be limited by impaired safety awareness, decreased activity tolerance and generalized weakness. Pt expressed motivation to regain strength, endurance and independence in ADLs and ADL transfers.   Plan of Care: Will benefit from Occupational Therapy 3 times per week  Discharge Recommendations:  Equipment Will Continue to Assess for Equipment Needs. Post-acute therapy Recommend post-acute placement for additional occupational therapy services prior to discharge home. Patient can tolerate post-acute therapies at a 5x/week frequency.    See \"Rehab Therapy-Acute\" Patient Summary Report for complete documentation.   "

## 2020-01-14 NOTE — PROGRESS NOTES
Internal Medicine Interval Note  Note Author: Alex Lozoya M.D.     Name Jacinto Loyola       1970   Age/Sex 49 y.o. male   MRN [id number]   Code Status Full Code     After 5PM or if no immediate response to page, please call for cross-coverage  Attending/Team: Dr Pemberton/Lindsay See Patient List for primary contact information  Call [phone] to page    1st Call - Day Intern (R1):   Dr Lozoya 2nd Call - Day Sr. Resident (R2/R3):   Dr Blanco         Reason for interval visit  (Principal Problem)   Intraparenchymal Hemorrhage of Right Basal Ganglia secondary to Hypertensive Emergency.      Interval Problem Daily Status Update  (24 hours, problem oriented, brief subjective history, new lab/imaging data pertinent to that problem)   - Yesterday evening I was called by RN saying that his Villasenor catheter came out accidentally, nurse denied seeing blood coming out from his urethra, or patient complaining of severe pain. I asked nurses not to put another catheter back, monitor for spontaneous void with bladder scans every 4 hours, straight cath if urine more than 450 ml.  - Nurses report using Peg tube without any difficulties, they also reported clean and dry dressing, no redness or bleeding noted.  - Vital signs stable, afebrile.  - Patient alert and oriented times 3, he was asleep but he was awaken easily with minimal stimuli, he states mild pain around area of tube placement, there are no signs of infection. Dressing was clean and dry.  - Patient was not able to urinate, requiring straight cath twice. Villasenor catheter was placed back again due to void trial failure.  - We ordered PPI today, patient was not on it.    Review of Systems   Constitutional: Negative for chills, diaphoresis and fever.   HENT: Negative for nosebleeds, sinus pain, sore throat and tinnitus.    Eyes: Negative for blurred vision, double vision and photophobia.   Respiratory: Negative for cough and shortness of breath.     Cardiovascular: Negative for chest pain, palpitations and orthopnea.   Gastrointestinal: Negative for heartburn, nausea and vomiting.   Genitourinary: Negative for dysuria, frequency and urgency.   Musculoskeletal: Negative for back pain, myalgias and neck pain.   Skin: Negative for itching and rash.   Neurological: Positive for focal weakness. Negative for dizziness, sensory change and headaches.   Endo/Heme/Allergies: Does not bruise/bleed easily.   Psychiatric/Behavioral: Negative for depression and suicidal ideas.       Disposition/Barriers to discharge:   Placement.    Consultants/Specialty  Neurologist  Critical care  GI    PCP: Pcp Pt States None      Quality Measures  Quality-Core Measures   Reviewed items::  Labs reviewed and Medications reviewed  Villasenor Catheter: Placed back in, unable to void overnight.  DVT prophylaxis pharmacological::  Heparin  DVT prophylaxis - mechanical:  SCDs          Physical Exam       Vitals:    01/13/20 2000 01/14/20 0000 01/14/20 0400 01/14/20 0800   BP: 120/78 135/76 117/84 135/88   Pulse: 69 90 73 75   Resp: 16 16 18 20   Temp: 37 °C (98.6 °F) 36.9 °C (98.5 °F) 36.7 °C (98 °F) 37.2 °C (99 °F)   TempSrc: Temporal Temporal Temporal Temporal   SpO2: 93% 93% 94% 95%   Weight:       Height:         Body mass index is 24.84 kg/m².    Oxygen Therapy:  Pulse Oximetry: 95 %, O2 (LPM): 0, O2 Delivery: None (Room Air)    Physical Exam   Constitutional: He is oriented to person, place, and time and well-developed, well-nourished, and in no distress. No distress.   HENT:   Head: Normocephalic and atraumatic.   Mouth/Throat: No oropharyngeal exudate.   Eyes: Pupils are equal, round, and reactive to light. EOM are normal. No scleral icterus.   Neck: Normal range of motion. Neck supple. No JVD present.   Cardiovascular: Normal rate, regular rhythm and normal heart sounds.   No murmur heard.  Pulmonary/Chest: Effort normal and breath sounds normal. No stridor. No respiratory distress. He  has no wheezes. He has no rales.   Abdominal: Soft. Bowel sounds are normal. He exhibits no distension and no mass. There is tenderness. There is no rebound and no guarding.   This morning stated mild abdominal discomfort area of Tube insertion, no signs of infection. Clean and dry dressing.   Musculoskeletal:         General: No deformity or edema.      Comments: Left side hemiparesis   Neurological: He is alert and oriented to person, place, and time.   Left side hemiparesis. Dysarthria.   Skin: Skin is warm. No rash noted. He is not diaphoretic. No erythema.   Psychiatric: Mood, memory and affect normal.             Assessment/Plan     * Intraparenchymal hemorrhage of brain (HCC)- (present on admission)  Assessment & Plan  - Intracranial Hemorrhage sec to poorly controlled HTN  -HTN emergency  - H/O methamphetamine abuse  - Head CT showed a 2.7 cm acute right basal ganglia/thalamic hemorrhage, no indications for neurosurgery intervention  - Repeated CT scan on 1/5 showed stable hemorrhage, no new bleed.  - Per Neurology: recommend inpatient SBP control <160, outpatient <130, LDL <70, A1C <7%.  - EEG negative for seizure. Neuro signed off on 12/30.  - Unchanged Dysarthria and Hemiparesis.  - Patient has not improved in gaining mobility at all in his extremities since I have been in the service.  - Peg Tube placed on 1/13  - RN's using peg,  not issues reported.                   PLAN:  - Use Peg tube  - Continue RT  - PT/OT/SLP  - Turn patient Q2 hrs to prevent new bed sores  - HTN med changes made, with better BP control.  - SNF referral placed prior to transfer from ICU; pending.  - Speech eval 1/7 recommended continue strict NPO.    Urinary retention  Assessment & Plan  - Villasenor catheter came out accidentally yesterday evening 1/13  - RN personnel instructed to monitor for spontaneous voiding  - Bladder scans every 4 hrs  - Straight cath if urine more than 450 ml  - Patient was not able to void spontaneously,  straight cath twice.  - Villasenor Catheter placed back again.      Plan:  - Continue Villasenor catheter   - Void trial in the future      Altered mental status  Assessment & Plan  - Intraparenchymal hemorrhage.   - Alertness waxes and wanes throughout the day  - Modafinil daily.  - Bowel protocol  - Unclear if patient will recover more functionality  - Unchanged dysarthria  - Strict NPO  - Peg tube placement on 1/13  - CTM    Methamphetamine abuse (HCC)- (present on admission)  Assessment & Plan  Counseling, when appropriate  Consulted SW    JAYASHREE (obstructive sleep apnea)- (present on admission)  Assessment & Plan  - Not on CPAP at home  - Unable to use Bipap with mental status and stroke  - Continue O2 supplementation as needed  - CT    Hypertensive emergency- (present on admission)  Assessment & Plan  -Presented with blood pressures of  190s  SBP; and 110-130s DBP  - Intraparenchymal hemorrhage Imaging  - [facility] BP  - Assessing changes in BP regimen for better BP control  - Increasing Chlortalidone to 50 and Doxazosin to 2mg  - BP has been stable WNL SBP between 110's-130's    S/P percutaneous endoscopic gastrostomy (PEG) tube placement (HCC)  Assessment & Plan  - Placement on 1/13  - RN's using Peg tube, not reporting issues.  - May start using it for meds  - Monitor for signs of Infection    Acute respiratory failure with hypoxia and hypercapnia (HCC)- (present on admission)  Assessment & Plan  - Resolved.  - Patient was Intubated 12/26- 12/31/2019 (for aspiration and airway protection)  - Extubate successfully 12/31/2019  - Was treated with Abxs for MSSA/ PNA (Ancef x5 days)  - PT/OT/SLP  - Aspiration precautions.  - Strict NPO.  - Oral care daily 2-3 times a day  - RT/O2 per protocol   - O2 sats WNL on RA  - CTM    Aspiration into airway- (present on admission)  Assessment & Plan  - PNA resolved  - MSSA pneumonia  - Patient Completed cefazolin 5 day course  - Aspiration precautions  - O2/RT   - Strict NPO  - Peg Tube  placed on 1/13.  - RN using peg right after patient came back to room, not reporting any issues with Peg tube use.  - Speech therapy following 3 times per week  - Failed last swallow eval

## 2020-01-14 NOTE — DIETARY
"Nutrition support update:  Day 21 of admit. Jacinto Loyola is a 49 y.o. male with admitting DX of intraparenchymal hemorrhage of brain, hypertensive crisis. Tube feeding initiated on . Current TF via PEG is Fibersource HN @ 80 ml/hr continuous providing 2304 kcal, 104 grams of protein, and 1555 ml of free water/day.     Assessment:  Weight was 97.3 kg (214 lb 8.1 oz) on 2020 per bed scale and was 83.1 kg (183 lb 3.2 oz) on 2020 per bed scale. 14.2 kg (31.24 lb) weight loss x 16 days. Pt noted as fluid overloaded per last RD note. Trace bilateral upper extremity edema present per flow sheets. Weight loss likely due to fluid shifts.     Re-estimate of nutritional needs as indicated by change in weight.    Estimated Nutritional Needs based on:   Height: 182.9 cm (6' 0.01\")  Weight: 83.1 kg (183 lb 3.2 oz)  Weight to Use in Calculations: 83.1 kg (183 lb 3.2 oz)  Ideal Body Weight: 80.7 kg (178 lb)  Percent Ideal Body Weight: 102.9  Body mass index is 24.84 kg/m²., BMI classification: normal    Calculation/Equation: REE MSJ x 1.2 = 2088.7 kcal/day  Total Calories / day:  - 2327 (Calories / k - 28)  Total Grams Protein / day: 83 - 100  (Grams Protein / k - 1.2)   Total mL Fluid / day:  - 2493 (mL Fluid / k - 30)     Evaluation:   1. Consult for tube feeding recieved  2. PEG placed . Bolus feeds appropriate at this time.   3. PMHx: HTN, JAYASHREE, non-compliance, meth abuse  4. MAR: oxycodone, bowel protocol  5. Fluids: NS infusion @ 100 ml/hr  6. 150 ml flushes q4h. 900 flushes + 1555 ml free water = 2455 ml total water/day   7. Resident stated IV fluids will be discontinued today since pt is receiving excess fluids.   8. Labs: sodium 133, potassium 2.4, glucose 102, BUN 46, pre-albumin 36, CRP 0.25  9. Last BM:   10. Calorically dense, fiber containing standard formula appears appropriate at this time.     Malnutrition risk: No new risk identified at this time. "     Recommendations/Plan:  1. Suggest 6 containers/day of Isosource 1.5 to provide 2250 kcal, 102 grams of protein, and 1710 ml free water. Recommend bolus 2 containers TID @ 7 AM, 1 PM, 7 PM or feeding frequency as tolerated by patient.   2. 6 containers/day of Isosource 1.5 will exceed 100% RDI/DRIs.   3. Fluids per MD.  4. Please obtain measured weight weekly.    RD following.

## 2020-01-14 NOTE — THERAPY
"Speech Language Therapy dysphagia treatment completed.   Functional Status: Patient currently NPO and s/p PEG tube placement yesterday. Patient awake, alert, and appearing much more improved than previous tx session with this SLP. Patient more alert, conversant, engaged, and following directives today. Patient consumed PO trials of single ice chips, NTL via tsp and cup sip, purees, and thin liquids via tsp and cup sip. Patient presented with subtle throat clearing and increased WOB on thins via cup sip, which is concerning for penetration/aspiration, with patient reporting \"That was harder than I expected\" regarding cup sip of thin liquids. Patient had no overt s/sx of aspiration on any other consistencies trialed, which is significant improvement from previous tx session. Patient triggering timely swallow and able to trigger second swallow today, as well. At this time, recommend patient continue strict NPO with PEG tube with plan for MBS as able to further evaluate oropharyngeal swallow function and r/o aspiration. RN aware. SLP is following for MBS as able and appropriate after speaking with MD, as patient is refusing FEES at this time.     Recommendations: At this time, recommend patient continue strict NPO with PEG tube with plan for MBS as able to further evaluate oropharyngeal swallow function and r/o aspiration.   Plan of Care: Will benefit from Speech Therapy 5 times per week  Post-Acute Therapy: Recommend inpatient transitional care services for continued speech therapy services.    See \"Rehab Therapy-Acute\" Patient Summary Report for complete documentation.     "

## 2020-01-14 NOTE — THERAPY
"Physical Therapy Treatment completed.   Bed Mobility:  Supine to Sit: Maximal Assist  Transfers: Sit to Stand: Moderate to Minimal Assist (holding Stedy)  Gait: Level Of Assist: Pre-gait (see below)  Plan of Care: Will benefit from Physical Therapy 5 times per week  Discharge Recommendations: Equipment: Will Continue to Assess for Equipment Needs. Post-acute therapy: Recommend post-acute placement for continued physical therapy services prior to discharge home. Patient can tolerate post-acute therapies at a 5x/week frequency.       Pt has progressed his independence and today was able to initiate rolling to L side with R UE/LE for assist. He required max A for completion of supine to sit and once upright at EOB sustained midline with verbal cues and CGA for safety. With SaraStedy, pt able to pull himself to standing and bear weight through bilateral LE without buckle (reports hx of knee surgeries limiting L knee extension to neutral). Pt also able to perform lateral weight shift with min A and step laterally with mod A for L LE advancement. While here, PT will follow to address gait deviations, balance impairments, and motor control difficulties. Recommend placement.      See \"Rehab Therapy-Acute\" Patient Summary Report for complete documentation.       "

## 2020-01-14 NOTE — CARE PLAN
Problem: Knowledge Deficit  Goal: Knowledge of disease process/condition, treatment plan, diagnostic tests, and medications will improve  Outcome: PROGRESSING AS EXPECTED  Measures to prevent reoccurrence of CVA discussed     Problem: Pain Management  Goal: Pain level will decrease to patient's comfort goal  Outcome: PROGRESSING AS EXPECTED  Prn pain meds administered. Turns every two hours.

## 2020-01-14 NOTE — PROGRESS NOTES
"Gastroenterology Progress Note:    Ashok Ball D.O.  Date & Time note created:    1/14/2020   1:24 PM     Patient ID:  Name:             Jacinto Loyola    YOB: 1970  Age:                 49 y.o.  male  MRN:               [id number]    Referring MD:                                                               Chief Complaint(s):      Dysphagia    History of Present Illness:    This is a very pleasant 49 y.o. male with a past medical history of uncontrolled hypertension, methamphetamine abuse, who presented to the hospital on 12/24/19 with left lower and upper extremity weakness. The patient was found to have a right basal ganglia intraparenchymal hemorrhage thought to be secondary to hypertensive emergency. The patient was being fed through NG tube due to oropharyngeal dysphagia. GI was consulted for enteral tube feeding through PEG tube. The patient had PEG tube placement on 1/13/20.     Interval Update:  No acute overnight events. The patient is tolerating PEG tube feeds. The patient was alert and oriented to place and situation.This morning he complained of some abdominal pain at the site of the PEG tube. He denies any shortness of breath, chest pain, nausea, or vomiting.     Review of Systems:      Constitutional: Denies fevers, weight changes  Cardiovascular: Denies chest pain or palpitations   Respiratory: Denies shortness of breath, denies cough  Gastrointestinal/Hepatic: Minimal abdominal pain, No nausea, vomiting, diarrhea, constipation or GI bleeding   Genitourinary: Denies dysuria or frequency  Neurological: Denies headache     Medication Allergy:  No Known Allergies    Physical Exam:  Weight/BMI: Body mass index is 24.84 kg/m².  /88   Pulse 75   Temp 37.2 °C (99 °F) (Temporal)   Resp 20   Ht 1.829 m (6' 0.01\")   Wt 83.1 kg (183 lb 3.2 oz)   SpO2 95%   Vitals:    01/13/20 2000 01/14/20 0000 01/14/20 0400 01/14/20 0800   BP: 120/78 135/76 117/84 135/88   Pulse: 69 " 90 73 75   Resp: 16 16 18 20   Temp: 37 °C (98.6 °F) 36.9 °C (98.5 °F) 36.7 °C (98 °F) 37.2 °C (99 °F)   TempSrc: Temporal Temporal Temporal Temporal   SpO2: 93% 93% 94% 95%   Weight:       Height:         Oxygen Therapy:  Pulse Oximetry: 95 %, O2 (LPM): 0, O2 Delivery: None (Room Air)    Intake/Output Summary (Last 24 hours) at 1/14/2020 1324  Last data filed at 1/14/2020 0600  Gross per 24 hour   Intake 2356 ml   Output 2100 ml   Net 256 ml       Constitutional:  No acute distress, dysarthric  HEENT:  Normocephalic, Atraumatic, Conjunctiva not pale, Sclera not icteric, Oropharynx moist mucous membranes, No oral exudates, Nose normal.  No thyromegaly.  Neck:  Normal range of motion, No cervical tenderness,  no JVD.  Chest/Lungs:  Symmetric expansion, no spider angioma, breath sounds clear to auscultation bilaterally,  no crackles, no wheezing.   Cardiovascular:  Normal heart rate, Normal rhythm, No murmurs, No rubs, No gallops.    Abdomen: PEG tube in place with clean bandage. No bleeding or erythema. Bowel sounds normal, Soft, No tenderness, No guarding, No rebound, No masses, No hepatosplenomegaly.  Extremities: No cyanosis/clubbing/edema/palmar erythema/flapping tremor  Neuro: Unable to move left upper and lower extremity      Lab Data Review:  Sodium 133, potassium 3.4, BUN 46, Creatinine 1.3 as of 1/13/20.    Imaging/Procedures Review:    CT Head: Decrease in size of right thalamic hemorrhage; No midline shift.    Assessment  #Dysphagia  -Secondary to right basal ganglia intraparenchymal hemorrhage. PEG tube was placed on 1/13/20.  -See procedure note from Dr. Samayoa for further details.  -Tolerating tube feeds well.     #Reflux esophagitis  #Hiatal hernia  -Seen on EGD. Ulcer proximal to the the GE junction.  -See procedure note by Dr. Samayoa for further details.    Plan  #Dysphagia  -Continue advancing tube feeds to goal.    #Reflux Ulceration  -Head of bed 30 degrees.  -Continue PPI.    -GI will sign off.  Please feel free to call if there are any further questions or changes in management.    Thank you very much for allowing me to participate in the care of your patient.      Ashok Ball D.O.  Gastroenterology Attending: Lorenzo Samayoa MD  Core Quality Measures   Reviewed items::  Labs, Medications and Radiology reports reviewed

## 2020-01-14 NOTE — CARE PLAN
Problem: Infection  Goal: Will remain free from infection  Outcome: PROGRESSING AS EXPECTED  Note:   Here for CVA. Dysphagic. Peg placed today circa 1000am. Patient was CHG bathed by [facility] care team circa 0600am.     Problem: Pain Management  Goal: Pain level will decrease to patient's comfort goal  Outcome: PROGRESSING SLOWER THAN EXPECTED  Intervention: Follow pain managment plan developed in collaboration with patient and Interdisciplinary Team  Note:   Here for CVA. Dysphagic. Requiring Oxy 10 and Tylenol 650 q4 hours to cope with pain since PEG placed today.

## 2020-01-14 NOTE — CARE PLAN
Problem: Nutritional:  Goal: Nutrition support tolerated and meeting greater than 85% of estimated needs  1/14/2020 1216 by Jennifer Alvarado R.D.  Outcome: NOT MET  1/14/2020 1216 by Jennifer Alvarado R.D.  Reactivated

## 2020-01-14 NOTE — PROGRESS NOTES
Pt resting in bed. Alert and oriented times four. Reported mild abdominal pain over PEG tube insertion site. On room air. Denies nausea, chest pain, dizziness and tingling. Admits to having numbness in left extremities. Tolerating tube feeding at 25cc per hour, will advance per protocol to goal rate of 80 cc per hour. Turns every two hours by staff. Will monitor.    2101 Peg tube insertion site dressing clean, dry and intact. Tube feeding rate increased to 55 cc per hour. Will monitor.    2205 Bladder scan read 364 cc retained. Pt still unable to void. Will re-scan in 4 hours. Denies discomfort.    0000 Pt reporting lower abdominal discomfort, expressed having urge to void but unable at this time. Bladder scan read 495 cc retained. St cath performed, 400 cc drained. Will monitor.    0520 Bladder scan at 0400 read 532 cc retained. Villasenor catheter placed per  order (See nursing communication).

## 2020-01-15 ENCOUNTER — APPOINTMENT (OUTPATIENT)
Dept: RADIOLOGY | Facility: MEDICAL CENTER | Age: 50
DRG: 064 | End: 2020-01-15
Attending: STUDENT IN AN ORGANIZED HEALTH CARE EDUCATION/TRAINING PROGRAM
Payer: MEDICAID

## 2020-01-15 LAB
ALBUMIN SERPL BCP-MCNC: 3.3 G/DL (ref 3.2–4.9)
ALBUMIN/GLOB SERPL: 1.2 G/DL
ALP SERPL-CCNC: 74 U/L (ref 30–99)
ALT SERPL-CCNC: 60 U/L (ref 2–50)
ANION GAP SERPL CALC-SCNC: 9 MMOL/L (ref 0–11.9)
AST SERPL-CCNC: 23 U/L (ref 12–45)
BILIRUB SERPL-MCNC: 0.7 MG/DL (ref 0.1–1.5)
BUN SERPL-MCNC: 23 MG/DL (ref 8–22)
CALCIUM SERPL-MCNC: 8.9 MG/DL (ref 8.5–10.5)
CHLORIDE SERPL-SCNC: 100 MMOL/L (ref 96–112)
CO2 SERPL-SCNC: 26 MMOL/L (ref 20–33)
CREAT SERPL-MCNC: 0.88 MG/DL (ref 0.5–1.4)
GLOBULIN SER CALC-MCNC: 2.8 G/DL (ref 1.9–3.5)
GLUCOSE SERPL-MCNC: 92 MG/DL (ref 65–99)
HEMOCCULT SP1 STL QL: NEGATIVE
POTASSIUM SERPL-SCNC: 3.5 MMOL/L (ref 3.6–5.5)
PROT SERPL-MCNC: 6.1 G/DL (ref 6–8.2)
SODIUM SERPL-SCNC: 135 MMOL/L (ref 135–145)

## 2020-01-15 PROCEDURE — 700102 HCHG RX REV CODE 250 W/ 637 OVERRIDE(OP): Performed by: INTERNAL MEDICINE

## 2020-01-15 PROCEDURE — A9270 NON-COVERED ITEM OR SERVICE: HCPCS | Performed by: INTERNAL MEDICINE

## 2020-01-15 PROCEDURE — 700102 HCHG RX REV CODE 250 W/ 637 OVERRIDE(OP): Performed by: STUDENT IN AN ORGANIZED HEALTH CARE EDUCATION/TRAINING PROGRAM

## 2020-01-15 PROCEDURE — 770006 HCHG ROOM/CARE - MED/SURG/GYN SEMI*

## 2020-01-15 PROCEDURE — 700111 HCHG RX REV CODE 636 W/ 250 OVERRIDE (IP): Performed by: STUDENT IN AN ORGANIZED HEALTH CARE EDUCATION/TRAINING PROGRAM

## 2020-01-15 PROCEDURE — 92611 MOTION FLUOROSCOPY/SWALLOW: CPT

## 2020-01-15 PROCEDURE — 80053 COMPREHEN METABOLIC PANEL: CPT

## 2020-01-15 PROCEDURE — 74230 X-RAY XM SWLNG FUNCJ C+: CPT

## 2020-01-15 PROCEDURE — A9270 NON-COVERED ITEM OR SERVICE: HCPCS | Performed by: STUDENT IN AN ORGANIZED HEALTH CARE EDUCATION/TRAINING PROGRAM

## 2020-01-15 PROCEDURE — 36415 COLL VENOUS BLD VENIPUNCTURE: CPT

## 2020-01-15 PROCEDURE — 99232 SBSQ HOSP IP/OBS MODERATE 35: CPT | Mod: GC | Performed by: INTERNAL MEDICINE

## 2020-01-15 PROCEDURE — 700105 HCHG RX REV CODE 258: Performed by: STUDENT IN AN ORGANIZED HEALTH CARE EDUCATION/TRAINING PROGRAM

## 2020-01-15 RX ORDER — POTASSIUM CHLORIDE 20 MEQ/1
40 TABLET, EXTENDED RELEASE ORAL ONCE
Status: COMPLETED | OUTPATIENT
Start: 2020-01-15 | End: 2020-01-15

## 2020-01-15 RX ADMIN — POTASSIUM CHLORIDE 40 MEQ: 1500 TABLET, EXTENDED RELEASE ORAL at 16:20

## 2020-01-15 RX ADMIN — NYSTATIN 500000 UNITS: 100000 SUSPENSION ORAL at 20:33

## 2020-01-15 RX ADMIN — AMLODIPINE BESYLATE 10 MG: 10 TABLET ORAL at 05:48

## 2020-01-15 RX ADMIN — MODAFINIL 100 MG: 100 TABLET ORAL at 05:48

## 2020-01-15 RX ADMIN — ENALAPRIL MALEATE 10 MG: 10 TABLET ORAL at 05:48

## 2020-01-15 RX ADMIN — DOXAZOSIN 2 MG: 2 TABLET ORAL at 20:33

## 2020-01-15 RX ADMIN — OXYCODONE HYDROCHLORIDE 5 MG: 5 TABLET ORAL at 08:16

## 2020-01-15 RX ADMIN — SENNOSIDES AND DOCUSATE SODIUM 2 TABLET: 8.6; 5 TABLET ORAL at 16:21

## 2020-01-15 RX ADMIN — SODIUM CHLORIDE: 9 INJECTION, SOLUTION INTRAVENOUS at 23:38

## 2020-01-15 RX ADMIN — SODIUM CHLORIDE: 9 INJECTION, SOLUTION INTRAVENOUS at 10:41

## 2020-01-15 RX ADMIN — OXYCODONE HYDROCHLORIDE 10 MG: 5 TABLET ORAL at 20:33

## 2020-01-15 RX ADMIN — CHLORTHALIDONE 50 MG: 50 TABLET ORAL at 05:48

## 2020-01-15 RX ADMIN — HEPARIN SODIUM 5000 UNITS: 5000 INJECTION, SOLUTION INTRAVENOUS; SUBCUTANEOUS at 16:21

## 2020-01-15 RX ADMIN — NYSTATIN 500000 UNITS: 100000 SUSPENSION ORAL at 13:34

## 2020-01-15 RX ADMIN — OMEPRAZOLE 40 MG: KIT at 05:47

## 2020-01-15 RX ADMIN — NYSTATIN 500000 UNITS: 100000 SUSPENSION ORAL at 16:21

## 2020-01-15 RX ADMIN — HEPARIN SODIUM 5000 UNITS: 5000 INJECTION, SOLUTION INTRAVENOUS; SUBCUTANEOUS at 05:48

## 2020-01-15 RX ADMIN — SENNOSIDES AND DOCUSATE SODIUM 2 TABLET: 8.6; 5 TABLET ORAL at 05:48

## 2020-01-15 ASSESSMENT — ENCOUNTER SYMPTOMS
DIZZINESS: 0
PALPITATIONS: 0
ABDOMINAL PAIN: 0
COUGH: 0
DEPRESSION: 0
NECK PAIN: 0
SHORTNESS OF BREATH: 0
MYALGIAS: 0
ORTHOPNEA: 0
VOMITING: 0
SEIZURES: 0
BRUISES/BLEEDS EASILY: 0
TINGLING: 0
DIAPHORESIS: 0
HEARTBURN: 0
SPUTUM PRODUCTION: 0
HEMOPTYSIS: 0
SINUS PAIN: 0
CHILLS: 0
PHOTOPHOBIA: 0
FEVER: 0
HALLUCINATIONS: 0
HEADACHES: 0
DOUBLE VISION: 0
BACK PAIN: 0
BLURRED VISION: 0
NAUSEA: 0

## 2020-01-15 NOTE — DISCHARGE PLANNING
Agency/Facility Name: [facility]  Outcome: Patient declined- unable to accept pending SHIRA.    Agency/Facility Name: [facility]  Outcome: Patient declined- current drug/alcohol abuse.    Agency/Facility Name: [facility]  Outcome: Patient declined- LTC needs, unable to do ERNIE.    Agency/Facility Name: [facility]  Outcome: Patient declined- not accepting LOAs at this time.    Agency/Facility Name: [facility]  Outcome: Patient declined- not accepting LOAs at this time.    Agency/Facility Name: [facility]  Spoke to: Lucien  Outcome: Patient declined- cost of care, current drug/alcohol abuse.    Agency/Facility Name: [facility]  Spoke To: Blanca  Outcome: Patient declined- no LOAs for pending SHIRA, no LTC beds available.    Agency/Facility Name: [facility]  Spoke To: Katelyn  Outcome: Patient declined- no LOAs for pending SHIRA, no SHIRA beds available.

## 2020-01-15 NOTE — DISCHARGE PLANNING
Anticipated Discharge Disposition: SNF    Action: LSW met with pt at bedside to discuss SNF. Pt currently pending Medicaid.   LSW and pt discussed where he was living prior to admit. Pt states that he has a travel trailer. Pt states that upon d/c long time friend Guzman Gomes ([phone]) is available to assist pt.  Pt okay with a blanket SNF referral. LSW may need to initiate ERNIE.   LSW faxed SNF blanket CHOICE form to [facility].     Barriers to Discharge: Payor Source    Plan: Await SNF acceptance, LSW to f/u with Medicaid status, LSW to ensure PASRR completed, LSW to assist as needed

## 2020-01-15 NOTE — PROGRESS NOTES
Patient oriented times 4 today. PEG tube bolus feeding given per order at 0800 and 1300. IV fluids infusing per order. Oral care done this AM. Hourly rounds being completed. Modified Barium swallow completed today.

## 2020-01-15 NOTE — CARE PLAN
Problem: Safety  Goal: Will remain free from falls  Outcome: PROGRESSING AS EXPECTED  Bed alarm on. Frequent rounding  by staff     Problem: Pain Management  Goal: Pain level will decrease to patient's comfort goal  Outcome: PROGRESSING AS EXPECTED  Prn pain meds administered     Problem: Skin Integrity  Goal: Risk for impaired skin integrity will decrease  Outcome: PROGRESSING AS EXPECTED  Q Shift skin assessment.

## 2020-01-15 NOTE — PROGRESS NOTES
2 RN skin check performed with ADALBERTO Mcdonald. Rash to back and chest. Partial thickness wound to sacrum.

## 2020-01-15 NOTE — DISCHARGE PLANNING
Anticipated Discharge Disposition: SNF    Action: LSW received fax confirmation that fax did not go through.   LSW faxed SNF CHOICE to [facility].   Pt pending Medicaid. LSW to initiate ERNIE.    Barriers to Discharge: Payor Source    Plan: Await SNF acceptance, Initiate ERNIE, LSW to f/u with Medicaid application status, LSW

## 2020-01-15 NOTE — DISCHARGE PLANNING
Received Choice form at 1045  Agency/Facility Name: [facility] [place] [facility] on ERNIE  Referral sent per Choice form @ 2021

## 2020-01-15 NOTE — WOUND TEAM
Pt seen for follow up of partial thickness wound to sacrum. Wound has resolved, area intact, pink and blanching. Wound care is signing off on this patient. Please place a new consult with any change or concern.

## 2020-01-15 NOTE — PROGRESS NOTES
2 RN skin check:    Left heel pink, blanching  Small scabs to left Lower leg.  Few whiteheads with redness to chest  Peg site with scant amount of serosanguinous drainage  Tongue dry coated  Bilateral feet dry and calloused.  Sacrum  reddened excoriated, blanching.

## 2020-01-16 LAB
ALBUMIN SERPL BCP-MCNC: 3.3 G/DL (ref 3.2–4.9)
ALBUMIN/GLOB SERPL: 1 G/DL
ALP SERPL-CCNC: 78 U/L (ref 30–99)
ALT SERPL-CCNC: 51 U/L (ref 2–50)
ANION GAP SERPL CALC-SCNC: 8 MMOL/L (ref 0–11.9)
AST SERPL-CCNC: 22 U/L (ref 12–45)
BILIRUB SERPL-MCNC: 0.7 MG/DL (ref 0.1–1.5)
BUN SERPL-MCNC: 21 MG/DL (ref 8–22)
CALCIUM SERPL-MCNC: 9.3 MG/DL (ref 8.5–10.5)
CHLORIDE SERPL-SCNC: 100 MMOL/L (ref 96–112)
CO2 SERPL-SCNC: 27 MMOL/L (ref 20–33)
CREAT SERPL-MCNC: 0.97 MG/DL (ref 0.5–1.4)
GLOBULIN SER CALC-MCNC: 3.4 G/DL (ref 1.9–3.5)
GLUCOSE SERPL-MCNC: 88 MG/DL (ref 65–99)
POTASSIUM SERPL-SCNC: 4 MMOL/L (ref 3.6–5.5)
PROT SERPL-MCNC: 6.7 G/DL (ref 6–8.2)
SODIUM SERPL-SCNC: 135 MMOL/L (ref 135–145)

## 2020-01-16 PROCEDURE — 700102 HCHG RX REV CODE 250 W/ 637 OVERRIDE(OP): Performed by: INTERNAL MEDICINE

## 2020-01-16 PROCEDURE — 97112 NEUROMUSCULAR REEDUCATION: CPT

## 2020-01-16 PROCEDURE — 97116 GAIT TRAINING THERAPY: CPT

## 2020-01-16 PROCEDURE — 700102 HCHG RX REV CODE 250 W/ 637 OVERRIDE(OP): Performed by: STUDENT IN AN ORGANIZED HEALTH CARE EDUCATION/TRAINING PROGRAM

## 2020-01-16 PROCEDURE — 36415 COLL VENOUS BLD VENIPUNCTURE: CPT

## 2020-01-16 PROCEDURE — 92526 ORAL FUNCTION THERAPY: CPT

## 2020-01-16 PROCEDURE — 80053 COMPREHEN METABOLIC PANEL: CPT

## 2020-01-16 PROCEDURE — 770006 HCHG ROOM/CARE - MED/SURG/GYN SEMI*

## 2020-01-16 PROCEDURE — 97535 SELF CARE MNGMENT TRAINING: CPT

## 2020-01-16 PROCEDURE — A9270 NON-COVERED ITEM OR SERVICE: HCPCS | Performed by: STUDENT IN AN ORGANIZED HEALTH CARE EDUCATION/TRAINING PROGRAM

## 2020-01-16 PROCEDURE — E0191 PROTECTOR HEEL OR ELBOW: HCPCS

## 2020-01-16 PROCEDURE — A9270 NON-COVERED ITEM OR SERVICE: HCPCS | Performed by: INTERNAL MEDICINE

## 2020-01-16 PROCEDURE — 700111 HCHG RX REV CODE 636 W/ 250 OVERRIDE (IP): Performed by: STUDENT IN AN ORGANIZED HEALTH CARE EDUCATION/TRAINING PROGRAM

## 2020-01-16 PROCEDURE — 99232 SBSQ HOSP IP/OBS MODERATE 35: CPT | Performed by: INTERNAL MEDICINE

## 2020-01-16 PROCEDURE — 700105 HCHG RX REV CODE 258: Performed by: STUDENT IN AN ORGANIZED HEALTH CARE EDUCATION/TRAINING PROGRAM

## 2020-01-16 RX ADMIN — NYSTATIN 500000 UNITS: 100000 SUSPENSION ORAL at 21:30

## 2020-01-16 RX ADMIN — OXYCODONE HYDROCHLORIDE 5 MG: 5 TABLET ORAL at 19:57

## 2020-01-16 RX ADMIN — NYSTATIN 500000 UNITS: 100000 SUSPENSION ORAL at 17:39

## 2020-01-16 RX ADMIN — MODAFINIL 100 MG: 100 TABLET ORAL at 05:29

## 2020-01-16 RX ADMIN — CHLORTHALIDONE 50 MG: 50 TABLET ORAL at 05:29

## 2020-01-16 RX ADMIN — SENNOSIDES AND DOCUSATE SODIUM 2 TABLET: 8.6; 5 TABLET ORAL at 17:39

## 2020-01-16 RX ADMIN — NYSTATIN 500000 UNITS: 100000 SUSPENSION ORAL at 14:16

## 2020-01-16 RX ADMIN — SENNOSIDES AND DOCUSATE SODIUM 2 TABLET: 8.6; 5 TABLET ORAL at 05:29

## 2020-01-16 RX ADMIN — AMLODIPINE BESYLATE 10 MG: 10 TABLET ORAL at 05:29

## 2020-01-16 RX ADMIN — OXYCODONE HYDROCHLORIDE 5 MG: 5 TABLET ORAL at 21:17

## 2020-01-16 RX ADMIN — NYSTATIN 500000 UNITS: 100000 SUSPENSION ORAL at 08:57

## 2020-01-16 RX ADMIN — ENALAPRIL MALEATE 10 MG: 10 TABLET ORAL at 05:29

## 2020-01-16 RX ADMIN — SODIUM CHLORIDE: 9 INJECTION, SOLUTION INTRAVENOUS at 10:32

## 2020-01-16 RX ADMIN — HEPARIN SODIUM 5000 UNITS: 5000 INJECTION, SOLUTION INTRAVENOUS; SUBCUTANEOUS at 05:29

## 2020-01-16 RX ADMIN — HEPARIN SODIUM 5000 UNITS: 5000 INJECTION, SOLUTION INTRAVENOUS; SUBCUTANEOUS at 17:39

## 2020-01-16 RX ADMIN — DOXAZOSIN 2 MG: 2 TABLET ORAL at 21:30

## 2020-01-16 RX ADMIN — OMEPRAZOLE 40 MG: KIT at 05:29

## 2020-01-16 ASSESSMENT — ENCOUNTER SYMPTOMS
PALPITATIONS: 0
NECK PAIN: 0
FOCAL WEAKNESS: 1
VOMITING: 0
SHORTNESS OF BREATH: 0
HEADACHES: 0
DEPRESSION: 0
DIZZINESS: 0
NAUSEA: 0
HALLUCINATIONS: 0
ORTHOPNEA: 0
BACK PAIN: 0
DOUBLE VISION: 0
ABDOMINAL PAIN: 0
COUGH: 0
CHILLS: 0
SORE THROAT: 0
FEVER: 0
BLURRED VISION: 0
PHOTOPHOBIA: 0
MYALGIAS: 0
HEARTBURN: 0
BRUISES/BLEEDS EASILY: 0
DIAPHORESIS: 0
HEMOPTYSIS: 0

## 2020-01-16 ASSESSMENT — COGNITIVE AND FUNCTIONAL STATUS - GENERAL
SUGGESTED CMS G CODE MODIFIER MOBILITY: CM
MOVING TO AND FROM BED TO CHAIR: UNABLE
STANDING UP FROM CHAIR USING ARMS: A LOT
TOILETING: A LOT
MOBILITY SCORE: 7
WALKING IN HOSPITAL ROOM: TOTAL
DRESSING REGULAR UPPER BODY CLOTHING: A LOT
MOVING FROM LYING ON BACK TO SITTING ON SIDE OF FLAT BED: UNABLE
DAILY ACTIVITIY SCORE: 12
EATING MEALS: TOTAL
DRESSING REGULAR LOWER BODY CLOTHING: A LOT
HELP NEEDED FOR BATHING: A LOT
PERSONAL GROOMING: A LITTLE
TURNING FROM BACK TO SIDE WHILE IN FLAT BAD: UNABLE
SUGGESTED CMS G CODE MODIFIER DAILY ACTIVITY: CL
CLIMB 3 TO 5 STEPS WITH RAILING: TOTAL

## 2020-01-16 ASSESSMENT — GAIT ASSESSMENTS
GAIT LEVEL OF ASSIST: MODERATE ASSIST
DISTANCE (FEET): 2

## 2020-01-16 NOTE — THERAPY
"Speech Language Therapy [facility] completed.  Functional Status: Patient seen this date for modified barium swallow study. Patient transported down to radiology via MBS chair without incident. Patient consumed PO trials of NTL via tsp, cup sip, and straw, purees, pudding, soft solids, and thin liquids via tsp and cup sip. Overall, patient presented with mild to moderate oropharyngeal dysphagia, evidenced by reduced BoT retraction, reduced velar elevation, weak pharyngeal constriction, and reduced hyolaryngeal excursion. Patient presented with premature spillage to the pyriform sinus before the swallow and penetration during the swallow on thins via cup sip. Patient had minimal to moderate amount of residue in the vallecula and on BoT after the swallow that cleared with second swallow. Patient intermittently independently did chin tuck and effortful swallow, but this was volitional d/t weakness and did not appear to improve swallow function. Soft solids left minimal amount of pocketing in left buccal cavity and appeared to increase fatigue. At this time, recommend patient start Dys1/NTL diet with 1:1 feeding. No straws. Crush meds in puree. RN and resident Alex Lozoya aware of results. SLP is following.     Recommendations - Diet: Dysphagia I, Nectar Thick Liquid                          Strategies: Strict 1:1 feeding, No Straws and Head of Bed at 90 Degrees                          Medication Administration: Crush all Medications in Puree  Plan of Care: Will benefit from Speech Therapy 5 times per week  Post-Acute Therapy: Recommend inpatient transitional care services for continued speech therapy services.    See \"Rehab Therapy-Acute\" Patient Summary Report for complete documentation.   "

## 2020-01-16 NOTE — PROGRESS NOTES
Daily Progress Note:     Date of Service: 1/16/2020  Primary Team: [facility] IM Purple Team   Attending: Dr Nilay M.D.   Senior Resident: Dr. Blanco  Intern: Dr. Lozoya  Contact:  [phone]    Chief Complaint:    Intraparenchymal Hemorrhage of Right Basal Ganglia secondary to Hypertensive Emergency.    Subjective: (Must contain at least 4 of the usual HPI elements (location, onset, duration, severity, modifying factors, radiation, character, timing) or if not possible the status of at least 3 chronic problems.)  - No acute events overnight reported by nursing personnel.  - Patient has been tolerating well recent introduced oral intake, patient on a Dysphagia I diet with Thick liquids, no straws.  - Speech therapy following closely, patient eating more than 80% of his meals.  - Patient's vital signs stable, afebrile.  - He now able to move his left lower extremity, his speech has improved significantly in the past 48 hours, we now can understand everything he says.  - Patient alert and oriented times 3, denies any pain, SOB, palpitations, fever, or chills, patient requesting going home when cleared by medical team rather than going to a facility.  - Patient very unlikely to go home, since we know that patient does not have a strong family support even though he thinks the opposite.  - Placement pending. Acceptance to SNF declined.  - Medicaid status pending.    Consultants/Specialty:  Neurology  GI  Critical care    Review of Systems:  CREATE MACRO  Review of Systems   Constitutional: Negative for chills, diaphoresis and fever.   HENT: Negative for congestion, hearing loss, sore throat and tinnitus.    Eyes: Negative for blurred vision, double vision and photophobia.   Respiratory: Negative for cough, hemoptysis and shortness of breath.    Cardiovascular: Negative for chest pain, palpitations and orthopnea.   Gastrointestinal: Negative for abdominal pain, heartburn, nausea and vomiting.   Genitourinary:  Negative for dysuria, hematuria and urgency.   Musculoskeletal: Negative for back pain, myalgias and neck pain.   Skin: Negative for itching and rash.   Neurological: Positive for focal weakness. Negative for dizziness and headaches.   Endo/Heme/Allergies: Does not bruise/bleed easily.   Psychiatric/Behavioral: Negative for depression, hallucinations and suicidal ideas.       Objective Data:   Physical Exam:   Vitals:   Temp:  [36.2 °C (97.2 °F)-36.9 °C (98.4 °F)] 36.2 °C (97.2 °F)  Pulse:  [] 72  Resp:  [14-18] 16  BP: (125-152)/() 128/78  SpO2:  [92 %-95 %] 95 %   CREATE MACRO BE SURE THAT THIS IS PERTINENT TO YOUR PATIENT!  Physical Exam  Constitutional:       General: He is not in acute distress.     Appearance: He is not diaphoretic.   HENT:      Head: Normocephalic and atraumatic.      Nose: No congestion or rhinorrhea.      Mouth/Throat:      Mouth: Mucous membranes are moist.      Pharynx: No oropharyngeal exudate or posterior oropharyngeal erythema.   Eyes:      General: No scleral icterus.     Extraocular Movements: Extraocular movements intact.      Conjunctiva/sclera: Conjunctivae normal.      Pupils: Pupils are equal, round, and reactive to light.   Neck:      Musculoskeletal: Normal range of motion and neck supple.   Cardiovascular:      Rate and Rhythm: Normal rate and regular rhythm.      Pulses: Normal pulses.      Heart sounds: No murmur.   Pulmonary:      Effort: Pulmonary effort is normal. No respiratory distress.      Breath sounds: Normal breath sounds. No stridor. No wheezing or rales.   Abdominal:      General: Abdomen is flat. Bowel sounds are normal. There is no distension.      Palpations: Abdomen is soft. There is no mass.      Tenderness: There is no tenderness. There is no guarding.   Musculoskeletal:         General: No swelling, tenderness or deformity.      Right lower leg: No edema.      Left lower leg: No edema.   Skin:     Capillary Refill: Capillary refill takes less  than 2 seconds.      Coloration: Skin is not jaundiced.      Findings: No bruising or erythema.   Neurological:      Mental Status: He is alert and oriented to person, place, and time.      Sensory: Sensory deficit present.      Motor: Weakness present.   Psychiatric:         Mood and Affect: Mood normal.         Thought Content: Thought content normal.           Labs:   Unremarkable CMP    Imaging:   No New imaging    * Intraparenchymal hemorrhage of brain (HCC)- (present on admission)  Assessment & Plan  - Intracranial Hemorrhage sec to poorly controlled HTN  -HTN emergency  - H/O methamphetamine abuse  - Head CT showed a 2.7 cm acute right basal ganglia/thalamic hemorrhage, no indications for neurosurgery intervention  - Repeated CT scan on 1/5 showed stable hemorrhage, no new bleed.  - Per Neurology: recommend inpatient SBP control <160, outpatient <130, LDL <70, A1C <7%.  - EEG negative for seizure. Neuro signed off on 12/30.  - Unchanged Dysarthria and Hemiparesis.  - Patient has not improved in gaining mobility at all in his extremities since I have been in the service.  - Peg Tube placed on 1/13  - RN's using peg,  not issues reported.                   PLAN:  - Barium Swallow on 1/15  - Patient tolerating diet  - Hold Feeding by Tube  - Continue RT  - PT/OT/SLP  - Turn patient Q2 hrs to prevent new bed sores  - HTN med changes made, with better BP control.  - SNF referral placed prior to transfer from ICU; pending.  - Speech following, recommendation appreciated    Urinary retention  Assessment & Plan  - Villasenor catheter came out accidentally yesterday evening 1/13  - RN personnel instructed to monitor for spontaneous voiding  - Bladder scans every 4 hrs  - Straight cath if urine more than 450 ml  - Patient was not able to void spontaneously, straight cath twice.  - Villasenor Catheter placed back again.      Plan:  - Continue Villasenor catheter   - Void trial in the future      Altered mental status  Assessment &  Plan  - Intraparenchymal hemorrhage.   - Modafinil daily.  - Bowel protocol  - Improvement, he is alert and oriented most of the day.  - Dysarthria remarkable improvement in the past 48 hrs.  - We can understand everything patient says to us.  - Strict NPO  - Peg tube placement on 1/13  - CTM    Methamphetamine abuse (HCC)- (present on admission)  Assessment & Plan  Counseling, when appropriate  Consulted SW    JAYASHREE (obstructive sleep apnea)- (present on admission)  Assessment & Plan  - Not on CPAP at home  - Unable to use Bipap with mental status and stroke  - Continue O2 supplementation as needed  - CT    Hypertensive emergency- (present on admission)  Assessment & Plan  -Presented with blood pressures of  190s  SBP; and 110-130s DBP  - Intraparenchymal hemorrhage Imaging  - CT Blood Pressure  - Assessing changes in BP regimen for better BP control  - Increasing Chlortalidone to 50 and Doxazosin to 2mg  - BP has been stable WNL SBP between 110's-130's  - Asymptomatic    S/P percutaneous endoscopic gastrostomy (PEG) tube placement ([facility])  Assessment & Plan  - Placement on 1/13  - Stop peg tube feeling since patient tolerating oral intake  - Monitor for signs of Infection  - Speech following    Acute respiratory failure with hypoxia and hypercapnia (HCC)- (present on admission)  Assessment & Plan  - Resolved.  - Patient was Intubated 12/26- 12/31/2019 (for aspiration and airway protection)  - Extubate successfully 12/31/2019  - Was treated with Abxs for MSSA/ PNA (Ancef x5 days)  - PT/OT/SLP  - Aspiration precautions.  - Now in Dysphagia 1 diet w/ Thick liq  - Oral care daily 2-3 times a day  - RT/O2 per protocol   - O2 sats WNL on RA  - CTM    Aspiration into airway- (present on admission)  Assessment & Plan  - PNA resolved  - MSSA pneumonia  - Patient Completed cefazolin 5 day course  - Aspiration precautions  - O2/RT   - Peg Tube placed on 1/13.  - Barium exam completed 1/15 patient getting fed now  - Patient  tolerating Oral intake w/o aspirating  - Speech therapy following

## 2020-01-16 NOTE — PROGRESS NOTES
No acute events overnight. Tolerating dysphagia one diet and nectar thick fluids. Slept well. Pain meds help with abdominal (peg tube site) and back pain. Cooperative and appreciative of cares. Will monitor.

## 2020-01-16 NOTE — WOUND TEAM
Pt seen during skin rounds, heels blanching, R lateral ankle slow to wade. Bilateral Prevalon boots & Heel Mepilex ordered for protection / prevention. Waffle in place.

## 2020-01-16 NOTE — CARE PLAN
Problem: Safety  Goal: Will remain free from falls  Outcome: PROGRESSING AS EXPECTED  Bed alarm on. Frequent rounding by staff     Problem: Skin Integrity  Goal: Risk for impaired skin integrity will decrease  Outcome: PROGRESSING AS EXPECTED  Every shift skin check by staff

## 2020-01-16 NOTE — DIETARY
NUTRITION SERVICES: UPDATE    Pt transitioned from TF to PO diet on 1/15. Current diet: Dysphagia I with NTL. PO intake per chart review 50-75% x1 meal. Bolus feedings discontinued per discussion with RN via phone.    RD following for adequate PO intake.

## 2020-01-16 NOTE — THERAPY
"Speech Language Therapy dysphagia treatment completed.   Functional Status: Patient started on Dys1/NTL diet yesterday by this clinician after MBS and seen with breakfast tray this morning. Patient was provided with 1:1 feeding and assistance from this SLP during entire meal. Patient consumed approximately 90% of breakfast meal. Patient required intermittent verbal cues to trigger two swallows per bite/sip and at times, initiation of second swallow appeared effortful and delayed. Patient had minimal oral residue after the swallow on pureed eggs, but this cleared easily with NTL wash. Wet/gurgly vocal quality noted x1 when second swallow wasn't utilized, but this cleared with strong throat clear and second swallow. No other overt s/sx of aspiration were noted during entire tx session. At this time, recommend patient continue Dys1/NTL diet with 1:1 supervision and feeding as needed. Crush meds in puree. RN, CNA, and Dr. Lozoya aware of tx session and recs. SLP is following.    Recommendations: At this time, recommend patient continue Dys1/NTL diet with 1:1 supervision and feeding as needed. Crush meds in puree. No straws.   Plan of Care: Will benefit from Speech Therapy 5 times per week  Post-Acute Therapy: Recommend inpatient transitional care services for continued speech therapy services.    See \"Rehab Therapy-Acute\" Patient Summary Report for complete documentation.     "

## 2020-01-16 NOTE — CARE PLAN
Problem: Communication  Goal: The ability to communicate needs accurately and effectively will improve  Outcome: PROGRESSING AS EXPECTED  Note:   Patient making improvements in aphasia       Problem: Venous Thromboembolism (VTW)/Deep Vein Thrombosis (DVT) Prevention:  Goal: Patient will participate in Venous Thrombosis (VTE)/Deep Vein Thrombosis (DVT)Prevention Measures  Outcome: PROGRESSING AS EXPECTED     Problem: Pain Management  Goal: Pain level will decrease to patient's comfort goal  Outcome: PROGRESSING AS EXPECTED

## 2020-01-16 NOTE — DISCHARGE PLANNING
Anticipated Discharge Disposition: SNF    Action: LSW informed by Ernestina SWANN that pt declined by all SNF's and no SNF's currently accepting ERNIE's. Referral was sent to all [place] SNF's.  Pt's facesheet still states that pt is pending Medicaid.   LSW to staff pt with management.      Barriers to Discharge: None    Plan: LSW to f/u with Medicaid status, LSW to assist as needed

## 2020-01-17 PROBLEM — R50.9 FEBRILE: Status: RESOLVED | Noted: 2019-12-27 | Resolved: 2020-01-17

## 2020-01-17 PROCEDURE — 700102 HCHG RX REV CODE 250 W/ 637 OVERRIDE(OP): Performed by: INTERNAL MEDICINE

## 2020-01-17 PROCEDURE — 700111 HCHG RX REV CODE 636 W/ 250 OVERRIDE (IP): Performed by: STUDENT IN AN ORGANIZED HEALTH CARE EDUCATION/TRAINING PROGRAM

## 2020-01-17 PROCEDURE — A9270 NON-COVERED ITEM OR SERVICE: HCPCS | Performed by: INTERNAL MEDICINE

## 2020-01-17 PROCEDURE — 99231 SBSQ HOSP IP/OBS SF/LOW 25: CPT | Mod: GC | Performed by: INTERNAL MEDICINE

## 2020-01-17 PROCEDURE — 770006 HCHG ROOM/CARE - MED/SURG/GYN SEMI*

## 2020-01-17 RX ORDER — OMEPRAZOLE 20 MG/1
20 CAPSULE, DELAYED RELEASE ORAL DAILY
Status: DISCONTINUED | OUTPATIENT
Start: 2020-01-17 | End: 2020-01-25 | Stop reason: HOSPADM

## 2020-01-17 RX ORDER — ONDANSETRON 2 MG/ML
4 INJECTION INTRAMUSCULAR; INTRAVENOUS EVERY 4 HOURS PRN
Status: DISCONTINUED | OUTPATIENT
Start: 2020-01-17 | End: 2020-01-25 | Stop reason: HOSPADM

## 2020-01-17 RX ORDER — MODAFINIL 100 MG/1
100 TABLET ORAL EVERY MORNING
Status: DISCONTINUED | OUTPATIENT
Start: 2020-01-17 | End: 2020-01-25 | Stop reason: HOSPADM

## 2020-01-17 RX ORDER — AMLODIPINE BESYLATE 5 MG/1
10 TABLET ORAL
Status: DISCONTINUED | OUTPATIENT
Start: 2020-01-17 | End: 2020-01-25 | Stop reason: HOSPADM

## 2020-01-17 RX ORDER — POLYETHYLENE GLYCOL 3350 17 G/17G
1 POWDER, FOR SOLUTION ORAL
Status: DISCONTINUED | OUTPATIENT
Start: 2020-01-17 | End: 2020-01-25 | Stop reason: HOSPADM

## 2020-01-17 RX ORDER — DOXAZOSIN 2 MG/1
2 TABLET ORAL
Status: DISCONTINUED | OUTPATIENT
Start: 2020-01-17 | End: 2020-01-25

## 2020-01-17 RX ORDER — ENALAPRIL MALEATE 10 MG/1
10 TABLET ORAL
Status: DISCONTINUED | OUTPATIENT
Start: 2020-01-17 | End: 2020-01-25 | Stop reason: HOSPADM

## 2020-01-17 RX ORDER — ACETAMINOPHEN 650 MG/1
650 SUPPOSITORY RECTAL EVERY 4 HOURS PRN
Status: DISCONTINUED | OUTPATIENT
Start: 2020-01-17 | End: 2020-01-25 | Stop reason: HOSPADM

## 2020-01-17 RX ORDER — ONDANSETRON 4 MG/1
4 TABLET, ORALLY DISINTEGRATING ORAL EVERY 4 HOURS PRN
Status: DISCONTINUED | OUTPATIENT
Start: 2020-01-17 | End: 2020-01-25 | Stop reason: HOSPADM

## 2020-01-17 RX ORDER — BISACODYL 10 MG
10 SUPPOSITORY, RECTAL RECTAL
Status: DISCONTINUED | OUTPATIENT
Start: 2020-01-17 | End: 2020-01-25 | Stop reason: HOSPADM

## 2020-01-17 RX ORDER — CHLORTHALIDONE 50 MG/1
50 TABLET ORAL
Status: DISCONTINUED | OUTPATIENT
Start: 2020-01-17 | End: 2020-01-25 | Stop reason: HOSPADM

## 2020-01-17 RX ORDER — AMOXICILLIN 250 MG
2 CAPSULE ORAL 2 TIMES DAILY
Status: DISCONTINUED | OUTPATIENT
Start: 2020-01-17 | End: 2020-01-25 | Stop reason: HOSPADM

## 2020-01-17 RX ORDER — OXYCODONE HYDROCHLORIDE 5 MG/1
5-10 TABLET ORAL EVERY 4 HOURS PRN
Status: DISCONTINUED | OUTPATIENT
Start: 2020-01-17 | End: 2020-01-25 | Stop reason: HOSPADM

## 2020-01-17 RX ORDER — ACETAMINOPHEN 325 MG/1
650 TABLET ORAL EVERY 4 HOURS PRN
Status: DISCONTINUED | OUTPATIENT
Start: 2020-01-17 | End: 2020-01-25 | Stop reason: HOSPADM

## 2020-01-17 RX ADMIN — NYSTATIN 500000 UNITS: 100000 SUSPENSION ORAL at 09:22

## 2020-01-17 RX ADMIN — CHLORTHALIDONE 50 MG: 50 TABLET ORAL at 04:46

## 2020-01-17 RX ADMIN — MODAFINIL 100 MG: 100 TABLET ORAL at 04:47

## 2020-01-17 RX ADMIN — OMEPRAZOLE 20 MG: 20 CAPSULE, DELAYED RELEASE ORAL at 04:46

## 2020-01-17 RX ADMIN — ENALAPRIL MALEATE 10 MG: 10 TABLET ORAL at 04:50

## 2020-01-17 RX ADMIN — SENNOSIDES AND DOCUSATE SODIUM 2 TABLET: 8.6; 5 TABLET ORAL at 17:40

## 2020-01-17 RX ADMIN — OXYCODONE HYDROCHLORIDE 5 MG: 5 TABLET ORAL at 20:21

## 2020-01-17 RX ADMIN — NYSTATIN 500000 UNITS: 100000 SUSPENSION ORAL at 14:11

## 2020-01-17 RX ADMIN — DOXAZOSIN 2 MG: 2 TABLET ORAL at 20:21

## 2020-01-17 RX ADMIN — OXYCODONE HYDROCHLORIDE 5 MG: 5 TABLET ORAL at 04:46

## 2020-01-17 RX ADMIN — OXYCODONE HYDROCHLORIDE 5 MG: 5 TABLET ORAL at 09:22

## 2020-01-17 RX ADMIN — AMLODIPINE BESYLATE 10 MG: 5 TABLET ORAL at 04:45

## 2020-01-17 RX ADMIN — NYSTATIN 500000 UNITS: 100000 SUSPENSION ORAL at 17:40

## 2020-01-17 RX ADMIN — HEPARIN SODIUM 5000 UNITS: 5000 INJECTION, SOLUTION INTRAVENOUS; SUBCUTANEOUS at 04:46

## 2020-01-17 RX ADMIN — HEPARIN SODIUM 5000 UNITS: 5000 INJECTION, SOLUTION INTRAVENOUS; SUBCUTANEOUS at 17:40

## 2020-01-17 RX ADMIN — ACETAMINOPHEN 650 MG: 325 TABLET, FILM COATED ORAL at 04:46

## 2020-01-17 RX ADMIN — ACETAMINOPHEN 650 MG: 325 TABLET, FILM COATED ORAL at 20:21

## 2020-01-17 RX ADMIN — SENNOSIDES AND DOCUSATE SODIUM 2 TABLET: 8.6; 5 TABLET ORAL at 04:46

## 2020-01-17 RX ADMIN — OXYCODONE HYDROCHLORIDE 5 MG: 5 TABLET ORAL at 14:21

## 2020-01-17 RX ADMIN — NYSTATIN 500000 UNITS: 100000 SUSPENSION ORAL at 20:21

## 2020-01-17 ASSESSMENT — ENCOUNTER SYMPTOMS
NECK PAIN: 0
SORE THROAT: 0
HALLUCINATIONS: 0
BLURRED VISION: 0
VOMITING: 0
ORTHOPNEA: 0
HEMOPTYSIS: 0
SHORTNESS OF BREATH: 0
BACK PAIN: 0
DEPRESSION: 0
HEARTBURN: 0
FOCAL WEAKNESS: 1
FEVER: 0
ABDOMINAL PAIN: 0
MYALGIAS: 0
CHILLS: 0
PALPITATIONS: 0
DIZZINESS: 0
DIAPHORESIS: 0
PHOTOPHOBIA: 0
HEADACHES: 0
NAUSEA: 0
DOUBLE VISION: 0
COUGH: 0
BRUISES/BLEEDS EASILY: 0

## 2020-01-17 ASSESSMENT — LIFESTYLE VARIABLES
CONSUMPTION TOTAL: POSITIVE
HOW MANY TIMES IN THE PAST YEAR HAVE YOU HAD 5 OR MORE DRINKS IN A DAY: 10
TOTAL SCORE: 1
HAVE PEOPLE ANNOYED YOU BY CRITICIZING YOUR DRINKING: NO
TOTAL SCORE: 1
ALCOHOL_USE: YES
AVERAGE NUMBER OF DAYS PER WEEK YOU HAVE A DRINK CONTAINING ALCOHOL: 3
DOES PATIENT WANT TO STOP DRINKING: NO
EVER FELT BAD OR GUILTY ABOUT YOUR DRINKING: NO
HAVE YOU EVER FELT YOU SHOULD CUT DOWN ON YOUR DRINKING: YES
ON A TYPICAL DAY WHEN YOU DRINK ALCOHOL HOW MANY DRINKS DO YOU HAVE: 2
TOTAL SCORE: 1
EVER HAD A DRINK FIRST THING IN THE MORNING TO STEADY YOUR NERVES TO GET RID OF A HANGOVER: NO

## 2020-01-17 ASSESSMENT — PATIENT HEALTH QUESTIONNAIRE - PHQ9
1. LITTLE INTEREST OR PLEASURE IN DOING THINGS: NOT AT ALL
2. FEELING DOWN, DEPRESSED, IRRITABLE, OR HOPELESS: NOT AT ALL
SUM OF ALL RESPONSES TO PHQ9 QUESTIONS 1 AND 2: 0

## 2020-01-17 NOTE — THERAPY
"Occupational Therapy Treatment completed with focus on ADLs, ADL transfers and upper extremity function.  Functional Status:  Pt seen for OT session. Progressing with alertness and speech, but demo'd increased fatigue. Max A for supine>sit, sitting balance initially with min A, but progressed to SPV. Demo'd tone in all LUE joints, but especially IR of shoulder and extension of wrist. Seated grooming with set-up/SPV. LB dressing with max A. Sit>stand with mod A, Txf to w/c with max A and facilitation of LLE, and 1step commands for sequencing. Functional ambulation with max A. Demo'd extreme fatigue at end of session, unable to continue EOB activities, req min A for sitting balance and propping torso with BUE. Continues to be limited by decreased functional mobility, activity tolerance, cognition, strength, AROM, coordination, and balance which are currently affecting pt's ability to complete ADLs/IADLs at baseline. Currently recommend acute OT, and Recommend post-acute placement for additional occupational therapy services prior to discharge home. Patient can tolerate post-acute therapies at a 5x/week frequency.    Plan of Care: Will benefit from Occupational Therapy 4 times per week  Discharge Recommendations:  Equipment Will Continue to Assess for Equipment Needs. Post-acute therapy: Recommend post-acute placement for additional occupational therapy services prior to discharge home. Patient can tolerate post-acute therapies at a 5x/week frequency.      See \"Rehab Therapy-Acute\" Patient Summary Report for complete documentation.   "

## 2020-01-17 NOTE — CARE PLAN
Problem: Communication  Goal: The ability to communicate needs accurately and effectively will improve  Outcome: PROGRESSING AS EXPECTED  Note:   Patient's ability to communicate verbally is improving.  Provide extra time for responses     Problem: Safety  Goal: Will remain free from falls  Outcome: PROGRESSING AS EXPECTED  Note:   Patient reminded to call for assistance, bed alarm in place for safety     Problem: Infection  Goal: Will remain free from infection  Outcome: PROGRESSING AS EXPECTED     Problem: Venous Thromboembolism (VTW)/Deep Vein Thrombosis (DVT) Prevention:  Goal: Patient will participate in Venous Thrombosis (VTE)/Deep Vein Thrombosis (DVT)Prevention Measures  Outcome: PROGRESSING AS EXPECTED     Problem: Bowel/Gastric:  Goal: Will not experience complications related to bowel motility  Outcome: PROGRESSING AS EXPECTED     Problem: Knowledge Deficit  Goal: Knowledge of disease process/condition, treatment plan, diagnostic tests, and medications will improve  Outcome: PROGRESSING AS EXPECTED  Goal: Knowledge of the prescribed therapeutic regimen will improve  Outcome: PROGRESSING AS EXPECTED     Problem: Discharge Barriers/Planning  Goal: Patient's continuum of care needs will be met  Outcome: PROGRESSING AS EXPECTED     Problem: Fluid Volume:  Goal: Will maintain balanced intake and output  Outcome: PROGRESSING AS EXPECTED     Problem: Pain Management  Goal: Pain level will decrease to patient's comfort goal  Outcome: PROGRESSING AS EXPECTED     Problem: Skin Integrity  Goal: Risk for impaired skin integrity will decrease  Outcome: PROGRESSING AS EXPECTED     Problem: Respiratory:  Goal: Respiratory status will improve  Outcome: PROGRESSING AS EXPECTED     Problem: Psychosocial Needs:  Goal: Level of anxiety will decrease  Outcome: PROGRESSING AS EXPECTED     Problem: Urinary Elimination:  Goal: Ability to reestablish a normal urinary elimination pattern will improve  Outcome: PROGRESSING AS  EXPECTED

## 2020-01-17 NOTE — PROGRESS NOTES
Daily Progress Note:     Date of Service: 1/17/2020  Primary Team: [facility] Purple Team   Attending: Dr Nilay M.D.   Senior Resident: Dr. Blanco  Intern: Dr. Lozoya  Contact:  [phone]    Chief Complaint:    Intraparenchymal Hemorrhage of Right Basal Ganglia secondary to Hypertensive Emergency.    Subjective:  No events overnight.  -Still awaiting discharge planning.  -Improving diet, currently on dysphagia 1, might consider PEG tube removal in 2 weeks if patient is still in the hospital and able to tolerate oral feeding.  Consultants/Specialty:  Neurology  GI  Critical care    Review of Systems:   Review of Systems   Constitutional: Negative for chills, diaphoresis and fever.   HENT: Negative for congestion, hearing loss, sore throat and tinnitus.    Eyes: Negative for blurred vision, double vision and photophobia.   Respiratory: Negative for cough, hemoptysis and shortness of breath.    Cardiovascular: Negative for chest pain, palpitations and orthopnea.   Gastrointestinal: Negative for abdominal pain, heartburn, nausea and vomiting.   Genitourinary: Negative for dysuria, hematuria and urgency.   Musculoskeletal: Negative for back pain, myalgias and neck pain.   Skin: Negative for itching and rash.   Neurological: Positive for focal weakness. Negative for dizziness and headaches.   Endo/Heme/Allergies: Does not bruise/bleed easily.   Psychiatric/Behavioral: Negative for depression, hallucinations and suicidal ideas.       Objective Data:   Physical Exam:   Vitals:   Temp:  [36.4 °C (97.5 °F)-37 °C (98.6 °F)] 36.7 °C (98.1 °F)  Pulse:  [75-88] 88  Resp:  [16-18] 18  BP: (113-138)/(73-95) 113/76  SpO2:  [93 %-96 %] 96 %      Physical Exam  Constitutional:       General: He is not in acute distress.     Appearance: He is not diaphoretic.   HENT:      Head: Normocephalic and atraumatic.      Nose: No congestion or rhinorrhea.      Mouth/Throat:      Mouth: Mucous membranes are moist.      Pharynx: No  oropharyngeal exudate or posterior oropharyngeal erythema.   Eyes:      General: No scleral icterus.     Conjunctiva/sclera: Conjunctivae normal.      Pupils: Pupils are equal, round, and reactive to light.   Neck:      Musculoskeletal: Normal range of motion and neck supple.   Cardiovascular:      Rate and Rhythm: Normal rate and regular rhythm.      Pulses: Normal pulses.      Heart sounds: No murmur.   Pulmonary:      Effort: Pulmonary effort is normal. No respiratory distress.      Breath sounds: Normal breath sounds. No stridor. No wheezing or rales.   Abdominal:      General: Abdomen is flat. Bowel sounds are normal. There is no distension.      Palpations: Abdomen is soft. There is no mass.      Tenderness: There is no tenderness. There is no guarding.   Musculoskeletal:         General: No swelling, tenderness or deformity.      Right lower leg: No edema.      Left lower leg: No edema.   Skin:     Capillary Refill: Capillary refill takes less than 2 seconds.      Coloration: Skin is not jaundiced.      Findings: No bruising or erythema.   Neurological:      Mental Status: He is alert and oriented to person, place, and time.      Sensory: Sensory deficit present.      Motor: Weakness present.   Psychiatric:         Mood and Affect: Mood normal.         Thought Content: Thought content normal.           Labs:   Unremarkable CMP    Imaging:   No New imaging    * Intraparenchymal hemorrhage of brain (HCC)- (present on admission)  Assessment & Plan  - Intracranial Hemorrhage sec to poorly controlled HTN  -HTN emergency  - H/O methamphetamine abuse  - Head CT showed a 2.7 cm acute right basal ganglia/thalamic hemorrhage, no indications for neurosurgery intervention  - Repeated CT scan on 1/5 showed stable hemorrhage, no new bleed.  - Per Neurology: recommend inpatient SBP control <160, outpatient <130, LDL <70, A1C <7%.  - EEG negative for seizure. Neuro signed off on 12/30.  - Unchanged Dysarthria and  Hemiparesis.  - Patient has not improved in gaining mobility at all in his extremities since I have been in the service.  - Peg Tube placed on 1/13  - RN's using peg,  not issues reported.                   PLAN:  - Barium Swallow on 1/15  - Patient tolerating diet  - Hold Feeding by Tube  - Continue RT  - PT/OT/SLP  - Turn patient Q2 hrs to prevent new bed sores  - HTN med changes made, with better BP control.  - SNF referral placed prior to transfer from ICU; pending.  - Speech following, recommendation appreciated    Urinary retention  Assessment & Plan  - Villasenor catheter came out accidentally yesterday evening 1/13  - RN personnel instructed to monitor for spontaneous voiding  - Bladder scans every 4 hrs  - Straight cath if urine more than 450 ml  - Patient was not able to void spontaneously, straight cath twice.  - Villasenor Catheter placed back again.      Plan:  - Continue Villasenor catheter   - Void trial in the future      Altered mental status- (present on admission)  Assessment & Plan  -Resolved   -Intraparenchymal hemorrhage.   - Modafinil daily.  - Bowel protocol  - Improvement, he is alert and oriented most of the day.  - Dysarthria remarkable improvement in the past 48 hrs.  - We can understand everything patient says to us.  - Strict NPO  - Peg tube placement on 1/13  - [facility]    Methamphetamine abuse (HCC)- (present on admission)  Assessment & Plan  Counseling, when appropriate  Consulted SW    JAYASHREE (obstructive sleep apnea)- (present on admission)  Assessment & Plan  - Not on CPAP at home  - Unable to use Bipap with mental status and stroke  - Continue O2 supplementation as needed  - CT    Hypertensive emergency- (present on admission)  Assessment & Plan  -Presented with blood pressures of  190s  SBP; and 110-130s DBP  - Intraparenchymal hemorrhage Imaging  - CT Blood Pressure  - Assessing changes in BP regimen for better BP control  - Increasing Chlortalidone to 50 and Doxazosin to 2mg  - BP has been stable  WNL SBP between 110's-130's  - Asymptomatic    S/P percutaneous endoscopic gastrostomy (PEG) tube placement (HCC)  Assessment & Plan  - Placement on 1/13  - Stop peg tube feeling since patient tolerating oral intake  - Monitor for signs of Infection  - Speech following    Hypomagnesemia- (present on admission)  Assessment & Plan  CTM   Replete when needed       Acute respiratory failure with hypoxia and hypercapnia (HCC)- (present on admission)  Assessment & Plan  - Resolved.  - Patient was Intubated 12/26- 12/31/2019 (for aspiration and airway protection)  - Extubate successfully 12/31/2019  - Was treated with Abxs for MSSA/ PNA (Ancef x5 days)  - PT/OT/SLP  - Aspiration precautions.  - Now in Dysphagia 1 diet w/ Thick liq  - Oral care daily 2-3 times a day  - RT/O2 per protocol   - O2 sats WNL on RA  - CTM    Aspiration into airway- (present on admission)  Assessment & Plan  - PNA resolved  - MSSA pneumonia  - Patient Completed cefazolin 5 day course  - Aspiration precautions  - O2/RT   - Peg Tube placed on 1/13.  - Barium exam completed 1/15 patient getting fed now  - Patient tolerating Oral intake w/o aspirating  - Speech therapy following       Hypokalemia- (present on admission)  Assessment & Plan  CTM   Replete when needed orally

## 2020-01-17 NOTE — THERAPY
"Physical Therapy Treatment completed.   Bed Mobility:  Supine to Sit: Moderate Assist  Transfers: Sit to Stand: Moderate Assist  Gait: Level Of Assist: Moderate Assist with R railing       Plan of Care: Will benefit from Physical Therapy 5 times per week  Discharge Recommendations: Equipment: Will Continue to Assess for Equipment Needs. Post-acute therapy Discharge to a transitional care facility for continued skilled therapy services.    Pt appeared to be most limited by ease of fatigue today. However, he was able to begin gait training against R railing. Pt required manual cues for posture, L LE advancement, and weight shift L and R. During transfer, required increased manual cues for weight shift as pt reporting he does not trust his L LE to support him. While here, PT will follow to address instability, gait deviations, and activity intolerance. Recommend placement.      See \"Rehab Therapy-Acute\" Patient Summary Report for complete documentation.       "

## 2020-01-18 PROCEDURE — 770006 HCHG ROOM/CARE - MED/SURG/GYN SEMI*

## 2020-01-18 PROCEDURE — 700102 HCHG RX REV CODE 250 W/ 637 OVERRIDE(OP): Performed by: INTERNAL MEDICINE

## 2020-01-18 PROCEDURE — A9270 NON-COVERED ITEM OR SERVICE: HCPCS | Performed by: INTERNAL MEDICINE

## 2020-01-18 PROCEDURE — 99231 SBSQ HOSP IP/OBS SF/LOW 25: CPT | Mod: GC | Performed by: INTERNAL MEDICINE

## 2020-01-18 PROCEDURE — 700111 HCHG RX REV CODE 636 W/ 250 OVERRIDE (IP): Performed by: STUDENT IN AN ORGANIZED HEALTH CARE EDUCATION/TRAINING PROGRAM

## 2020-01-18 RX ADMIN — NYSTATIN 500000 UNITS: 100000 SUSPENSION ORAL at 11:55

## 2020-01-18 RX ADMIN — NYSTATIN 500000 UNITS: 100000 SUSPENSION ORAL at 09:40

## 2020-01-18 RX ADMIN — ENOXAPARIN SODIUM 40 MG: 100 INJECTION SUBCUTANEOUS at 11:55

## 2020-01-18 RX ADMIN — OXYCODONE HYDROCHLORIDE 10 MG: 5 TABLET ORAL at 23:49

## 2020-01-18 RX ADMIN — CHLORTHALIDONE 50 MG: 50 TABLET ORAL at 05:13

## 2020-01-18 RX ADMIN — MODAFINIL 100 MG: 100 TABLET ORAL at 05:15

## 2020-01-18 RX ADMIN — NYSTATIN 500000 UNITS: 100000 SUSPENSION ORAL at 20:47

## 2020-01-18 RX ADMIN — CHLORTHALIDONE 50 MG: 50 TABLET ORAL at 05:15

## 2020-01-18 RX ADMIN — OXYCODONE HYDROCHLORIDE 5 MG: 5 TABLET ORAL at 05:14

## 2020-01-18 RX ADMIN — NYSTATIN 500000 UNITS: 100000 SUSPENSION ORAL at 17:52

## 2020-01-18 RX ADMIN — AMLODIPINE BESYLATE 10 MG: 5 TABLET ORAL at 06:00

## 2020-01-18 RX ADMIN — ENALAPRIL MALEATE 10 MG: 10 TABLET ORAL at 05:15

## 2020-01-18 RX ADMIN — OMEPRAZOLE 20 MG: 20 CAPSULE, DELAYED RELEASE ORAL at 05:13

## 2020-01-18 RX ADMIN — OXYCODONE HYDROCHLORIDE 10 MG: 5 TABLET ORAL at 17:52

## 2020-01-18 RX ADMIN — ACETAMINOPHEN 650 MG: 325 TABLET, FILM COATED ORAL at 05:14

## 2020-01-18 RX ADMIN — HEPARIN SODIUM 5000 UNITS: 5000 INJECTION, SOLUTION INTRAVENOUS; SUBCUTANEOUS at 05:15

## 2020-01-18 RX ADMIN — SENNOSIDES AND DOCUSATE SODIUM 2 TABLET: 8.6; 5 TABLET ORAL at 17:52

## 2020-01-18 RX ADMIN — DOXAZOSIN 2 MG: 2 TABLET ORAL at 20:47

## 2020-01-18 RX ADMIN — ACETAMINOPHEN 650 MG: 325 TABLET, FILM COATED ORAL at 23:49

## 2020-01-18 ASSESSMENT — ENCOUNTER SYMPTOMS
BLURRED VISION: 0
ABDOMINAL PAIN: 0
HEMOPTYSIS: 0
BRUISES/BLEEDS EASILY: 0
ORTHOPNEA: 0
SHORTNESS OF BREATH: 0
HALLUCINATIONS: 0
FEVER: 0
NECK PAIN: 0
COUGH: 0
NAUSEA: 0
DEPRESSION: 0
BACK PAIN: 0
DIZZINESS: 0
DOUBLE VISION: 0
PHOTOPHOBIA: 0
SORE THROAT: 0
VOMITING: 0
HEARTBURN: 0
FOCAL WEAKNESS: 1
MYALGIAS: 0
CHILLS: 0
SINUS PAIN: 0
DIAPHORESIS: 0
HEADACHES: 0
SPUTUM PRODUCTION: 0
PALPITATIONS: 0
TINGLING: 0

## 2020-01-18 NOTE — PROGRESS NOTES
Daily Progress Note:     Date of Service: 1/18/2020  Primary Team: [facility] Team   Attending: MARY Pemberton   Senior Resident: Dr. Blanco  Intern: Dr. Lozoya  Contact:  [phone]    Chief Complaint:   Intraparenchymal Hemorrhage of Right Basal Ganglia secondary to Hypertensive Emergency.      Subjective: (Must contain at least 4 of the usual HPI elements (location, onset, duration, severity, modifying factors, radiation, character, timing) or if not possible the status of at least 3 chronic problems.)  - No acute events overnight reported.  - Patient has been able to tolerate well Diet Dysph I with thicken liquids.  - Nurses and Speech stating that patient is eating well, no signs of chocking wit food or meds, he is eating almost 100% of his meals.   - Peg Tube clamped, not using.  - Patient denies any difficulty eating, fever, chills, sweats, abdominal pain, SOB or chest pain.  - Speech will assess next Monday to possible advancing diet if well tolerated.  - Working with PT, he is now able to begin gait training.  - Patient has improved remarkably in his speech, his left leg mobility very minimal.  - Still with FC, might consider flomax next week if able to advance diet, to try for spontaneous voiding.  - Patient alert and oriented times 3, vital sings stable, afebrile, eating well, remarkable improvement.  - SW working on his SNF transfer.    Consultants/Specialty:  Neurology  Intensivist     Review of Systems:  CREATE MACRO  Review of Systems   Constitutional: Negative for chills, diaphoresis and fever.   HENT: Negative for hearing loss, nosebleeds, sinus pain, sore throat and tinnitus.    Eyes: Negative for blurred vision, double vision and photophobia.   Respiratory: Negative for cough, hemoptysis, sputum production and shortness of breath.    Cardiovascular: Negative for chest pain, palpitations and orthopnea.   Gastrointestinal: Negative for abdominal pain, heartburn, nausea and vomiting.    Genitourinary: Negative for dysuria, frequency, hematuria and urgency.   Musculoskeletal: Negative for back pain, myalgias and neck pain.   Skin: Negative for itching and rash.   Neurological: Positive for focal weakness. Negative for dizziness, tingling and headaches.   Endo/Heme/Allergies: Does not bruise/bleed easily.   Psychiatric/Behavioral: Negative for depression, hallucinations and suicidal ideas.       Objective Data:   Physical Exam:   Vitals:   Temp:  [36.6 °C (97.8 °F)-36.7 °C (98.1 °F)] 36.7 °C (98 °F)  Pulse:  [55-88] 55  Resp:  [16-18] 18  BP: (113-133)/(76-98) 115/79  SpO2:  [93 %-98 %] 97 %   CREATE MACRO BE SURE THAT THIS IS PERTINENT TO YOUR PATIENT!  Physical Exam  Constitutional:       General: He is not in acute distress.     Appearance: He is normal weight. He is not diaphoretic.   HENT:      Head: Normocephalic and atraumatic.      Nose: Nose normal. No congestion or rhinorrhea.      Mouth/Throat:      Mouth: Mucous membranes are moist.      Pharynx: No oropharyngeal exudate or posterior oropharyngeal erythema.   Eyes:      General: No scleral icterus.     Extraocular Movements: Extraocular movements intact.      Conjunctiva/sclera: Conjunctivae normal.      Pupils: Pupils are equal, round, and reactive to light.   Neck:      Musculoskeletal: Normal range of motion and neck supple. No neck rigidity or muscular tenderness.   Cardiovascular:      Rate and Rhythm: Normal rate and regular rhythm.      Pulses: Normal pulses.      Heart sounds: No murmur.   Pulmonary:      Effort: Pulmonary effort is normal. No respiratory distress.      Breath sounds: No stridor. No wheezing, rhonchi or rales.   Abdominal:      General: Abdomen is flat. Bowel sounds are normal. There is no distension.      Palpations: Abdomen is soft. There is no mass.      Tenderness: There is no tenderness. There is no guarding.      Comments: Peg tube clamped   Musculoskeletal: Normal range of motion.         General: No  swelling.      Right lower leg: No edema.      Left lower leg: No edema.      Comments: Hemiparesis improvement in his lower extremity, can barely move his leg, unable to move his arm at all.   Skin:     General: Skin is warm.      Coloration: Skin is not jaundiced.      Findings: No erythema or rash.   Neurological:      General: No focal deficit present.      Mental Status: He is alert and oriented to person, place, and time.   Psychiatric:         Mood and Affect: Mood normal.         Thought Content: Thought content normal.           Labs:   Not routine labs    Imaging:   No recent imaging needed    * Intraparenchymal hemorrhage of brain (HCC)- (present on admission)  Assessment & Plan  - Intracranial Hemorrhage sec to poorly controlled HTN  -HTN emergency  - H/O methamphetamine abuse  - Head CT showed a 2.7 cm acute right basal ganglia/thalamic hemorrhage, no indications for neurosurgery intervention  - Repeated CT scan on 1/5 showed stable hemorrhage, no new bleed.  - Per Neurology: recommend inpatient SBP control <160, outpatient <130, LDL <70, A1C <7%.  - EEG negative for seizure. Neuro signed off on 12/30.  - Unchanged Dysarthria and Hemiparesis.  - Patient has not improved in gaining mobility at all in his extremities since I have been in the service.  - Peg Tube placed on 1/13  - Peg clamped  - Eating Dysph I diet w/o complications                   PLAN:  - Barium swallow eval passed  - Patient tolerating diet  - Eval again on Monday to possibly advance diet  - Hold Feeding by Tube  - Continue RT  - PT/OT/SLP  - Turn patient Q2 hrs to prevent new bed sores  - HTN med changes made, with better BP control.  - SNF referral placed prior to transfer from ICU; pending.  - Speech following, recommendation appreciated    Urinary retention  Assessment & Plan  - Villasenor catheter came out accidentally yesterday evening 1/13  - RN personnel instructed to monitor for spontaneous voiding  - Bladder scans every 4 hrs  -  Straight cath if urine more than 450 ml  - Patient was not able to void spontaneously, straight cath twice.  - Villasenor Catheter placed back again.      Plan:  - Continue Villasenor catheter   - Void trial in the future  - Might consider Flomax       Altered mental status- (present on admission)  Assessment & Plan  -Resolved   -Intraparenchymal hemorrhage.   - Modafinil daily.  - Bowel protocol  - Improvement, he is alert and oriented most of the day.  - Dysarthria remarkable improvement  - We can understand everything patient says to us.  - Dysphagia 1 diet  - Peg tube placement on 1/13, not in use patient eating  - CT    Methamphetamine abuse (HCC)- (present on admission)  Assessment & Plan  Counseling, when appropriate  Consulted SW    JAYASHREE (obstructive sleep apnea)- (present on admission)  Assessment & Plan  - Not on CPAP at home  - Unable to use Bipap with mental status and stroke  - Continue O2 supplementation as needed  - CT    Hypertensive emergency- (present on admission)  Assessment & Plan  -Presented with blood pressures of  190s  SBP; and 110-130s DBP  - Intraparenchymal hemorrhage Imaging  - [facility] Blood Pressure  - Assessing changes in BP regimen for better BP control  - Increasing Chlortalidone to 50 and Doxazosin to 2mg  - BP has been stable WNL SBP between 110's-130's  - Asymptomatic    S/P percutaneous endoscopic gastrostomy (PEG) tube placement ([facility])  Assessment & Plan  - Placement on 1/13  - Stop peg tube feeling since patient tolerating oral intake  - Monitor for signs of Infection  - Speech following next Monday for evaluation to advance diet    Hypomagnesemia- (present on admission)  Assessment & Plan  CTM   Replete when needed       Acute respiratory failure with hypoxia and hypercapnia ([facility])- (present on admission)  Assessment & Plan  - Resolved.  - Patient was Intubated 12/26- 12/31/2019 (for aspiration and airway protection)  - Extubate successfully 12/31/2019  - Was treated with Abxs for MSSA/ PNA  (Ancef x5 days)  - PT/OT/SLP  - Aspiration precautions.  - Now in Dysphagia 1 diet w/ Thick liq  - Speech eval on Monday to possibly advance diet  - Oral care daily 2-3 times a day  - RT/O2 per protocol   - O2 sats WNL on RA  - CTM    Aspiration into airway- (present on admission)  Assessment & Plan  - PNA resolved  - MSSA pneumonia  - Patient Completed cefazolin 5 day course  - Aspiration precautions  - O2/RT   - Peg Tube placed on 1/13.  - Barium exam completed 1/15 patient getting fed now  - Patient tolerating Oral intake w/o aspirating  - Might advance diet next monday  - Speech therapy following       Hypokalemia- (present on admission)  Assessment & Plan  CTM   Replete when needed orally

## 2020-01-18 NOTE — PROGRESS NOTES
Patient awake, alert, and an active participant in bedside report.  He denies pain at this time; plan in place for medicating him prior to HS.  He is resting in bed at the time of this note with call light in reach.

## 2020-01-18 NOTE — CARE PLAN
Problem: Safety  Goal: Will remain free from falls  Outcome: PROGRESSING AS EXPECTED     Problem: Venous Thromboembolism (VTW)/Deep Vein Thrombosis (DVT) Prevention:  Goal: Patient will participate in Venous Thrombosis (VTE)/Deep Vein Thrombosis (DVT)Prevention Measures  Outcome: PROGRESSING AS EXPECTED     Problem: Skin Integrity  Goal: Risk for impaired skin integrity will decrease  Outcome: PROGRESSING AS EXPECTED

## 2020-01-18 NOTE — CARE PLAN
Problem: Communication  Goal: The ability to communicate needs accurately and effectively will improve  Outcome: PROGRESSING AS EXPECTED  Note:   Patient encouraged to verbalize needs     Problem: Safety  Goal: Will remain free from falls  Outcome: PROGRESSING AS EXPECTED  Note:   Patient will continue to demonstrate compliance with safety measures until discharge.       Problem: Infection  Goal: Will remain free from infection  Outcome: PROGRESSING AS EXPECTED  Note:   Villasenor catheter care to be provided q day and prn while in place.  Patient will verbalize understanding of care in prevention of infection by discharge.

## 2020-01-19 PROCEDURE — 770006 HCHG ROOM/CARE - MED/SURG/GYN SEMI*

## 2020-01-19 PROCEDURE — 700111 HCHG RX REV CODE 636 W/ 250 OVERRIDE (IP): Performed by: STUDENT IN AN ORGANIZED HEALTH CARE EDUCATION/TRAINING PROGRAM

## 2020-01-19 PROCEDURE — A9270 NON-COVERED ITEM OR SERVICE: HCPCS | Performed by: INTERNAL MEDICINE

## 2020-01-19 PROCEDURE — 99231 SBSQ HOSP IP/OBS SF/LOW 25: CPT | Mod: GC | Performed by: INTERNAL MEDICINE

## 2020-01-19 PROCEDURE — 700102 HCHG RX REV CODE 250 W/ 637 OVERRIDE(OP): Performed by: INTERNAL MEDICINE

## 2020-01-19 RX ADMIN — ACETAMINOPHEN 650 MG: 325 TABLET, FILM COATED ORAL at 21:11

## 2020-01-19 RX ADMIN — ACETAMINOPHEN 650 MG: 325 TABLET, FILM COATED ORAL at 04:33

## 2020-01-19 RX ADMIN — CHLORTHALIDONE 50 MG: 50 TABLET ORAL at 06:00

## 2020-01-19 RX ADMIN — NYSTATIN 500000 UNITS: 100000 SUSPENSION ORAL at 21:11

## 2020-01-19 RX ADMIN — MODAFINIL 100 MG: 100 TABLET ORAL at 04:32

## 2020-01-19 RX ADMIN — ENOXAPARIN SODIUM 40 MG: 100 INJECTION SUBCUTANEOUS at 04:31

## 2020-01-19 RX ADMIN — OMEPRAZOLE 20 MG: 20 CAPSULE, DELAYED RELEASE ORAL at 04:31

## 2020-01-19 RX ADMIN — NYSTATIN 500000 UNITS: 100000 SUSPENSION ORAL at 16:53

## 2020-01-19 RX ADMIN — OXYCODONE HYDROCHLORIDE 5 MG: 5 TABLET ORAL at 21:11

## 2020-01-19 RX ADMIN — ENALAPRIL MALEATE 10 MG: 10 TABLET ORAL at 04:32

## 2020-01-19 RX ADMIN — OXYCODONE HYDROCHLORIDE 10 MG: 5 TABLET ORAL at 04:31

## 2020-01-19 RX ADMIN — SENNOSIDES AND DOCUSATE SODIUM 2 TABLET: 8.6; 5 TABLET ORAL at 16:54

## 2020-01-19 RX ADMIN — NYSTATIN 500000 UNITS: 100000 SUSPENSION ORAL at 12:47

## 2020-01-19 RX ADMIN — AMLODIPINE BESYLATE 10 MG: 5 TABLET ORAL at 04:31

## 2020-01-19 RX ADMIN — NYSTATIN 500000 UNITS: 100000 SUSPENSION ORAL at 10:40

## 2020-01-19 RX ADMIN — DOXAZOSIN 2 MG: 2 TABLET ORAL at 21:11

## 2020-01-19 ASSESSMENT — ENCOUNTER SYMPTOMS
DIZZINESS: 0
ABDOMINAL PAIN: 0
SPUTUM PRODUCTION: 0
PALPITATIONS: 0
HEMOPTYSIS: 0
STRIDOR: 0
COUGH: 0
SHORTNESS OF BREATH: 0
MYALGIAS: 0
FLANK PAIN: 0
PHOTOPHOBIA: 0
HEARTBURN: 0
NECK PAIN: 0
NAUSEA: 0
TINGLING: 0
BACK PAIN: 0
BRUISES/BLEEDS EASILY: 0
CHILLS: 0
FOCAL WEAKNESS: 1
HEADACHES: 0
DIAPHORESIS: 0
VOMITING: 0
FEVER: 0
DOUBLE VISION: 0
ORTHOPNEA: 0
DEPRESSION: 0
BLURRED VISION: 0
SORE THROAT: 0
HALLUCINATIONS: 0

## 2020-01-19 NOTE — CARE PLAN
Problem: Knowledge Deficit  Goal: Knowledge of the prescribed therapeutic regimen will improve  Outcome: PROGRESSING AS EXPECTED  Note:   Education provided in small, consistent amounts in order to promote retention.  Patient will demonstrate understanding by teach back

## 2020-01-19 NOTE — PROGRESS NOTES
Patient oriented times 4 today, left sided weakness noted. VSS. Patient being turned and repositioned for comfort and skin integrity. Villasenor draining yellow urine. Skin warm and dry. Hourly rounds being completed.

## 2020-01-19 NOTE — PROGRESS NOTES
Daily Progress Note:     Date of Service: 1/19/2020  Primary Team: [facility] IM Purple Team   Attending: Dr. Nilay M.D.   Senior Resident: Dr. Blanco  Intern: Dr. Lozoya  Contact:  [phone]    Chief Complaint:   Intraparenchymal Hemorrhage of Right Basal Ganglia secondary to Hypertensive Emergency.    Subjective:    -  No acute events overnight reported.  - Patient alert and oriented times 3, vital signs stable, afebrile.  - He denies fever, chills, sweats, chest pain, palpitations or SOB.  - Patient improving, he has been able to tolerate well his oral intake without chocking, speech therapy will assess him on Monday to see if we can advance his diet.  - PT/OT following.  - Keeping Peg tube clamped, no removal yet.  - Overall patient showing great improvement, more remarkable in his speech and swallowing.  - No advances in his Placement for the weekend.    Consultants/Specialty:  Neurology  Intensive Care    Review of Systems:    Review of Systems   Constitutional: Negative for chills, diaphoresis and fever.   HENT: Negative for ear pain, hearing loss, sore throat and tinnitus.    Eyes: Negative for blurred vision, double vision and photophobia.   Respiratory: Negative for cough, hemoptysis, sputum production, shortness of breath and stridor.    Cardiovascular: Negative for chest pain, palpitations and orthopnea.   Gastrointestinal: Negative for abdominal pain, heartburn, nausea and vomiting.   Genitourinary: Negative for dysuria, flank pain, frequency and urgency.   Musculoskeletal: Negative for back pain, joint pain, myalgias and neck pain.   Skin: Negative for itching and rash.   Neurological: Positive for focal weakness. Negative for dizziness, tingling and headaches.   Endo/Heme/Allergies: Does not bruise/bleed easily.   Psychiatric/Behavioral: Negative for depression, hallucinations and suicidal ideas.       Objective Data:   Physical Exam:   Vitals:   Temp:  [36.3 °C (97.4 °F)-37.1 °C (98.7 °F)] 36.3  °C (97.4 °F)  Pulse:  [70-96] 71  Resp:  [15-18] 16  BP: (109-129)/(79-89) 126/80  SpO2:  [93 %-98 %] 98 %     Physical Exam  Constitutional:       General: He is not in acute distress.     Appearance: Normal appearance.   HENT:      Head: Normocephalic and atraumatic.      Nose: Nose normal.      Mouth/Throat:      Mouth: Mucous membranes are moist.      Pharynx: No oropharyngeal exudate or posterior oropharyngeal erythema.   Eyes:      General: No scleral icterus.     Extraocular Movements: Extraocular movements intact.      Conjunctiva/sclera: Conjunctivae normal.      Pupils: Pupils are equal, round, and reactive to light.   Neck:      Musculoskeletal: Normal range of motion and neck supple. No neck rigidity or muscular tenderness.   Cardiovascular:      Rate and Rhythm: Normal rate and regular rhythm.      Pulses: Normal pulses.      Heart sounds: No murmur.   Pulmonary:      Effort: Pulmonary effort is normal. No respiratory distress.      Breath sounds: Normal breath sounds. No stridor. No wheezing, rhonchi or rales.   Abdominal:      General: Bowel sounds are normal. There is no distension.      Palpations: Abdomen is soft. There is no mass.      Tenderness: There is no tenderness. There is no guarding or rebound.   Musculoskeletal: Normal range of motion.         General: No swelling or tenderness.      Right lower leg: No edema.      Left lower leg: No edema.   Skin:     General: Skin is warm.      Capillary Refill: Capillary refill takes less than 2 seconds.      Coloration: Skin is not jaundiced.      Findings: No erythema or rash.   Neurological:      General: No focal deficit present.      Mental Status: He is alert and oriented to person, place, and time. Mental status is at baseline.      Comments: Left hemiparesis improving in his leg.   Psychiatric:         Mood and Affect: Mood normal.         Thought Content: Thought content normal.         Judgment: Judgment normal.           Labs:   No recent  labs    Imaging:   No recent imaging    * Intraparenchymal hemorrhage of brain (HCC)- (present on admission)  Assessment & Plan  - Intracranial Hemorrhage sec to poorly controlled HTN  -HTN emergency  - H/O methamphetamine abuse  - Head CT showed a 2.7 cm acute right basal ganglia/thalamic hemorrhage, no indications for neurosurgery intervention  - Repeated CT scan on 1/5 showed stable hemorrhage, no new bleed.  - Per Neurology: recommend inpatient SBP control <160, outpatient <130, LDL <70, A1C <7%.  - EEG negative for seizure. Neuro signed off on 12/30.  - Unchanged Dysarthria and Hemiparesis.  - Patient has not improved in gaining mobility at all in his extremities since I have been in the service.  - Peg Tube placed on 1/13  - Peg clamped  - Eating Dysph I diet w/o complications                   PLAN:  - Barium swallow eval passed  - Patient tolerating diet  - Eval again on Monday to possibly advance diet  - Hold Feeding by Tube  - PT/OT/SLP  - Turn patient Q2 hrs to prevent new bed sores  - HTN med changes made, with better BP control.  - SNF referral placed prior to transfer from ICU; pending.  - Speech following, recommendation appreciated.  - Feeding 1-1, bed 90 degree    Urinary retention  Assessment & Plan  - Void trial maybe in the week  - If able to advance diet on Monday may consider ordering flomax  - Failed 2 times void trials        Plan:  - Continue Villasenor catheter   - Void trial in the future  - Might consider Flomax       Altered mental status- (present on admission)  Assessment & Plan  -Resolved, patient does not get confused at all, sometimes he gets tired and does not want to get much interested with nursing personnel, but he has not been altered at all.  -Intraparenchymal hemorrhage.   - Modafinil daily.  - Bowel protocol  - Improvement, he is alert and oriented most of the day.  - Dysarthria remarkable improvement  - We can understand everything patient says to us.  - Dysphagia 1 diet,  assessment on Monday for possible advancement on diet  - Peg tube placement on 1/13, not in use patient eating  - CTM    Methamphetamine abuse (HCC)- (present on admission)  Assessment & Plan  Counseling, when appropriate  Consulted SW    JAYASHREE (obstructive sleep apnea)- (present on admission)  Assessment & Plan  - Not on CPAP at home  - Unable to use Bipap with mental status and stroke  - Continue O2 supplementation as needed  - CT    Hypertensive emergency- (present on admission)  Assessment & Plan  -Presented with blood pressures of  190s  SBP; and 110-130s DBP  - Intraparenchymal hemorrhage Imaging  - CT Blood Pressure  - Assessing changes in BP regimen for better BP control  - Increasing Chlortalidone to 50 and Doxazosin to 2mg  - BP has been stable WNL SBP between 110's-130's  - Asymptomatic    S/P percutaneous endoscopic gastrostomy (PEG) tube placement (HCC)  Assessment & Plan  - Placement on 1/13  - Stop peg tube feeling since patient tolerating oral intake  - Monitor for signs of Infection  - Speech following next Monday for evaluation to advance diet  - Do not remove Peg yet    Hypomagnesemia- (present on admission)  Assessment & Plan  CTM   Replete when needed       Acute respiratory failure with hypoxia and hypercapnia ([facility])- (present on admission)  Assessment & Plan  - Resolved.  - Patient was Intubated 12/26- 12/31/2019 (for aspiration and airway protection)  - Extubate successfully 12/31/2019  - Was treated with Abxs for MSSA/ PNA (Ancef x5 days)  - PT/OT/SLP  - Aspiration precautions.  - Now in Dysphagia 1 diet w/ Thick liq  - Speech eval on Monday to possibly advance diet  - Oral care daily 2-3 times a day  - No need of Oxygen  - O2 sats WNL on RA  - CTM    Aspiration into airway- (present on admission)  Assessment & Plan  - PNA resolved  - MSSA pneumonia  - Patient Completed cefazolin 5 day course  - Aspiration precautions  - O2/RT   - Peg Tube placed on 1/13.  - Barium exam completed 1/15 patient  getting fed now  - Patient tolerating Oral intake w/o aspirating  - Might advance diet next monday  - Speech therapy following       Hypokalemia- (present on admission)  Assessment & Plan  CTM   Replete when needed orally

## 2020-01-19 NOTE — CARE PLAN
Problem: Safety  Goal: Will remain free from falls  Outcome: PROGRESSING AS EXPECTED     Problem: Pain Management  Goal: Pain level will decrease to patient's comfort goal  Outcome: PROGRESSING AS EXPECTED     Problem: Skin Integrity  Goal: Risk for impaired skin integrity will decrease  Outcome: PROGRESSING AS EXPECTED     Problem: Respiratory:  Goal: Respiratory status will improve  Outcome: PROGRESSING AS EXPECTED

## 2020-01-20 PROBLEM — E87.1 HYPONATREMIA: Status: ACTIVE | Noted: 2020-01-20

## 2020-01-20 LAB
ALBUMIN SERPL BCP-MCNC: 3.7 G/DL (ref 3.2–4.9)
ALBUMIN/GLOB SERPL: 1 G/DL
ALP SERPL-CCNC: 89 U/L (ref 30–99)
ALT SERPL-CCNC: 127 U/L (ref 2–50)
ANION GAP SERPL CALC-SCNC: 9 MMOL/L (ref 0–11.9)
AST SERPL-CCNC: 51 U/L (ref 12–45)
BILIRUB SERPL-MCNC: 0.5 MG/DL (ref 0.1–1.5)
BUN SERPL-MCNC: 29 MG/DL (ref 8–22)
CALCIUM SERPL-MCNC: 9.9 MG/DL (ref 8.5–10.5)
CHLORIDE SERPL-SCNC: 95 MMOL/L (ref 96–112)
CO2 SERPL-SCNC: 28 MMOL/L (ref 20–33)
CREAT SERPL-MCNC: 1.22 MG/DL (ref 0.5–1.4)
CRP SERPL HS-MCNC: 1.02 MG/DL (ref 0–0.75)
GLOBULIN SER CALC-MCNC: 3.8 G/DL (ref 1.9–3.5)
GLUCOSE SERPL-MCNC: 139 MG/DL (ref 65–99)
POTASSIUM SERPL-SCNC: 3.3 MMOL/L (ref 3.6–5.5)
PREALB SERPL-MCNC: 29 MG/DL (ref 18–38)
PROT SERPL-MCNC: 7.5 G/DL (ref 6–8.2)
SODIUM SERPL-SCNC: 132 MMOL/L (ref 135–145)

## 2020-01-20 PROCEDURE — 97530 THERAPEUTIC ACTIVITIES: CPT | Mod: CQ

## 2020-01-20 PROCEDURE — 770006 HCHG ROOM/CARE - MED/SURG/GYN SEMI*

## 2020-01-20 PROCEDURE — 84134 ASSAY OF PREALBUMIN: CPT

## 2020-01-20 PROCEDURE — A9270 NON-COVERED ITEM OR SERVICE: HCPCS | Performed by: INTERNAL MEDICINE

## 2020-01-20 PROCEDURE — 36415 COLL VENOUS BLD VENIPUNCTURE: CPT

## 2020-01-20 PROCEDURE — 99231 SBSQ HOSP IP/OBS SF/LOW 25: CPT | Performed by: INTERNAL MEDICINE

## 2020-01-20 PROCEDURE — 86140 C-REACTIVE PROTEIN: CPT

## 2020-01-20 PROCEDURE — 97112 NEUROMUSCULAR REEDUCATION: CPT | Mod: CQ

## 2020-01-20 PROCEDURE — 92526 ORAL FUNCTION THERAPY: CPT

## 2020-01-20 PROCEDURE — 700111 HCHG RX REV CODE 636 W/ 250 OVERRIDE (IP): Performed by: STUDENT IN AN ORGANIZED HEALTH CARE EDUCATION/TRAINING PROGRAM

## 2020-01-20 PROCEDURE — 700102 HCHG RX REV CODE 250 W/ 637 OVERRIDE(OP): Performed by: STUDENT IN AN ORGANIZED HEALTH CARE EDUCATION/TRAINING PROGRAM

## 2020-01-20 PROCEDURE — A9270 NON-COVERED ITEM OR SERVICE: HCPCS | Performed by: STUDENT IN AN ORGANIZED HEALTH CARE EDUCATION/TRAINING PROGRAM

## 2020-01-20 PROCEDURE — 80053 COMPREHEN METABOLIC PANEL: CPT

## 2020-01-20 PROCEDURE — 700102 HCHG RX REV CODE 250 W/ 637 OVERRIDE(OP): Performed by: INTERNAL MEDICINE

## 2020-01-20 RX ORDER — POTASSIUM CHLORIDE 20 MEQ/1
40 TABLET, EXTENDED RELEASE ORAL ONCE
Status: COMPLETED | OUTPATIENT
Start: 2020-01-20 | End: 2020-01-20

## 2020-01-20 RX ADMIN — OXYCODONE HYDROCHLORIDE 10 MG: 5 TABLET ORAL at 21:42

## 2020-01-20 RX ADMIN — NYSTATIN 500000 UNITS: 100000 SUSPENSION ORAL at 12:13

## 2020-01-20 RX ADMIN — ACETAMINOPHEN 650 MG: 325 TABLET, FILM COATED ORAL at 17:29

## 2020-01-20 RX ADMIN — OXYCODONE HYDROCHLORIDE 5 MG: 5 TABLET ORAL at 03:03

## 2020-01-20 RX ADMIN — AMLODIPINE BESYLATE 10 MG: 5 TABLET ORAL at 05:20

## 2020-01-20 RX ADMIN — OMEPRAZOLE 20 MG: 20 CAPSULE, DELAYED RELEASE ORAL at 05:19

## 2020-01-20 RX ADMIN — POTASSIUM CHLORIDE 40 MEQ: 1500 TABLET, EXTENDED RELEASE ORAL at 08:18

## 2020-01-20 RX ADMIN — MODAFINIL 100 MG: 100 TABLET ORAL at 05:20

## 2020-01-20 RX ADMIN — DOXAZOSIN 2 MG: 2 TABLET ORAL at 20:33

## 2020-01-20 RX ADMIN — ENOXAPARIN SODIUM 40 MG: 100 INJECTION SUBCUTANEOUS at 05:17

## 2020-01-20 RX ADMIN — NYSTATIN 500000 UNITS: 100000 SUSPENSION ORAL at 20:33

## 2020-01-20 RX ADMIN — NYSTATIN 500000 UNITS: 100000 SUSPENSION ORAL at 08:18

## 2020-01-20 RX ADMIN — CHLORTHALIDONE 50 MG: 50 TABLET ORAL at 05:20

## 2020-01-20 RX ADMIN — SENNOSIDES AND DOCUSATE SODIUM 2 TABLET: 8.6; 5 TABLET ORAL at 17:23

## 2020-01-20 RX ADMIN — SENNOSIDES AND DOCUSATE SODIUM 2 TABLET: 8.6; 5 TABLET ORAL at 05:20

## 2020-01-20 RX ADMIN — NYSTATIN 500000 UNITS: 100000 SUSPENSION ORAL at 17:23

## 2020-01-20 RX ADMIN — ACETAMINOPHEN 650 MG: 325 TABLET, FILM COATED ORAL at 03:03

## 2020-01-20 RX ADMIN — ACETAMINOPHEN 650 MG: 325 TABLET, FILM COATED ORAL at 21:42

## 2020-01-20 RX ADMIN — OXYCODONE HYDROCHLORIDE 10 MG: 5 TABLET ORAL at 17:29

## 2020-01-20 RX ADMIN — ENALAPRIL MALEATE 10 MG: 10 TABLET ORAL at 05:20

## 2020-01-20 ASSESSMENT — ENCOUNTER SYMPTOMS
HEARTBURN: 0
PHOTOPHOBIA: 0
FEVER: 0
SHORTNESS OF BREATH: 0
STRIDOR: 0
NAUSEA: 0
PALPITATIONS: 0
TINGLING: 0
BACK PAIN: 0
BLURRED VISION: 0
DIZZINESS: 0
NECK PAIN: 0
HEMOPTYSIS: 0
BRUISES/BLEEDS EASILY: 0
VOMITING: 0
ORTHOPNEA: 0
FOCAL WEAKNESS: 1
ABDOMINAL PAIN: 0
HALLUCINATIONS: 0
SPUTUM PRODUCTION: 0
COUGH: 0
DOUBLE VISION: 0
CHILLS: 0
HEADACHES: 0
DEPRESSION: 0
MYALGIAS: 0
SINUS PAIN: 0
DIAPHORESIS: 0

## 2020-01-20 ASSESSMENT — COGNITIVE AND FUNCTIONAL STATUS - GENERAL
MOVING FROM LYING ON BACK TO SITTING ON SIDE OF FLAT BED: UNABLE
MOVING TO AND FROM BED TO CHAIR: UNABLE
SUGGESTED CMS G CODE MODIFIER MOBILITY: CM
STANDING UP FROM CHAIR USING ARMS: A LOT
MOBILITY SCORE: 7
WALKING IN HOSPITAL ROOM: TOTAL
CLIMB 3 TO 5 STEPS WITH RAILING: TOTAL
TURNING FROM BACK TO SIDE WHILE IN FLAT BAD: UNABLE

## 2020-01-20 ASSESSMENT — GAIT ASSESSMENTS: GAIT LEVEL OF ASSIST: UNABLE TO PARTICIPATE

## 2020-01-20 NOTE — PROGRESS NOTES
2 RN skin check completed with ADALBERTO Horvath    Sacrum/perineal area pink and blanching, incontinence care completed as needed.  Excoriation to scrotum, barrier cream applied intermittently  Slight rash remains on upper chest.   Skin is otherwise intact, q2H turns in place.

## 2020-01-20 NOTE — CARE PLAN
Problem: Nutritional:  Goal: Achieve adequate nutritional intake  Description  Patient will consume 50% of meals   Outcome: MET      PO % x 10 meals per flow sheets

## 2020-01-20 NOTE — DISCHARGE PLANNING
Agency/Facility Name: All [facility]  Plan or Request: Patient now has active insurance. Updated referral sent to all [facility].

## 2020-01-20 NOTE — PROGRESS NOTES
Assumed care of patient at 1900. A/ox4. No change to neuro assessment from report and chart review. Continued weakness/falccid to L side. Educated about plan of care and room controls. Called appropriately throughout the shift. Q2 turns and room rounds completed.

## 2020-01-20 NOTE — PROGRESS NOTES
Daily Progress Note:     Date of Service: 1/20/2020  Primary Team: [facility] Team   Attending: Dr. Nilay M.D.   Senior Resident: Dr. Blanco  Intern: Dr. Lozoya  Contact:  [phone]    Chief Complaint:   Intraparenchymal Hemorrhage of Right Basal Ganglia secondary to Hypertensive Emergency.      Subjective: (Must contain at least 4 of the usual HPI elements (location, onset, duration, severity, modifying factors, radiation, character, timing) or if not possible the status of at least 3 chronic problems.)  - No acute overnight reported.  - Patient alert and oriented times 3, denies any new symptoms.  - He has been tolerating well Oral intake Dysphagia I diet.  - Speech evaluated patient, he will be advanced in his diet to Dysphagia II/NTL.  - Patient still with Villasenor catheter, void trial in the next few days.  - Hemiparesis has improved significantly, now able to make some movements in his left leg, his speech has improved the most, remarkable improvement in the past several days.  - He was approved by Medicaid,  sent requests to all facilities in [facility].    Consultants/Specialty:  Neurology  Intensive Care    Review of Systems:  CREATE MACRO  Review of Systems   Constitutional: Negative for chills, diaphoresis and fever.   HENT: Negative for hearing loss, sinus pain and tinnitus.    Eyes: Negative for blurred vision, double vision and photophobia.   Respiratory: Negative for cough, hemoptysis, sputum production, shortness of breath and stridor.    Cardiovascular: Negative for chest pain, palpitations and orthopnea.   Gastrointestinal: Negative for abdominal pain, heartburn, nausea and vomiting.   Genitourinary: Negative for dysuria, frequency and urgency.   Musculoskeletal: Negative for back pain, myalgias and neck pain.   Skin: Negative for itching and rash.   Neurological: Positive for focal weakness. Negative for dizziness, tingling and headaches.   Endo/Heme/Allergies: Does not  bruise/bleed easily.   Psychiatric/Behavioral: Negative for depression, hallucinations and suicidal ideas.       Objective Data:   Physical Exam:   Vitals:   Temp:  [36.6 °C (97.8 °F)-36.7 °C (98 °F)] 36.6 °C (97.9 °F)  Pulse:  [] 78  Resp:  [16] 16  BP: ()/(75-92) 97/75  SpO2:  [91 %-96 %] 94 %   CREATE MACRO BE SURE THAT THIS IS PERTINENT TO YOUR PATIENT!  Physical Exam  Constitutional:       General: He is not in acute distress.     Appearance: Normal appearance.   HENT:      Head: Normocephalic and atraumatic.      Nose: Nose normal.      Mouth/Throat:      Mouth: Mucous membranes are moist.      Pharynx: No oropharyngeal exudate or posterior oropharyngeal erythema.   Eyes:      General: No scleral icterus.     Extraocular Movements: Extraocular movements intact.      Conjunctiva/sclera: Conjunctivae normal.      Pupils: Pupils are equal, round, and reactive to light.   Neck:      Musculoskeletal: Normal range of motion and neck supple. No neck rigidity or muscular tenderness.   Cardiovascular:      Rate and Rhythm: Normal rate and regular rhythm.      Pulses: Normal pulses.      Heart sounds: No murmur.   Pulmonary:      Effort: Pulmonary effort is normal. No respiratory distress.      Breath sounds: Normal breath sounds. No stridor. No wheezing, rhonchi or rales.   Abdominal:      General: Bowel sounds are normal. There is no distension.      Palpations: Abdomen is soft.      Tenderness: There is no tenderness. There is no guarding or rebound.      Hernia: No hernia is present.   Musculoskeletal: Normal range of motion.         General: No swelling or deformity.      Right lower leg: No edema.      Left lower leg: No edema.      Comments: Hemiparesis left side   Neurological:      General: No focal deficit present.      Mental Status: He is alert and oriented to person, place, and time. Mental status is at baseline.   Psychiatric:         Mood and Affect: Mood normal.         Thought Content:  Thought content normal.         Judgment: Judgment normal.           Labs:   Review    Imaging:   Review    * Intraparenchymal hemorrhage of brain (HCC)- (present on admission)  Assessment & Plan  - Intracranial Hemorrhage sec to poorly controlled HTN  -HTN emergency  - H/O methamphetamine abuse  - Head CT showed a 2.7 cm acute right basal ganglia/thalamic hemorrhage, no indications for neurosurgery intervention  - Repeated CT scan on 1/5 showed stable hemorrhage, no new bleed.  - Per Neurology: recommend inpatient SBP control <160, outpatient <130, LDL <70, A1C <7%.  - EEG negative for seizure. Neuro signed off on 12/30.  - Unchanged Dysarthria and Hemiparesis.  - Patient has not improved in gaining mobility at all in his extremities since I have been in the service.  - Peg Tube placed on 1/13  - Peg clamped  - Eating Dysph I diet w/o complications  - Speech eval today for potential advancement in Diet                   PLAN:  - Barium swallow eval passed  - Patient tolerating diet  - Advancing diet to Dysphagia 2/NTL  - Hold Feeding by Tube  - PT/OT/SLP  - Turn patient Q2 hrs to prevent new bed sores  - HTN med changes made, with better BP control.  - SNF referral placed prior to transfer from ICU; pending.  - Speech following, recommendation appreciated.  - Feeding 1-1, bed 90 degree    Urinary retention  Assessment & Plan  - Void trial maybe in the week  - If able to advance diet on Monday may consider ordering flomax  - Failed 2 times void trials        Plan:  - Continue Villasenor catheter   - Void trial in the future  - Might consider Flomax       Altered mental status- (present on admission)  Assessment & Plan  -Resolved, patient does not get confused at all, sometimes he gets tired and does not want to get much interested with nursing personnel, but he has not been altered at all.  -Intraparenchymal hemorrhage.   - Modafinil daily.  - Bowel protocol  - Improvement, he is alert and oriented most of the day.  -  Dysarthria remarkable improvement  - We can understand everything patient says to us.  - Upgraded today 1/20 to Dysphagia 2/NTL diet.  - Peg tube placement on 1/13, not in use patient eating  - CTM  - No changes in mental status     Methamphetamine abuse (HCC)- (present on admission)  Assessment & Plan  Counseling, when appropriate  Consulted SW    JAYASHREE (obstructive sleep apnea)- (present on admission)  Assessment & Plan  - Not on CPAP at home  - Unable to use Bipap with mental status and stroke  - Continue O2 supplementation as needed  - CTM    Hypertensive emergency- (present on admission)  Assessment & Plan  -Presented with blood pressures of  190s  SBP; and 110-130s DBP  - Intraparenchymal hemorrhage Imaging  - CTM Blood Pressure  - Assessing changes in BP regimen for better BP control  - Increasing Chlortalidone to 50 and Doxazosin to 2mg  - BP has been stable WNL SBP between 110's-130's  - Asymptomatic  - Normotensive    Hyponatremia  Assessment & Plan  - Mild Hyponatremia  - Today 1/20 sodium 132  - CTM  - No CNS symptoms    S/P percutaneous endoscopic gastrostomy (PEG) tube placement (HCC)  Assessment & Plan  - Placement on 1/13  - Stop peg tube feeling since patient tolerating oral intake  - Monitor for signs of Infection  - Speech following next Monday for evaluation to advance diet  - Do not remove Peg yet    Hypomagnesemia- (present on admission)  Assessment & Plan  CTM   Replete when needed       Acute respiratory failure with hypoxia and hypercapnia (HCC)- (present on admission)  Assessment & Plan  - Resolved.  - Patient was Intubated 12/26- 12/31/2019 (for aspiration and airway protection)  - Extubate successfully 12/31/2019  - Was treated with Abxs for MSSA/ PNA (Ancef x5 days)  - PT/OT/SLP  - Aspiration precautions.  - Diet advanced to Dysphagia II/NTL  - Oral care daily 2-3 times a day  - No need of Oxygen  - O2 sats WNL on RA  - CTM    Aspiration into airway- (present on admission)  Assessment &  Plan  - PNA resolved  - MSSA pneumonia  - Patient Completed cefazolin 5 day course  - Aspiration precautions  - O2/RT   - Peg Tube placed on 1/13.  - Barium exam completed 1/15 patient getting fed now  - Patient tolerating Oral intake w/o aspirating  - Advancing his Diet to Dysphagia 2/NTL  - Speech therapy following         Hypokalemia- (present on admission)  Assessment & Plan  Mild Hypokalemia 3.3  K 40 mEp today  Daily labs till normalized

## 2020-01-20 NOTE — THERAPY
"Physical Therapy Treatment completed.   Bed Mobility:  Supine to Sit: Moderate Assist  Transfers: Sit to Stand: Moderate Assist  Gait: Level Of Assist: Unable to Participate     Plan of Care: Will benefit from Physical Therapy 5 times per week  Discharge Recommendations: Equipment: Will Continue to Assess for Equipment Needs. Post-acute therapy Recommend post-acute placement for continued physical therapy services prior to discharge home. Patient can tolerate post-acute therapies at a 5x/week frequency.       See \"Rehab Therapy-Acute\" Patient Summary Report for complete documentation.     Pt progressing well w/ therapy. Pt did state a lot of frustration today saying \"I have never been this weak in my life. Can I just go to the gym?\" Pt able to use his R LE to bring his L LE off the bed and pull on therapist to get his trunk upright. Pt able to hold balance EOB w/ improved postural control. Pt demonstrating improved trunk control w/ pt able to bring his trunk forward w/out having to use his UE's. Pt demonstrating improved strength w/ standing. He did require neuro inhibition prior to stand d/t increased L LE tone. Pt not trusting his L LE during WB'ing as he states he has had 5 surgeries on it even before his stroke. Pt able to weight shift onto the L but wouldn't lift R LE for gait even w/ blocking. Pt is very motivated to participate and continues to be at a level in which he will benefit from post acute therapy.  "

## 2020-01-20 NOTE — DISCHARGE PLANNING
Patient is eligible for Medicaid Meds to Beds at discharge if they have coverage with [place] Medicaid, Medicaid FFS, Medicaid HMO ([facility]), or [place]. This service is provided through the [facility] if orders are received by the pharmacy prior to 4pm Monday through Friday excluding holidays. Preferred pharmacy has been changed to [facility]. Please call [phone] prior to discharge.

## 2020-01-20 NOTE — CARE PLAN
Problem: Communication  Goal: The ability to communicate needs accurately and effectively will improve  Outcome: PROGRESSING AS EXPECTED  Intervention: [place] patient and significant other/support system to call light to alert staff of needs  Flowsheets (Taken 1/11/2020 0139 by María Rahman RMinnaN.)  Oriented to:: All of the Following : Location of Bathroom, Visiting Policy, Unit Routine, Call Light and Bedside Controls, Bedside Rail Policy, Smoking Policy, Rights and Responsibilities, Bedside Report, and Patient Education Notebook  Note:   Pt orientated to daily plan of care and new shift staff. Verbalizes understanding of how to notify us of needs.      Problem: Skin Integrity  Goal: Risk for impaired skin integrity will decrease  Outcome: PROGRESSING AS EXPECTED  Intervention: Assess and monitor skin integrity, appearance and/or temperature  Note:   Continue q2 turns for comfort and integrity.

## 2020-01-20 NOTE — PROGRESS NOTES
Daily Progress Note:        Date of Service: 1/20/2020  Primary Team: [facility] Purple Team   Attending: Dr. Nilay M.D.   Senior Resident: Dr. Blanco  Intern: Dr. Lozoya  Contact:  [phone]     Chief Complaint:   Intraparenchymal Hemorrhage of Right Basal Ganglia secondary to Hypertensive Emergency.        Subjective:  - No acute overnight reported.  - Patient alert and oriented times 3, denies any new symptoms.  - He has been tolerating well Oral intake Dysphagia I diet.  - Speech evaluated patient, he will be advanced in his diet to Dysphagia II/NTL.  - Patient still with Villasenor catheter, void trial in the next few days.  - Hemiparesis has improved significantly, now able to make some movements in his left leg, his speech has improved the most, remarkable improvement in the past several days.  - He was approved by Medicaid, SW sent requests to all facilities in [facility].     Consultants/Specialty:  Neurology  Intensive Care     Review of Systems:    Review of Systems   Constitutional: Negative for chills, diaphoresis and fever.   HENT: Negative for hearing loss, sinus pain and tinnitus.    Eyes: Negative for blurred vision, double vision and photophobia.   Respiratory: Negative for cough, hemoptysis, sputum production, shortness of breath and stridor.    Cardiovascular: Negative for chest pain, palpitations and orthopnea.   Gastrointestinal: Negative for abdominal pain, heartburn, nausea and vomiting.   Genitourinary: Negative for dysuria, frequency and urgency.   Musculoskeletal: Negative for back pain, myalgias and neck pain.   Skin: Negative for itching and rash.   Neurological: Positive for focal weakness. Negative for dizziness, tingling and headaches.   Endo/Heme/Allergies: Does not bruise/bleed easily.   Psychiatric/Behavioral: Negative for depression, hallucinations and suicidal ideas.         Objective Data:   Physical Exam:   Vitals:   Temp:  [36.6 °C (97.8 °F)-36.7 °C (98 °F)] 36.6 °C  (97.9 °F)  Pulse:  [] 78  Resp:  [16] 16  BP: ()/(75-92) 97/75  SpO2:  [91 %-96 %] 94 %     Physical Exam  Constitutional:       General: He is not in acute distress.     Appearance: Normal appearance.   HENT:      Head: Normocephalic and atraumatic.      Nose: Nose normal.      Mouth/Throat:      Mouth: Mucous membranes are moist.      Pharynx: No oropharyngeal exudate or posterior oropharyngeal erythema.   Eyes:      General: No scleral icterus.     Extraocular Movements: Extraocular movements intact.      Conjunctiva/sclera: Conjunctivae normal.      Pupils: Pupils are equal, round, and reactive to light.   Neck:      Musculoskeletal: Normal range of motion and neck supple. No neck rigidity or muscular tenderness.   Cardiovascular:      Rate and Rhythm: Normal rate and regular rhythm.      Pulses: Normal pulses.      Heart sounds: No murmur.   Pulmonary:      Effort: Pulmonary effort is normal. No respiratory distress.      Breath sounds: Normal breath sounds. No stridor. No wheezing, rhonchi or rales.   Abdominal:      General: Bowel sounds are normal. There is no distension.      Palpations: Abdomen is soft.      Tenderness: There is no tenderness. There is no guarding or rebound.      Hernia: No hernia is present.   Musculoskeletal: Normal range of motion.         General: No swelling or deformity.      Right lower leg: No edema.      Left lower leg: No edema.      Comments: Hemiparesis left side   Neurological:      General: No focal deficit present.      Mental Status: He is alert and oriented to person, place, and time. Mental status is at baseline.   Psychiatric:         Mood and Affect: Mood normal.         Thought Content: Thought content normal.         Judgment: Judgment normal.               Labs:   Review     Imaging:   Review     * Intraparenchymal hemorrhage of brain (HCC)- (present on admission)  Assessment & Plan  - Intracranial Hemorrhage sec to poorly controlled HTN  -HTN  emergency  - H/O methamphetamine abuse  - Head CT showed a 2.7 cm acute right basal ganglia/thalamic hemorrhage, no indications for neurosurgery intervention  - Repeated CT scan on 1/5 showed stable hemorrhage, no new bleed.  - Per Neurology: recommend inpatient SBP control <160, outpatient <130, LDL <70, A1C <7%.  - EEG negative for seizure. Neuro signed off on 12/30.  - Unchanged Dysarthria and Hemiparesis.  - Patient has not improved in gaining mobility at all in his extremities since I have been in the service.  - Peg Tube placed on 1/13  - Peg clamped  - Eating Dysph I diet w/o complications  - Speech eval today for potential advancement in Diet                    PLAN:  - Barium swallow eval passed  - Patient tolerating diet  - Advancing diet to Dysphagia 2/NTL  - Hold Feeding by Tube  - PT/OT/SLP  - Turn patient Q2 hrs to prevent new bed sores  - HTN med changes made, with better BP control.  - SNF referral placed prior to transfer from ICU; pending.  - Speech following, recommendation appreciated.  - Feeding 1-1, bed 90 degree     Urinary retention  Assessment & Plan  - Void trial maybe in the week  - If able to advance diet on Monday may consider ordering flomax  - Failed 2 times void trials         Plan:  - Continue Villasenor catheter   - Void trial in the future  - Might consider Flomax         Altered mental status- (present on admission)  Assessment & Plan  -Resolved, patient does not get confused at all, sometimes he gets tired and does not want to get much interested with nursing personnel, but he has not been altered at all.  -Intraparenchymal hemorrhage.   - Modafinil daily.  - Bowel protocol  - Improvement, he is alert and oriented most of the day.  - Dysarthria remarkable improvement  - We can understand everything patient says to us.  - Upgraded today 1/20 to Dysphagia 2/NTL diet.  - Peg tube placement on 1/13, not in use patient eating  - CTM  - No changes in mental status      Methamphetamine abuse  (Former[facility])- (present on admission)  Assessment & Plan  Counseling, when appropriate  Consulted SW     JAYASHREE (obstructive sleep apnea)- (present on admission)  Assessment & Plan  - Not on CPAP at home  - Unable to use Bipap with mental status and stroke  - Continue O2 supplementation as needed  - CTM     Hypertensive emergency- (present on admission)  Assessment & Plan  -Presented with blood pressures of  190s  SBP; and 110-130s DBP  - Intraparenchymal hemorrhage Imaging  - CTM Blood Pressure  - Assessing changes in BP regimen for better BP control  - Increasing Chlortalidone to 50 and Doxazosin to 2mg  - BP has been stable WNL SBP between 110's-130's  - Asymptomatic  - Normotensive     Hyponatremia  Assessment & Plan  - Mild Hyponatremia  - Today 1/20 sodium 132  - CTM  - No CNS symptoms     S/P percutaneous endoscopic gastrostomy (PEG) tube placement ([facility])  Assessment & Plan  - Placement on 1/13  - Stop peg tube feeling since patient tolerating oral intake  - Monitor for signs of Infection  - Speech following next Monday for evaluation to advance diet  - Do not remove Peg yet     Hypomagnesemia- (present on admission)  Assessment & Plan  CTM   Replete when needed         Acute respiratory failure with hypoxia and hypercapnia ([facility])- (present on admission)  Assessment & Plan  - Resolved.  - Patient was Intubated 12/26- 12/31/2019 (for aspiration and airway protection)  - Extubate successfully 12/31/2019  - Was treated with Abxs for MSSA/ PNA (Ancef x5 days)  - PT/OT/SLP  - Aspiration precautions.  - Diet advanced to Dysphagia II/NTL  - Oral care daily 2-3 times a day  - No need of Oxygen  - O2 sats WNL on RA  - CTM     Aspiration into airway- (present on admission)  Assessment & Plan  - PNA resolved  - MSSA pneumonia  - Patient Completed cefazolin 5 day course  - Aspiration precautions  - O2/RT   - Peg Tube placed on 1/13.  - Barium exam completed 1/15 patient getting fed now  - Patient tolerating Oral intake w/o  aspirating  - Advancing his Diet to Dysphagia 2/NTL  - Speech therapy following            Hypokalemia- (present on admission)  Assessment & Plan  Mild Hypokalemia 3.3  K 40 mEp today  Daily labs till normalized

## 2020-01-20 NOTE — THERAPY
"Speech Language Therapy dysphagia treatment completed.   Functional Status:  Patient awake, alert, and seen with Dys1/NTL breakfast meal. Patient consumed approximately 50% of breakfast tray with no overt s/sx of aspiration. Patient also consumed 4 oz soft solids with no overt s/sx of aspiration. Minimal oral residue was noted in left buccal cavity but patient was able to sense this and used tongue sweep to left to clear residue as needed. Patient denied any fatigue and no changes in WOB were noted with mastication. At this time, recommend patient upgrade to Dys2/NTL diet with 1:1 supervision. Float meds whole in puree. No straws. RN aware. SLP is following.     Recommendations: At this time, recommend patient upgrade to Dys2/NTL diet with 1:1 supervision. Float meds whole in puree. No straws.   Plan of Care: Will benefit from Speech Therapy 5 times per week  Post-Acute Therapy: Recommend inpatient transitional care services for continued speech therapy services.      See \"Rehab Therapy-Acute\" Patient Summary Report for complete documentation.     "

## 2020-01-20 NOTE — DISCHARGE PLANNING
Anticipated Discharge Disposition: SNF    Action: Pt's chart now showing pt has [place] Medicaid.  Pt's son, Beni and close friend, Guzman at bedside visiting on 1/17 and LSW informed that pt's d/c support is Guzman ([phone]).   LSW tc Guzman for address pt will d/c to from SNF. LSW left vm with name and number for call back.     Barriers to Discharge: None     Plan: LSW to update CCA, Await Medicaid bed availability at SNF's, LSW to ensure PASRR complete, LSW to assist as needed

## 2020-01-20 NOTE — CARE PLAN
Problem: Venous Thromboembolism (VTW)/Deep Vein Thrombosis (DVT) Prevention:  Goal: Patient will participate in Venous Thrombosis (VTE)/Deep Vein Thrombosis (DVT)Prevention Measures  Outcome: PROGRESSING AS EXPECTED     Problem: Bowel/Gastric:  Goal: Will not experience complications related to bowel motility  Outcome: PROGRESSING AS EXPECTED     Problem: Pain Management  Goal: Pain level will decrease to patient's comfort goal  Outcome: PROGRESSING AS EXPECTED     Problem: Skin Integrity  Goal: Risk for impaired skin integrity will decrease  Outcome: PROGRESSING AS EXPECTED     Problem: Respiratory:  Goal: Respiratory status will improve  Outcome: PROGRESSING AS EXPECTED     Problem: Urinary Elimination:  Goal: Ability to reestablish a normal urinary elimination pattern will improve  Outcome: PROGRESSING SLOWER THAN EXPECTED  Note:   Villasenor catheter in place

## 2020-01-21 PROCEDURE — 97112 NEUROMUSCULAR REEDUCATION: CPT

## 2020-01-21 PROCEDURE — A9270 NON-COVERED ITEM OR SERVICE: HCPCS | Performed by: STUDENT IN AN ORGANIZED HEALTH CARE EDUCATION/TRAINING PROGRAM

## 2020-01-21 PROCEDURE — 700102 HCHG RX REV CODE 250 W/ 637 OVERRIDE(OP): Performed by: INTERNAL MEDICINE

## 2020-01-21 PROCEDURE — 770006 HCHG ROOM/CARE - MED/SURG/GYN SEMI*

## 2020-01-21 PROCEDURE — 99231 SBSQ HOSP IP/OBS SF/LOW 25: CPT | Performed by: INTERNAL MEDICINE

## 2020-01-21 PROCEDURE — 700111 HCHG RX REV CODE 636 W/ 250 OVERRIDE (IP): Performed by: STUDENT IN AN ORGANIZED HEALTH CARE EDUCATION/TRAINING PROGRAM

## 2020-01-21 PROCEDURE — 700102 HCHG RX REV CODE 250 W/ 637 OVERRIDE(OP): Performed by: STUDENT IN AN ORGANIZED HEALTH CARE EDUCATION/TRAINING PROGRAM

## 2020-01-21 PROCEDURE — 97535 SELF CARE MNGMENT TRAINING: CPT

## 2020-01-21 PROCEDURE — 97116 GAIT TRAINING THERAPY: CPT

## 2020-01-21 PROCEDURE — 97140 MANUAL THERAPY 1/> REGIONS: CPT

## 2020-01-21 PROCEDURE — A9270 NON-COVERED ITEM OR SERVICE: HCPCS | Performed by: INTERNAL MEDICINE

## 2020-01-21 RX ORDER — TAMSULOSIN HYDROCHLORIDE 0.4 MG/1
0.4 CAPSULE ORAL
Status: DISCONTINUED | OUTPATIENT
Start: 2020-01-21 | End: 2020-01-23

## 2020-01-21 RX ADMIN — NYSTATIN 500000 UNITS: 100000 SUSPENSION ORAL at 17:33

## 2020-01-21 RX ADMIN — OMEPRAZOLE 20 MG: 20 CAPSULE, DELAYED RELEASE ORAL at 05:25

## 2020-01-21 RX ADMIN — ENALAPRIL MALEATE 10 MG: 10 TABLET ORAL at 05:25

## 2020-01-21 RX ADMIN — OXYCODONE HYDROCHLORIDE 10 MG: 5 TABLET ORAL at 21:41

## 2020-01-21 RX ADMIN — NYSTATIN 500000 UNITS: 100000 SUSPENSION ORAL at 20:50

## 2020-01-21 RX ADMIN — NYSTATIN 500000 UNITS: 100000 SUSPENSION ORAL at 14:35

## 2020-01-21 RX ADMIN — ACETAMINOPHEN 650 MG: 325 TABLET, FILM COATED ORAL at 21:41

## 2020-01-21 RX ADMIN — ACETAMINOPHEN 650 MG: 325 TABLET, FILM COATED ORAL at 10:59

## 2020-01-21 RX ADMIN — SENNOSIDES AND DOCUSATE SODIUM 2 TABLET: 8.6; 5 TABLET ORAL at 17:33

## 2020-01-21 RX ADMIN — TAMSULOSIN HYDROCHLORIDE 0.4 MG: 0.4 CAPSULE ORAL at 17:36

## 2020-01-21 RX ADMIN — ENOXAPARIN SODIUM 40 MG: 100 INJECTION SUBCUTANEOUS at 05:22

## 2020-01-21 RX ADMIN — MODAFINIL 100 MG: 100 TABLET ORAL at 05:25

## 2020-01-21 RX ADMIN — SENNOSIDES AND DOCUSATE SODIUM 2 TABLET: 8.6; 5 TABLET ORAL at 05:25

## 2020-01-21 RX ADMIN — DOXAZOSIN 2 MG: 2 TABLET ORAL at 20:50

## 2020-01-21 RX ADMIN — OXYCODONE HYDROCHLORIDE 10 MG: 5 TABLET ORAL at 17:33

## 2020-01-21 RX ADMIN — CHLORTHALIDONE 50 MG: 50 TABLET ORAL at 05:25

## 2020-01-21 RX ADMIN — NYSTATIN 500000 UNITS: 100000 SUSPENSION ORAL at 10:48

## 2020-01-21 RX ADMIN — AMLODIPINE BESYLATE 10 MG: 5 TABLET ORAL at 05:25

## 2020-01-21 RX ADMIN — OXYCODONE HYDROCHLORIDE 10 MG: 5 TABLET ORAL at 10:59

## 2020-01-21 RX ADMIN — ACETAMINOPHEN 650 MG: 325 TABLET, FILM COATED ORAL at 17:33

## 2020-01-21 ASSESSMENT — COGNITIVE AND FUNCTIONAL STATUS - GENERAL
SUGGESTED CMS G CODE MODIFIER MOBILITY: CM
MOVING TO AND FROM BED TO CHAIR: UNABLE
WALKING IN HOSPITAL ROOM: TOTAL
HELP NEEDED FOR BATHING: A LOT
MOBILITY SCORE: 7
CLIMB 3 TO 5 STEPS WITH RAILING: TOTAL
MOVING FROM LYING ON BACK TO SITTING ON SIDE OF FLAT BED: UNABLE
DRESSING REGULAR UPPER BODY CLOTHING: A LITTLE
STANDING UP FROM CHAIR USING ARMS: A LOT
SUGGESTED CMS G CODE MODIFIER DAILY ACTIVITY: CL
PERSONAL GROOMING: A LITTLE
DRESSING REGULAR LOWER BODY CLOTHING: A LOT
DAILY ACTIVITIY SCORE: 13
TOILETING: A LOT
EATING MEALS: TOTAL
TURNING FROM BACK TO SIDE WHILE IN FLAT BAD: UNABLE

## 2020-01-21 ASSESSMENT — ENCOUNTER SYMPTOMS
COUGH: 0
BLURRED VISION: 0
HEADACHES: 0
CHILLS: 0
FOCAL WEAKNESS: 1
SHORTNESS OF BREATH: 0
HEARTBURN: 0
PHOTOPHOBIA: 0
ABDOMINAL PAIN: 0
NAUSEA: 0
DIAPHORESIS: 0
VOMITING: 0
DIZZINESS: 0
SORE THROAT: 0
NECK PAIN: 0
HALLUCINATIONS: 0
FEVER: 0
DEPRESSION: 0
SPUTUM PRODUCTION: 0
ORTHOPNEA: 0
TINGLING: 0
DOUBLE VISION: 0
HEMOPTYSIS: 0
MYALGIAS: 0
SENSORY CHANGE: 0
PALPITATIONS: 0
BACK PAIN: 0

## 2020-01-21 NOTE — CARE PLAN
Problem: Communication  Goal: The ability to communicate needs accurately and effectively will improve  Outcome: PROGRESSING AS EXPECTED     Problem: Safety  Goal: Will remain free from falls  Outcome: PROGRESSING AS EXPECTED     Problem: Pain Management  Goal: Pain level will decrease to patient's comfort goal  Outcome: PROGRESSING AS EXPECTED     Problem: Skin Integrity  Goal: Risk for impaired skin integrity will decrease  Outcome: PROGRESSING AS EXPECTED  Intervention: Implement precautions to protect skin integrity in collaboration with the interdisciplinary team  Note:   Continuing q2 turns for comfort and integrity, bed bath and hourly rounding in place to assess skin care, needs and positioning.      Problem: Acute Care of the Stroke Patient  Goal: Optimal Outcome for the Stroke patient  Outcome: PROGRESSING AS EXPECTED     Problem: Risk of Aspiration  Goal: Absence of aspiration  Outcome: PROGRESSING AS EXPECTED

## 2020-01-21 NOTE — PROGRESS NOTES
Assumed care of patient at 1930. 2 RN skin check done, see note. Discussed plan of care, bed bath completed, and needs addressed. Reinforced fall risk and use of call light. Hourly rounding and q2 turns in place.

## 2020-01-21 NOTE — DISCHARGE PLANNING
Agency/Facility Name: [facility]  Outcome: Patient declined- Medicaid product.    Agency/Facility Name: [facility]  Outcome: Patient declined- drug/alcohol abuse.    Agency/Facility Name: [facility]  Outcome: Patient declined- Medicaid.    Agency/Facility Name: [facility]  Outcome: Patient declined- care exceeds capacity, drug/alcohol abuse.    Agency/Facility Name: [facility]  Outcome: Patient declined- Medicaid.    Agency/Facility Name: [facility]  Outcome: Lolla(Liaison) to do onsite eval. Referral under review.    Agency/Facility Name: [facility]  Outcome: Attempted to follow up, however, no answer. Voicemail left.

## 2020-01-21 NOTE — THERAPY
"Physical Therapy Treatment completed.   Bed Mobility:  Supine to Sit: Moderate Assist  Transfers: Sit to Stand: Moderate Assist  Gait: Level Of Assist: Pre-gait training; see below  Plan of Care: Will benefit from Physical Therapy 5 times per week  Discharge Recommendations: Equipment: Will Continue to Assess for Equipment Needs. Post-acute therapy: Recommend post-acute placement for continued physical therapy services prior to discharge home. Patient can tolerate post-acute therapies at a 5x/week frequency.       Pt consistently able to pull himself into standing with railing and facilitation to L quads and gluts. With verbal cues to lift head and manual facilitation to gluts, pt is able to achieve an extended posture, but has difficulty sustaining. During pre-gait trials, pt manually/verbally cued for weight shift L and R and was able to pivot R LE as well as advance L LE laterally with mod A. He required seated rests between standing bouts and appeared most limited by low back pain. While here, PT will follow to address gait deviations, instability, and motor control/processing impairments. Recommend placement.    See \"Rehab Therapy-Acute\" Patient Summary Report for complete documentation.       "

## 2020-01-21 NOTE — PROGRESS NOTES
Daily Progress Note:     Date of Service: 1/21/2020  Primary Team: [facility]   Attending: Jigar Pemberton M.D.   Senior Resident: Dr. Blanco  Intern: Dr. Lozoya  Contact:  [phone]    Chief Complaint:   Intraparenchymal Hemorrhage of Right Basal Ganglia secondary to Hypertensive Emergency.    Subjective:  - No acute events overnight reported.  - Patient alert and oriented times 3.  - Denies fever, chills, sweats, chest pain, palpitations, SOB or abdominal pain.  - Patient was advanced to Dysphagia II diet /TNL  - Vital signs stable, he has been afebrile.  - Villasenor catheter in place, I'll start on Flomax, possible void trial in the next couple-few days.  - Discharge Planner sent requests for placement to all SNF in [facility], waiting for response.  - Patient speech improving, as well his leg mobility.  - PT/OT working with him.    Consultants/Specialty:  Neurology  Intensive Care    Review of Systems:    Review of Systems   Constitutional: Negative for chills, diaphoresis, fever and malaise/fatigue.   HENT: Negative for congestion, hearing loss, nosebleeds, sore throat and tinnitus.    Eyes: Negative for blurred vision, double vision and photophobia.   Respiratory: Negative for cough, hemoptysis, sputum production and shortness of breath.    Cardiovascular: Negative for chest pain, palpitations and orthopnea.   Gastrointestinal: Negative for abdominal pain, heartburn, nausea and vomiting.   Genitourinary: Negative for dysuria, frequency, hematuria and urgency.   Musculoskeletal: Negative for back pain, myalgias and neck pain.   Skin: Negative for itching and rash.   Neurological: Positive for focal weakness. Negative for dizziness, tingling, sensory change and headaches.   Psychiatric/Behavioral: Negative for depression, hallucinations and suicidal ideas.       Objective Data:   Physical Exam:   Vitals:   Temp:  [36.2 °C (97.1 °F)-37.1 °C (98.8 °F)] 36.2 °C (97.1 °F)  Pulse:  [61-80] 61  Resp:   [16] 16  BP: ()/(75-88) 110/88  SpO2:  [91 %-98 %] 92 %      Physical Exam  Constitutional:       General: He is not in acute distress.     Appearance: Normal appearance. He is normal weight.   HENT:      Head: Normocephalic and atraumatic.      Nose: Nose normal. No congestion or rhinorrhea.      Mouth/Throat:      Mouth: Mucous membranes are moist.      Pharynx: No oropharyngeal exudate or posterior oropharyngeal erythema.   Eyes:      General: No scleral icterus.     Extraocular Movements: Extraocular movements intact.      Conjunctiva/sclera: Conjunctivae normal.      Pupils: Pupils are equal, round, and reactive to light.   Neck:      Musculoskeletal: Normal range of motion and neck supple. No neck rigidity or muscular tenderness.   Cardiovascular:      Rate and Rhythm: Normal rate and regular rhythm.      Pulses: Normal pulses.      Heart sounds: No murmur.   Pulmonary:      Effort: Pulmonary effort is normal. No respiratory distress.      Breath sounds: Normal breath sounds. No stridor. No wheezing, rhonchi or rales.   Chest:      Chest wall: No tenderness.   Abdominal:      General: Abdomen is flat. Bowel sounds are normal. There is no distension.      Palpations: Abdomen is soft. There is no mass.      Tenderness: There is no tenderness. There is no guarding.      Hernia: No hernia is present.   Musculoskeletal: Normal range of motion.         General: No swelling or tenderness.      Right lower leg: No edema.      Left lower leg: No edema.      Comments: Left side hemiparesis, leg some mobility.   Skin:     General: Skin is warm.      Capillary Refill: Capillary refill takes less than 2 seconds.      Coloration: Skin is not jaundiced or pale.      Findings: No erythema.   Neurological:      General: No focal deficit present.      Mental Status: He is alert and oriented to person, place, and time. Mental status is at baseline.   Psychiatric:         Mood and Affect: Mood normal.         Thought  Content: Thought content normal.         Judgment: Judgment normal.           Labs:   Review    Imaging:   No new imaging    * Intraparenchymal hemorrhage of brain (HCC)- (present on admission)  Assessment & Plan  - Intracranial Hemorrhage sec to poorly controlled HTN  -HTN emergency  - H/O methamphetamine abuse  - Head CT showed a 2.7 cm acute right basal ganglia/thalamic hemorrhage, no indications for neurosurgery intervention  - Repeated CT scan on 1/5 showed stable hemorrhage, no new bleed.  - Per Neurology: recommend inpatient SBP control <160, outpatient <130, LDL <70, A1C <7%.  - EEG negative for seizure. Neuro signed off on 12/30.  - Unchanged Dysarthria and Hemiparesis.  - Patient has not improved in gaining mobility at all in his extremities since I have been in the service.  - Peg Tube placed on 1/13  - Peg clamped  - Eating Dysph I diet w/o complications  - Speech eval today for potential advancement in Diet                   PLAN:  - Barium swallow eval passed  - Patient tolerating diet  - Advancing diet to Dysphagia 2/NTL  - Hold Feeding by Tube  - PT/OT/SLP  - Turn patient Q2 hrs to prevent new bed sores  - HTN med changes made, with better BP control.  - SNF referral placed prior to transfer from ICU; pending.  - Speech following, recommendation appreciated.  - Feeding 1-1, bed 90 degree  - CTM    Urinary retention  Assessment & Plan  - Void trial maybe in the week  - Patient now on Dysph II diet  - Failed 2 times void trials        Plan:  - Continue Villasenor catheter   - Void trial in the future  - Start flomax today 1/21      Altered mental status- (present on admission)  Assessment & Plan  -Resolved, patient does not get confused at all, sometimes he gets tired and does not want to get much interested with nursing personnel, but he has not been altered at all.  -Intraparenchymal hemorrhage.   - Modafinil daily.  - Bowel protocol  - Improvement, he is alert and oriented most of the day.  - Dysarthria  remarkable improvement  - We can understand everything patient says to us.  - Upgraded today 1/20 to Dysphagia 2/NTL diet.  - Peg tube placement on 1/13, not in use patient eating  - CTM  - No changes in mental status     Methamphetamine abuse (HCC)- (present on admission)  Assessment & Plan  Counseling, when appropriate  Consulted SW    JAYASHREE (obstructive sleep apnea)- (present on admission)  Assessment & Plan  - Not on CPAP at home  - Unable to use Bipap with mental status and stroke  - Continue O2 supplementation as needed  - CTM    Hypertensive emergency- (present on admission)  Assessment & Plan  -Presented with blood pressures of  190s  SBP; and 110-130s DBP  - Intraparenchymal hemorrhage Imaging  - CTM Blood Pressure  - Assessing changes in BP regimen for better BP control  - Increasing Chlortalidone to 50 and Doxazosin to 2mg  - BP has been stable WNL SBP between 110's-130's  - Asymptomatic  - Normotensive  -    Hyponatremia  Assessment & Plan  - Mild Hyponatremia  - Today 1/20 sodium 132  - CTM  - No CNS symptoms    S/P percutaneous endoscopic gastrostomy (PEG) tube placement (HCC)  Assessment & Plan  - Placement on 1/13  - Stop peg tube feeling since patient tolerating oral intake  - Monitor for signs of Infection  - Speech following next Monday for evaluation to advance diet  - Do not remove Peg yet    Hypomagnesemia- (present on admission)  Assessment & Plan  CTM   Replete when needed       Acute respiratory failure with hypoxia and hypercapnia (HCC)- (present on admission)  Assessment & Plan  - Resolved.  - Patient was Intubated 12/26- 12/31/2019 (for aspiration and airway protection)  - Extubate successfully 12/31/2019  - Was treated with Abxs for MSSA/ PNA (Ancef x5 days)  - PT/OT/SLP  - Aspiration precautions.  - Diet advanced to Dysphagia II/NTL  - 1-1 feeding, HOB at 90 degrees  - Oral care daily 2-3 times a day  - No need of Oxygen  - O2 sats WNL on RA  - CTM    Aspiration into airway- (present on  admission)  Assessment & Plan  - PNA resolved  - MSSA pneumonia  - Patient Completed cefazolin 5 day course  - Aspiration precautions  - O2/RT   - Peg Tube placed on 1/13.  - Barium exam completed 1/15 patient getting fed now  - Patient tolerating Oral intake w/o aspirating  - Advancing his Diet to Dysphagia 2/NTL  - 1-1 feeding, 90 degree HOB  - Speech therapy following         Hypokalemia- (present on admission)  Assessment & Plan  Mild Hypokalemia on 3.3  K 40 mEp today  Labs on the 22nd  No need for daily labs now

## 2020-01-21 NOTE — PROGRESS NOTES
2 RN skin check completed with ADALBERTO Kumar     Slight rash to upper chest, barely noticeable.  Excoration to scrotum, lisa care completed, barrier cream as needed as well as scrotal floating.   Slight pinkness to sacrum, blanchable, lisa/skin care completed.     Otherwise intact, continue to monitor and turn q2.

## 2020-01-22 LAB
ALBUMIN SERPL BCP-MCNC: 3.8 G/DL (ref 3.2–4.9)
ALBUMIN/GLOB SERPL: 1.2 G/DL
ALP SERPL-CCNC: 87 U/L (ref 30–99)
ALT SERPL-CCNC: 122 U/L (ref 2–50)
ANION GAP SERPL CALC-SCNC: 9 MMOL/L (ref 0–11.9)
AST SERPL-CCNC: 38 U/L (ref 12–45)
BILIRUB SERPL-MCNC: 0.5 MG/DL (ref 0.1–1.5)
BUN SERPL-MCNC: 38 MG/DL (ref 8–22)
CALCIUM SERPL-MCNC: 10.2 MG/DL (ref 8.5–10.5)
CHLORIDE SERPL-SCNC: 94 MMOL/L (ref 96–112)
CO2 SERPL-SCNC: 31 MMOL/L (ref 20–33)
CREAT SERPL-MCNC: 1.46 MG/DL (ref 0.5–1.4)
GLOBULIN SER CALC-MCNC: 3.3 G/DL (ref 1.9–3.5)
GLUCOSE SERPL-MCNC: 100 MG/DL (ref 65–99)
POTASSIUM SERPL-SCNC: 3.5 MMOL/L (ref 3.6–5.5)
PROT SERPL-MCNC: 7.1 G/DL (ref 6–8.2)
SODIUM SERPL-SCNC: 134 MMOL/L (ref 135–145)

## 2020-01-22 PROCEDURE — 302128 INFUSION PUMP: Performed by: INTERNAL MEDICINE

## 2020-01-22 PROCEDURE — 99231 SBSQ HOSP IP/OBS SF/LOW 25: CPT | Mod: GC | Performed by: INTERNAL MEDICINE

## 2020-01-22 PROCEDURE — 36415 COLL VENOUS BLD VENIPUNCTURE: CPT

## 2020-01-22 PROCEDURE — 770006 HCHG ROOM/CARE - MED/SURG/GYN SEMI*

## 2020-01-22 PROCEDURE — A9270 NON-COVERED ITEM OR SERVICE: HCPCS | Performed by: STUDENT IN AN ORGANIZED HEALTH CARE EDUCATION/TRAINING PROGRAM

## 2020-01-22 PROCEDURE — 92526 ORAL FUNCTION THERAPY: CPT

## 2020-01-22 PROCEDURE — 700105 HCHG RX REV CODE 258: Performed by: STUDENT IN AN ORGANIZED HEALTH CARE EDUCATION/TRAINING PROGRAM

## 2020-01-22 PROCEDURE — 80053 COMPREHEN METABOLIC PANEL: CPT

## 2020-01-22 PROCEDURE — 700102 HCHG RX REV CODE 250 W/ 637 OVERRIDE(OP): Performed by: INTERNAL MEDICINE

## 2020-01-22 PROCEDURE — 700102 HCHG RX REV CODE 250 W/ 637 OVERRIDE(OP): Performed by: STUDENT IN AN ORGANIZED HEALTH CARE EDUCATION/TRAINING PROGRAM

## 2020-01-22 PROCEDURE — 700111 HCHG RX REV CODE 636 W/ 250 OVERRIDE (IP): Performed by: STUDENT IN AN ORGANIZED HEALTH CARE EDUCATION/TRAINING PROGRAM

## 2020-01-22 PROCEDURE — A9270 NON-COVERED ITEM OR SERVICE: HCPCS | Performed by: INTERNAL MEDICINE

## 2020-01-22 RX ORDER — POTASSIUM CHLORIDE 20 MEQ/1
40 TABLET, EXTENDED RELEASE ORAL ONCE
Status: COMPLETED | OUTPATIENT
Start: 2020-01-22 | End: 2020-01-22

## 2020-01-22 RX ORDER — SODIUM CHLORIDE 9 MG/ML
INJECTION, SOLUTION INTRAVENOUS CONTINUOUS
Status: DISCONTINUED | OUTPATIENT
Start: 2020-01-22 | End: 2020-01-24

## 2020-01-22 RX ADMIN — NYSTATIN 500000 UNITS: 100000 SUSPENSION ORAL at 14:07

## 2020-01-22 RX ADMIN — SENNOSIDES AND DOCUSATE SODIUM 2 TABLET: 8.6; 5 TABLET ORAL at 16:35

## 2020-01-22 RX ADMIN — OMEPRAZOLE 20 MG: 20 CAPSULE, DELAYED RELEASE ORAL at 06:10

## 2020-01-22 RX ADMIN — POTASSIUM CHLORIDE 40 MEQ: 1500 TABLET, EXTENDED RELEASE ORAL at 08:54

## 2020-01-22 RX ADMIN — SODIUM CHLORIDE: 9 INJECTION, SOLUTION INTRAVENOUS at 12:18

## 2020-01-22 RX ADMIN — ACETAMINOPHEN 650 MG: 325 TABLET, FILM COATED ORAL at 16:35

## 2020-01-22 RX ADMIN — TAMSULOSIN HYDROCHLORIDE 0.4 MG: 0.4 CAPSULE ORAL at 18:17

## 2020-01-22 RX ADMIN — CHLORTHALIDONE 50 MG: 50 TABLET ORAL at 06:10

## 2020-01-22 RX ADMIN — DOXAZOSIN 2 MG: 2 TABLET ORAL at 20:46

## 2020-01-22 RX ADMIN — SENNOSIDES AND DOCUSATE SODIUM 2 TABLET: 8.6; 5 TABLET ORAL at 06:11

## 2020-01-22 RX ADMIN — POLYETHYLENE GLYCOL 3350 1 PACKET: 17 POWDER, FOR SOLUTION ORAL at 06:23

## 2020-01-22 RX ADMIN — NYSTATIN 500000 UNITS: 100000 SUSPENSION ORAL at 20:46

## 2020-01-22 RX ADMIN — NYSTATIN 500000 UNITS: 100000 SUSPENSION ORAL at 16:35

## 2020-01-22 RX ADMIN — ENALAPRIL MALEATE 10 MG: 10 TABLET ORAL at 06:11

## 2020-01-22 RX ADMIN — ENOXAPARIN SODIUM 40 MG: 100 INJECTION SUBCUTANEOUS at 06:10

## 2020-01-22 RX ADMIN — ACETAMINOPHEN 650 MG: 325 TABLET, FILM COATED ORAL at 20:46

## 2020-01-22 RX ADMIN — AMLODIPINE BESYLATE 10 MG: 5 TABLET ORAL at 06:11

## 2020-01-22 RX ADMIN — NYSTATIN 500000 UNITS: 100000 SUSPENSION ORAL at 08:54

## 2020-01-22 RX ADMIN — MODAFINIL 100 MG: 100 TABLET ORAL at 06:11

## 2020-01-22 ASSESSMENT — ENCOUNTER SYMPTOMS
BRUISES/BLEEDS EASILY: 0
NECK PAIN: 0
DIAPHORESIS: 0
FOCAL WEAKNESS: 1
TINGLING: 0
SPUTUM PRODUCTION: 0
DOUBLE VISION: 0
CHILLS: 0
NAUSEA: 0
DIZZINESS: 0
SHORTNESS OF BREATH: 0
FEVER: 0
SORE THROAT: 0
BACK PAIN: 0
VOMITING: 0
PHOTOPHOBIA: 0
MYALGIAS: 0
HEADACHES: 0
PALPITATIONS: 0
HALLUCINATIONS: 0
HEARTBURN: 0
ABDOMINAL PAIN: 0
BLURRED VISION: 0
ORTHOPNEA: 0
HEMOPTYSIS: 0
COUGH: 0
DEPRESSION: 0

## 2020-01-22 NOTE — DISCHARGE PLANNING
Agency/Facility Name: [facility]  Spoke To: Blanca  Outcome: Patient accepted pending bed availability. Odilia(JOSEE) notified.

## 2020-01-22 NOTE — THERAPY
"Occupational Therapy Treatment completed with focus on ADLs, ADL transfers and upper extremity function.  Functional Status:  Pt seen for OT session. Demo'd increased tone in L fingers/wrist/elbow. Seated in chair. Educated on franny dressing; demo'd with min A. Txf BTB with mod A x2 req faciliation of LLE. Seated balance exercises, scap mobs, stretching, and WB exercises. Seated grooming with min A for balance. Req min A for balance initially, but fatigued quickly and req mod A; able to sit unsupported for 30 secs before fatigued and returned to supine with max A. Continues to be limited by decreased functional mobility, activity tolerance, sensation, strength, AROM, coordination, balance, and pain which are currently affecting pt's ability to complete ADLs/IADLs at baseline. Currently recommend acute OT, and Recommend post-acute placement for additional occupational therapy services prior to discharge home.    Plan of Care: Will benefit from Occupational Therapy 3 times per week  Discharge Recommendations:  Equipment Will Continue to Assess for Equipment Needs. Post-acute therapy: Recommend post-acute placement for additional occupational therapy services prior to discharge home.    See \"Rehab Therapy-Acute\" Patient Summary Report for complete documentation.   "

## 2020-01-22 NOTE — PROGRESS NOTES
Daily Progress Note:     Date of Service: 1/22/2020  Primary Team: [facility] Purple Team   Attending: Jigar Pemberton M.D.   Senior Resident: Dr. Blanco  Intern: Dr. Lozoya  Contact:  [phone]    Chief Complaint:   Intraparenchymal Hemorrhage of Right Basal Ganglia secondary to Hypertensive Emergency.       Subjective:  - No acute events overnight reported.  - Patient states feeling well, denies any symptoms.  - He is Tolerating well Dysphagia 2 Diet  - Patient's creatinine worsen, starting fluids, apparently patient not drinking enough fluids.  - Vital signs stable, afebrile, he is doing well with PT/OT, patient able to stand up with PT and move to tiera and stand with their assistance.  - Placement referrals sent since Monday, one facility came to assess him yesterday, still waiting for final saying, also another facility has not respond, pending to hear from them. Multiple other facilities have declined acceptance.  - He was started on flomax yesterday, void trial in the next 48 hrs, start bladder training.  - Patient alert and oriented times 3, progressing well, stable, just waiting for placement.    Consultants/Specialty:  Neurology  Intensive Care    Review of Systems:    Review of Systems   Constitutional: Negative for chills, diaphoresis, fever and malaise/fatigue.   HENT: Negative for congestion, ear discharge, hearing loss, nosebleeds, sore throat and tinnitus.    Eyes: Negative for blurred vision, double vision and photophobia.   Respiratory: Negative for cough, hemoptysis, sputum production and shortness of breath.    Cardiovascular: Negative for chest pain, palpitations and orthopnea.   Gastrointestinal: Negative for abdominal pain, heartburn, nausea and vomiting.   Genitourinary: Negative for dysuria, frequency, hematuria and urgency.   Musculoskeletal: Negative for back pain, myalgias and neck pain.   Skin: Negative for itching and rash.   Neurological: Positive for focal weakness. Negative  for dizziness, tingling and headaches.   Endo/Heme/Allergies: Does not bruise/bleed easily.   Psychiatric/Behavioral: Negative for depression, hallucinations and suicidal ideas.       Objective Data:   Physical Exam:   Vitals:   Temp:  [36.2 °C (97.1 °F)-37 °C (98.6 °F)] 36.2 °C (97.1 °F)  Pulse:  [63-97] 81  Resp:  [16-18] 18  BP: (107-130)/(78-98) 121/84  SpO2:  [90 %-96 %] 93 %      Physical Exam  Constitutional:       General: He is not in acute distress.     Appearance: He is not diaphoretic.   HENT:      Head: Normocephalic and atraumatic.      Nose: Nose normal. No congestion or rhinorrhea.      Mouth/Throat:      Mouth: Mucous membranes are dry.      Pharynx: No oropharyngeal exudate or posterior oropharyngeal erythema.   Eyes:      General: No scleral icterus.     Extraocular Movements: Extraocular movements intact.      Conjunctiva/sclera: Conjunctivae normal.      Pupils: Pupils are equal, round, and reactive to light.   Neck:      Musculoskeletal: Normal range of motion and neck supple. No neck rigidity or muscular tenderness.   Cardiovascular:      Rate and Rhythm: Normal rate and regular rhythm.      Pulses: Normal pulses.      Heart sounds: No murmur.   Pulmonary:      Effort: Pulmonary effort is normal. No respiratory distress.      Breath sounds: Normal breath sounds. No stridor. No wheezing, rhonchi or rales.   Abdominal:      General: Bowel sounds are normal. There is no distension.      Palpations: Abdomen is soft.      Tenderness: There is no tenderness. There is no guarding or rebound.   Musculoskeletal: Normal range of motion.         General: No swelling or tenderness.      Right lower leg: No edema.      Left lower leg: No edema.   Skin:     General: Skin is warm.      Capillary Refill: Capillary refill takes less than 2 seconds.      Coloration: Skin is not jaundiced or pale.      Findings: No erythema.   Neurological:      Mental Status: He is alert and oriented to person, place, and time.  Mental status is at baseline.      Comments: Left hemiparesis, leg mobility has shown some mobility.   Psychiatric:         Mood and Affect: Mood normal.         Thought Content: Thought content normal.         Judgment: Judgment normal.           Labs:   Review    Imaging:   Review    * Intraparenchymal hemorrhage of brain (HCC)- (present on admission)  Assessment & Plan  - Intracranial Hemorrhage sec to poorly controlled HTN  -HTN emergency  - H/O methamphetamine abuse  - Head CT showed a 2.7 cm acute right basal ganglia/thalamic hemorrhage, no indications for neurosurgery intervention  - Repeated CT scan on 1/5 showed stable hemorrhage, no new bleed.  - Per Neurology: recommend inpatient SBP control <160, outpatient <130, LDL <70, A1C <7%.  - EEG negative for seizure. Neuro signed off on 12/30.  - Unchanged Dysarthria and Hemiparesis.  - Patient has not improved in gaining mobility at all in his extremities since I have been in the service.  - Peg Tube placed on 1/13  - Peg clamped  - Eating Dysph I diet w/o complications  - On Dysphagia 2 diet  - Encourage fluid intake                   PLAN:  - Barium swallow eval passed  - Patient tolerating diet  - Advancing diet to Dysphagia 2/NTL  - Hold Feeding by Tube  - PT/OT/SLP  - Turn patient Q2 hrs to prevent new bed sores  - HTN med changes made, with better BP control.  - SNF referral placed prior to transfer from ICU; pending.  - Speech following, recommendation appreciated.  - Feeding 1-1, bed 90 degree  - CTM    Urinary retention  Assessment & Plan  - Failed 2 times void trials  - Started on Flomax 1/21        Plan:  - Continue Villasenor catheter   - Void trial 23/24?  - Bladder training      Altered mental status- (present on admission)  Assessment & Plan  -Resolved, patient does not get confused at all, sometimes he gets tired and does not want to get much interested with nursing personnel, but he has not been altered at all.  -Intraparenchymal hemorrhage.   -  Modafinil daily.  - Bowel protocol  - Improvement, he is alert and oriented most of the day.  - Dysarthria Improving  - Speech well understood   - Upgraded today 1/20 to Dysphagia 2/NTL diet.  - Peg tube placement on 1/13, not in use patient eating  - CTM  - No changes in mental status     Methamphetamine abuse (HCC)- (present on admission)  Assessment & Plan  Counseling, when appropriate  Consulted SW    JAYASHREE (obstructive sleep apnea)- (present on admission)  Assessment & Plan  - Not on CPAP at home  - Unable to use Bipap with mental status and stroke  - Continue O2 supplementation as needed  - CTM    Hypertensive emergency- (present on admission)  Assessment & Plan  -Presented with blood pressures of  190s  SBP; and 110-130s DBP  - Intraparenchymal hemorrhage Imaging  - CT Blood Pressure  - Assessing changes in BP regimen for better BP control  - Increasing Chlortalidone to 50 and Doxazosin to 2mg  - BP has been stable WNL SBP between 110's-120's  - Asymptomatic  - Normotensive  - Stable vitals      BEVERLY (acute kidney injury) ([facility])- (present on admission)  Assessment & Plan  - Morning Labs Creatinine 1.46  - Patient apparently not drinking enough         PLAN:    - IV Fluids  - CTM  - Encourage Fluid intake    Hyponatremia  Assessment & Plan  - Mild Hyponatremia  - Improving  - Today 1/22 sodium 134  - CTM  - No CNS symptoms    S/P percutaneous endoscopic gastrostomy (PEG) tube placement ([facility])  Assessment & Plan  - Placement on 1/13  - Stop peg tube feeling since patient tolerating oral intake  - Monitor for signs of Infection  - Speech following next Monday for evaluation to advance diet  - Do not remove Peg yet    Hypomagnesemia- (present on admission)  Assessment & Plan  CTM   Replete when needed       Acute respiratory failure with hypoxia and hypercapnia ([facility])- (present on admission)  Assessment & Plan  - Resolved.  - Patient was Intubated 12/26- 12/31/2019 (for aspiration and airway protection)  - Extubate  successfully 12/31/2019  - Was treated with Abxs for MSSA/ PNA (Ancef x5 days)  - PT/OT/SLP  - Aspiration precautions.  - Diet advanced to Dysphagia II /NTL  - Encourage more fluid intake  - 1-1 feeding, HOB at 90 degrees  - Oral care daily 2-3 times a day  - No need of Oxygen  - O2 sats WNL on RA  - CTM    Aspiration into airway- (present on admission)  Assessment & Plan  - PNA resolved  - MSSA pneumonia  - Patient Completed cefazolin 5 day course  - Aspiration precautions  - O2/RT   - Peg Tube placed on 1/13.  - Barium exam completed 1/15 patient getting fed now  - Patient tolerating Oral intake w/o aspirating  - Advancing his Diet to Dysphagia 2/NTL  - 1-1 feeding, 90 degree HOB  - Speech therapy following         Hypokalemia- (present on admission)  Assessment & Plan  Mild Hypokalemia on 3.5 as 1/22  K 40 mEp today  Monitor   Reassess and replace if low

## 2020-01-22 NOTE — CARE PLAN
Problem: Skin Integrity  Goal: Risk for impaired skin integrity will decrease  Outcome: PROGRESSING AS EXPECTED  Intervention: Implement precautions to protect skin integrity in collaboration with the interdisciplinary team  Note:   Q2 turns in place, noticeable improvement to skin during the past few shifts.      Problem: Psychosocial Needs:  Goal: Level of anxiety will decrease  Outcome: PROGRESSING AS EXPECTED  Intervention: Identify and develop with patient strategies to cope with anxiety triggers  Note:   Pt able to discuss feelings about transition to SNF and seems comfortable at this time.      Problem: Acute Care of the Stroke Patient  Goal: Optimal Outcome for the Stroke patient  Outcome: PROGRESSING AS EXPECTED     Problem: Risk of Aspiration  Goal: Absence of aspiration  Outcome: PROGRESSING AS EXPECTED

## 2020-01-22 NOTE — CARE PLAN
Problem: Pain Management  Goal: Pain level will decrease to patient's comfort goal  Outcome: PROGRESSING AS EXPECTED     Problem: Skin Integrity  Goal: Risk for impaired skin integrity will decrease  Outcome: PROGRESSING AS EXPECTED     Problem: Respiratory:  Goal: Respiratory status will improve  Outcome: PROGRESSING AS EXPECTED     Problem: Mobility  Goal: Risk for activity intolerance will decrease  Outcome: PROGRESSING AS EXPECTED

## 2020-01-22 NOTE — PROGRESS NOTES
2 RN skin check done with ADALBERTO Stephens.     Excoriation to thighs/scrotum appears much improved.   Slight excoriation to tip of penis, pt does not report discomfort.   Dry, flaky feet.   Sacrum/coccyx intact, no redness noted.   Skin otherwise intact.

## 2020-01-22 NOTE — DISCHARGE PLANNING
Anticipated Discharge Disposition: SNF    Action: LSW informed by Octavia VALENCIA that pt's PASRR in manual review.   [facility] referral sent and still pending referrals.     Barriers to Discharge: None    Plan: Await SNF acceptance, LSW to f/u with PASRR, LSW to assist as needed

## 2020-01-22 NOTE — PROGRESS NOTES
Assumed care of patient at 1900. A/O x4, reinforced call light use and fall risk. No needs at this time. Plan of care disccused, q2 turns for skin integrity and comfort, hourly rounding in place.     Pt in better spirits tonight after moving rooms, said he isn't sure how he feels about the rehab yet but seemed encouraged by the progress and nervous about leaving here.

## 2020-01-23 LAB
ALBUMIN SERPL BCP-MCNC: 3.6 G/DL (ref 3.2–4.9)
ALBUMIN/GLOB SERPL: 1 G/DL
ALP SERPL-CCNC: 75 U/L (ref 30–99)
ALT SERPL-CCNC: 95 U/L (ref 2–50)
ANION GAP SERPL CALC-SCNC: 9 MMOL/L (ref 0–11.9)
AST SERPL-CCNC: 28 U/L (ref 12–45)
BILIRUB SERPL-MCNC: 0.5 MG/DL (ref 0.1–1.5)
BUN SERPL-MCNC: 33 MG/DL (ref 8–22)
CALCIUM SERPL-MCNC: 9.4 MG/DL (ref 8.5–10.5)
CHLORIDE SERPL-SCNC: 98 MMOL/L (ref 96–112)
CO2 SERPL-SCNC: 28 MMOL/L (ref 20–33)
CREAT SERPL-MCNC: 1.14 MG/DL (ref 0.5–1.4)
GLOBULIN SER CALC-MCNC: 3.5 G/DL (ref 1.9–3.5)
GLUCOSE SERPL-MCNC: 95 MG/DL (ref 65–99)
POTASSIUM SERPL-SCNC: 3.6 MMOL/L (ref 3.6–5.5)
PROT SERPL-MCNC: 7.1 G/DL (ref 6–8.2)
SODIUM SERPL-SCNC: 135 MMOL/L (ref 135–145)

## 2020-01-23 PROCEDURE — A9270 NON-COVERED ITEM OR SERVICE: HCPCS | Performed by: INTERNAL MEDICINE

## 2020-01-23 PROCEDURE — 97535 SELF CARE MNGMENT TRAINING: CPT

## 2020-01-23 PROCEDURE — 770006 HCHG ROOM/CARE - MED/SURG/GYN SEMI*

## 2020-01-23 PROCEDURE — 36415 COLL VENOUS BLD VENIPUNCTURE: CPT

## 2020-01-23 PROCEDURE — 97140 MANUAL THERAPY 1/> REGIONS: CPT

## 2020-01-23 PROCEDURE — 700111 HCHG RX REV CODE 636 W/ 250 OVERRIDE (IP): Performed by: STUDENT IN AN ORGANIZED HEALTH CARE EDUCATION/TRAINING PROGRAM

## 2020-01-23 PROCEDURE — 97530 THERAPEUTIC ACTIVITIES: CPT | Mod: CQ

## 2020-01-23 PROCEDURE — 80053 COMPREHEN METABOLIC PANEL: CPT

## 2020-01-23 PROCEDURE — 97112 NEUROMUSCULAR REEDUCATION: CPT | Mod: CQ

## 2020-01-23 PROCEDURE — 99231 SBSQ HOSP IP/OBS SF/LOW 25: CPT | Mod: GC | Performed by: INTERNAL MEDICINE

## 2020-01-23 PROCEDURE — 700102 HCHG RX REV CODE 250 W/ 637 OVERRIDE(OP): Performed by: INTERNAL MEDICINE

## 2020-01-23 PROCEDURE — 97530 THERAPEUTIC ACTIVITIES: CPT

## 2020-01-23 RX ORDER — TAMSULOSIN HYDROCHLORIDE 0.4 MG/1
0.4 CAPSULE ORAL
Status: DISCONTINUED | OUTPATIENT
Start: 2020-01-23 | End: 2020-01-25

## 2020-01-23 RX ADMIN — NYSTATIN 500000 UNITS: 100000 SUSPENSION ORAL at 20:08

## 2020-01-23 RX ADMIN — CHLORTHALIDONE 50 MG: 50 TABLET ORAL at 05:17

## 2020-01-23 RX ADMIN — ENOXAPARIN SODIUM 40 MG: 100 INJECTION SUBCUTANEOUS at 05:17

## 2020-01-23 RX ADMIN — TAMSULOSIN HYDROCHLORIDE 0.4 MG: 0.4 CAPSULE ORAL at 17:53

## 2020-01-23 RX ADMIN — OXYCODONE HYDROCHLORIDE 10 MG: 5 TABLET ORAL at 02:10

## 2020-01-23 RX ADMIN — NYSTATIN 500000 UNITS: 100000 SUSPENSION ORAL at 11:24

## 2020-01-23 RX ADMIN — MODAFINIL 100 MG: 100 TABLET ORAL at 05:17

## 2020-01-23 RX ADMIN — DOXAZOSIN 2 MG: 2 TABLET ORAL at 20:08

## 2020-01-23 RX ADMIN — AMLODIPINE BESYLATE 10 MG: 5 TABLET ORAL at 05:17

## 2020-01-23 RX ADMIN — ENALAPRIL MALEATE 10 MG: 10 TABLET ORAL at 05:17

## 2020-01-23 RX ADMIN — SENNOSIDES AND DOCUSATE SODIUM 2 TABLET: 8.6; 5 TABLET ORAL at 05:17

## 2020-01-23 RX ADMIN — OXYCODONE HYDROCHLORIDE 5 MG: 5 TABLET ORAL at 07:07

## 2020-01-23 RX ADMIN — OXYCODONE HYDROCHLORIDE 5 MG: 5 TABLET ORAL at 17:53

## 2020-01-23 RX ADMIN — NYSTATIN 500000 UNITS: 100000 SUSPENSION ORAL at 14:41

## 2020-01-23 RX ADMIN — NYSTATIN 500000 UNITS: 100000 SUSPENSION ORAL at 17:53

## 2020-01-23 RX ADMIN — ACETAMINOPHEN 650 MG: 325 TABLET, FILM COATED ORAL at 05:27

## 2020-01-23 RX ADMIN — OMEPRAZOLE 20 MG: 20 CAPSULE, DELAYED RELEASE ORAL at 05:17

## 2020-01-23 ASSESSMENT — ENCOUNTER SYMPTOMS
DEPRESSION: 0
ORTHOPNEA: 0
PHOTOPHOBIA: 0
HEARTBURN: 0
HALLUCINATIONS: 0
SORE THROAT: 0
DOUBLE VISION: 0
CHILLS: 0
HEMOPTYSIS: 0
PALPITATIONS: 0
NAUSEA: 0
BLURRED VISION: 0
NECK PAIN: 0
ABDOMINAL PAIN: 0
SPUTUM PRODUCTION: 0
DIZZINESS: 0
HEADACHES: 0
TINGLING: 0
SHORTNESS OF BREATH: 0
DIAPHORESIS: 0
FOCAL WEAKNESS: 1
BRUISES/BLEEDS EASILY: 0
VOMITING: 0
COUGH: 0
FEVER: 0
BACK PAIN: 0
MYALGIAS: 0

## 2020-01-23 ASSESSMENT — COGNITIVE AND FUNCTIONAL STATUS - GENERAL
SUGGESTED CMS G CODE MODIFIER DAILY ACTIVITY: CL
TURNING FROM BACK TO SIDE WHILE IN FLAT BAD: UNABLE
SUGGESTED CMS G CODE MODIFIER MOBILITY: CM
MOVING TO AND FROM BED TO CHAIR: UNABLE
DRESSING REGULAR LOWER BODY CLOTHING: A LOT
EATING MEALS: A LOT
CLIMB 3 TO 5 STEPS WITH RAILING: TOTAL
HELP NEEDED FOR BATHING: A LOT
DAILY ACTIVITIY SCORE: 13
DRESSING REGULAR UPPER BODY CLOTHING: A LOT
STANDING UP FROM CHAIR USING ARMS: A LOT
WALKING IN HOSPITAL ROOM: A LOT
PERSONAL GROOMING: A LITTLE
TOILETING: A LOT
MOVING FROM LYING ON BACK TO SITTING ON SIDE OF FLAT BED: UNABLE
MOBILITY SCORE: 8

## 2020-01-23 ASSESSMENT — GAIT ASSESSMENTS: GAIT LEVEL OF ASSIST: UNABLE TO PARTICIPATE

## 2020-01-23 NOTE — THERAPY
"Speech Language Therapy dysphagia treatment completed.   Functional Status: Patient awake, alert, and pleasant during tx session. Patient currently on Dys2/NTL diet and per RN, patient appears to be tolerating diet without difficulty. Patient consumed PO trials of soft solids, mixed consistencies, NTL, and thins via cup sip. Patient consumed mixed consistencies and thins via cup sip with intermittent coughing, which is concerning for penetration/aspiration. Patient had no other overt s/sx of aspiration noted on any other textures. Patient was given handout on dysphagia exercises targeting laryngeal elevation, BoT retraction, and pharyngeal constriction and instructed on proper technique of each. Patient completed them with \"fair\" accuracy. At this time, recommend patient continue Dys2/NTL diet with intermittent supervision. RN aware. SLP is following.     Recommendations: At this time, recommend patient continue Dys2/NTL diet with intermittent supervision.  Plan of Care: Will benefit from Speech Therapy 5 times per week  Post-Acute Therapy: Recommend inpatient transitional care services for continued speech therapy services.      See \"Rehab Therapy-Acute\" Patient Summary Report for complete documentation.     "

## 2020-01-23 NOTE — PROGRESS NOTES
Assumed patient care at 0700 and received bedside report from RN. Patient alert and oriented times 4. Patient complains of numbness in Left upper/lower extremities, denies tingling. Patient denies chest pain, shortness of breath, blurry/double vision. Patient ambulating x2 with maile stedy. Patient continent of bowel, palmer in place for retention. Bladder training in process. Patient is tolerating diet. Plan of care discussed, education provided on all administered medications, hourly rounding, Q2 turns and Q4 neuro checks in place.

## 2020-01-23 NOTE — CARE PLAN
Problem: Communication  Goal: The ability to communicate needs accurately and effectively will improve  Outcome: PROGRESSING AS EXPECTED  Note:   Encouraged use of call light, assessed needs, encouraged pt to voice feelings.        Problem: Safety  Goal: Will remain free from falls  Outcome: PROGRESSING AS EXPECTED  Note:   Patient at risk for falls due to stroke, bed alarm on, walker out of sight, nonskid socks on, call light within reach, personal belongings within reach, toileting offered.        Problem: Venous Thromboembolism (VTW)/Deep Vein Thrombosis (DVT) Prevention:  Goal: Patient will participate in Venous Thrombosis (VTE)/Deep Vein Thrombosis (DVT)Prevention Measures  Outcome: PROGRESSING AS EXPECTED  Note:   Patient at risk for DVT due to stroke, pharmacological prophylaxis in use, refused SCD's, education provided, encouraged ROM exercises and ambulation and tolerated.

## 2020-01-23 NOTE — CARE PLAN
Problem: Safety  Goal: Free from accidental injury  Outcome: PROGRESSING AS EXPECTED  Fall precautions in place. Bed alarm on.      Problem: Knowledge Deficit  Goal: Patient/Significant other demonstrates understanding of disease process, treatment plan, medications and discharge instructions  Outcome: PROGRESSING AS EXPECTED   Reviewing plan of care, activities, and medication with patient.  Encouraging patient to ask questions and participate in plan of care.  Providing answers to all questions.  Continuing with current plan of care.  Hourly rounding in practice.

## 2020-01-23 NOTE — THERAPY
"Occupational Therapy Treatment completed   Functional Status:  Pt seen for OT tx today, making progress with set POC. Pt performed bed mobility with min a, LB dressing max a, donning/doffing hospital gown with mod to max a using franny-techniques, F balance sitting eob, STS eob with mod a, attempted wt-shifting requires max a to progress LE, eob->chair with mod a of 2 people. Noted LUE tone, educated pt on self ROM of LUE, attempted to s/u pt with resting hand splint but currently not available. Will trial next session as able. Pt will continue to benefit from acute skilled OT services while in house and post acute recommended prior to d/c home. Pt is highly motivated to participate in therapy.   Plan of Care: Will benefit from Occupational Therapy 4 times per week  Discharge Recommendations:  Equipment Will Continue to Assess for Equipment Needs. Post-acute therapy Recommend post-acute placement for additional occupational therapy services prior to discharge home. Patient can tolerate post-acute therapies at a 5x/week frequency.      See \"Rehab Therapy-Acute\" Patient Summary Report for complete documentation.   "

## 2020-01-23 NOTE — DISCHARGE PLANNING
Anticipated Discharge Disposition: SNF- [facility]    Action: Pt's PASRR still in manual review.   LSW to ensure required documents scanned into PASRR system.   LOC unable to be completed until PASRR complete.   Pt accepted to [facility]. Pending bed availability.      Barriers to Discharge: PASRR, LOC    Plan: LSW to provide requested documents to PASRR, LSW to ensure LOC completed, LSW to assist as needed

## 2020-01-23 NOTE — PROGRESS NOTES
2 rn skin check    Excoriation to thighs and scrotum, healing.   bilat feet dry, flaky. Skin otherwise intact.

## 2020-01-23 NOTE — THERAPY
"Physical Therapy Treatment completed.   Bed Mobility:  Supine to Sit: Moderate Assist  Transfers: Sit to Stand: Moderate Assist  Gait: Level Of Assist: Unable to Participate    Plan of Care: Will benefit from Physical Therapy 5 times per week  Discharge Recommendations: Equipment: Will Continue to Assess for Equipment Needs. Post-acute therapy Recommend post-acute placement for continued physical therapy services prior to discharge home. Patient can tolerate post-acute therapies at a 5x/week frequency.       See \"Rehab Therapy-Acute\" Patient Summary Report for complete documentation.     Pt progressing well w/ therapy. Pt able to assist more w/ mobility using his R side. Pt demonstrates improved midline control and was able to perform dynamic reaches and come back to midline. Pt demonstrating improved WB'ing through the L during standing activities. He was able to advance his L LE sideways after max cues to weight shift to the R. Pt was able to WB through the L LE w/out blocking when stepping to the side however didn't feel he could maintain w/ forward steps. Pt needing MaxA to transfer to the chair mainly d/t fatigue from therapy session. Pt is very motivated to participate. He continues to be at a level in which he will benefit from post acute therapy.  "

## 2020-01-24 PROCEDURE — 97530 THERAPEUTIC ACTIVITIES: CPT

## 2020-01-24 PROCEDURE — 770006 HCHG ROOM/CARE - MED/SURG/GYN SEMI*

## 2020-01-24 PROCEDURE — 97140 MANUAL THERAPY 1/> REGIONS: CPT

## 2020-01-24 PROCEDURE — 97760 ORTHOTIC MGMT&TRAING 1ST ENC: CPT

## 2020-01-24 PROCEDURE — 92526 ORAL FUNCTION THERAPY: CPT

## 2020-01-24 PROCEDURE — 700102 HCHG RX REV CODE 250 W/ 637 OVERRIDE(OP): Performed by: INTERNAL MEDICINE

## 2020-01-24 PROCEDURE — A9270 NON-COVERED ITEM OR SERVICE: HCPCS | Performed by: INTERNAL MEDICINE

## 2020-01-24 PROCEDURE — 700111 HCHG RX REV CODE 636 W/ 250 OVERRIDE (IP): Performed by: INTERNAL MEDICINE

## 2020-01-24 PROCEDURE — 97116 GAIT TRAINING THERAPY: CPT

## 2020-01-24 PROCEDURE — 99232 SBSQ HOSP IP/OBS MODERATE 35: CPT | Mod: GC | Performed by: INTERNAL MEDICINE

## 2020-01-24 PROCEDURE — 97112 NEUROMUSCULAR REEDUCATION: CPT

## 2020-01-24 PROCEDURE — 51798 US URINE CAPACITY MEASURE: CPT

## 2020-01-24 RX ADMIN — NYSTATIN 500000 UNITS: 100000 SUSPENSION ORAL at 14:13

## 2020-01-24 RX ADMIN — MODAFINIL 100 MG: 100 TABLET ORAL at 04:49

## 2020-01-24 RX ADMIN — ENALAPRIL MALEATE 10 MG: 10 TABLET ORAL at 04:49

## 2020-01-24 RX ADMIN — NYSTATIN 500000 UNITS: 100000 SUSPENSION ORAL at 17:37

## 2020-01-24 RX ADMIN — ENOXAPARIN SODIUM 40 MG: 100 INJECTION SUBCUTANEOUS at 04:50

## 2020-01-24 RX ADMIN — OXYCODONE HYDROCHLORIDE 10 MG: 5 TABLET ORAL at 02:16

## 2020-01-24 RX ADMIN — NYSTATIN 500000 UNITS: 100000 SUSPENSION ORAL at 20:49

## 2020-01-24 RX ADMIN — OMEPRAZOLE 20 MG: 20 CAPSULE, DELAYED RELEASE ORAL at 04:49

## 2020-01-24 RX ADMIN — CHLORTHALIDONE 50 MG: 50 TABLET ORAL at 04:49

## 2020-01-24 RX ADMIN — NYSTATIN 500000 UNITS: 100000 SUSPENSION ORAL at 08:45

## 2020-01-24 RX ADMIN — TAMSULOSIN HYDROCHLORIDE 0.4 MG: 0.4 CAPSULE ORAL at 17:39

## 2020-01-24 RX ADMIN — OXYCODONE HYDROCHLORIDE 10 MG: 5 TABLET ORAL at 16:00

## 2020-01-24 RX ADMIN — AMLODIPINE BESYLATE 10 MG: 5 TABLET ORAL at 04:49

## 2020-01-24 RX ADMIN — SENNOSIDES AND DOCUSATE SODIUM 2 TABLET: 8.6; 5 TABLET ORAL at 04:49

## 2020-01-24 RX ADMIN — DOXAZOSIN 2 MG: 2 TABLET ORAL at 20:50

## 2020-01-24 RX ADMIN — OXYCODONE HYDROCHLORIDE 10 MG: 5 TABLET ORAL at 20:49

## 2020-01-24 ASSESSMENT — ENCOUNTER SYMPTOMS
HEARTBURN: 0
SHORTNESS OF BREATH: 0
DIZZINESS: 0
WEIGHT LOSS: 0
STRIDOR: 0
CHILLS: 0
MYALGIAS: 0
DOUBLE VISION: 0
FOCAL WEAKNESS: 0
PHOTOPHOBIA: 0
NERVOUS/ANXIOUS: 0
SPEECH CHANGE: 0
BLURRED VISION: 0
FEVER: 0
DEPRESSION: 0
ORTHOPNEA: 0
DIAPHORESIS: 0
WEAKNESS: 0
TINGLING: 0
NAUSEA: 0
VOMITING: 0
NECK PAIN: 0
COUGH: 0
ABDOMINAL PAIN: 0
SPUTUM PRODUCTION: 0
HEADACHES: 0
PALPITATIONS: 0
BACK PAIN: 0
BRUISES/BLEEDS EASILY: 0
SORE THROAT: 0
HEMOPTYSIS: 0

## 2020-01-24 ASSESSMENT — COGNITIVE AND FUNCTIONAL STATUS - GENERAL
MOVING TO AND FROM BED TO CHAIR: UNABLE
WALKING IN HOSPITAL ROOM: A LOT
CLIMB 3 TO 5 STEPS WITH RAILING: TOTAL
SUGGESTED CMS G CODE MODIFIER MOBILITY: CM
MOVING FROM LYING ON BACK TO SITTING ON SIDE OF FLAT BED: A LOT
TURNING FROM BACK TO SIDE WHILE IN FLAT BAD: UNABLE
MOBILITY SCORE: 9
STANDING UP FROM CHAIR USING ARMS: A LOT

## 2020-01-24 ASSESSMENT — LIFESTYLE VARIABLES: SUBSTANCE_ABUSE: 0

## 2020-01-24 NOTE — DISCHARGE PLANNING
Received Transport Form @ 1100  Spoke to Tina @ [facility]    Transport is scheduled for 1/25 @ 1400 going to [facility]. [place]) and [place]([facility]) notified of transport time.

## 2020-01-24 NOTE — DISCHARGE PLANNING
Anticipated Discharge Disposition: SNF- [facility]    Action: LSW informed by Ernestina SWANN that [facility] does not have a bed available until Tuesday. However, [facility] can take pt tomorrow.  LSW met with pt and discussed [facility]. Pt very tearful and expressed that being in the hospital has been tough and is ready to move on to the next step.   Pt okay to [facility].   [facility]side [facility] stated that pt appropriate for [facility].   LSW faxed transport request form and [facility] form to Ernestina SWANN.   LSW Tiger Texted [facility] resident, Bella and pt medically cleared. LSW also requested signature for [facility].     Barriers to Discharge: None    Plan: Await transport time, LSW to assist as needed     Addendum 1407  LSW informed that pt transport setup for 1400 for 1/25 to [facility]. Pt aware of transport time. LSW tc pt's son, Beni ([phone]) to inform.   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

## 2020-01-24 NOTE — PROGRESS NOTES
Daily Progress Note:     Date of Service: 1/23/2020  Primary Team: [facility] Purple Team   Attending: Jigar Pemberton M.D.   Senior Resident: Dr. Blanco  Intern: Dr. Lozoya  Contact:  [phone]    Chief Complaint:   Intraparenchymal Hemorrhage of Right Basal Ganglia secondary to Hypertensive Emergency.       Subjective:  - No events overnight.  - still awaiting placement   - BUN still up, on IV fluid, will continue to monitor.   - consider voiding trail tomorrow       Consultants/Specialty:  Neurology  Intensive Care    Review of Systems:    Review of Systems   Constitutional: Negative for chills, diaphoresis, fever and malaise/fatigue.   HENT: Negative for congestion, ear discharge, hearing loss, nosebleeds, sore throat and tinnitus.    Eyes: Negative for blurred vision, double vision and photophobia.   Respiratory: Negative for cough, hemoptysis, sputum production and shortness of breath.    Cardiovascular: Negative for chest pain, palpitations and orthopnea.   Gastrointestinal: Negative for abdominal pain, heartburn, nausea and vomiting.   Genitourinary: Negative for dysuria, frequency, hematuria and urgency.   Musculoskeletal: Negative for back pain, myalgias and neck pain.   Skin: Negative for itching and rash.   Neurological: Positive for focal weakness. Negative for dizziness, tingling and headaches.   Endo/Heme/Allergies: Does not bruise/bleed easily.   Psychiatric/Behavioral: Negative for depression, hallucinations and suicidal ideas.       Objective Data:   Physical Exam:   Vitals:   Temp:  [36.4 °C (97.5 °F)-36.9 °C (98.4 °F)] 36.9 °C (98.4 °F)  Pulse:  [66-85] 77  Resp:  [16-18] 16  BP: (118-137)/(76-88) 137/76  SpO2:  [95 %-98 %] 97 %      Physical Exam  Constitutional:       General: He is not in acute distress.     Appearance: He is not diaphoretic.   HENT:      Head: Normocephalic and atraumatic.      Nose: Nose normal. No congestion or rhinorrhea.      Mouth/Throat:      Mouth: Mucous  membranes are dry.      Pharynx: No oropharyngeal exudate or posterior oropharyngeal erythema.   Eyes:      General: No scleral icterus.     Extraocular Movements: Extraocular movements intact.      Conjunctiva/sclera: Conjunctivae normal.      Pupils: Pupils are equal, round, and reactive to light.   Neck:      Musculoskeletal: Normal range of motion and neck supple. No neck rigidity or muscular tenderness.   Cardiovascular:      Rate and Rhythm: Normal rate and regular rhythm.      Pulses: Normal pulses.      Heart sounds: No murmur.   Pulmonary:      Effort: Pulmonary effort is normal. No respiratory distress.      Breath sounds: Normal breath sounds. No stridor. No wheezing, rhonchi or rales.   Abdominal:      General: Bowel sounds are normal. There is no distension.      Palpations: Abdomen is soft.      Tenderness: There is no tenderness. There is no guarding or rebound.   Musculoskeletal: Normal range of motion.         General: No swelling or tenderness.      Right lower leg: No edema.      Left lower leg: No edema.   Skin:     General: Skin is warm.      Capillary Refill: Capillary refill takes less than 2 seconds.      Coloration: Skin is not jaundiced or pale.      Findings: No erythema.   Neurological:      Mental Status: He is alert and oriented to person, place, and time. Mental status is at baseline.      Comments: Left hemiparesis, leg mobility has shown some mobility.   Psychiatric:         Mood and Affect: Mood normal.         Thought Content: Thought content normal.         Judgment: Judgment normal.           Labs:   BUN still up     Imaging:   No imaging ordered     * Intraparenchymal hemorrhage of brain (HCC)- (present on admission)  Assessment & Plan  - Intracranial Hemorrhage sec to poorly controlled HTN  -HTN emergency  - H/O methamphetamine abuse  - Head CT showed a 2.7 cm acute right basal ganglia/thalamic hemorrhage, no indications for neurosurgery intervention  - Repeated CT scan on 1/5  showed stable hemorrhage, no new bleed.  - Per Neurology: recommend inpatient SBP control <160, outpatient <130, LDL <70, A1C <7%.  - EEG negative for seizure. Neuro signed off on 12/30.  - Unchanged Dysarthria and Hemiparesis.  - Patient has not improved in gaining mobility at all in his extremities since I have been in the service.  - Peg Tube placed on 1/13  - Peg clamped  - Eating Dysph I diet w/o complications  - On Dysphagia 2 diet  - Encourage fluid intake                   PLAN:  - Barium swallow eval passed  - Patient tolerating diet  - Advancing diet to Dysphagia 2/NTL  - Hold Feeding by Tube  - PT/OT/SLP  - Turn patient Q2 hrs to prevent new bed sores  - HTN med changes made, with better BP control.  - SNF referral placed prior to transfer from ICU; pending.  - Speech following, recommendation appreciated.  - Feeding 1-1, bed 90 degree  - CT    Urinary retention  Assessment & Plan  - Failed 2 times void trials  - Started on Flomax 1/21        Plan:  - Continue Villasenor catheter   - Void trial 23/24?  - Bladder training      Altered mental status- (present on admission)  Assessment & Plan  -Resolved, patient does not get confused at all, sometimes he gets tired and does not want to get much interested with nursing personnel, but he has not been altered at all.  -Intraparenchymal hemorrhage.   - Modafinil daily.  - Bowel protocol  - Improvement, he is alert and oriented most of the day.  - Dysarthria Improving  - Speech well understood   - Upgraded today 1/20 to Dysphagia 2/NTL diet.  - Peg tube placement on 1/13, not in use patient eating  - CTM  - No changes in mental status     Methamphetamine abuse (HCC)- (present on admission)  Assessment & Plan  Counseling, when appropriate  Consulted SW    JAYASHREE (obstructive sleep apnea)- (present on admission)  Assessment & Plan  - Not on CPAP at home  - Unable to use Bipap with mental status and stroke  - Continue O2 supplementation as needed  - CTM    Hypertensive  emergency- (present on admission)  Assessment & Plan  -Presented with blood pressures of  190s  SBP; and 110-130s DBP  - Intraparenchymal hemorrhage Imaging  - CTM Blood Pressure  - Assessing changes in BP regimen for better BP control  - Increasing Chlortalidone to 50 and Doxazosin to 2mg  - BP has been stable WNL SBP between 110's-120's  - Asymptomatic  - Normotensive  - Stable vitals      BEVERLY (acute kidney injury) (HCC)- (present on admission)  Assessment & Plan  - Morning Labs Creatinine 1.46  - Patient apparently not drinking enough         PLAN:    - IV Fluids  - CTM  - Encourage Fluid intake    Hyponatremia  Assessment & Plan  - Mild Hyponatremia  - Improving  - Today 1/22 sodium 134  - CTM  - No CNS symptoms    S/P percutaneous endoscopic gastrostomy (PEG) tube placement (HCC)  Assessment & Plan  - Placement on 1/13  - Stop peg tube feeling since patient tolerating oral intake  - Monitor for signs of Infection  - Speech following next Monday for evaluation to advance diet  - Do not remove Peg yet    Hypomagnesemia- (present on admission)  Assessment & Plan  CTM   Replete when needed       Acute respiratory failure with hypoxia and hypercapnia (HCC)- (present on admission)  Assessment & Plan  - Resolved.  - Patient was Intubated 12/26- 12/31/2019 (for aspiration and airway protection)  - Extubate successfully 12/31/2019  - Was treated with Abxs for MSSA/ PNA (Ancef x5 days)  - PT/OT/SLP  - Aspiration precautions.  - Diet advanced to Dysphagia II /NTL  - Encourage more fluid intake  - 1-1 feeding, HOB at 90 degrees  - Oral care daily 2-3 times a day  - No need of Oxygen  - O2 sats WNL on RA  - CTM    Aspiration into airway- (present on admission)  Assessment & Plan  - PNA resolved  - MSSA pneumonia  - Patient Completed cefazolin 5 day course  - Aspiration precautions  - O2/RT   - Peg Tube placed on 1/13.  - Barium exam completed 1/15 patient getting fed now  - Patient tolerating Oral intake w/o aspirating  -  Advancing his Diet to Dysphagia 2/NTL  - 1-1 feeding, 90 degree HOB  - Speech therapy following         Hypokalemia- (present on admission)  Assessment & Plan  Mild Hypokalemia on 3.5 as 1/22  K 40 mEp today  Monitor   Reassess and replace if low

## 2020-01-24 NOTE — THERAPY
Skin check performed, noted some pressure points on elbow, no redness, good refill back on skin. Splint removed, will see pt tomorrow morning for adjustments of splint as able.

## 2020-01-24 NOTE — PROGRESS NOTES
Daily Progress Note:     Date of Service: 1/24/2020  Primary Team: [facility] Team   Attending: Jigar Pemberton M.D.   Senior Resident: Dr. Blanco  Intern: Dr. Lozoya  Contact:  [phone]    Chief Complaint:   Intraparenchymal Hemorrhage of Right Basal Ganglia secondary to Hypertensive Emergency.      Subjective:   - NO acute events overnight reported  - Patient States feeling well, denies any symptoms.  - Speech greatly improved, left leg mobility mild improve.  - Continues in Dysphagia II diet with Nectar Thick liquids  - Patient accepted at [facility], bed availability confirmed this morning, patient's bed available tomorrow 1/25,  we will get notified tomorrow for the time of transportation.  - Alert and oriented times 3, stable vital signs, afebrile, tolerating well oral intake.  - BEVERLY resolved. Stop IV fluids.  - We will DC Villasenor catheter for Void Trial.    Consultants/Specialty:  Neurology.  Intensive Care.    Review of Systems:   Review of Systems   Constitutional: Negative for chills, diaphoresis, fever and weight loss.   HENT: Negative for congestion, hearing loss, sore throat and tinnitus.    Eyes: Negative for blurred vision, double vision and photophobia.   Respiratory: Negative for cough, hemoptysis, sputum production, shortness of breath and stridor.    Cardiovascular: Negative for chest pain, palpitations and orthopnea.   Gastrointestinal: Negative for abdominal pain, heartburn, nausea and vomiting.   Genitourinary: Negative for dysuria, frequency, hematuria and urgency.   Musculoskeletal: Negative for back pain, joint pain, myalgias and neck pain.   Skin: Negative for itching and rash.   Neurological: Negative for dizziness, tingling, speech change, focal weakness, weakness and headaches.   Endo/Heme/Allergies: Does not bruise/bleed easily.   Psychiatric/Behavioral: Negative for depression, substance abuse and suicidal ideas. The patient is not nervous/anxious.        Objective Data:    Physical Exam:   Vitals:   Temp:  [35.8 °C (96.5 °F)-37.5 °C (99.5 °F)] 35.8 °C (96.5 °F)  Pulse:  [64-93] 77  Resp:  [12-16] 12  BP: (117-131)/(78-95) 131/83  SpO2:  [91 %-98 %] 98 %     Physical Exam  Constitutional:       General: He is not in acute distress.     Appearance: Normal appearance. He is normal weight. He is not diaphoretic.   HENT:      Head: Normocephalic and atraumatic.      Nose: Nose normal. No congestion or rhinorrhea.      Mouth/Throat:      Mouth: Mucous membranes are moist.      Pharynx: No oropharyngeal exudate or posterior oropharyngeal erythema.   Eyes:      General: No scleral icterus.     Extraocular Movements: Extraocular movements intact.      Conjunctiva/sclera: Conjunctivae normal.      Pupils: Pupils are equal, round, and reactive to light.   Neck:      Musculoskeletal: Normal range of motion and neck supple. No neck rigidity or muscular tenderness.   Cardiovascular:      Rate and Rhythm: Normal rate and regular rhythm.      Pulses: Normal pulses.      Heart sounds: No murmur.   Pulmonary:      Effort: Pulmonary effort is normal. No respiratory distress.      Breath sounds: Normal breath sounds. No stridor. No wheezing, rhonchi or rales.   Abdominal:      General: Bowel sounds are normal. There is no distension.      Palpations: Abdomen is soft. There is no mass.      Tenderness: There is no tenderness. There is no guarding or rebound.   Musculoskeletal: Normal range of motion.         General: No swelling or tenderness.      Right lower leg: No edema.      Left lower leg: No edema.   Skin:     General: Skin is warm.      Capillary Refill: Capillary refill takes less than 2 seconds.      Coloration: Skin is not jaundiced or pale.      Findings: No bruising or erythema.   Neurological:      General: No focal deficit present.      Mental Status: He is alert and oriented to person, place, and time. Mental status is at baseline.   Psychiatric:         Mood and Affect: Mood normal.          Thought Content: Thought content normal.         Judgment: Judgment normal.           Labs:   Review    Imaging:   Review    * Intraparenchymal hemorrhage of brain (HCC)- (present on admission)  Assessment & Plan  - Intracranial Hemorrhage sec to poorly controlled HTN  -HTN emergency  - H/O methamphetamine abuse  - Head CT showed a 2.7 cm acute right basal ganglia/thalamic hemorrhage, no indications for neurosurgery intervention  - Repeated CT scan on 1/5 showed stable hemorrhage, no new bleed.  - Per Neurology: recommend inpatient SBP control <160, outpatient <130, LDL <70, A1C <7%.  - EEG negative for seizure. Neuro signed off on 12/30.  - Unchanged Dysarthria and Hemiparesis.  - Patient has not improved in gaining mobility at all in his extremities since I have been in the service.  - Peg Tube placed on 1/13  - Peg clamped  - Eating Dysph I diet w/o complications  - On Dysphagia 2 diet  - Encourage fluid intake                   PLAN:  - Barium swallow eval passed  - Patient tolerating diet  - Advancing diet to Dysphagia 2/NTL  - Hold Feeding by Tube  - PT/OT/SLP  - Turn patient Q2 hrs to prevent new bed sores  - HTN med changes made, with better BP control.  - SNF referral placed prior to transfer from ICU; pending.  - Speech following, recommendation appreciated.  - Feeding 1-1, bed 90 degree  - NO straws  - CTM    Urinary retention  Assessment & Plan  - Failed 2 times void trials  - Started on Flomax 1/21  -We will DC Villasenor for Void Trial Today 1/24        Plan:  - Discontinue Villasenor for Void Trial  - Bladder scans every 4 hrs  - Offer patient urinal every few hours      Altered mental status- (present on admission)  Assessment & Plan  -Resolved, patient does not get confused at all, sometimes he gets tired and does not want to get much interested with nursing personnel, but he has not been altered at all.  -Intraparenchymal hemorrhage.   - Modafinil daily.  - Bowel protocol  - Improvement, he is alert and  oriented most of the day.  - Dysarthria Improving  - Speech well understood   - Upgraded today 1/20 to Dysphagia 2/NTL diet.  - Evaluate in near future to advanced diet  - Peg tube placement on 1/13, not in use patient eating  - CTM  - No changes in mental status     Methamphetamine abuse (HCC)- (present on admission)  Assessment & Plan  Counseling, when appropriate  Consulted SW    JAYASHREE (obstructive sleep apnea)- (present on admission)  Assessment & Plan  - Not on CPAP at home  - Unable to use Bipap with mental status and stroke  - Continue O2 supplementation as needed  - CTM    Hypertensive emergency- (present on admission)  Assessment & Plan  -Presented with blood pressures of  190s  SBP; and 110-130s DBP  - Intraparenchymal hemorrhage Imaging  - CTM Blood Pressure  - Assessing changes in BP regimen for better BP control  - Increasing Chlortalidone to 50 and Doxazosin to 2mg  - BP has been stable WNL SBP between 110's-120's  - Asymptomatic  - Normotensive  - Stable vital signs      BEVERLY (acute kidney injury) (HCC)- (present on admission)  Assessment & Plan  -Resolved      PLAN:    - IV Fluids as needed  - CTM  - Encourage Fluid intake    Hyponatremia  Assessment & Plan  -Resolved  - None routine labs    S/P percutaneous endoscopic gastrostomy (PEG) tube placement ([facility])  Assessment & Plan  - Placement on 1/13  - Stop peg tube feeling since patient tolerating oral intake  - Monitor for signs of Infection  - Speech following next Monday for evaluation to advance diet  - Do not remove Peg yet    Hypomagnesemia- (present on admission)  Assessment & Plan  CTM   Replete when needed       Acute respiratory failure with hypoxia and hypercapnia (HCC)- (present on admission)  Assessment & Plan  - Resolved.  - Patient was Intubated 12/26- 12/31/2019 (for aspiration and airway protection)  - Extubate successfully 12/31/2019  - Was treated with Abxs for MSSA/ PNA (Ancef x5 days)  - PT/OT/SLP  - Aspiration precautions.  - Diet  advanced to Dysphagia II /NTL  - Encourage more fluid intake between meals.  - 1-1 feeding, HOB at 90 degrees  - Oral care daily 2-3 times a day  - No need of Oxygen  - O2 sats WNL on RA  - CTM    Aspiration into airway- (present on admission)  Assessment & Plan  - PNA resolved  - MSSA pneumonia  - Patient Completed cefazolin 5 day course  - Aspiration precautions  - O2/RT   - Peg Tube placed on 1/13.  - Barium exam completed 1/15 patient getting fed now  - Patient tolerating Oral intake w/o aspirating  - Advancing his Diet to Dysphagia 2/NTL  - 1-1 feeding, 90 degree HOB  - Speech therapy following   - NO straws        Hypokalemia- (present on admission)  Assessment & Plan  - Resolved  - CTM

## 2020-01-24 NOTE — PROGRESS NOTES
Patient's Villasenor removed this AM. Patient incontinent times 2 today. Patient bladder scanned at 1315 for 207 ml. Call light within reach.

## 2020-01-24 NOTE — THERAPY
Pt seen for OT for placement and s/u of neutral hand splint of LUE for tone management during nighttime, initial fit looks okay, no pressure points identified. Pt able to maintain digits, wrist and elbow in extension. Pt aware to notify nursing should he has any discomfort or notice any redness. RN aware of OT placement of hand splint, OT plan to return in 2hrs to do skin check and provided OT contact information should there be any concerns prior to return.    Jolynn Bearden MS OTR/L, pager # [phone]

## 2020-01-24 NOTE — DISCHARGE PLANNING
Agency/Facility Name: [facility]  Spoke To: Katelyn  Outcome: No beds available today. Odilia(JOSEE) notified.

## 2020-01-24 NOTE — DISCHARGE PLANNING
Anticipated Discharge Disposition: [facility] [place]     Action: Documents requested for pt's PASRR's manual review uploaded. Pt's PASRR no longer in manual review.  LSW able to move forward with LOC.   [facility]- [id number]  LOC- [id number]     Barriers to Discharge: None    Plan: LSW to f/u with [facility] for bed availability at [place], LSW to assist as needed

## 2020-01-25 VITALS
HEIGHT: 72 IN | RESPIRATION RATE: 18 BRPM | WEIGHT: 225.31 LBS | TEMPERATURE: 98.2 F | SYSTOLIC BLOOD PRESSURE: 104 MMHG | DIASTOLIC BLOOD PRESSURE: 70 MMHG | OXYGEN SATURATION: 96 % | HEART RATE: 95 BPM | BODY MASS INDEX: 30.52 KG/M2

## 2020-01-25 PROCEDURE — 97530 THERAPEUTIC ACTIVITIES: CPT

## 2020-01-25 PROCEDURE — A9270 NON-COVERED ITEM OR SERVICE: HCPCS | Performed by: INTERNAL MEDICINE

## 2020-01-25 PROCEDURE — 700111 HCHG RX REV CODE 636 W/ 250 OVERRIDE (IP): Performed by: INTERNAL MEDICINE

## 2020-01-25 PROCEDURE — 97140 MANUAL THERAPY 1/> REGIONS: CPT

## 2020-01-25 PROCEDURE — 97763 ORTHC/PROSTC MGMT SBSQ ENC: CPT

## 2020-01-25 PROCEDURE — 99239 HOSP IP/OBS DSCHRG MGMT >30: CPT | Mod: GC | Performed by: INTERNAL MEDICINE

## 2020-01-25 PROCEDURE — 51798 US URINE CAPACITY MEASURE: CPT

## 2020-01-25 PROCEDURE — 700102 HCHG RX REV CODE 250 W/ 637 OVERRIDE(OP): Performed by: INTERNAL MEDICINE

## 2020-01-25 RX ORDER — DOXAZOSIN 2 MG/1
4 TABLET ORAL
Status: DISCONTINUED | OUTPATIENT
Start: 2020-01-25 | End: 2020-01-25 | Stop reason: HOSPADM

## 2020-01-25 RX ORDER — CHLORTHALIDONE 50 MG/1
50 TABLET ORAL DAILY
Qty: 30 TAB | Refills: 0 | Status: SHIPPED | OUTPATIENT
Start: 2020-01-26 | End: 2021-09-22

## 2020-01-25 RX ORDER — ONDANSETRON 4 MG/1
4 TABLET, ORALLY DISINTEGRATING ORAL EVERY 4 HOURS PRN
Qty: 10 TAB | Refills: 0 | Status: SHIPPED | OUTPATIENT
Start: 2020-01-25 | End: 2021-09-22

## 2020-01-25 RX ORDER — OMEPRAZOLE 20 MG/1
20 CAPSULE, DELAYED RELEASE ORAL DAILY
Qty: 30 CAP | Refills: 0 | Status: SHIPPED | OUTPATIENT
Start: 2020-01-26 | End: 2021-09-22 | Stop reason: SDUPTHER

## 2020-01-25 RX ORDER — ENALAPRIL MALEATE 10 MG/1
10 TABLET ORAL DAILY
Qty: 30 TAB | Refills: 0 | Status: SHIPPED | OUTPATIENT
Start: 2020-01-26 | End: 2021-09-22 | Stop reason: SDUPTHER

## 2020-01-25 RX ORDER — ACETAMINOPHEN 325 MG/1
650 TABLET ORAL EVERY 4 HOURS PRN
Qty: 30 TAB | Refills: 0 | Status: SHIPPED | OUTPATIENT
Start: 2020-01-25 | End: 2021-09-22

## 2020-01-25 RX ORDER — AMLODIPINE BESYLATE 10 MG/1
10 TABLET ORAL DAILY
Qty: 30 TAB | Refills: 0 | Status: SHIPPED | OUTPATIENT
Start: 2020-01-26 | End: 2021-09-22 | Stop reason: SDUPTHER

## 2020-01-25 RX ORDER — DOXAZOSIN MESYLATE 4 MG/1
4 TABLET ORAL
Qty: 30 TAB | Refills: 0 | Status: SHIPPED | OUTPATIENT
Start: 2020-01-25 | End: 2021-09-22 | Stop reason: SDUPTHER

## 2020-01-25 RX ORDER — MODAFINIL 100 MG/1
100 TABLET ORAL EVERY MORNING
Qty: 30 TAB | Refills: 0 | Status: SHIPPED | OUTPATIENT
Start: 2020-01-26 | End: 2020-02-25

## 2020-01-25 RX ADMIN — OXYCODONE HYDROCHLORIDE 10 MG: 5 TABLET ORAL at 03:04

## 2020-01-25 RX ADMIN — ENOXAPARIN SODIUM 40 MG: 100 INJECTION SUBCUTANEOUS at 06:24

## 2020-01-25 RX ADMIN — OXYCODONE HYDROCHLORIDE 5 MG: 5 TABLET ORAL at 09:05

## 2020-01-25 RX ADMIN — SENNOSIDES AND DOCUSATE SODIUM 2 TABLET: 8.6; 5 TABLET ORAL at 06:24

## 2020-01-25 RX ADMIN — CHLORTHALIDONE 50 MG: 50 TABLET ORAL at 07:19

## 2020-01-25 RX ADMIN — ENALAPRIL MALEATE 10 MG: 10 TABLET ORAL at 06:23

## 2020-01-25 RX ADMIN — OMEPRAZOLE 20 MG: 20 CAPSULE, DELAYED RELEASE ORAL at 06:24

## 2020-01-25 RX ADMIN — AMLODIPINE BESYLATE 10 MG: 5 TABLET ORAL at 06:24

## 2020-01-25 RX ADMIN — MODAFINIL 100 MG: 100 TABLET ORAL at 06:24

## 2020-01-25 NOTE — DISCHARGE INSTRUCTIONS
Discharge Instructions    Discharged to other by medical transportation with escort. Discharged via ambulance, hospital escort: Yes.  Special equipment needed: Not Applicable    Be sure to schedule a follow-up appointment with your primary care doctor or any specialists as instructed.     Discharge Plan:   Influenza Vaccine Indication: Indicated: 9 to 64 years of age  Influenza Vaccine Given - only chart on this line when given: Influenza Vaccine Given (See MAR)    I understand that a diet low in cholesterol, fat, and sodium is recommended for good health. Unless I have been given specific instructions below for another diet, I accept this instruction as my diet prescription.   Other diet: regular, dysphagia II nectar thick liquids. 1-1 supervision    Special Instructions: None    · Is patient discharged on Warfarin / Coumadin?   No     Depression / Suicide Risk    As you are discharged from this [facility] facility, it is important to learn how to keep safe from harming yourself.    Recognize the warning signs:  · Abrupt changes in personality, positive or negative- including increase in energy   · Giving away possessions  · Change in eating patterns- significant weight changes-  positive or negative  · Change in sleeping patterns- unable to sleep or sleeping all the time   · Unwillingness or inability to communicate  · Depression  · Unusual sadness, discouragement and loneliness  · Talk of wanting to die  · Neglect of personal appearance   · Rebelliousness- reckless behavior  · Withdrawal from people/activities they love  · Confusion- inability to concentrate     If you or a loved one observes any of these behaviors or has concerns about self-harm, here's what you can do:  · Talk about it- your feelings and reasons for harming yourself  · Remove any means that you might use to hurt yourself (examples: pills, rope, extension cords, firearm)  · Get professional help from the community (Mental Health, Substance  Abuse, psychological counseling)  · Do not be alone:Call your Safe Contact- someone whom you trust who will be there for you.  · Call your local CRISIS HOTLINE 130-2331 or 335-486-7921  · Call your local Children's Mobile Crisis Response Team [place] (412) 075-1117 or www.Janalakshmi  · Call the toll free National Suicide Prevention Hotlines   · National Suicide Prevention Lifeline 644-362-VEEX (0722)  · National Hope Line Network 800-SUICIDE (673-9051)

## 2020-01-25 NOTE — THERAPY
Pt skin assessed after 2 hours, skin intact, no pressure point or redness identified, tolerated it well. Splint was removed, educated pt on use of nighttime only for tone management, and written instructions provided for nursing staff as pt is possibly discharging this afternoon to SNF for rehab. Pt declined oob session in anticipation of t/f to SNF this afternoon. Will f/u with pt on Monday, should he stay in acute setting.

## 2020-01-25 NOTE — THERAPY
"Speech Language Therapy dysphagia treatment completed.   Functional Status: Patient awake, alert, and requested to continue to lay flat during tx session d/t significant back pain; RN made aware. D/t patient's limited positioning from pain, no PO trials were tested today. Per RN and patient, patient appears to be tolerating diet without difficulty. Patient reported he is completing his dysphagia exercises \"sometimes\". Patient completed 10-15 reps of Falsetto, CTAR, Effortful swallow, and Mere with \"fair\" accuracy, likely d/t weakness and fatigue. Patient had questions about proper completion of exercises and all questions were answered. Patient was educated on importance of exercises, frequency, and effect of exercises on oropharyngeal swallow function. At this time, recommend patient continue Dys2/NTL diet with intermittent supervision. RN and CNA aware. SLP is following.     Recommendations:  At this time, recommend patient continue Dys2/NTL diet with intermittent supervision. Float meds in puree. No straws.   Plan of Care: Will benefit from Speech Therapy 5 times per week  Post-Acute Therapy: Recommend inpatient transitional care services for continued speech therapy services.      See \"Rehab Therapy-Acute\" Patient Summary Report for complete documentation.     "

## 2020-01-25 NOTE — DISCHARGE PLANNING
Anticipated Discharge Disposition: [facility]    Action: Cobra signed by pt, packet complete and in pt chart. Primary RN aware and given phone number for report.     Barriers to Discharge: none    Plan: DC at 1400     Length of Stay: 32

## 2020-01-25 NOTE — THERAPY
Pt seen for f/u brace adjustment, pt pressure points identified from yesterday trial adjusted and mole skin added for extra padding, pt was able to show back demonstration of removing and adjusting velcro straps. Will return back around ~12 to assess for pt's tolerance to brace and skin integrity. RN aware of application of splint and to assess pt's skin integrity in the meantime.

## 2020-01-25 NOTE — DISCHARGE SUMMARY
Discharge Summary    Date of Admission: 12/24/2019  Date of Discharge: No discharge date for patient encounter.  Discharging Attending: Jigar Pemberton M.D.   Discharging Senior Resident: Dr. Blanco  Discharging Intern: Dr. Lozoya    CHIEF COMPLAINT ON ADMISSION  Chief Complaint   Patient presents with   • Possible Stroke     LKW 1600. noticed loss of sensation to LT side at that ttime then had a sudden onset of LUE & LLE weakness, slurred speech, LT sided facial droop 1745. BS: 89.        Reason for Admission  Intraparenchymal Hemorrhage of Right Basal Ganglia secondary to Hypertensive Emergency.     Admission Date  12/24/2019    CODE STATUS  Full Code    HPI & HOSPITAL COURSE  Mr Loyola  is a 49 y.o. male, presented to the ED on 12/24/2019 for Hypertensive Emergency, patient complained of sudden onset of LUE and LLE, facial droop and slurred speech. Patient was given Labetalol once and put in Cardene drip.  CT imaging showed new right basal ganglia ICH (2.7 cm) Likely HTN. Neurology assessed and treat patient, recommended MAP .  Repeat CT head this following morning without change in ICH size approx. 10ml with a small amount of right lateral ventricle IVH. Mr Loyola had significant dysarthria, it was very difficult to understand when communicating with medical team, he also had left sided hemiparesis, unable to perform any movement of his extremities.  Patient was intubated on 12/27/2019 due to Respiratory distress.  EEG  On 12/30/2019negative for seizure activity.  Extubated successfully on 12/31/2019.  Patient developed aspiration PNA with successful treatment finished on 12/31/2019.  Mr Loyola was fed via Cortrack, he failed multiple swallow evaluations.  Cortrack came out accidentally on 1/12/2020, Peg tube order was placed afterwards to continue nutrition.  Mr Loyola developed urine retention, failed 2 void trials in different weeks, but able to void after removing catheter on 1/24/2020.  Peg Tube  successfully placed on 1/13/2020.  Mr Loyola underwent barium eval on 1/15/2020, per Speech Therapist Mr Loyola was placed in Dysphagia I diet with thick liquids, patient did very well then advanced to Dysphagia 2 diet few days after, patient currently eating soft regular diet w/o chocking.  Patient's vital signs have being stable for the past several weeks, afebrile, he has showed remarkable improvement in his speech and has been able to recovered mild mobility in his left lower extremity. Patient is stable to be discharge to lower level of care, focusing in physical rehabilitation. Mr Loyola still has his Peg tube in place, we recommend to be removed after a month. Medical treatment has been optimized for the past several weeks and we feel comfortable discharging him from the hospital since he has been improving well, has not showed any concerning signs or symptoms. Patient will benefit of frequent PT/OT, speech therapy for further improvements in his mobility and quality of life.        Therefore, he is discharged in good and stable condition to skilled nursing facility.    The patient met 2-midnight criteria for an inpatient stay at the time of discharge.    Discharge Date  1/25/2020    FOLLOW UP ITEMS POST DISCHARGE  PT/OT/Speech therapy      Day Of Discharge:  - No acute events overnight  - Patient alert and oriented times 3  - Denies any symptoms. No fever, chills, sweats, SOB, palpitations or chest pain.  - Patient tolerating well oral intake.  - Was able to urinate after dc palmer catheter on 1/24  - Vital signs stable, afebrile.  - Stable to be discharge to SNF     Physical Exam  Constitutional:       General: He is not in acute distress.     Appearance: Normal appearance. He is normal weight. He is not diaphoretic.   HENT:      Head: Normocephalic and atraumatic.      Nose: Nose normal. No congestion or rhinorrhea.      Mouth/Throat:      Mouth: Mucous membranes are moist.      Pharynx: No oropharyngeal  exudate or posterior oropharyngeal erythema.   Eyes:      General: No scleral icterus.     Extraocular Movements: Extraocular movements intact.      Conjunctiva/sclera: Conjunctivae normal.      Pupils: Pupils are equal, round, and reactive to light.   Neck:      Musculoskeletal: Normal range of motion and neck supple. No neck rigidity or muscular tenderness.   Cardiovascular:      Rate and Rhythm: Normal rate and regular rhythm.      Pulses: Normal pulses.      Heart sounds: No murmur.   Pulmonary:      Effort: Pulmonary effort is normal. No respiratory distress.      Breath sounds: Normal breath sounds. No stridor. No wheezing, rhonchi or rales.   Abdominal:      General: Bowel sounds are normal. There is no distension.      Palpations: Abdomen is soft. There is no mass.      Tenderness: There is no tenderness. There is no guarding or rebound.   Musculoskeletal: Normal range of motion.         General: No swelling or tenderness.      Right lower leg: No edema.      Left lower leg: No edema.   Skin:     General: Skin is warm.      Capillary Refill: Capillary refill takes less than 2 seconds.      Coloration: Skin is not jaundiced or pale.      Findings: No bruising or erythema.   Neurological:      General: No focal deficit present.      Mental Status: He is alert and oriented to person, place, and time. Mental status is at baseline.   Psychiatric:         Mood and Affect: Mood normal.         Thought Content: Thought content normal.         Judgment: Judgment normal.          DISCHARGE DIAGNOSES  Principal Problem:    Intraparenchymal hemorrhage of brain ([facility]) POA: Yes  Active Problems:    BEVERLY (acute kidney injury) ([facility]) POA: Yes    Hypertensive emergency POA: Yes    JAYASHREE (obstructive sleep apnea) POA: Yes    Methamphetamine abuse ([facility]) POA: Yes    Altered mental status POA: Yes    Urinary retention POA: No    Hypokalemia POA: Yes    Aspiration into airway POA: Yes    Acute respiratory failure with hypoxia and  hypercapnia (HCC) POA: Yes    Hypomagnesemia POA: Yes    S/P percutaneous endoscopic gastrostomy (PEG) tube placement (HCC) POA: Unknown    Hyponatremia POA: Unknown  Resolved Problems:    Febrile POA: Unknown      FOLLOW UP  No future appointments.  [facility]  [address] [facility] [address]  [phone]          MEDICATIONS ON DISCHARGE     Medication List      You have not been prescribed any medications.         Allergies  No Known Allergies    DIET  Orders Placed This Encounter   Procedures   • Diet Order Regular (Float pills in puree please; no straws )     Standing Status:   Standing     Number of Occurrences:   1     Order Specific Question:   Diet:     Answer:   Regular [1]     Comments:   Float pills in puree please; no straws      Order Specific Question:   Texture/Fiber modifications:     Answer:   Dysphagia 2(Pureed/Chopped)specify fluid consistency(question 6) [2]     Order Specific Question:   Consistency/Fluid modifications:     Answer:   Nectar Thick [2]     Order Specific Question:   Miscellaneous modifications:     Answer:   SLP - 1:1 Supervision by Nursing [21]     Order Specific Question:   Miscellaneous modifications:     Answer:   SLP - Deliver to Nursing Station [22]          * Intraparenchymal hemorrhage of brain (HCC)- (present on admission)  Assessment & Plan  - Intracranial Hemorrhage sec to poorly controlled HTN  -HTN emergency  - H/O methamphetamine abuse  - Head CT showed a 2.7 cm acute right basal ganglia/thalamic hemorrhage, no indications for neurosurgery intervention  - Repeated CT scan on 1/5 showed stable hemorrhage, no new bleed.  - Per Neurology: recommend inpatient SBP control <160, outpatient <130, LDL <70, A1C <7%.  - EEG negative for seizure. Neuro signed off on 12/30.  - Unchanged Dysarthria and Hemiparesis.  - Patient has not improved in gaining mobility at all in his extremities since I have been in the service.  - Peg Tube placed on 1/13  -  Peg clamped,  - On Dysphagia 2 diet  - Encourage fluid intake                    PLAN:  - Patient tolerating diet  - Continue soft diet  - Remove feeding tube in a month  - PT/OT/SLP  - Turn patient Q2 hrs to prevent new bed sores  - HTN med changes made, with better BP control.  - SNF referral placed prior to transfer from ICU; pending.  - Speech following, recommendation appreciated.  - Feeding 1-1, bed 90 degree  - NO straws  - DC SNF     Urinary retention  Assessment & Plan  =- RESOLVED  - Failed 2 times void trials  - Started on Flomax 1/21  - Able to void after dc palmer on 1/24         Plan:  -CTM  - Scan bladder if no urine in more than 6 hrs in the day     Altered mental status- (present on admission)  Assessment & Plan  -Resolved, patient does not get confused at all, sometimes he gets tired and does not want to get much interested with nursing personnel, but he has not been altered at all.  -Intraparenchymal hemorrhage.   - Modafinil daily.  - Bowel protocol  - Improvement, he is alert and oriented most of the day.  - Dysarthria Improving  - Speech well understood   - Upgraded today 1/20 to Dysphagia 2/NTL diet.  - Evaluate in near future to advanced diet  - Peg tube placement on 1/13, not in use patient eating  - CTM  - No changes in mental status      Methamphetamine abuse (HCC)- (present on admission)  Assessment & Plan  Counseling, when appropriate  Consulted SW     JAYASHREE (obstructive sleep apnea)- (present on admission)  Assessment & Plan  - Not on CPAP at home  - Unable to use Bipap with mental status and stroke  - Continue O2 supplementation as needed  - CT     Hypertensive emergency- (present on admission)  Assessment & Plan  -Presented with blood pressures of  190s  SBP; and 110-130s DBP  - Intraparenchymal hemorrhage Imaging  - CTM Blood Pressure  - Assessing changes in BP regimen for better BP control  - Increasing Chlortalidone to 50 and Doxazosin to 2mg  - BP has been stable WNL SBP between  110's-120's  - Asymptomatic  - Normotensive  - Stable vital signs        BEVERLY (acute kidney injury) (HCC)- (present on admission)  Assessment & Plan  -Resolved      PLAN:     - CTM  - Encourage Fluid intake between meals     Hyponatremia  Assessment & Plan  -Resolved  - None routine labs     S/P percutaneous endoscopic gastrostomy (PEG) tube placement (HCC)  Assessment & Plan  - Placement on 1/13  - Stop peg tube feeling since patient tolerating oral intake  - Monitor for signs of Infection  - Speech following next Monday for evaluation to advance diet  - Remove PEG in a month      Hypomagnesemia- (present on admission)  Assessment & Plan  CTM   Replete when needed         Acute respiratory failure with hypoxia and hypercapnia (HCC)- (present on admission)  Assessment & Plan  - Resolved.  - Patient was Intubated 12/26- 12/31/2019 (for aspiration and airway protection)  - Extubate successfully 12/31/2019  - Was treated with Abxs for MSSA/ PNA (Ancef x5 days)  - PT/OT/SLP  - Aspiration precautions.  - Diet advanced to Dysphagia II /NTL  - Encourage more fluid intake between meals.  - 1-1 feeding, HOB at 90 degrees  - Oral care daily 2-3 times a day  - No need of Oxygen  - O2 sats WNL on RA  - CTM     Aspiration into airway- (present on admission)  Assessment & Plan  - PNA resolved  - MSSA pneumonia  - Patient Completed cefazolin 5 day course  - Aspiration precautions  - O2/RT   - Peg Tube placed on 1/13.  - Barium exam completed 1/15 patient getting fed now  - Patient tolerating Oral intake w/o aspirating  - Advancing his Diet to Dysphagia 2/NTL  - 1-1 feeding, 90 degree HOB  - Speech therapy following   - NO straws           Hypokalemia- (present on admission)  Assessment & Plan  - Resolved  - CTM    ACTIVITY  As tolerated and directed by rehab.  Weight bearing as tolerated    CONSULTATIONS  Dr. Graff with Neurology Service consulted.  Treatment options were discussed and plan of care agreed  upon.    PROCEDURES  Central line

## 2020-01-25 NOTE — PROGRESS NOTES
Patient discharged to [facility]. Patient left floor via gurney. Discharge paperwork given to transporters. Questions answered. IV out.

## 2020-01-25 NOTE — THERAPY
"Physical Therapy Treatment completed.   Bed Mobility:  Supine to Sit: Moderate Assist  Transfers: Sit to Stand: Minimal Assist(by pulling up with Stedy)  Gait: Level Of Assist: (pre-gait)       Plan of Care: Will benefit from Physical Therapy 5 times per week  Discharge Recommendations: Equipment: Will Continue to Assess for Equipment Needs. Post-acute therapy Discharge to a transitional care facility for continued skilled therapy services.    Pt with improved static standing ability and today was able to initiate sit to stand with min A and R UE pulling on Stedy handle. Pt able to maintain upright posture with release of R hand at [facility] from PT for safety. He initiated stepping with R LE with PT facilitating L quads and gluts for upright posture. To step L LE, pt requires manual weight shift R and assist for advancement of L LE. While here, PT will follow to address gait deviations, balance impairments, and activity intolerance. Recommend placement.      See \"Rehab Therapy-Acute\" Patient Summary Report for complete documentation.       "

## 2020-03-25 ENCOUNTER — APPOINTMENT (OUTPATIENT)
Dept: RADIOLOGY | Facility: MEDICAL CENTER | Age: 50
End: 2020-03-25
Attending: EMERGENCY MEDICINE
Payer: MEDICAID

## 2020-03-25 ENCOUNTER — HOSPITAL ENCOUNTER (OUTPATIENT)
Facility: MEDICAL CENTER | Age: 50
End: 2020-03-25
Attending: EMERGENCY MEDICINE | Admitting: HOSPITALIST
Payer: MEDICAID

## 2020-03-25 ENCOUNTER — APPOINTMENT (OUTPATIENT)
Dept: RADIOLOGY | Facility: MEDICAL CENTER | Age: 50
End: 2020-03-25
Attending: PSYCHIATRY & NEUROLOGY
Payer: MEDICAID

## 2020-03-25 VITALS
DIASTOLIC BLOOD PRESSURE: 85 MMHG | SYSTOLIC BLOOD PRESSURE: 120 MMHG | HEIGHT: 72 IN | TEMPERATURE: 98.7 F | OXYGEN SATURATION: 92 % | WEIGHT: 182.1 LBS | RESPIRATION RATE: 20 BRPM | HEART RATE: 85 BPM | BODY MASS INDEX: 24.66 KG/M2

## 2020-03-25 DIAGNOSIS — R20.0 NUMBNESS AND TINGLING: ICD-10-CM

## 2020-03-25 DIAGNOSIS — R20.2 NUMBNESS AND TINGLING: ICD-10-CM

## 2020-03-25 DIAGNOSIS — R53.1 LEFT-SIDED WEAKNESS: ICD-10-CM

## 2020-03-25 PROBLEM — Z86.79 HISTORY OF INTRACRANIAL HEMORRHAGE: Status: ACTIVE | Noted: 2020-03-25

## 2020-03-25 LAB
ABO GROUP BLD: NORMAL
ALBUMIN SERPL BCP-MCNC: 4.3 G/DL (ref 3.2–4.9)
ALBUMIN/GLOB SERPL: 1.2 G/DL
ALP SERPL-CCNC: 83 U/L (ref 30–99)
ALT SERPL-CCNC: 15 U/L (ref 2–50)
AMPHET UR QL SCN: NEGATIVE
ANION GAP SERPL CALC-SCNC: 12 MMOL/L (ref 7–16)
APPEARANCE UR: CLEAR
APTT PPP: 29.2 SEC (ref 24.7–36)
AST SERPL-CCNC: 9 U/L (ref 12–45)
BACTERIA #/AREA URNS HPF: ABNORMAL /HPF
BARBITURATES UR QL SCN: NEGATIVE
BASOPHILS # BLD AUTO: 0.6 % (ref 0–1.8)
BASOPHILS # BLD: 0.05 K/UL (ref 0–0.12)
BENZODIAZ UR QL SCN: NEGATIVE
BILIRUB SERPL-MCNC: 0.4 MG/DL (ref 0.1–1.5)
BILIRUB UR QL STRIP.AUTO: NEGATIVE
BLD GP AB SCN SERPL QL: NORMAL
BUN SERPL-MCNC: 22 MG/DL (ref 8–22)
BZE UR QL SCN: NEGATIVE
CALCIUM SERPL-MCNC: 9.8 MG/DL (ref 8.5–10.5)
CANNABINOIDS UR QL SCN: NEGATIVE
CHLORIDE SERPL-SCNC: 98 MMOL/L (ref 96–112)
CO2 SERPL-SCNC: 28 MMOL/L (ref 20–33)
COLOR UR: YELLOW
CREAT SERPL-MCNC: 1.06 MG/DL (ref 0.5–1.4)
EKG IMPRESSION: NORMAL
EOSINOPHIL # BLD AUTO: 0.16 K/UL (ref 0–0.51)
EOSINOPHIL NFR BLD: 1.8 % (ref 0–6.9)
EPI CELLS #/AREA URNS HPF: ABNORMAL /HPF
ERYTHROCYTE [DISTWIDTH] IN BLOOD BY AUTOMATED COUNT: 39.8 FL (ref 35.9–50)
EST. AVERAGE GLUCOSE BLD GHB EST-MCNC: 120 MG/DL
GLOBULIN SER CALC-MCNC: 3.6 G/DL (ref 1.9–3.5)
GLUCOSE SERPL-MCNC: 101 MG/DL (ref 65–99)
GLUCOSE UR STRIP.AUTO-MCNC: NEGATIVE MG/DL
HBA1C MFR BLD: 5.8 % (ref 0–5.6)
HCT VFR BLD AUTO: 46.2 % (ref 42–52)
HGB BLD-MCNC: 16 G/DL (ref 14–18)
HYALINE CASTS #/AREA URNS LPF: ABNORMAL /LPF
IMM GRANULOCYTES # BLD AUTO: 0.05 K/UL (ref 0–0.11)
IMM GRANULOCYTES NFR BLD AUTO: 0.6 % (ref 0–0.9)
INR PPP: 0.99 (ref 0.87–1.13)
KETONES UR STRIP.AUTO-MCNC: NEGATIVE MG/DL
LEUKOCYTE ESTERASE UR QL STRIP.AUTO: ABNORMAL
LYMPHOCYTES # BLD AUTO: 3.43 K/UL (ref 1–4.8)
LYMPHOCYTES NFR BLD: 39.6 % (ref 22–41)
MCH RBC QN AUTO: 29.1 PG (ref 27–33)
MCHC RBC AUTO-ENTMCNC: 34.6 G/DL (ref 33.7–35.3)
MCV RBC AUTO: 84 FL (ref 81.4–97.8)
METHADONE UR QL SCN: NEGATIVE
MICRO URNS: ABNORMAL
MONOCYTES # BLD AUTO: 0.62 K/UL (ref 0–0.85)
MONOCYTES NFR BLD AUTO: 7.2 % (ref 0–13.4)
MUCOUS THREADS #/AREA URNS HPF: ABNORMAL /HPF
NEUTROPHILS # BLD AUTO: 4.35 K/UL (ref 1.82–7.42)
NEUTROPHILS NFR BLD: 50.2 % (ref 44–72)
NITRITE UR QL STRIP.AUTO: POSITIVE
NRBC # BLD AUTO: 0 K/UL
NRBC BLD-RTO: 0 /100 WBC
OPIATES UR QL SCN: NEGATIVE
OXYCODONE UR QL SCN: NEGATIVE
PCP UR QL SCN: NEGATIVE
PH UR STRIP.AUTO: 5 [PH] (ref 5–8)
PLATELET # BLD AUTO: 262 K/UL (ref 164–446)
PMV BLD AUTO: 8.5 FL (ref 9–12.9)
POTASSIUM SERPL-SCNC: 3.5 MMOL/L (ref 3.6–5.5)
PROPOXYPH UR QL SCN: NEGATIVE
PROT SERPL-MCNC: 7.9 G/DL (ref 6–8.2)
PROT UR QL STRIP: NEGATIVE MG/DL
PROTHROMBIN TIME: 13.2 SEC (ref 12–14.6)
RBC # BLD AUTO: 5.5 M/UL (ref 4.7–6.1)
RBC # URNS HPF: ABNORMAL /HPF
RBC UR QL AUTO: NEGATIVE
RH BLD: NORMAL
SODIUM SERPL-SCNC: 138 MMOL/L (ref 135–145)
SP GR UR REFRACTOMETRY: >1.045
TROPONIN T SERPL-MCNC: 16 NG/L (ref 6–19)
UROBILINOGEN UR STRIP.AUTO-MCNC: 1 MG/DL
WBC # BLD AUTO: 8.7 K/UL (ref 4.8–10.8)
WBC #/AREA URNS HPF: ABNORMAL /HPF

## 2020-03-25 PROCEDURE — 87186 SC STD MICRODIL/AGAR DIL: CPT

## 2020-03-25 PROCEDURE — 95816 EEG AWAKE AND DROWSY: CPT | Performed by: PSYCHIATRY & NEUROLOGY

## 2020-03-25 PROCEDURE — A9270 NON-COVERED ITEM OR SERVICE: HCPCS | Performed by: INTERNAL MEDICINE

## 2020-03-25 PROCEDURE — 97166 OT EVAL MOD COMPLEX 45 MIN: CPT

## 2020-03-25 PROCEDURE — 99285 EMERGENCY DEPT VISIT HI MDM: CPT

## 2020-03-25 PROCEDURE — 97162 PT EVAL MOD COMPLEX 30 MIN: CPT

## 2020-03-25 PROCEDURE — 83036 HEMOGLOBIN GLYCOSYLATED A1C: CPT

## 2020-03-25 PROCEDURE — 80307 DRUG TEST PRSMV CHEM ANLYZR: CPT

## 2020-03-25 PROCEDURE — 84484 ASSAY OF TROPONIN QUANT: CPT

## 2020-03-25 PROCEDURE — 80053 COMPREHEN METABOLIC PANEL: CPT

## 2020-03-25 PROCEDURE — 71045 X-RAY EXAM CHEST 1 VIEW: CPT

## 2020-03-25 PROCEDURE — 70496 CT ANGIOGRAPHY HEAD: CPT

## 2020-03-25 PROCEDURE — 36415 COLL VENOUS BLD VENIPUNCTURE: CPT

## 2020-03-25 PROCEDURE — 81001 URINALYSIS AUTO W/SCOPE: CPT | Mod: XU

## 2020-03-25 PROCEDURE — 87077 CULTURE AEROBIC IDENTIFY: CPT

## 2020-03-25 PROCEDURE — 70551 MRI BRAIN STEM W/O DYE: CPT

## 2020-03-25 PROCEDURE — 700102 HCHG RX REV CODE 250 W/ 637 OVERRIDE(OP): Performed by: INTERNAL MEDICINE

## 2020-03-25 PROCEDURE — 86850 RBC ANTIBODY SCREEN: CPT

## 2020-03-25 PROCEDURE — 70498 CT ANGIOGRAPHY NECK: CPT

## 2020-03-25 PROCEDURE — 700117 HCHG RX CONTRAST REV CODE 255: Performed by: EMERGENCY MEDICINE

## 2020-03-25 PROCEDURE — 95816 EEG AWAKE AND DROWSY: CPT | Mod: 26 | Performed by: PSYCHIATRY & NEUROLOGY

## 2020-03-25 PROCEDURE — 86901 BLOOD TYPING SEROLOGIC RH(D): CPT

## 2020-03-25 PROCEDURE — 93005 ELECTROCARDIOGRAM TRACING: CPT | Performed by: EMERGENCY MEDICINE

## 2020-03-25 PROCEDURE — 99243 OFF/OP CNSLTJ NEW/EST LOW 30: CPT | Performed by: PSYCHIATRY & NEUROLOGY

## 2020-03-25 PROCEDURE — A9270 NON-COVERED ITEM OR SERVICE: HCPCS | Performed by: HOSPITALIST

## 2020-03-25 PROCEDURE — G0378 HOSPITAL OBSERVATION PER HR: HCPCS

## 2020-03-25 PROCEDURE — 86900 BLOOD TYPING SEROLOGIC ABO: CPT

## 2020-03-25 PROCEDURE — 87086 URINE CULTURE/COLONY COUNT: CPT

## 2020-03-25 PROCEDURE — 99236 HOSP IP/OBS SAME DATE HI 85: CPT | Performed by: HOSPITALIST

## 2020-03-25 PROCEDURE — 94760 N-INVAS EAR/PLS OXIMETRY 1: CPT

## 2020-03-25 PROCEDURE — 0042T CT-CEREBRAL PERFUSION ANALYSIS: CPT

## 2020-03-25 PROCEDURE — 85025 COMPLETE CBC W/AUTO DIFF WBC: CPT

## 2020-03-25 PROCEDURE — 92610 EVALUATE SWALLOWING FUNCTION: CPT

## 2020-03-25 PROCEDURE — 70450 CT HEAD/BRAIN W/O DYE: CPT

## 2020-03-25 PROCEDURE — 700102 HCHG RX REV CODE 250 W/ 637 OVERRIDE(OP): Performed by: HOSPITALIST

## 2020-03-25 PROCEDURE — 85610 PROTHROMBIN TIME: CPT

## 2020-03-25 PROCEDURE — 85730 THROMBOPLASTIN TIME PARTIAL: CPT

## 2020-03-25 RX ORDER — AMLODIPINE BESYLATE 5 MG/1
10 TABLET ORAL
Status: DISCONTINUED | OUTPATIENT
Start: 2020-03-25 | End: 2020-03-25 | Stop reason: HOSPADM

## 2020-03-25 RX ORDER — OMEPRAZOLE 20 MG/1
20 CAPSULE, DELAYED RELEASE ORAL DAILY
Status: DISCONTINUED | OUTPATIENT
Start: 2020-03-25 | End: 2020-03-25 | Stop reason: HOSPADM

## 2020-03-25 RX ORDER — ENALAPRIL MALEATE 10 MG/1
10 TABLET ORAL
Status: DISCONTINUED | OUTPATIENT
Start: 2020-03-25 | End: 2020-03-25 | Stop reason: HOSPADM

## 2020-03-25 RX ORDER — DOXAZOSIN 2 MG/1
4 TABLET ORAL
Status: DISCONTINUED | OUTPATIENT
Start: 2020-03-25 | End: 2020-03-25 | Stop reason: HOSPADM

## 2020-03-25 RX ORDER — BISACODYL 10 MG
10 SUPPOSITORY, RECTAL RECTAL
Status: DISCONTINUED | OUTPATIENT
Start: 2020-03-25 | End: 2020-03-25 | Stop reason: HOSPADM

## 2020-03-25 RX ORDER — ASPIRIN 300 MG/1
300 SUPPOSITORY RECTAL DAILY
Status: DISCONTINUED | OUTPATIENT
Start: 2020-03-25 | End: 2020-03-25

## 2020-03-25 RX ORDER — LABETALOL HYDROCHLORIDE 5 MG/ML
10 INJECTION, SOLUTION INTRAVENOUS EVERY 4 HOURS PRN
Status: DISCONTINUED | OUTPATIENT
Start: 2020-03-25 | End: 2020-03-25 | Stop reason: HOSPADM

## 2020-03-25 RX ORDER — ASPIRIN 325 MG
325 TABLET ORAL DAILY
Status: DISCONTINUED | OUTPATIENT
Start: 2020-03-25 | End: 2020-03-25

## 2020-03-25 RX ORDER — CHLORTHALIDONE 50 MG/1
50 TABLET ORAL
Status: DISCONTINUED | OUTPATIENT
Start: 2020-03-25 | End: 2020-03-25 | Stop reason: HOSPADM

## 2020-03-25 RX ORDER — AMOXICILLIN 250 MG
2 CAPSULE ORAL 2 TIMES DAILY
Status: DISCONTINUED | OUTPATIENT
Start: 2020-03-25 | End: 2020-03-25 | Stop reason: HOSPADM

## 2020-03-25 RX ORDER — HYDRALAZINE HYDROCHLORIDE 20 MG/ML
10 INJECTION INTRAMUSCULAR; INTRAVENOUS
Status: DISCONTINUED | OUTPATIENT
Start: 2020-03-25 | End: 2020-03-25 | Stop reason: HOSPADM

## 2020-03-25 RX ORDER — ATORVASTATIN CALCIUM 80 MG/1
40 TABLET, FILM COATED ORAL EVERY EVENING
Qty: 30 TAB
Start: 2020-03-25 | End: 2021-09-22 | Stop reason: SDUPTHER

## 2020-03-25 RX ORDER — POLYETHYLENE GLYCOL 3350 17 G/17G
1 POWDER, FOR SOLUTION ORAL
Status: DISCONTINUED | OUTPATIENT
Start: 2020-03-25 | End: 2020-03-25 | Stop reason: HOSPADM

## 2020-03-25 RX ORDER — ASPIRIN 81 MG/1
324 TABLET, CHEWABLE ORAL DAILY
Status: DISCONTINUED | OUTPATIENT
Start: 2020-03-25 | End: 2020-03-25

## 2020-03-25 RX ORDER — ATORVASTATIN CALCIUM 80 MG/1
80 TABLET, FILM COATED ORAL EVERY EVENING
Status: DISCONTINUED | OUTPATIENT
Start: 2020-03-25 | End: 2020-03-25 | Stop reason: HOSPADM

## 2020-03-25 RX ORDER — POTASSIUM CHLORIDE 20 MEQ/1
40 TABLET, EXTENDED RELEASE ORAL ONCE
Status: COMPLETED | OUTPATIENT
Start: 2020-03-25 | End: 2020-03-25

## 2020-03-25 RX ADMIN — AMLODIPINE BESYLATE 10 MG: 5 TABLET ORAL at 13:19

## 2020-03-25 RX ADMIN — CHLORTHALIDONE 50 MG: 50 TABLET ORAL at 13:19

## 2020-03-25 RX ADMIN — OMEPRAZOLE 20 MG: 20 CAPSULE, DELAYED RELEASE ORAL at 05:44

## 2020-03-25 RX ADMIN — ASPIRIN 325 MG: 325 TABLET, FILM COATED ORAL at 05:45

## 2020-03-25 RX ADMIN — POTASSIUM CHLORIDE 40 MEQ: 1500 TABLET, EXTENDED RELEASE ORAL at 05:45

## 2020-03-25 RX ADMIN — IOHEXOL 40 ML: 350 INJECTION, SOLUTION INTRAVENOUS at 01:46

## 2020-03-25 RX ADMIN — ATORVASTATIN CALCIUM 80 MG: 80 TABLET, FILM COATED ORAL at 16:43

## 2020-03-25 RX ADMIN — IOHEXOL 80 ML: 350 INJECTION, SOLUTION INTRAVENOUS at 01:47

## 2020-03-25 RX ADMIN — ENALAPRIL MALEATE 10 MG: 10 TABLET ORAL at 13:19

## 2020-03-25 ASSESSMENT — LIFESTYLE VARIABLES
EVER HAD A DRINK FIRST THING IN THE MORNING TO STEADY YOUR NERVES TO GET RID OF A HANGOVER: NO
AVERAGE NUMBER OF DAYS PER WEEK YOU HAVE A DRINK CONTAINING ALCOHOL: 0
EVER_SMOKED: NEVER
TOTAL SCORE: 0
ON A TYPICAL DAY WHEN YOU DRINK ALCOHOL HOW MANY DRINKS DO YOU HAVE: 0
CONSUMPTION TOTAL: NEGATIVE
HAVE YOU EVER FELT YOU SHOULD CUT DOWN ON YOUR DRINKING: NO
HAVE PEOPLE ANNOYED YOU BY CRITICIZING YOUR DRINKING: NO
ALCOHOL_USE: NO
HOW MANY TIMES IN THE PAST YEAR HAVE YOU HAD 5 OR MORE DRINKS IN A DAY: 0
EVER FELT BAD OR GUILTY ABOUT YOUR DRINKING: NO
SUBSTANCE_ABUSE: 0
DOES PATIENT WANT TO STOP DRINKING: NO

## 2020-03-25 ASSESSMENT — ENCOUNTER SYMPTOMS
ABDOMINAL PAIN: 0
VOMITING: 0
WEAKNESS: 1
SPEECH CHANGE: 0
FLANK PAIN: 0
MYALGIAS: 0
DEPRESSION: 0
NAUSEA: 0
PALPITATIONS: 0
FOCAL WEAKNESS: 0
BRUISES/BLEEDS EASILY: 0
SHORTNESS OF BREATH: 0
HALLUCINATIONS: 0
SENSORY CHANGE: 1
EYE DISCHARGE: 0
COUGH: 0
CHILLS: 0
HEMOPTYSIS: 0
HEARTBURN: 0
DIZZINESS: 0
FEVER: 0
BLURRED VISION: 0
DOUBLE VISION: 0

## 2020-03-25 ASSESSMENT — ACTIVITIES OF DAILY LIVING (ADL): TOILETING: REQUIRES ASSIST

## 2020-03-25 ASSESSMENT — COGNITIVE AND FUNCTIONAL STATUS - GENERAL
PERSONAL GROOMING: A LITTLE
MOBILITY SCORE: 8
TOILETING: A LOT
DAILY ACTIVITIY SCORE: 15
STANDING UP FROM CHAIR USING ARMS: A LOT
SUGGESTED CMS G CODE MODIFIER DAILY ACTIVITY: CK
MOVING FROM LYING ON BACK TO SITTING ON SIDE OF FLAT BED: UNABLE
DRESSING REGULAR UPPER BODY CLOTHING: A LITTLE
MOVING TO AND FROM BED TO CHAIR: UNABLE
DRESSING REGULAR LOWER BODY CLOTHING: A LOT
WALKING IN HOSPITAL ROOM: TOTAL
TURNING FROM BACK TO SIDE WHILE IN FLAT BAD: A LOT
CLIMB 3 TO 5 STEPS WITH RAILING: TOTAL
EATING MEALS: A LITTLE
HELP NEEDED FOR BATHING: A LOT
SUGGESTED CMS G CODE MODIFIER MOBILITY: CM

## 2020-03-25 ASSESSMENT — COPD QUESTIONNAIRES
COPD SCREENING SCORE: 0
HAVE YOU SMOKED AT LEAST 100 CIGARETTES IN YOUR ENTIRE LIFE: NO/DON'T KNOW
DURING THE PAST 4 WEEKS HOW MUCH DID YOU FEEL SHORT OF BREATH: NONE/LITTLE OF THE TIME
IN THE PAST 12 MONTHS DO YOU DO LESS THAN YOU USED TO BECAUSE OF YOUR BREATHING PROBLEMS: DISAGREE/UNSURE
DO YOU EVER COUGH UP ANY MUCUS OR PHLEGM?: NO/ONLY WITH OCCASIONAL COLDS OR INFECTIONS

## 2020-03-25 ASSESSMENT — FIBROSIS 4 INDEX
FIB4 SCORE: 0.35
FIB4 SCORE: 0.43

## 2020-03-25 ASSESSMENT — PATIENT HEALTH QUESTIONNAIRE - PHQ9
SUM OF ALL RESPONSES TO PHQ9 QUESTIONS 1 AND 2: 0
1. LITTLE INTEREST OR PLEASURE IN DOING THINGS: NOT AT ALL
2. FEELING DOWN, DEPRESSED, IRRITABLE, OR HOPELESS: NOT AT ALL

## 2020-03-25 NOTE — CONSULTS
Tele-Stroke Consultation Note    Hospital: [facility]  Referring Physician: Kristin Cuello M.D.    Reason for consultation: acute on chronic numbness of left    Last Known Well: 12am 3/25/20  Time of Call: 2:09 3/25/20    HPI: Jacinto Loyola is a 49 year old man who was last seen by the neurology service in Dec 2019 by Dr. Limon who now presents with acute on chronic changes in sensation on the left.  The patient has a PMH of HTN, JAYASHREE, medication noncompliance and meth abuse who was last seen Dec 2019 after being found down at home collapsed. At the time of prior admission, had elevated SBP to 190. Previous CT Head W/O CST demonstrated a R basal ganglia hemorrhage w/ intraventricular extension with ICH score 2. Etiology was felt to be HTN and concomitant meth use. Prior hospitalization complicated by AMS which was worked up with EEG.    Today, 3/25/20, patient presents with acute on chronic sensory changes in the left face, arm, and leg.  Patient also complaining of slurred speech.  Sensory change described as numbness.  Says that symptoms have been worsening since midnight but seems to be progressive rather than acute in onset.  At baseline has left sided hemiplegia.  Glucose 97. /102.  Denies headache. No acute changes in vision    Review of systems: In addition to what is detailed in the HPI above, (and scanned into the chart if and when application), all other systems reviewed and are negative.    Past Medical History:    has a past medical history of Depression (8/28/2009), HTN (8/28/2009), and Migraines, neuralgic (8/28/2009).    FHx:  family history is not on file.    SHx:   reports that he has never smoked. He has never used smokeless tobacco. He reports current alcohol use. He reports current drug use. Drug: Intravenous.    Allergies:  No Known Allergies    Medications:  No current facility-administered medications for this encounter.     Current Outpatient Medications:    •  acetaminophen (TYLENOL) 325 MG Tab, Take 2 Tabs by mouth every four hours as needed (Temperature greater than 100.4 F (38 C))., Disp: 30 Tab, Rfl: 0  •  amLODIPine (NORVASC) 10 MG Tab, Take 1 Tab by mouth every day., Disp: 30 Tab, Rfl: 0  •  chlorthalidone (HYGROTON) 50 MG Tab, Take 1 Tab by mouth every day., Disp: 30 Tab, Rfl: 0  •  doxazosin (CARDURA) 4 MG Tab, Take 1 Tab by mouth every bedtime., Disp: 30 Tab, Rfl: 0  •  enalapril (VASOTEC) 10 MG Tab, Take 1 Tab by mouth every day., Disp: 30 Tab, Rfl: 0  •  omeprazole (PRILOSEC) 20 MG delayed-release capsule, Take 1 Cap by mouth every day., Disp: 30 Cap, Rfl: 0  •  ondansetron (ZOFRAN ODT) 4 MG TABLET DISPERSIBLE, Take 1 Tab by mouth every four hours as needed for Nausea (Nausea/Vomiting)., Disp: 10 Tab, Rfl: 0    Physical examination:    Vitals:    03/25/20 0133 03/25/20 0151 03/25/20 0228   BP:  154/102 160/102   Pulse:  91 90   Resp:  17 14   SpO2:  95% 95%   Weight: 102.5 kg (226 lb)     Height: 1.829 m (6')         General: Patient is awake and in no acute distress  Neck: There is normal range of motion  CV: RRR  Psychiatric: Mood and affect appear normal    NEUROLOGICAL EXAM:     Mental status: Awake, alert and fully oriented, follows commands  Speech and language: speech is mildly dysarthric. The patient is able to name and repeat.  Cranial nerve exam: Pupils are equal, round and reactive to light bilaterally. Visual fields are full. There is no nystagmus. Extraocular muscles are intact. Face is notable for left sided droop. Sensation in the face: decreased on the right compared to left. Uvula is midline. Palate elevates symmetrically. Tongue is midline. Sternocleidomastoid muscles exhibit normal strength bilaterally.  Motor exam: Strength is 5/5 on the right; hemiplegic on the left. No abnormal movements were seen on exam.  Sensory exam: decreased on the left compared to the right  Coordination: no ataxia   Gait: deferred as patient being actively  transferred    NIH Stroke Scale:    1a. Level of Consciousness (Alert, drowsy, etc): 0= Alert    1b. LOC Questions (Month, age): 0= Answers both correctly    1c. LOC Commands (Open/close eyes make fist/let go): 0= Obeys both correctly    2.   Best Gaze (Eyes open - patient follows examiner's finger on face): 0= Normal    3.   Visual Fields (introduce visual stimulus/threat to patient's field quadrants): 0= No visual loss  4.   Facial Paresis (Show teeth, raise eyebrows and squeeze eyes shut): 1= Minor     5a. Motor Arm - Left (Elevate arm to 90 degrees if patient is sitting, 45 degrees if  supine): 4= No movement    5b. Motor Arm - Right (Elevate arm to 90 degrees if patient is sitting, 45 degrees if supine): 0= No drift    6a. Motor Leg - Left (Elevate leg 30 degrees with patient supine): 4= No movement    6b. Motor Leg - Right  (Elevate leg 30 degrees with patient supine): 0= No drift    7.   Limb Ataxia (Finger-nose, heel down shin): 0= No ataxia    8.   Sensory (Pin prick to face, arm, trunk and leg - compare side to side): 1= Partial loss    9.  Best Language (Name item, describe a picture and read sentences): 0= No aphasia    10. Dysarthria (Evaluate speech clarity by patient repeating listed words): 1= Mild to moderate slurring    11. Extinction and Inattention (Use information from prior testing to identify neglect or  double simultaneous stimuli testing): 0= No neglect    Total NIH Score: 11    Labs:  Lab Results   Component Value Date/Time    PROTHROMBTM 13.2 03/25/2020 01:31 AM    INR 0.99 03/25/2020 01:31 AM      Lab Results   Component Value Date/Time    WBC 8.7 03/25/2020 01:31 AM    RBC 5.50 03/25/2020 01:31 AM    HEMOGLOBIN 16.0 03/25/2020 01:31 AM    HEMATOCRIT 46.2 03/25/2020 01:31 AM    MCV 84.0 03/25/2020 01:31 AM    MCH 29.1 03/25/2020 01:31 AM    MCHC 34.6 03/25/2020 01:31 AM    MPV 8.5 (L) 03/25/2020 01:31 AM    NEUTSPOLYS 50.20 03/25/2020 01:31 AM    LYMPHOCYTES 39.60 03/25/2020 01:31 AM     MONOCYTES 7.20 03/25/2020 01:31 AM    EOSINOPHILS 1.80 03/25/2020 01:31 AM    BASOPHILS 0.60 03/25/2020 01:31 AM      Lab Results   Component Value Date/Time    SODIUM 138 03/25/2020 01:31 AM    POTASSIUM 3.5 (L) 03/25/2020 01:31 AM    CHLORIDE 98 03/25/2020 01:31 AM    CO2 28 03/25/2020 01:31 AM    GLUCOSE 101 (H) 03/25/2020 01:31 AM    BUN 22 03/25/2020 01:31 AM    CREATININE 1.06 03/25/2020 01:31 AM    CREATININE 1.0 05/24/2007 09:55 PM      Lab Results   Component Value Date/Time    CHOLSTRLTOT 136 12/24/2019 06:18 PM    LDL 61 12/24/2019 06:18 PM    HDL 44 12/24/2019 06:18 PM    TRIGLYCERIDE 125 12/29/2019 03:45 AM       Lab Results   Component Value Date/Time    ALKPHOSPHAT 83 03/25/2020 01:31 AM    ASTSGOT 9 (L) 03/25/2020 01:31 AM    ALTSGPT 15 03/25/2020 01:31 AM    TBILIRUBIN 0.4 03/25/2020 01:31 AM        Imaging:    DX-CHEST-PORTABLE (1 VIEW)   Final Result      1.  There is no acute cardiopulmonary process.      CT-CTA NECK WITH & W/O-POST PROCESSING   Final Result      1.  CT angiogram of the neck within normal limits.      CT-CTA HEAD WITH & W/O-POST PROCESS   Final Result      1.  CT angiogram of the [place] proximal occlusion or aneurysm.      CT-CEREBRAL PERFUSION ANALYSIS   Final Result      1.  Cerebral blood flow less than 30% likely representing completed infarct = 0 mL.      2.  T Max more than 6 seconds likely representing combination of completed infarct and ischemia = 16 mL.      3.  Mismatched volume likely representing ischemic brain/penumbra = 16 mL      4.  Please note that the cerebral perfusion was performed on the limited brain tissue around the basal ganglia region. Infarct/ischemia outside the CT perfusion sections can be missed in this study.      CT-HEAD W/O   Final Result      1.  The previous right thalamic hemorrhage has continued to decrease in size.   2.  There is no new intracranial hemorrhage.   3.  There are bilateral periventricular and centrum semiovale white  matter hypodensities again seen consistent with prior areas of ischemic change, demyelination or gliosis.        Assessment/Plan:    Jacinto Loyola is a 49 y.o. man with history of right basal ganglia ICH in Dec 2020 presenting to the hospital for acute on chronic sensory changes affecting the left and consulted as a code stroke.  Patient not a candidate for tPA as there is not a clear acute stroke and most of the deficits are consistent with patient's baseline.  Additionally, LKN is not concrete as the symptoms were more progressive rather than acute in nature.  Not a candidate for endovascular intervention given no LVO on CTA.  Ddx includes recrudescence in the setting of toxic metabolic derangement vs. Seizure vs. Intoxication / withdrawal.     Plan:    - Admit to the hospital for further workup of etiology and to provide secondary stroke prevention measures  - Neurology checks and vital signs per protocol  - Permissive HTN for 24-48 hours from onset of symptoms unless MRI reveals no acute stroke, then would aggressively pursue normotension more readily  - obtain normoglycemia and avoid hypo- or hyper -natremia; aim for normothermia  - To identify any acute stroke territory, obtain MRI brain : the results will guide antiplatelet therapy indication and secondary stroke prevention measures  - evaluate and treat with PT/OT/ST  - utox, UA, and toxic metabolic workup  - rEEG    Plan discussed with consulting physician, Dr. Cuello. This consultation was conducted utilizing secure and encrypted videoconferencing equipment with the assistance of a trained tele-presenter at the originating site.    Genaro Freitas MD  Director, [facility], [facility]  Neurohospitalist, [facility]  Clinical  of Neurology, [employer]  (t) [phone] (f) [phone]

## 2020-03-25 NOTE — PROGRESS NOTES
Pt denies any pain. He is A&O x4, left sided deficits remain, pt states he still has the numbness and tingling in his face and left arm and leg.   Condom cath placed.

## 2020-03-25 NOTE — THERAPY
"Physical Therapy Evaluation completed.   Bed Mobility:  Supine to Sit: Moderate Assist(for trunk)  Transfers: Sit to Stand: Minimal Assist(pulling up on Jasmyne Stedy)  Gait: Level Of Assist: (pre-gait only: manual weight shift with stepping of R foot)        Plan of Care: Will benefit from Physical Therapy 5 times per week  Discharge Recommendations: Equipment: Will Continue to Assess for Equipment Needs. Post-acute therapy Discharge to a transitional care facility for continued skilled therapy services.    Pt admitted for CVA workup and is known to this therapist from previous admit. Today, pt presents with L LE weakness and impaired proprioception to knee, ankle, and great toe. In sitting, he can hold himself in midline once cued for L lateral lean. In standing, pt was able to initiate lift off by pulling up on Stedy at min A. Pregait trials only were held as pt required manual facilitation to L LE for stability as well as verbal cues for weight shift L. While here, PT will follow to address instability, gait deviations, and impaired motor control. Recommend placement. Pt could benefit from and tolerate intensive multidisciplinary skilled therapy.     See \"Rehab Therapy-Acute\" Patient Summary Report for complete documentation.     "

## 2020-03-25 NOTE — PROGRESS NOTES
Report given to RN at [facility]. PIV removed with tip intact and site covered with gauze and tape. Discharge instructions verbalized to patient and COBRA completed. All belongings gathered.

## 2020-03-25 NOTE — THERAPY
"Occupational Therapy Evaluation completed.   Functional Status:  Pt admitted to [facility] for r/o CVA, know to this therapist from previous admit. Pt performed bed mobility with mod a, max a for LB dressing, supine in bed able to s/u urinal, STS eob with min a pulling self up on steady. Pt demonstrates LUE increased rigidity, flexion contracture pattern(with gentle stretching able to achieve full extension of L elbow and digits), decreased proprioception, activation of deltoid and trace wrist flexors noted 1x. Pt will benefit from acute skilled OT services while in house and will benefit from post acute services prior to d/c home.   Plan of Care: Will benefit from Occupational Therapy 5 times per week  Discharge Recommendations:  Equipment: Will Continue to Assess for Equipment Needs. Post-acute therapy Recommend post-acute placement for additional occupational therapy services prior to discharge home.      See \"Rehab Therapy-Acute\" Patient Summary Report for complete documentation.    "

## 2020-03-25 NOTE — PROGRESS NOTES
2 RN skin check completed with ADALBERTO Breaux.   Pt has dry cracking feet, red spots resembling acne on upper back, with no other areas of concern.

## 2020-03-25 NOTE — PROGRESS NOTES
Pt seen and examined, resting in bed, admitted after midnight for left side weakness and r/o CVA. MRI done this AM negative for acute CVA.   PT/OT eval   For more info please refer to DR. Antunez H&P.

## 2020-03-25 NOTE — DISCHARGE SUMMARY
Discharge Summary    CHIEF COMPLAINT ON ADMISSION  Chief Complaint   Patient presents with   • Possible Stroke       Reason for Admission  EMS - STROKE      Admission Date  3/25/2020    CODE STATUS  Full Code    HPI & HOSPITAL COURSE  This is a 49 y.o. male with past medical history of intraparenchymal hemorrhage, hypertension, depression who was admitted 3/25/20 after presenting to ER  with left-sided weakness.  This patient at baseline has left-sided hemiparesis but left upper extremity had regained some strength.  He had a intraparenchymal hemorrhage in December 2019.  Now the left arm has significantly worsened.  With associated left-sided facial droop and numbness and tingling. Pt was admitted for further work up.   Pt had an MRI brain which did showed the old changes in the brain, but no evidence of acute infarction or acute hemorrhage on today's exam.  Physical therapy is recommending skilled, and pt actually came from [facility] and will discharge him today back to [facility].     Discharge Date  3/25/20    FOLLOW UP ITEMS POST DISCHARGE  pcp     DISCHARGE DIAGNOSES  Principal Problem:    Left-sided weakness POA: Unknown  Active Problems:    JAYASHREE (obstructive sleep apnea) POA: Yes    HTN (hypertension) (Chronic) POA: Yes      Overview: ICD-10 transition    History of intracranial hemorrhage POA: Unknown    Hypokalemia POA: Yes  Resolved Problems:    * No resolved hospital problems. *      FOLLOW UP  No future appointments.  No follow-up provider specified.    MEDICATIONS ON DISCHARGE     Medication List      START taking these medications      Instructions   atorvastatin 80 MG tablet  Commonly known as:  LIPITOR   Take 0.5 Tabs by mouth every evening.  Dose:  40 mg        CONTINUE taking these medications      Instructions   acetaminophen 325 MG Tabs  Commonly known as:  Tylenol   Take 2 Tabs by mouth every four hours as needed (Temperature greater than 100.4 F (38 C)).  Dose:  650 mg     amLODIPine 10 MG  Tabs  Commonly known as:  NORVASC   Take 1 Tab by mouth every day.  Dose:  10 mg     chlorthalidone 50 MG Tabs  Commonly known as:  HYGROTON   Take 1 Tab by mouth every day.  Dose:  50 mg     doxazosin 4 MG Tabs  Commonly known as:  CARDURA   Take 1 Tab by mouth every bedtime.  Dose:  4 mg     enalapril 10 MG Tabs  Commonly known as:  VASOTEC   Take 1 Tab by mouth every day.  Dose:  10 mg     omeprazole 20 MG delayed-release capsule  Commonly known as:  PRILOSEC   Take 1 Cap by mouth every day.  Dose:  20 mg     ondansetron 4 MG Tbdp  Commonly known as:  ZOFRAN ODT   Take 1 Tab by mouth every four hours as needed for Nausea (Nausea/Vomiting).  Dose:  4 mg            Allergies  No Known Allergies    DIET  Orders Placed This Encounter   Procedures   • Diet Order Regular     Standing Status:   Standing     Number of Occurrences:   1     Order Specific Question:   Diet:     Answer:   Regular [1]     Order Specific Question:   Texture Modifier     Answer:   Level 5 - Minced & Moist (Dysphagia 2)     Order Specific Question:   Liquid level     Answer:   Level 2 - Mildly Thick       ACTIVITY  As tolerated.  Weight bearing as tolerated    CONSULTATIONS  Neurology     PROCEDURES  None     LABORATORY  Lab Results   Component Value Date    SODIUM 138 03/25/2020    POTASSIUM 3.5 (L) 03/25/2020    CHLORIDE 98 03/25/2020    CO2 28 03/25/2020    GLUCOSE 101 (H) 03/25/2020    BUN 22 03/25/2020    CREATININE 1.06 03/25/2020    CREATININE 1.0 05/24/2007        Lab Results   Component Value Date    WBC 8.7 03/25/2020    HEMOGLOBIN 16.0 03/25/2020    HEMATOCRIT 46.2 03/25/2020    PLATELETCT 262 03/25/2020        Total time of the discharge process exceeds 39 minutes.

## 2020-03-25 NOTE — ASSESSMENT & PLAN NOTE
Acute on chronic left sided weakness   At baseline left sided hemiparesis from previous intracranial bleed December 2019, some improvement now worsening  Check eeg, mri brain   Asa, statin therapy   Permissive htn for now   Pt/ot evaluation  a1c lipid panel   Neuro consulted by ERP

## 2020-03-25 NOTE — ED TRIAGE NOTES
Chief Complaint   Patient presents with   • Possible Stroke     Pt BIB EMS from [facility]. Pt has c/o stroke like symptoms onset at 0005. Pt has noted L sided facial numbness/weakness along with L leg weakness and numbness. Pt has baseline no movement in his L arm due to previous stroke.   [id number]

## 2020-03-25 NOTE — PROGRESS NOTES
Spiritual Care Note    Patient's Name: Jacinto Loyola   MRN: [id number]    YOB: 1970   Age and Gender: 49 y.o. male   Service Area: Neuro   Room (and Bed): [place] [address]   Ethnicity or Nationality: White   Primary Language: English   Jewish/Spiritual preference: None   Place of Residence: [facility]   Family/Friends/Others Present: No   Clinical Team Present: No   Medical Diagnosis(-es)/Procedure(s): Possible Stroke   Code Status: Full Code    Date of Admission: 3/25/2020   Length of Stay: 0 days        Spiritual Care Provider Information    Name of Spiritual Care Provider:   Tita Mcmillan  Title of Spiritual Care Provider:     Phone Number:     [phone]  E-mail:      [email or web address]  Total Time:      20 minutes    Spiritual Screen Results:    Gen Nursing    Is your spiritual health or inner well-being important to you as you cope with your medical condition?: Yes  Would you like to receive a visit from our Spiritual Care team or your own Muslim or spiritual leader?: Yes  Was spiritual care education provided to the patient?: Yes     Palliative Care    Was spiritual care education provided to the patient?: Yes      Encounter/Request Information    Visited With: Patient  Nature of the Visit: Initial, On shift  Continue Visiting: Yes  General Visit: Yes  Referral From/ Origin of Request: Epic nursing    Religous Needs/Values  Jewish Needs Visit  Jewish Needs: Prayer, Jewish/Devotional items    Spiritual Assessment     Observations/Symptoms: Confessing, Doubts, Tearful, Thankfulness    Interacton/Conversation: PT is seeking his spiritual truth.  offered various theological perspectives.    Assessment: Need, Distress   Need: Ginny Issues, Seeking Spiritual Assistance and Support   Distress: Doubt, Disbelief, Conflicted or Challenged Beliefs, Inability to [place] or Engage in Spiritual Practices, Search for Inner Peace and [place], Seeking Safety to Share  Emotions    Intended Effects: Convey a Calming Presence, Demonstrate Caring and Concern, Establish Rapport and Connectedness, Ginny Affirmation, Preserve Dignity and Respect, Promote a Sense of Peace    Interventions: Compassionate presence, active listening, prayer    Outcomes: Ability to Communicate with Truth and Honesty, Connectedness with the Holy/with God, Emotional Release,   Spiritual Comfort, Value/Dignity/Respect    Plan: Visit Upon Request    Notes:  Thank you for allowing Spiritual Care to support this patient and family. Contact [phone] for additional assistance, changes in PT status, or with any questions/concerns.

## 2020-03-25 NOTE — DISCHARGE INSTRUCTIONS
Discharge Instructions    Discharged to home by ambulance with escort. Discharged via ambulance, hospital escort: Yes.  Special equipment needed: Not Applicable    Be sure to schedule a follow-up appointment with your primary care doctor or any specialists as instructed.     Discharge Plan:   Diet Plan: Discussed  Activity Level: Discussed  Confirmed Follow up Appointment: Patient to Call and Schedule Appointment  Confirmed Symptoms Management: Discussed  Medication Reconciliation Updated: Yes  Influenza Vaccine Indication: Not indicated: Previously immunized this influenza season and > 8 years of age    I understand that a diet low in cholesterol, fat, and sodium is recommended for good health. Unless I have been given specific instructions below for another diet, I accept this instruction as my diet prescription.   Other diet: Regular, moist and bite sized with mildy thick liquids     Special Instructions: None    · Is patient discharged on Warfarin / Coumadin?   No     Depression / Suicide Risk    As you are discharged from this [facility] facility, it is important to learn how to keep safe from harming yourself.    Recognize the warning signs:  · Abrupt changes in personality, positive or negative- including increase in energy   · Giving away possessions  · Change in eating patterns- significant weight changes-  positive or negative  · Change in sleeping patterns- unable to sleep or sleeping all the time   · Unwillingness or inability to communicate  · Depression  · Unusual sadness, discouragement and loneliness  · Talk of wanting to die  · Neglect of personal appearance   · Rebelliousness- reckless behavior  · Withdrawal from people/activities they love  · Confusion- inability to concentrate     If you or a loved one observes any of these behaviors or has concerns about self-harm, here's what you can do:  · Talk about it- your feelings and reasons for harming yourself  · Remove any means that you might use  to hurt yourself (examples: pills, rope, extension cords, firearm)  · Get professional help from the community (Mental Health, Substance Abuse, psychological counseling)  · Do not be alone:Call your Safe Contact- someone whom you trust who will be there for you.  · Call your local CRISIS HOTLINE 784-8795 or 321-546-5478  · Call your local Children's Mobile Crisis Response Team [place] (113) 109-3741 or www.[place]  · Call the toll free National Suicide Prevention Hotlines   · National Suicide Prevention Lifeline 506-182-MBTP (5166)  · National Hope Line Network 800-SUICIDE (688-1022)

## 2020-03-25 NOTE — PROGRESS NOTES
2 RN skin check completed with ADALBERTO Meredith.  Sacrum red and blanching. Feet dry. Turning pt [id number]. Waffle cushion on bed.

## 2020-03-25 NOTE — PROCEDURES
ROUTINE ELECTROENCEPHALOGRAM REPORT      Referring provider: Dr. Freitas.    DOS: 3/25/2020 (total recording of 23 minutes)    INDICATION:  Jacinto Loyola 49 y.o. male presenting with left facial paresthesias, history of stroke.  Rule out seizures.    CURRENT ANTIEPILEPTIC REGIMEN: None.    TECHNIQUE: 30 channel routine electroencephalogram (EEG) was performed in accordance with the international 10-20 system. The study was reviewed in bipolar and referential montages. The recording examined the patient during wakeful state.     DESCRIPTION OF THE RECORD:  During the wakefulness, the background showed a symmetrical 9 Hz alpha activity posteriorly with amplitude of 70 mV.  There was reactivity to eye closure/opening.  A normal anterior-posterior gradient was noted with faster beta frequencies seen anteriorly.  During drowsiness, theta/delta frequencies were seen.    During the sleep state, background shows diffuse high-amplitude 4-5 Hz delta activity.  Symmetrical high-amplitude sleep spindles and vertex sharps were seen in the leads over the central regions.     ACTIVATION PROCEDURES:   Intermittent Photic stimulation was performed in a stepwise fashion from 1 to 30 Hz and elicited a normal response (photic driving), most noticeable in the posterior leads.      ICTAL AND/OR INTERICTAL FINDINGS:   No focal or generalized epileptiform activity noted. No regional slowing was seen during this routine study.  No  seizures were reported or recorded during the study.  Patient complained of left facial paresthesias, typical spell, during the study, this was not epileptic.    EKG: sampling of the EKG recording demonstrated sinus rhythm.       INTERPRETATION:  This is a normal routine EEG recording in the awake state.  The patient continues to complain of left facial paresthesias during the study, these are nonepileptic.  No seizures were captured during the study.  Clinical correlation is recommended.    Note: A normal  EEG does not rule out epilepsy.  If the clinical suspicion remains high for seizures, a prolonged recording to capture clinical or subclinical events may be helpful.        Harry Nazario MD   Epilepsy and Neurodiagnostics.   Clinical  of Neurology [facility].   Diplomate in Neurology, Epilepsy, and Electrodiagnostic Medicine.   Office: [phone]  Fax: [phone]

## 2020-03-25 NOTE — ED PROVIDER NOTES
"ED Provider Note    Scribed for Kristin Cuello M.D. by Anjel Kaplan. 3/25/2020  1:29 AM    Means of arrival: EMS  History obtained from: Patient and medics  History limited by: None      CHIEF COMPLAINT  Chief Complaint   Patient presents with   • Possible Stroke       HPI  Jacinto Loyola is a 49 y.o. male who presents to the Emergency Department for evaluation of a potential stroke. He states that around midnight, he developed a headache and felt mildly \"foggy\". He went to scratch his face, and noticed he was feeling left sided numbness. He had a intraparenchymal hemorrhage in December of 2019, which caused complete left-sided hemiparesis.  He states that he did regain a small amount of strength in his left lower extremity which is now worsening since his symptoms started this evening. He denies any chest pain, shortness of breath, fever, cough, dysuria, diarrhea.  He does not take any blood thinning medications.  He states that he has not been in physical therapy for about a month now.     REVIEW OF SYSTEMS  Pertinent positive include headache, left sided facial droop, left sided facial numbness, left leg weakness. Pertinent negative include no chest pain, shortness of breath, cough, fevers, dysuria, or diarrhea. All other systems reviewed and are negative.      PAST MEDICAL HISTORY   has a past medical history of Depression (8/28/2009), HTN (8/28/2009), and Migraines, neuralgic (8/28/2009).    SOCIAL HISTORY  Social History     Tobacco Use   • Smoking status: Never Smoker   • Smokeless tobacco: Never Used   Substance and Sexual Activity   • Alcohol use: Yes   • Drug use: Yes     Types: Intravenous     Comment: daily marijuana use   • Sexual activity: Not noted       SURGICAL HISTORY   has a past surgical history that includes knee reconstruction; appendectomy; place percut gastrostomy tube (Left, 1/13/2020); and gastroscopy (N/A, 1/13/2020).    CURRENT MEDICATIONS    Current Facility-Administered " Medications:   •  Pharmacy consult request - Allow for permissive hypertension: SBP up to 220 mmHg/DBP up to 120 mmHg x 48 hours, , Other, PHARMACY TO DOSE, Sunny Antunez M.D.  •  labetalol (NORMODYNE/TRANDATE) injection 10 mg, 10 mg, Intravenous, Q4HRS PRN **OR** hydrALAZINE (APRESOLINE) injection 10 mg, 10 mg, Intravenous, Q2HRS PRN, Sunny Antunez M.D.  •  atorvastatin (LIPITOR) tablet 80 mg, 80 mg, Oral, Q EVENING, Sunny Antunez M.D.  •  aspirin (ASA) tablet 325 mg, 325 mg, Oral, DAILY **OR** aspirin (ASA) chewable tab 324 mg, 324 mg, Oral, DAILY **OR** aspirin (ASA) suppository 300 mg, 300 mg, Rectal, DAILY, Sunny Antunez M.D.  •  senna-docusate (PERICOLACE or SENOKOT S) 8.6-50 MG per tablet 2 Tab, 2 Tab, Oral, BID **AND** polyethylene glycol/lytes (MIRALAX) PACKET 1 Packet, 1 Packet, Oral, QDAY PRN **AND** magnesium hydroxide (MILK OF MAGNESIA) suspension 30 mL, 30 mL, Oral, QDAY PRN **AND** bisacodyl (DULCOLAX) suppository 10 mg, 10 mg, Rectal, QDAY PRN, Sunny Antunez M.D.  •  omeprazole (PRILOSEC) capsule 20 mg, 20 mg, Oral, DAILY, Sunny Antunez M.D.  •  potassium chloride SA (Kdur) tablet 40 mEq, 40 mEq, Oral, Once, Sunny Antunez M.D.    ALLERGIES  No Known Allergies    PHYSICAL EXAM   VITAL SIGNS: /102   Pulse 91   Resp 17   Ht 1.829 m (6')   Wt 102.5 kg (226 lb)   SpO2 95%   BMI 30.65 kg/m²    Constitutional: Nontoxic appearing middle-aged male.  Alert in no apparent distress.  HENT: Normocephalic, Atraumatic. Bilateral external ears normal. Nose normal.  Moist mucous membranes.  Oropharynx clear.  Eyes: Pupils are equal and reactive. Conjunctiva normal.   Neck: Supple, full range of motion  Heart: Regular rate and rhythm.  No murmurs.    Lungs: No respiratory distress, normal work of breathing. Lungs clear to auscultation bilaterally.  Abdomen Soft, no distention.  No tenderness to palpation.  Musculoskeletal: Atraumatic. No obvious deformities noted.  No lower  extremity edema.  Skin: Warm, Dry.  No erythema, No rash.   Neurologic: Alert and oriented x3.  Diminished sensation to entire left side of face and body, chronic 0/5 strength in left upper extremity, chronic 0/5 strength in left lower extremity. No dysmetria, cranial nerves intact, no facial droop, normal speech.   Psychiatric: Affect normal, Mood normal, Appears appropriate and not intoxicated.    DIAGNOSTIC STUDIES    EKG  Results for orders placed or performed during the hospital encounter of 20   EKG (NOW)   Result Value Ref Range    Report       [facility] Emergency Dept.    Test Date:  2020  Pt Name:    TERESA KIM          Department: ER  MRN:        [id number]                      Room:       RD 11  Gender:     Male                         Technician: [id number]  :        1970                   Requested By:KRISTIN CABALLERO  Order #:    [id number]                    Reading MD: Kristin Caballero MD    Measurements  Intervals                                Axis  Rate:       89                           P:          76  WI:         168                          QRS:        1  QRSD:       94                           T:          133  QT:         356  QTc:        434    Interpretive Statements  SINUS RHYTHM  Normal axis and intervals  No ectopy or hypertrophy  T wave inversions in lateral leads  No ST change  Compared to 2019  No significant change from prior  Electronically Signed On 3- 2:29:52 PDT by Kristin Caballero MD           LABS  Personally reviewed by me  Labs Reviewed   CBC WITH DIFFERENTIAL - Abnormal; Notable for the following components:       Result Value    MPV 8.5 (*)     All other components within normal limits    Narrative:     Indicate which anticoagulants the patient is on:->UNKNOWN   COMP METABOLIC PANEL - Abnormal; Notable for the following components:    Potassium 3.5 (*)     Glucose 101 (*)     AST(SGOT) 9 (*)     Globulin 3.6 (*)     All  other components within normal limits    Narrative:     Indicate which anticoagulants the patient is on:->UNKNOWN   PROTHROMBIN TIME    Narrative:     Indicate which anticoagulants the patient is on:->UNKNOWN   APTT    Narrative:     Indicate which anticoagulants the patient is on:->UNKNOWN   COD (ADULT)   TROPONIN    Narrative:     Indicate which anticoagulants the patient is on:->UNKNOWN   ESTIMATED GFR    Narrative:     Indicate which anticoagulants the patient is on:->UNKNOWN   HEMOGLOBIN A1C   URINALYSIS,CULTURE IF INDICATED   URINE DRUG SCREEN         RADIOLOGY  Personally reviewed by me  DX-CHEST-PORTABLE (1 VIEW)   Final Result      1.  There is no acute cardiopulmonary process.      CT-CTA NECK WITH & W/O-POST PROCESSING   Final Result      1.  CT angiogram of the neck within normal limits.      CT-CTA HEAD WITH & W/O-POST PROCESS   Final Result      1.  CT angiogram of the [place] proximal occlusion or aneurysm.      CT-CEREBRAL PERFUSION ANALYSIS   Final Result      1.  Cerebral blood flow less than 30% likely representing completed infarct = 0 mL.      2.  T Max more than 6 seconds likely representing combination of completed infarct and ischemia = 16 mL.      3.  Mismatched volume likely representing ischemic brain/penumbra = 16 mL      4.  Please note that the cerebral perfusion was performed on the limited brain tissue around the basal ganglia region. Infarct/ischemia outside the CT perfusion sections can be missed in this study.      CT-HEAD W/O   Final Result      1.  The previous right thalamic hemorrhage has continued to decrease in size.   2.  There is no new intracranial hemorrhage.   3.  There are bilateral periventricular and centrum semiovale white matter hypodensities again seen consistent with prior areas of ischemic change, demyelination or gliosis.      MR-BRAIN-W/O    (Results Pending)   MR-BRAIN-W/O    (Results Pending)       ED COURSE  Vitals:    03/25/20 0151 03/25/20 0228  03/25/20 0253 03/25/20 0400   BP: 154/102 160/102 141/101 145/110   Pulse: 91 90 91 100   Resp: 17 14 12 16   Temp:  37.3 °C (99.1 °F)  37.1 °C (98.7 °F)   TempSrc:  Tympanic  Temporal   SpO2: 95% 95% 93% 94%   Weight:       Height:             Medications administered:    Old records personally reviewed:  Admitted on 12/24/19 for right basal ganglia intracrancimal hemorrhage associated with hypertensive emergency. At that point he presented with left sided hemiparesis, facial droop, and slurred speech. He was discharged with mild mobility in his left lower extremity.     1:29 AM Patient seen and examined at bedside. The patient presents with left sided facial numbness and left leg weakness. Ordered for DX-chest, CT-head, CT-cerebral perfusion analysis, CT-CTA Head with and without, CT-CTA Neck, UA, CBC with differential, CMP, Prothrombin time, APTT, COD, Troponin, and EKG to evaluate.     1:58 AM Informed him of CT results, that there does not appear to be any new bleeding. Further history obtained as noted above. Performed neurologic exam. He denies any needs for pain medication at this time.     MEDICAL DECISION MAKING  Patient with recent hospitalization for intraparenchymal hemorrhage causing complete left sided hemiparesis who is now presenting with increasing numbness to the left side of the body and possibly worsening left lower extremity weakness.  Stroke alert was called immediately on presentation.  The patient is mildly hypertensive however has otherwise reassuring vital signs.  His NIH stroke scale was measured at 1 however t is difficult to assess his possibly worsening chronic left lower extremity weakness.  The patient is not a candidate for treatment with IV alteplase due to his prior intracranial hemorrhage.  Imaging does not show acute hemorrhage or obvious infarct.  Vascular imaging does not show thrombus or dissection.  His labs are otherwise reassuring without significant electrolyte  abnormality.  He has not had any recent fevers or infectious symptoms.      2:11 AM I discussed the patient's case and the above findings with Dr. Freitas (Neurology) who agrees that patient is not a TPA candidate. Recommends MRI and hospitalization.  Patient is was updated with plan of care and agreeable at this time.    2:24 AM I discussed the patient's case and the above findings with Dr. Antunez (Hospitalist) who agreed to evaluate the patient for hospitalization.          DISPOSITION:  Patient will be hospitalized by Dr. Antunez in guarded condition.    IMPRESSION  (R20.0,  R20.2) Numbness and tingling  (R53.1) Left-sided weakness    Results, diagnoses, and treatment options were discussed with the patient and/or family. Patient verbalized understanding of plan of care.     Anjel MARTINEZ (Josemanuel), am scribing for, and in the presence of, Kristin Cuello M.D..    Electronically signed by: Anjel Kaplan (Slyibe), 3/25/2020    Kristin MARTINEZ M.D. personally performed the services described in this documentation, as scribed by Anjel Kaplan in my presence, and it is both accurate and complete. C    The note accurately reflects work and decisions made by me.  Kristin Cuello M.D.  3/25/2020  5:40 AM

## 2020-03-25 NOTE — PROGRESS NOTES
Pt down to MRI at this time. Waffle overlay placed on bed. Seizure, aspiration, and fall precautions in place. Pt A/Ox4 with trace movement on MIKE. Pt diet changed to NPO pending speech evaluation to determine appropriate dysphagia level considering prior intraparenchymal hemorrhage with baseline deficits.

## 2020-03-25 NOTE — DISCHARGE PLANNING
Agency/Facility Name: [facility]  Spoke To: Vidal  Outcome: Can accept pt back to [facility] this evening; CCA will confirm transport and call back.    
Anticipated Discharge Disposition: SNF-[facility]    Action: RN ANGEL spoke with pt; pt initially hesitant to return to [facility] but [facility]. [facility] transport scheduled for pickup at 1700.      Barriers to Discharge: None    Plan: Transfer back to [facility].    
Medical Social Work    Referral: Stroke    Intervention: Pt is a 49 year old male brought in by ANDRE[facility] from [facility] for possible stroke.  Pt is Jacinto Loyola (: 1970).  Per chart pt's emergency contact is his son, Beni ([phone]).  No family was contacted at this time as pt's able to contact family as needed.    Plan: SW will follow as needed.  
Patient is eligible for Medicaid Meds to Beds at discharge if they have coverage with [place] Medicaid, Medicaid FFS, Medicaid HMO ([facility]), or Reserve. This service is provided through the [facility] if orders are received by the pharmacy prior to 4pm Monday through Friday excluding holidays. Preferred pharmacy has been changed to [facility]. Please call [phone] prior to discharge.     
Received Transport Form @ 152  Spoke to Ketan @ [facility]    Transport is scheduled for 3/25 @1700 going to [facility].    Authorized by Mariaelena at [facility]    Left vmail for Vidal at [facility] with transport time    MALISSA Aguilar notified    
[facility] Transitional Care Coordination     Referral from:      Facesheet indicates: ANT SHIRA    Potential Rehab Diagnosis: CVA?    Chart review indicates patient may have on going medical management and may have therapy needs to possibly meet inpatient rehab facility criteria with the goal of returning to community.    D/C support: TBD     Physiatry consultation pended per protocol.      Presented from [facility] with stroke-like sx.  W/U & TX evals are pending. Waiting on additional information to determine appropriateness for acute inpatient rehabilitation. Will continue to follow.     Thank you for the referral.        
Yes

## 2020-03-25 NOTE — H&P
Hospital Medicine History & Physical Note    Date of Service  3/25/2020    Primary Care Physician  Pcp Pt States None    Consultants  neuro    Code Status  Full     Chief Complaint  Left sided weakness     History of Presenting Illness  49 y.o. male who presented 3/25/2020 with past medical history of intraparenchymal hemorrhage, hypertension, depression presented with left-sided weakness.  This patient around midnight he had a headache with associated left-sided facial droop and left-sided numbness and tingling.  This patient at baseline has left-sided hemiparesis but left upper extremity had regained some strength.  He had a intraparenchymal hemorrhage in December 2019.  Now the left arm has significantly worsened.  With associated left-sided facial droop and numbness and tingling.  Otherwise no known alleviating or exacerbating factors to his symptoms radiogram into the hospital for further observation and rule out of CVA.    Review of Systems  Review of Systems   Constitutional: Positive for malaise/fatigue. Negative for chills and fever.   HENT: Negative for congestion, hearing loss and tinnitus.    Eyes: Negative for blurred vision, double vision and discharge.   Respiratory: Negative for cough, hemoptysis and shortness of breath.    Cardiovascular: Negative for chest pain, palpitations and leg swelling.   Gastrointestinal: Negative for abdominal pain, heartburn, nausea and vomiting.   Genitourinary: Negative for dysuria and flank pain.   Musculoskeletal: Negative for joint pain and myalgias.   Skin: Negative for rash.   Neurological: Positive for sensory change and weakness. Negative for dizziness, speech change and focal weakness.   Endo/Heme/Allergies: Negative for environmental allergies. Does not bruise/bleed easily.   Psychiatric/Behavioral: Negative for depression, hallucinations and substance abuse.       Past Medical History   has a past medical history of Depression (8/28/2009), HTN (8/28/2009), and  Migraines, neuralgic (8/28/2009).    Surgical History   has a past surgical history that includes knee reconstruction; appendectomy; pr place percut gastrostomy tube (Left, 1/13/2020); and gastroscopy (N/A, 1/13/2020).     Family History  Reviewed and not pertinent     Social History   reports that he has never smoked. He has never used smokeless tobacco. He reports current alcohol use. He reports current drug use. Drug: Intravenous.    Allergies  No Known Allergies    Medications  Prior to Admission Medications   Prescriptions Last Dose Informant Patient Reported? Taking?   acetaminophen (TYLENOL) 325 MG Tab   No No   Sig: Take 2 Tabs by mouth every four hours as needed (Temperature greater than 100.4 F (38 C)).   amLODIPine (NORVASC) 10 MG Tab   No No   Sig: Take 1 Tab by mouth every day.   chlorthalidone (HYGROTON) 50 MG Tab   No No   Sig: Take 1 Tab by mouth every day.   doxazosin (CARDURA) 4 MG Tab   No No   Sig: Take 1 Tab by mouth every bedtime.   enalapril (VASOTEC) 10 MG Tab   No No   Sig: Take 1 Tab by mouth every day.   omeprazole (PRILOSEC) 20 MG delayed-release capsule   No No   Sig: Take 1 Cap by mouth every day.   ondansetron (ZOFRAN ODT) 4 MG TABLET DISPERSIBLE   No No   Sig: Take 1 Tab by mouth every four hours as needed for Nausea (Nausea/Vomiting).      Facility-Administered Medications: None       Physical Exam  Temp:  [37.3 °C (99.1 °F)] 37.3 °C (99.1 °F)  Pulse:  [90-91] 90  Resp:  [14-17] 14  BP: (154-160)/(102) 160/102  SpO2:  [95 %] 95 %    Physical Exam  Vitals signs reviewed.   Constitutional:       General: He is not in acute distress.     Appearance: He is ill-appearing.   HENT:      Head: Normocephalic and atraumatic.      Nose: No congestion.      Mouth/Throat:      Mouth: Mucous membranes are dry.   Eyes:      Extraocular Movements: Extraocular movements intact.      Pupils: Pupils are equal, round, and reactive to light.   Neck:      Musculoskeletal: Neck supple.   Cardiovascular:       Rate and Rhythm: Normal rate and regular rhythm.      Pulses: Normal pulses.      Heart sounds: Normal heart sounds.   Pulmonary:      Effort: Pulmonary effort is normal. No respiratory distress.      Breath sounds: Normal breath sounds. No wheezing.   Abdominal:      General: Bowel sounds are normal. There is no distension.      Palpations: Abdomen is soft.      Tenderness: There is no abdominal tenderness.   Musculoskeletal:         General: No swelling.   Skin:     General: Skin is warm and dry.      Capillary Refill: Capillary refill takes 2 to 3 seconds.   Neurological:      General: No focal deficit present.      Mental Status: He is alert.      Comments: Left sided hemiparesis   Psychiatric:         Mood and Affect: Mood normal.         Behavior: Behavior normal.         Thought Content: Thought content normal.         Judgment: Judgment normal.         Laboratory:  Recent Labs     03/25/20  0131   WBC 8.7   RBC 5.50   HEMOGLOBIN 16.0   HEMATOCRIT 46.2   MCV 84.0   MCH 29.1   MCHC 34.6   RDW 39.8   PLATELETCT 262   MPV 8.5*     Recent Labs     03/25/20  0131   SODIUM 138   POTASSIUM 3.5*   CHLORIDE 98   CO2 28   GLUCOSE 101*   BUN 22   CREATININE 1.06   CALCIUM 9.8     Recent Labs     03/25/20  0131   ALTSGPT 15   ASTSGOT 9*   ALKPHOSPHAT 83   TBILIRUBIN 0.4   GLUCOSE 101*     Recent Labs     03/25/20 0131   APTT 29.2   INR 0.99     No results for input(s): NTPROBNP in the last 72 hours.      Recent Labs     03/25/20  0131   TROPONINT 16       Urinalysis:    No results found     Imaging:  DX-CHEST-PORTABLE (1 VIEW)   Final Result      1.  There is no acute cardiopulmonary process.      CT-CTA NECK WITH & W/O-POST PROCESSING   Final Result      1.  CT angiogram of the neck within normal limits.      CT-CTA HEAD WITH & W/O-POST PROCESS   Final Result      1.  CT angiogram of the [place] proximal occlusion or aneurysm.      CT-CEREBRAL PERFUSION ANALYSIS   Final Result      1.  Cerebral blood flow  less than 30% likely representing completed infarct = 0 mL.      2.  T Max more than 6 seconds likely representing combination of completed infarct and ischemia = 16 mL.      3.  Mismatched volume likely representing ischemic brain/penumbra = 16 mL      4.  Please note that the cerebral perfusion was performed on the limited brain tissue around the basal ganglia region. Infarct/ischemia outside the CT perfusion sections can be missed in this study.      CT-HEAD W/O   Final Result      1.  The previous right thalamic hemorrhage has continued to decrease in size.   2.  There is no new intracranial hemorrhage.   3.  There are bilateral periventricular and centrum semiovale white matter hypodensities again seen consistent with prior areas of ischemic change, demyelination or gliosis.      MR-BRAIN-W/O    (Results Pending)   MR-BRAIN-W/O    (Results Pending)         Assessment/Plan:  I anticipate this patient is appropriate for observation status at this time.    * Left-sided weakness  Assessment & Plan  Acute on chronic left sided weakness   At baseline left sided hemiparesis from previous intracranial bleed December 2019, some improvement now worsening  Check eeg, mri brain   Asa, statin therapy   Permissive htn for now   Pt/ot evaluation  a1c lipid panel   Neuro consulted by ERP     JAYASHREE (obstructive sleep apnea)- (present on admission)  Assessment & Plan  History of    History of intracranial hemorrhage  Assessment & Plan  Thought to be due to hypertension     HTN (hypertension)- (present on admission)  Assessment & Plan  Allow for permissive htn     Hypokalemia- (present on admission)  Assessment & Plan  Replace and recheck       VTE prophylaxis: scd

## 2020-03-25 NOTE — CARE PLAN
Problem: Communication  Goal: The ability to communicate needs accurately and effectively will improve  Outcome: PROGRESSING AS EXPECTED  Note: Pt using call light and communicating needs appropriately.     Problem: Safety  Goal: Will remain free from falls  Outcome: PROGRESSING AS EXPECTED  Note: Bed alarm on and pt calling for assistance.

## 2020-03-25 NOTE — THERAPY
"  Speech Language Therapy Clinical Swallow Evaluation completed.  Functional Status: Pt awake and alert and recalling his previous SLP's name (Amrita) while at [facility]. Pt recommended for D2 NTL during last tx on 1/22. Per pt and nurs, pt was on regular and thins while at [facility]. Pt stating he has not felt his normal for \"a couple of weeks.\" When asked what was new for him today in regards to his body and possible neuro changes, pt replied \"my vision and I can't move my left leg.\" Pt with mild left sided facial weakness with left body weakness related to prior CVA. Speech is 100% intelligible. Pt assessed with NTL from the cup, thins from spoon and cup, applesauce, and diced peaches. Pt with good laryngeal elev that is palpated. Pt triggering swallows in approx 1-2 seconds. No coughing post swallow. Mild increase in time to masticate single and small pieces of diced peaches with no lingual residue. SLP provided educ to the pt and collaborated with nurs regarding L5/L2 monitor during intake. Thin water with staff. Plan to upgrade texture on Thurs as approp.   Recommendations - Diet: Diet / Liquid Recommendation: Mildly Thick (2) - (Nectar Thick), Minced & Moist (5) - (Dysphagia II)                          Strategies: Head of Bed at 90 Degrees, sm bites and sips, monitor during trays, superv for thin water.                           Medication Administration: Medication Administration : Whole with Liquid Wash, Float Whole with Puree  Plan of Care: Will benefit from Speech Therapy 3 times per week  Post-Acute Therapy: Discharge to a transitional care facility for continued skilled therapy services.  Thanks, Kali  See \"Rehab Therapy-Acute\" Patient Summary Report for complete documentation.   "

## 2020-03-27 LAB
BACTERIA UR CULT: ABNORMAL
BACTERIA UR CULT: ABNORMAL
SIGNIFICANT IND 70042: ABNORMAL
SITE SITE: ABNORMAL
SOURCE SOURCE: ABNORMAL

## 2020-10-01 ENCOUNTER — HOSPITAL ENCOUNTER (EMERGENCY)
Facility: MEDICAL CENTER | Age: 50
End: 2020-10-01
Attending: EMERGENCY MEDICINE
Payer: MEDICAID

## 2020-10-01 ENCOUNTER — APPOINTMENT (OUTPATIENT)
Dept: RADIOLOGY | Facility: MEDICAL CENTER | Age: 50
End: 2020-10-01
Attending: EMERGENCY MEDICINE
Payer: MEDICAID

## 2020-10-01 VITALS
SYSTOLIC BLOOD PRESSURE: 165 MMHG | OXYGEN SATURATION: 97 % | DIASTOLIC BLOOD PRESSURE: 109 MMHG | RESPIRATION RATE: 20 BRPM | TEMPERATURE: 98 F | BODY MASS INDEX: 24.7 KG/M2 | HEIGHT: 72 IN | HEART RATE: 82 BPM

## 2020-10-01 DIAGNOSIS — M79.605 PAIN OF LEFT LOWER EXTREMITY: ICD-10-CM

## 2020-10-01 PROCEDURE — 93971 EXTREMITY STUDY: CPT | Mod: LT

## 2020-10-01 PROCEDURE — 99283 EMERGENCY DEPT VISIT LOW MDM: CPT

## 2020-10-01 PROCEDURE — 73630 X-RAY EXAM OF FOOT: CPT | Mod: LT

## 2020-10-02 NOTE — DISCHARGE INSTRUCTIONS
I do believe that you have pain secondary to nerves in her left lower extremity.  Please follow with your primary care physician tomorrow for further evaluation and management.  Your ultrasound as well as x-ray are negative for acute abnormality.  In addition follow-up with your primary care physician tomorrow for your blood pressure.

## 2020-10-02 NOTE — ED PROVIDER NOTES
"ED Provider Note    CHIEF COMPLAINT  Chief Complaint   Patient presents with   • Foot Problem     Reports feeling that L foot is \"cold\". +post tibial pulse +2, +cap refill [place]. Denies injury to foot.    • Hypertension     Reports being seen at PCP today for above and /119. Denies CP, SOB, dizziness, h/a.        HPI  Jacinto Loyola is a 50 y.o. male who presents to the emergency department states that he has had severe pain in a cold feeling to his left foot.  The patient had a CVA back in January and since that time he has had intermittent pain to his left lower extremity.  He has had pain to the left lower extremity is been increasing severity last several days and it feels like his foot is freezing ice cold and somewhat intermittently numb.  He saw his primary care physician 2 days prior and she asked him just to wrap it up an Ace bandage.  He states that since that time is been intermittently as well he wrapped up an Ace bandage and felt extremely hot.  He is concerned that he may have vascular abnormality.  He does not limit significantly is concern for possible DVT.  In addition, patient has evidence of hypertension and is being managed with his blood pressure for his primary care physician has appointment tomorrow to see her.    REVIEW OF SYSTEMS  Positives as above. Pertinent negatives include chest pain, rash, fever, shakes, chills, sweats all other review of systems are negative    PAST MEDICAL HISTORY  Past Medical History:   Diagnosis Date   • Depression 8/28/2009   • HTN 8/28/2009   • Migraines, neuralgic 8/28/2009   • Stroke (HCC)        FAMILY HISTORY  Noncontributory    SOCIAL HISTORY  Social History     Socioeconomic History   • Marital status:      Spouse name: Not on file   • Number of children: Not on file   • Years of education: Not on file   • Highest education level: Not on file   Occupational History   • Not on file   Social Needs   • Financial resource strain: Not on file "   • Food insecurity     Worry: Not on file     Inability: Not on file   • Transportation needs     Medical: Not on file     Non-medical: Not on file   Tobacco Use   • Smoking status: Never Smoker   • Smokeless tobacco: Never Used   Substance and Sexual Activity   • Alcohol use: Yes   • Drug use: Not Currently     Types: Intravenous     Comment: daily marijuana use   • Sexual activity: Not on file   Lifestyle   • Physical activity     Days per week: Not on file     Minutes per session: Not on file   • Stress: Not on file   Relationships   • Social connections     Talks on phone: Not on file     Gets together: Not on file     Attends [employer] service: Not on file     Active member of club or organization: Not on file     Attends meetings of clubs or organizations: Not on file     Relationship status: Not on file   • Intimate partner violence     Fear of current or ex partner: Not on file     Emotionally abused: Not on file     Physically abused: Not on file     Forced sexual activity: Not on file   Other Topics Concern   • Not on file   Social History Narrative   • Not on file       SURGICAL HISTORY  Past Surgical History:   Procedure Laterality Date   • PB PLACE PERCUT GASTROSTOMY TUBE Left 1/13/2020    Procedure: INSERTION, PEG TUBE;  Surgeon: Lorenzo Samayoa M.D.;  Location: SURGERY SAME DAY [facility] ORS;  Service: Gastroenterology   • GASTROSCOPY N/A 1/13/2020    Procedure: GASTROSCOPY PEG TUBE;  Surgeon: Lorenzo Samayoa M.D.;  Location: SURGERY SAME DAY [facility] ORS;  Service: Gastroenterology   • APPENDECTOMY     • KNEE RECONSTRUCTION      x5       CURRENT MEDICATIONS  Home Medications    **Home medications have not yet been reviewed for this encounter**         ALLERGIES  No Known Allergies    PHYSICAL EXAM  VITAL SIGNS: BP (!) 165/109   Pulse 82   Temp 36.7 °C (98 °F) (Temporal)   Resp 20   Ht 1.829 m (6')   SpO2 97%   BMI 24.70 kg/m²      Constitutional: Well developed, Well nourished, No acute distress,  Non-toxic appearance.   Eyes: PERRLA, EOMI, Conjunctiva normal, No discharge.   Skin: Warm, Dry, No erythema, No rash.   Extremities: Left lower extremity, is no significant pedal edema, no edema in the posterior thigh or popliteal region, his dorsalis pedis and posterior tibial pulses are brisk, his foot is warm to the touch and cap refill is less than 2 seconds, he is limited range of motion and states that he has slight sensation changes of his left lower extremity.  Neurologic: Alert & oriented x 3, decreased strength left upper and lower extremity, slight decrease sensation to the left lower extremity        RADIOLOGY/PROCEDURES  DX-FOOT-COMPLETE 3+ LEFT   Final Result         1.  No acute traumatic bony injury.      US-EXTREMITY VENOUS LOWER UNILAT LEFT   Final Result         1.  No evidence of left lower extremity deep venous thrombosis.            COURSE & MEDICAL DECISION MAKING  Pertinent Labs & Imaging studies reviewed. (See chart for details)  This is a 50-year-old gentleman presents with pain to his left lower extremity.  Here in the emergency department because no vascular abnormality with distal pulses that are brisk and a warm foot therefore do not believe he has arterial occlusion.  Initial DVT study was negative.  X-ray of the foot is negative.  I do believe he has having sequelae secondary to the CVA as his wife does state that his pain in feeling changes were present when he was discharged from the rehab facility.  I do not believe the patient has intracranial catastrophe such as a repeat intracranial hemorrhage, CVA.  I have asked the patient to follow with his primary care physician tomorrow for possible occupational therapy, physical therapy and further evaluation management possible with neurology.  The patient understands to be discharged.    FINAL IMPRESSION     1. Pain of left lower extremity Active     DISPOSITION:  Patient will be discharged home in stable condition.    FOLLOW  UP:  [facility], Emergency Dept  [address]  [place] [address]  [phone]    If symptoms worsen        Electronically signed by: Ilan Thakur D.O., 10/1/2020 8:50 PM

## 2021-03-24 ENCOUNTER — HOME HEALTH ADMISSION (OUTPATIENT)
Dept: HOME HEALTH SERVICES | Facility: HOME HEALTHCARE | Age: 51
End: 2021-03-24
Payer: MEDICAID

## 2021-09-22 ENCOUNTER — APPOINTMENT (OUTPATIENT)
Dept: RADIOLOGY | Facility: MEDICAL CENTER | Age: 51
End: 2021-09-22
Attending: EMERGENCY MEDICINE
Payer: MEDICAID

## 2021-09-22 ENCOUNTER — HOSPITAL ENCOUNTER (EMERGENCY)
Facility: MEDICAL CENTER | Age: 51
End: 2021-09-22
Attending: EMERGENCY MEDICINE
Payer: MEDICAID

## 2021-09-22 ENCOUNTER — HOSPITAL ENCOUNTER (EMERGENCY)
Facility: MEDICAL CENTER | Age: 51
End: 2021-09-24
Attending: EMERGENCY MEDICINE
Payer: MEDICAID

## 2021-09-22 VITALS
WEIGHT: 235 LBS | HEART RATE: 74 BPM | TEMPERATURE: 98.7 F | BODY MASS INDEX: 31.83 KG/M2 | HEIGHT: 72 IN | OXYGEN SATURATION: 95 % | DIASTOLIC BLOOD PRESSURE: 105 MMHG | SYSTOLIC BLOOD PRESSURE: 162 MMHG | RESPIRATION RATE: 18 BRPM

## 2021-09-22 DIAGNOSIS — I10 ESSENTIAL HYPERTENSION: ICD-10-CM
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
## 2021-09-22 DIAGNOSIS — R53.1 LEFT-SIDED WEAKNESS: ICD-10-CM

## 2021-09-22 DIAGNOSIS — Z86.79 HISTORY OF INTRACRANIAL HEMORRHAGE: ICD-10-CM

## 2021-09-22 DIAGNOSIS — R29.6 FREQUENT FALLS: ICD-10-CM

## 2021-09-22 LAB
ANION GAP SERPL CALC-SCNC: 11 MMOL/L (ref 7–16)
APTT PPP: 29.2 SEC (ref 24.7–36)
BASOPHILS # BLD AUTO: 0.6 % (ref 0–1.8)
BASOPHILS # BLD: 0.06 K/UL (ref 0–0.12)
BUN SERPL-MCNC: 15 MG/DL (ref 8–22)
CALCIUM SERPL-MCNC: 9.1 MG/DL (ref 8.5–10.5)
CHLORIDE SERPL-SCNC: 103 MMOL/L (ref 96–112)
CO2 SERPL-SCNC: 23 MMOL/L (ref 20–33)
CREAT SERPL-MCNC: 0.86 MG/DL (ref 0.5–1.4)
EOSINOPHIL # BLD AUTO: 0.19 K/UL (ref 0–0.51)
EOSINOPHIL NFR BLD: 1.8 % (ref 0–6.9)
ERYTHROCYTE [DISTWIDTH] IN BLOOD BY AUTOMATED COUNT: 40.5 FL (ref 35.9–50)
GLUCOSE SERPL-MCNC: 93 MG/DL (ref 65–99)
HCT VFR BLD AUTO: 52.7 % (ref 42–52)
HGB BLD-MCNC: 17.9 G/DL (ref 14–18)
IMM GRANULOCYTES # BLD AUTO: 0.07 K/UL (ref 0–0.11)
IMM GRANULOCYTES NFR BLD AUTO: 0.7 % (ref 0–0.9)
INR PPP: 0.97 (ref 0.87–1.13)
LYMPHOCYTES # BLD AUTO: 3.13 K/UL (ref 1–4.8)
LYMPHOCYTES NFR BLD: 29.1 % (ref 22–41)
MCH RBC QN AUTO: 28.6 PG (ref 27–33)
MCHC RBC AUTO-ENTMCNC: 34 G/DL (ref 33.7–35.3)
MCV RBC AUTO: 84.3 FL (ref 81.4–97.8)
MONOCYTES # BLD AUTO: 0.85 K/UL (ref 0–0.85)
MONOCYTES NFR BLD AUTO: 7.9 % (ref 0–13.4)
NEUTROPHILS # BLD AUTO: 6.46 K/UL (ref 1.82–7.42)
NEUTROPHILS NFR BLD: 59.9 % (ref 44–72)
NRBC # BLD AUTO: 0 K/UL
NRBC BLD-RTO: 0 /100 WBC
PLATELET # BLD AUTO: 285 K/UL (ref 164–446)
PMV BLD AUTO: 8.7 FL (ref 9–12.9)
POTASSIUM SERPL-SCNC: 4.1 MMOL/L (ref 3.6–5.5)
PROTHROMBIN TIME: 12.6 SEC (ref 12–14.6)
RBC # BLD AUTO: 6.25 M/UL (ref 4.7–6.1)
SODIUM SERPL-SCNC: 137 MMOL/L (ref 135–145)
TROPONIN T SERPL-MCNC: 20 NG/L (ref 6–19)
WBC # BLD AUTO: 10.8 K/UL (ref 4.8–10.8)

## 2021-09-22 PROCEDURE — 85730 THROMBOPLASTIN TIME PARTIAL: CPT

## 2021-09-22 PROCEDURE — 700102 HCHG RX REV CODE 250 W/ 637 OVERRIDE(OP): Performed by: EMERGENCY MEDICINE

## 2021-09-22 PROCEDURE — 96374 THER/PROPH/DIAG INJ IV PUSH: CPT

## 2021-09-22 PROCEDURE — A9270 NON-COVERED ITEM OR SERVICE: HCPCS | Performed by: EMERGENCY MEDICINE

## 2021-09-22 PROCEDURE — 99285 EMERGENCY DEPT VISIT HI MDM: CPT | Mod: 27

## 2021-09-22 PROCEDURE — A9270 NON-COVERED ITEM OR SERVICE: HCPCS

## 2021-09-22 PROCEDURE — 71045 X-RAY EXAM CHEST 1 VIEW: CPT

## 2021-09-22 PROCEDURE — 70450 CT HEAD/BRAIN W/O DYE: CPT

## 2021-09-22 PROCEDURE — 700111 HCHG RX REV CODE 636 W/ 250 OVERRIDE (IP): Performed by: EMERGENCY MEDICINE

## 2021-09-22 PROCEDURE — 84484 ASSAY OF TROPONIN QUANT: CPT

## 2021-09-22 PROCEDURE — 85610 PROTHROMBIN TIME: CPT

## 2021-09-22 PROCEDURE — 80048 BASIC METABOLIC PNL TOTAL CA: CPT

## 2021-09-22 PROCEDURE — 97165 OT EVAL LOW COMPLEX 30 MIN: CPT

## 2021-09-22 PROCEDURE — 73080 X-RAY EXAM OF ELBOW: CPT | Mod: LT

## 2021-09-22 PROCEDURE — 700102 HCHG RX REV CODE 250 W/ 637 OVERRIDE(OP)

## 2021-09-22 PROCEDURE — 86480 TB TEST CELL IMMUN MEASURE: CPT

## 2021-09-22 PROCEDURE — 85025 COMPLETE CBC W/AUTO DIFF WBC: CPT

## 2021-09-22 PROCEDURE — 99284 EMERGENCY DEPT VISIT MOD MDM: CPT

## 2021-09-22 PROCEDURE — 97161 PT EVAL LOW COMPLEX 20 MIN: CPT

## 2021-09-22 RX ORDER — DOXAZOSIN MESYLATE 4 MG/1
4 TABLET ORAL
Qty: 30 TABLET | Refills: 0 | Status: SHIPPED | OUTPATIENT
Start: 2021-09-22 | End: 2021-09-22 | Stop reason: SDUPTHER

## 2021-09-22 RX ORDER — LISINOPRIL 40 MG/1
40 TABLET ORAL DAILY
Status: SHIPPED | COMMUNITY
End: 2024-01-13

## 2021-09-22 RX ORDER — ENALAPRIL MALEATE 10 MG/1
10 TABLET ORAL DAILY
Qty: 30 TABLET | Refills: 0 | Status: SHIPPED | OUTPATIENT
Start: 2021-09-22 | End: 2021-09-22 | Stop reason: SDUPTHER

## 2021-09-22 RX ORDER — ACETAMINOPHEN 325 MG/1
650 TABLET ORAL EVERY 4 HOURS PRN
Status: DISCONTINUED | OUTPATIENT
Start: 2021-09-22 | End: 2021-09-24 | Stop reason: HOSPADM

## 2021-09-22 RX ORDER — LISINOPRIL 20 MG/1
40 TABLET ORAL DAILY
Status: DISCONTINUED | OUTPATIENT
Start: 2021-09-23 | End: 2021-09-24 | Stop reason: HOSPADM

## 2021-09-22 RX ORDER — ENALAPRIL MALEATE 10 MG/1
10 TABLET ORAL ONCE
Status: COMPLETED | OUTPATIENT
Start: 2021-09-22 | End: 2021-09-22

## 2021-09-22 RX ORDER — DOXAZOSIN MESYLATE 4 MG/1
4 TABLET ORAL
Qty: 30 TABLET | Refills: 0 | Status: SHIPPED | OUTPATIENT
Start: 2021-09-22 | End: 2021-09-22

## 2021-09-22 RX ORDER — ACETAMINOPHEN 325 MG/1
650 TABLET ORAL ONCE
Status: COMPLETED | OUTPATIENT
Start: 2021-09-22 | End: 2021-09-22

## 2021-09-22 RX ORDER — OMEPRAZOLE 20 MG/1
20 CAPSULE, DELAYED RELEASE ORAL DAILY
Qty: 30 CAPSULE | Refills: 0 | Status: SHIPPED | OUTPATIENT
Start: 2021-09-22 | End: 2021-09-22 | Stop reason: SDUPTHER

## 2021-09-22 RX ORDER — AMLODIPINE BESYLATE 5 MG/1
10 TABLET ORAL ONCE
Status: COMPLETED | OUTPATIENT
Start: 2021-09-22 | End: 2021-09-22

## 2021-09-22 RX ORDER — HYDRALAZINE HYDROCHLORIDE 20 MG/ML
10 INJECTION INTRAMUSCULAR; INTRAVENOUS ONCE
Status: COMPLETED | OUTPATIENT
Start: 2021-09-22 | End: 2021-09-22

## 2021-09-22 RX ORDER — ENALAPRIL MALEATE 10 MG/1
10 TABLET ORAL DAILY
Qty: 30 TABLET | Refills: 0 | Status: SHIPPED | OUTPATIENT
Start: 2021-09-22 | End: 2021-09-22

## 2021-09-22 RX ORDER — OMEPRAZOLE 20 MG/1
20 CAPSULE, DELAYED RELEASE ORAL DAILY
Qty: 30 CAPSULE | Refills: 0 | Status: SHIPPED | OUTPATIENT
Start: 2021-09-22 | End: 2021-09-22

## 2021-09-22 RX ORDER — ATORVASTATIN CALCIUM 80 MG/1
40 TABLET, FILM COATED ORAL EVERY EVENING
Qty: 30 TABLET | Refills: 0 | Status: SHIPPED | OUTPATIENT
Start: 2021-09-22 | End: 2021-09-22 | Stop reason: SDUPTHER

## 2021-09-22 RX ORDER — ATORVASTATIN CALCIUM 80 MG/1
40 TABLET, FILM COATED ORAL EVERY EVENING
Qty: 30 TABLET | Refills: 0 | Status: SHIPPED | OUTPATIENT
Start: 2021-09-22 | End: 2021-09-22

## 2021-09-22 RX ORDER — AMLODIPINE BESYLATE 5 MG/1
10 TABLET ORAL DAILY
Status: DISCONTINUED | OUTPATIENT
Start: 2021-09-23 | End: 2021-09-24 | Stop reason: HOSPADM

## 2021-09-22 RX ORDER — AMLODIPINE BESYLATE 10 MG/1
10 TABLET ORAL DAILY
Qty: 30 TABLET | Refills: 0 | Status: SHIPPED | OUTPATIENT
Start: 2021-09-22 | End: 2021-09-22 | Stop reason: SDUPTHER

## 2021-09-22 RX ORDER — AMLODIPINE BESYLATE 10 MG/1
10 TABLET ORAL DAILY
Qty: 30 TABLET | Refills: 0 | Status: SHIPPED | OUTPATIENT
Start: 2021-09-22 | End: 2024-01-13

## 2021-09-22 RX ORDER — ATORVASTATIN CALCIUM 20 MG/1
20 TABLET, FILM COATED ORAL DAILY
Status: DISCONTINUED | OUTPATIENT
Start: 2021-09-23 | End: 2021-09-24 | Stop reason: HOSPADM

## 2021-09-22 RX ORDER — ATORVASTATIN CALCIUM 20 MG/1
20 TABLET, FILM COATED ORAL
COMMUNITY

## 2021-09-22 RX ADMIN — HYDRALAZINE HYDROCHLORIDE 10 MG: 20 INJECTION INTRAMUSCULAR; INTRAVENOUS at 03:54

## 2021-09-22 RX ADMIN — AMLODIPINE BESYLATE 10 MG: 5 TABLET ORAL at 09:45

## 2021-09-22 RX ADMIN — ENALAPRIL MALEATE 10 MG: 10 TABLET ORAL at 10:27

## 2021-09-22 RX ADMIN — ENALAPRIL MALEATE 10 MG: 10 TABLET ORAL at 02:12

## 2021-09-22 RX ADMIN — ACETAMINOPHEN 650 MG: 325 TABLET, FILM COATED ORAL at 10:35

## 2021-09-22 RX ADMIN — ACETAMINOPHEN 650 MG: 325 TABLET, FILM COATED ORAL at 18:50

## 2021-09-22 RX ADMIN — AMLODIPINE BESYLATE 10 MG: 5 TABLET ORAL at 02:12

## 2021-09-22 ASSESSMENT — COGNITIVE AND FUNCTIONAL STATUS - GENERAL
MOVING TO AND FROM BED TO CHAIR: A LITTLE
HELP NEEDED FOR BATHING: A LITTLE
DRESSING REGULAR LOWER BODY CLOTHING: A LITTLE
TURNING FROM BACK TO SIDE WHILE IN FLAT BAD: A LITTLE
SUGGESTED CMS G CODE MODIFIER MOBILITY: CL
STANDING UP FROM CHAIR USING ARMS: A LITTLE
MOVING FROM LYING ON BACK TO SITTING ON SIDE OF FLAT BED: A LITTLE
DAILY ACTIVITIY SCORE: 22
CLIMB 3 TO 5 STEPS WITH RAILING: TOTAL
WALKING IN HOSPITAL ROOM: TOTAL
SUGGESTED CMS G CODE MODIFIER DAILY ACTIVITY: CJ
MOBILITY SCORE: 14

## 2021-09-22 ASSESSMENT — ENCOUNTER SYMPTOMS
CHILLS: 0
FEVER: 0
NAUSEA: 0
SHORTNESS OF BREATH: 1
VOMITING: 0

## 2021-09-22 ASSESSMENT — FIBROSIS 4 INDEX
FIB4 SCORE: 0.41
FIB4 SCORE: 0.44

## 2021-09-22 ASSESSMENT — GAIT ASSESSMENTS: GAIT LEVEL OF ASSIST: UNABLE TO PARTICIPATE

## 2021-09-22 ASSESSMENT — LIFESTYLE VARIABLES
DOES PATIENT WANT TO STOP DRINKING: NO
DO YOU DRINK ALCOHOL: NO
DO YOU DRINK ALCOHOL: NO

## 2021-09-22 ASSESSMENT — ACTIVITIES OF DAILY LIVING (ADL): TOILETING: INDEPENDENT

## 2021-09-22 NOTE — ED PROVIDER NOTES
ED Provider Note        Primary care provider: Pcp Pt States None    I verified that the patient was wearing a mask and I was wearing appropriate PPE every time I entered the room. The patient's mask was on the patient at all times during my encounter except for a brief view of the oropharynx.      CHIEF COMPLAINT  Chief Complaint   Patient presents with   • Fall   • Arm Pain   • Rib Pain       HPI  Jacinto Loyola is a 51 y.o. male who presents to the Emergency Department with chief complaint of fall.  Patient has chronic left-sided deficits after stroke 2 years prior.  Been noncompliant with all of his medications due to life stresses and moving recently.  Patient's had several falls over the last few days.  Fell from his wheelchair today landing on the left side.  He states severe pain over the left side of his chest and left elbow since then also reports that he has had headache and sense of numbness over the left side of his face since then.  Unclear whether he was knocked out he denies abdominal pain he has no right upper extremity lower extremity pain no left lower extremity pain no abdominal pain pelvic pain no fevers chills cough  shortness of breath no other acute symptoms or concerns.    REVIEW OF SYSTEMS  10 systems reviewed and otherwise negative, pertinent positives and negatives listed in the history of present illness.    PAST MEDICAL HISTORY   has a past medical history of Depression (8/28/2009), HTN (8/28/2009), Migraines, neuralgic (8/28/2009), and Stroke ([facility]).    SURGICAL HISTORY   has a past surgical history that includes knee reconstruction; appendectomy; place percut gastrostomy tube (Left, 1/13/2020); and gastroscopy (N/A, 1/13/2020).    SOCIAL HISTORY  Social History     Tobacco Use   • Smoking status: Never Smoker   • Smokeless tobacco: Never Used   Vaping Use   • Vaping Use: Never used   Substance Use Topics   • Alcohol use: Yes   • Drug use: Not Currently     Types: Intravenous      Comment: daily marijuana use      Social History     Substance and Sexual Activity   Drug Use Not Currently   • Types: Intravenous    Comment: daily marijuana use       FAMILY HISTORY  Non-Contributory    CURRENT MEDICATIONS  Home Medications    **Home medications have not yet been reviewed for this encounter**         ALLERGIES  No Known Allergies    PHYSICAL EXAM  VITAL SIGNS: BP (!) 203/130   Pulse 78   Temp 36.9 °C (98.4 °F) (Temporal)   Resp 18   Ht 1.829 m (6')   Wt 107 kg (235 lb)   SpO2 95%   BMI 31.87 kg/m²   Pulse ox interpretation: I interpret this pulse ox as normal.  Constitutional: Alert and oriented x 3, minimal distress  HEENT: Minor abrasion over the left temporal area normocephalic, pupils are equal round, extraocular movements are intact. The nares is clear, external ears are normal, mouth shows moist mucous membranes  Neck: no obvious JVD or tracheal deviation  Cardiovascular: Regular rate and rhythm no murmur rub or gallop   Thorax & Lungs: No respiratory distress, no wheezes rales or rhonchi, tender along the left mid axillary line no outward sign of trauma  GI: Soft nontender nondistended positive bowel sounds, no peritoneal signs  Skin: Warm dry no obvious acute rash or lesion  Musculoskeletal: Chronic contractures left upper and left lower extremity decreased range of motion in the left upper and left lower due to prior stroke.  Patient has tenderness to palpation about the left elbow.  Left lower and bilateral right extremities are unremarkable.  Neurologic: Cranial nerves III through XII are grossly intact, no sensory deficit, no cerebellar dysfunction   Psychiatric: Appropriate affect for situation at this time      DIAGNOSTIC STUDIES / PROCEDURES  LABS      Results for orders placed or performed during the hospital encounter of 03/25/20   CBC WITH DIFFERENTIAL   Result Value Ref Range    WBC 8.7 4.8 - 10.8 K/uL    RBC 5.50 4.70 - 6.10 M/uL    Hemoglobin 16.0 14.0 - 18.0 g/dL     Hematocrit 46.2 42.0 - 52.0 %    MCV 84.0 81.4 - 97.8 fL    MCH 29.1 27.0 - 33.0 pg    MCHC 34.6 33.7 - 35.3 g/dL    RDW 39.8 35.9 - 50.0 fL    Platelet Count 262 164 - 446 K/uL    MPV 8.5 (L) 9.0 - 12.9 fL    Neutrophils-Polys 50.20 44.00 - 72.00 %    Lymphocytes 39.60 22.00 - 41.00 %    Monocytes 7.20 0.00 - 13.40 %    Eosinophils 1.80 0.00 - 6.90 %    Basophils 0.60 0.00 - 1.80 %    Immature Granulocytes 0.60 0.00 - 0.90 %    Nucleated RBC 0.00 /100 WBC    Neutrophils (Absolute) 4.35 1.82 - 7.42 K/uL    Lymphs (Absolute) 3.43 1.00 - 4.80 K/uL    Monos (Absolute) 0.62 0.00 - 0.85 K/uL    Eos (Absolute) 0.16 0.00 - 0.51 K/uL    Baso (Absolute) 0.05 0.00 - 0.12 K/uL    Immature Granulocytes (abs) 0.05 0.00 - 0.11 K/uL    NRBC (Absolute) 0.00 K/uL   COMP METABOLIC PANEL   Result Value Ref Range    Sodium 138 135 - 145 mmol/L    Potassium 3.5 (L) 3.6 - 5.5 mmol/L    Chloride 98 96 - 112 mmol/L    Co2 28 20 - 33 mmol/L    Anion Gap 12.0 7.0 - 16.0    Glucose 101 (H) 65 - 99 mg/dL    Bun 22 8 - 22 mg/dL    Creatinine 1.06 0.50 - 1.40 mg/dL    Calcium 9.8 8.5 - 10.5 mg/dL    AST(SGOT) 9 (L) 12 - 45 U/L    ALT(SGPT) 15 2 - 50 U/L    Alkaline Phosphatase 83 30 - 99 U/L    Total Bilirubin 0.4 0.1 - 1.5 mg/dL    Albumin 4.3 3.2 - 4.9 g/dL    Total Protein 7.9 6.0 - 8.2 g/dL    Globulin 3.6 (H) 1.9 - 3.5 g/dL    A-G Ratio 1.2 g/dL   PROTHROMBIN TIME   Result Value Ref Range    PT 13.2 12.0 - 14.6 sec    INR 0.99 0.87 - 1.13   APTT   Result Value Ref Range    APTT 29.2 24.7 - 36.0 sec   COD (ADULT)   Result Value Ref Range    ABO Grouping Only O     Rh Grouping Only NEG     Antibody Screen-Cod NEG    TROPONIN   Result Value Ref Range    Troponin T 16 6 - 19 ng/L   URINALYSIS CULTURE, IF INDICATED    Specimen: Urine   Result Value Ref Range    Color Yellow     Character Clear     Ph 5.0 5.0 - 8.0    Glucose Negative Negative mg/dL    Ketones Negative Negative mg/dL    Protein Negative Negative mg/dL    Bilirubin Negative  Negative    Urobilinogen, Urine 1.0 Negative    Nitrite Positive (A) Negative    Leukocyte Esterase Small (A) Negative    Occult Blood Negative Negative    Micro Urine Req Microscopic    ESTIMATED GFR   Result Value Ref Range    GFR If African American >60 >60 mL/min/1.73 m 2    GFR If Non African American >60 >60 mL/min/1.73 m 2   URINE DRUG SCREEN   Result Value Ref Range    Amphetamines Urine Negative Negative    Barbiturates Negative Negative    Benzodiazepines Negative Negative    Cocaine Metabolite Negative Negative    Methadone Negative Negative    Opiates Negative Negative    Oxycodone Negative Negative    Phencyclidine -Pcp Negative Negative    Propoxyphene Negative Negative    Cannabinoid Metab Negative Negative   Hemoglobin A1C   Result Value Ref Range    Glycohemoglobin 5.8 (H) 0.0 - 5.6 %    Est Avg Glucose 120 mg/dL   REFRACTOMETER SG   Result Value Ref Range    Specific Gravity >1.045    URINE MICROSCOPIC (W/UA)   Result Value Ref Range    WBC 10-20 (A) /hpf    RBC 10-20 (A) /hpf    Bacteria Few (A) None /hpf    Epithelial Cells Few /hpf    Mucous Threads Rare /hpf    Hyaline Cast 0-2 /lpf   URINE CULTURE(NEW)    Specimen: Urine   Result Value Ref Range    Significant Indicator POS (POS)     Source UR     Site -     Culture Result - (A)     Culture Result Escherichia coli  >100,000 cfu/mL   (A)        Susceptibility    Escherichia coli - ROSA     Ampicillin 16 Intermediate mcg/mL     Ceftriaxone <=1 Sensitive mcg/mL     Ceftazidime <=1 Sensitive mcg/mL     Cefotaxime <=2 Sensitive mcg/mL     Cefazolin <=2 Sensitive mcg/mL     Ciprofloxacin <=1 Sensitive mcg/mL     Ampicillin/sulbactam <=8/4 Sensitive mcg/mL     Cefepime <=2 Sensitive mcg/mL     Tobramycin <=4 Sensitive mcg/mL     Cefotetan <=16 Sensitive mcg/mL     Nitrofurantoin <=32 Sensitive mcg/mL     Gentamicin <=4 Sensitive mcg/mL     Levofloxacin <=2 Sensitive mcg/mL     Pip/Tazobactam <=16 Sensitive mcg/mL     Trimeth/Sulfa <=2/38 Sensitive  mcg/mL   EKG (NOW)   Result Value Ref Range    Report       [facility] Emergency Dept.    Test Date:  2020  Pt Name:    TERESA KIM          Department: ER  MRN:        [id number]                      Room:       RD 11  Gender:     Male                         Technician: [id number]  :        1970                   Requested By:KRISTIN CABALLERO  Order #:    [id number]                    Reading MD: Kristin Caballero MD    Measurements  Intervals                                Axis  Rate:       89                           P:          76  WY:         168                          QRS:        1  QRSD:       94                           T:          133  QT:         356  QTc:        434    Interpretive Statements  SINUS RHYTHM  Normal axis and intervals  No ectopy or hypertrophy  T wave inversions in lateral leads  No ST change  Compared to 2019  No significant change from prior  Electronically Signed On 3- 2:29:52 PDT by Kristin Caballero MD         All labs reviewed by me.      RADIOLOGY  No orders to display         COURSE & MEDICAL DECISION MAKING  Pertinent Labs & Imaging studies reviewed. (See chart for details)    2:03 AM - Patient seen and examined at bedside.     Coagulation studies were ordered due to head injury and possible need for anticoagulation.    Patient noted to have slightly elevated blood pressure likely circumstantial secondary to presenting complaint. Referred to primary care physician for further evaluation.        Medical Decision Makin-year-old male previous stroke left-sided deficit fell out of his chair this evening left-sided elbow chest and shoulder pain and headache.  Head CT is negative all x-rays are negative he was hypertensive to 200 systolic at arrival he was given his oral medications and then given a small dose of hydralazine his symptoms have improved his vital signs are vastly improved he is instructed to follow-up on with his primary  care physician he is given new prescriptions for all of his antihypertensives and diuretics.  Return for worsening symptoms or concerns otherwise discharged in stable and improved condition.    BP (!) 203/130   Pulse 78   Temp 36.9 °C (98.4 °F) (Temporal)   Resp 18   Ht 1.829 m (6')   Wt 107 kg (235 lb)   SpO2 95%   BMI 31.87 kg/m²     Cuca Greenberg, ANAND.N.P.  [address]  [facility] [id number] [phone]    Schedule an appointment as soon as possible for a visit       [facility], Emergency Dept  [address] [address] [phone]    in 12-24 hours if symptoms persist, immediately If symptoms worsen, or if you develop any other symptoms or concerns      Discharge Medication List as of 9/22/2021  5:21 AM          FINAL IMPRESSION  1. Fall, initial encounter Active    2.  Hypertensive urgency  3.  Close head injury  4.  Elbow contusion  5.  Left chest contusion    This dictation has been created using voice recognition software and/or scribes. The accuracy of the dictation is limited by the abilities of the software and the expertise of the scribes. I expect there may be some errors of grammar and possibly content. I made every attempt to manually correct the errors within my dictation. However, errors related to voice recognition software and/or scribes may still exist and should be interpreted within the appropriate context.

## 2021-09-22 NOTE — THERAPY
"Occupational Therapy   Initial Evaluation     Patient Name: Jacinto Loyola  Age:  51 y.o., Sex:  male  Medical Record #: [id number]  Today's Date: 9/22/2021     Precautions  Precautions: (P) Fall Risk  Comments: (P) CVA 2 years ago, L sided deficits    Assessment  Patient is 51 y.o. male admitted for GLF, pt states multiple falls lately, pt has history of CVA with left sided deficits and uses a W/C at baseline, has not ambulated for over a year following stay at SNF, pt lives in an apartment alone and has son assist but he works out of town, LUE non-functional at baseline from hypertonicity. Pt seems to be at his functional baseline with chronic deficits, supervision for mobility and ADLs with more time given and one handed dressing techniques, pt would benefit from home health therapy or possible need for alternative living arrangement ([facility] or LTC). Patient will not be actively followed for occupational therapy services at this time, however may be seen if requested by physician for 1 more visit within 30 days to address any discharge or equipment needs.     Plan    Recommend Occupational Therapy for Evaluation only.    DC Equipment Recommendations: (P) None  Discharge Recommendations: (P) Recommend home health for continued occupational therapy services     Subjective    \"My son thinks I need more help\"     Objective       09/22/21 1255   Prior Living Situation   Prior Services None   Housing / Facility 1 Story Apartment / Condo   Steps Into Home 0   Steps In Home 0   Elevator Yes   Bathroom Set up Bathtub / Shower Combination;Tub Transfer Bench   Equipment Owned Wheelchair;Power Wheel Chair;Tub Transfer Bench   Lives with - Patient's Self Care Capacity Alone and Able to Care For Self   Prior Level of ADL Function   Self Feeding Independent   Grooming / Hygiene Independent   Bathing Independent   Dressing Independent   Toileting Independent   Prior Level of IADL Function   Medication Management Independent "   Laundry Independent   Kitchen Mobility Independent   Finances Independent   Home Management Independent   Shopping Requires Assist   Prior Level Of Mobility Uses Wheel Chair for Community Mobility;Uses Wheel Chair for in Home Mobility   Driving / Transportation Relatives / Others Provide Transportation   History of Falls   History of Falls Yes   Date of Last Fall   (reports multiple falls)   Precautions   Precautions Fall Risk   Comments CVA 2 years ago, L sided deficits   Cognition    Cognition / Consciousness WDL   Comments Pleasant, cooperative, gets upset about current living situation   Active ROM Upper Body   Active ROM Upper Body  X   Dominant Hand Right   Comments LUE limited by hypertonicity in flexor synergy pattern   Strength Upper Body   Upper Body Strength  X   Comments RUE WFL, LUE non functional   Sensation Upper Body   Upper Extremity Sensation  WDL   Upper Body Muscle Tone   Upper Body Muscle Tone  X   Lt Upper Extremity Muscle Tone Non Functional;Hypertonic  (baseline function)   Balance Assessment   Sitting Balance (Static) Good   Sitting Balance (Dynamic) Fair +   Standing Balance (Static) Fair +   Standing Balance (Dynamic) Fair   Weight Shift Sitting Good   Weight Shift Standing Fair   Comments no AD, pivot transfer   Bed Mobility    Supine to Sit Supervised   Sit to Supine Supervised   Scooting Supervised   ADL Assessment   Upper Body Dressing Supervision   Lower Body Dressing Supervision   Toileting   (NT-refused)   How much help from another person does the patient currently need...   Putting on and taking off regular lower body clothing? 3   Bathing (including washing, rinsing, and drying)? 3   Toileting, which includes using a toilet, bedpan, or urinal? 4   Putting on and taking off regular upper body clothing? 4   Taking care of personal grooming such as brushing teeth? 4   Eating meals? 4   6 Clicks Daily Activity Score 22   Functional Mobility   Sit to Stand Supervised   Bed, Chair,  Wheelchair Transfer Supervised   Toilet Transfers Refused   Transfer Method Stand Pivot   Mobility bed mobility, pivot to wheelchair and back   Activity Tolerance   Sitting in Chair 5 min in WC   Sitting Edge of Bed 5 min   Standing 5 min   Education Group   Education Provided Role of Occupational Therapist   Role of Occupational Therapist Patient Response Patient;Acceptance;Explanation   Problem List   Problem List None   Interdisciplinary Plan of Care Collaboration   IDT Collaboration with  Nursing;Physical Therapist   Patient Position at End of Therapy In Bed;Call Light within Reach;Tray Table within Reach;Phone within Reach   Collaboration Comments RN updated

## 2021-09-22 NOTE — ED NOTES
Pt medicated per MAR. [facility].   Pt on phone with son, updated son on the plan of care.     Awaiting PT and OT eval.

## 2021-09-22 NOTE — ED PROVIDER NOTES
"ED Provider Note    Scribed for Juan Moore M.D. by Marni Mendoza. 9/22/2021, 9:25 AM.    Primary care provider: OMEGA Sanchez  Means of arrival: Walk in  History obtained from: Patient  History limited by: None    CHIEF COMPLAINT  Chief Complaint   Patient presents with   • Other     Pt was just discharged this AM and went to his sons house. Pt states \"he was breathing really heavy\" and his son was concerned so he brought him back. Pt denies chest pain. Pt currently breathing normal. Pt also states \"he is really struggling since his stroke\". Pt wants to go back to a rehab to get \"stronger\".        HPI  Jacinto Loyola is a 51 y.o. male, with a history of left sided deficit from previous hemorrhagic stroke, who presents to the Emergency Department for frequent falls. He states he fell on 3-4 separate occasions the past 2 weeks. Patient was just discharged home this morning after being treated for a hemorrhagic stroke. He states he got into an [employer] to return to his son's home today, but his Uber  returned him to the hospital due to him \"struggling to breath.\" Exacerbating factors include bearing weight on his left side. No alleviating factors were identified. He states he would like to be returned to [facility] for rehabilitation. His shortness of breath has now resolved.  He denies any associated chest pain, nausea, vomiting, fever, or chills. Patient has not been compliant with his hypertension mediations for several years.     Additional history was later obtained and the patient states he cannot live where he has been living.  He lost his benefits and no longer can afford to live in his weekly motel.  His son went to get his stuff.  His son cannot accommodate him because he is in a small home which she shares and there is no bathroom is adequate.    Patient denies any other acute concerns or complaints.  He reports not being compliant with his blood pressure medications.    REVIEW OF " SYSTEMS  Review of Systems   Constitutional: Negative for chills and fever.   Respiratory: Positive for shortness of breath (resolved).    Cardiovascular: Negative for chest pain.   Gastrointestinal: Negative for nausea and vomiting.   Neurological:        Frequent falls   All other systems reviewed and are negative.      PAST MEDICAL HISTORY   has a past medical history of Depression (8/28/2009), HTN (8/28/2009), Migraines, neuralgic (8/28/2009), and Stroke (HCC).    SURGICAL HISTORY   has a past surgical history that includes knee reconstruction; appendectomy; place percut gastrostomy tube (Left, 1/13/2020); and gastroscopy (N/A, 1/13/2020).    SOCIAL HISTORY  Social History     Tobacco Use   • Smoking status: Never Smoker   • Smokeless tobacco: Never Used   Vaping Use   • Vaping Use: Never used   Substance Use Topics   • Alcohol use: Yes   • Drug use: Not Currently     Types: Intravenous     Comment: daily marijuana use      Social History     Substance and Sexual Activity   Drug Use Not Currently   • Types: Intravenous    Comment: daily marijuana use       FAMILY HISTORY  None noted.    CURRENT MEDICATIONS  Home Medications     Reviewed by Katelyn Darnell R.N. (Registered Nurse) on 09/22/21 at 0803  Med List Status: Not Addressed   Medication Last Dose Status   acetaminophen (TYLENOL) 325 MG Tab  Active   amLODIPine (NORVASC) 10 MG Tab  Active   atorvastatin (LIPITOR) 80 MG tablet  Active   chlorthalidone (HYGROTON) 50 MG Tab  Active   doxazosin (CARDURA) 4 MG Tab  Active   enalapril (VASOTEC) 10 MG Tab  Active   omeprazole (PRILOSEC) 20 MG delayed-release capsule  Active   ondansetron (ZOFRAN ODT) 4 MG TABLET DISPERSIBLE  Active                ALLERGIES  No Known Allergies    PHYSICAL EXAM  VITAL SIGNS: BP (!) 166/122   Pulse (!) 110   Temp 36.3 °C (97.4 °F) (Temporal)   Resp 18   Ht 1.829 m (6')   Wt 107 kg (235 lb)   SpO2 95%   BMI 31.87 kg/m²   Vitals reviewed.  Constitutional: Awake alert  nontoxic.  Mild distress  HENT: Normocephalic, Atraumatic, Bilateral external ears normal, Oropharynx moist, No oral exudates, Nose normal.   Eyes: PERRL, EOMI, Conjunctiva normal, No discharge.   Neck: Normal range of motion, No tenderness, Supple, No stridor.   Cardiovascular: Normal heart rate, Normal rhythm, No murmurs, No rubs, No gallops.   Thorax & Lungs: Normal breath sounds, No respiratory distress, No wheezing, no chest wall tenderness.  Abdomen: Bowel sounds normal, Soft, No tenderness  Skin: Warm, Dry, No erythema, No rash.   Back: No tenderness, No CVA tenderness.   Musculoskeletal: Good range of motion in all major joints.  No asymmetric edema.  Neurologic: Alert, Normal sensory function, left sided deficit from previous stroke.  Psychiatric: Anxious    COURSE & MEDICAL DECISION MAKING  Pertinent Labs & Imaging studies reviewed. (See chart for details)    Obtained and reviewed past medical records which indicated he has a history of a hemorrhagic stroke.    9:25 AM Patient seen and examined at bedside. Discussed plan of care with the patient. I informed him I will get in contact with social workers for rehabilitation. He is understanding and agreeable to plan. The patient presents with frequent falls, and the inability to care for himself.  This is second visit today.. Patient will be treated with Enalapril 10 mg tablet, and Amlodipine 10 mg tablet for his symptoms.  His blood pressure is high he was given some blood pressure medications.    10:33 AM - Patient will be treated with Tylenol 650 mg tablet for pain management.     The patient was seen here last night and he was worked up for falls with imaging and labs which are all reassuring.  I do not feel he has any new or different trauma that requires any further imaging or more intensive imaging like a CT scan.  Repeat labs are not indicated.    I spoke with social work because the patient states he feels like he needs to go back to the rehab  facility.  They requested order PT and OT consult.  I have ordered this.  They state they were able to see him around 1240.    PT and OT recommend home health if possible.  Do not feel he is likely to be a candidate for [facility] but did write up the recommendations.  I spoke with case management and they will send the patient's paperwork over to [facility] to see if the patient is a candidate.     Spoke with case management who agreed to come see the patient.  They will continue to work with the patient to see if he is a candidate for [facility].  I have ordered home health care PT and completed a face-to-face form requirements.    If they are unable to get into [facility] they work in a group home.  I spoke with the patient's son and he states that living him is not an option.  If all else fails he will arrange a hotel for him.  The patient states he does not want to do this.  He only wants to [facility]    2:26 PM - Patient was reevaluated at bedside. He is resting comfortably in bed with no new complaints. He states he does not have a place to stay, and there is no other family that can take him in. I informed him I will speak to someone to get him into the homeless shelter.    Social work states he cannot go to a shelter being debilitated in a wheelchair.  He will need to go to a group home.  We are waiting on hearing back from [facility] for skilled nursing placement, if not a group home, if not his son spoke with may arrange in the amount of town with the [place].  Disposition is pending turned over to my partner.           The patient will return for new or worsening symptoms and is stable at the time of discharge. Patient verbalizes understanding and agreement to this plan of care.      The patient is referred to a primary physician for blood pressure management, diabetic screening, and for all other preventative health concerns.    DISPOSITION:  Patient will be discharged home in stable  condition.    FOLLOW UP:  No follow-up provider specified.    OUTPATIENT MEDICATIONS:  New Prescriptions    No medications on file       FINAL IMPRESSION  1. Left-sided weakness    2. History of intracranial hemorrhage    3. Essential hypertension    4. Frequent falls          Marni MARTINEZ (Scribadore), am scribing for, and in the presence of, Juan Moore M.D..    Electronically signed by: Marni Mendoza (Slyibadore), 9/22/2021    IJuan M.D. personally performed the services described in this documentation, as scribed by Marni Mendoza in my presence, and it is both accurate and complete.    C    The note accurately reflects work and decisions made by me.  Juan Moore M.D.  9/22/2021  4:55 PM      Addendum: Pharmacy department will be consulted to continue his home meds while here.

## 2021-09-22 NOTE — ED TRIAGE NOTES
Chief Complaint   Patient presents with   • Fall   • Arm Pain   • Rib Pain     Pt presents for hx of recent falls over past week. Today fell around 1500 on L side while tranferring to wheelchair. Pt now has L rib and arm pain. Pt has hx of hemorrhagic stroke with L sided deficits and is non compliant with htn meds. Pt hypertensive with EMS and on arrival. States he may have hit his head on one of his falls but isnt sure. States no headpain, and no resulting injury if he did.

## 2021-09-22 NOTE — DISCHARGE PLANNING
Met with patient to discuss discharge plans, was at [facility] about a year ago and would like to go back. PT and OT to eval, discussed with ERP.

## 2021-09-22 NOTE — DISCHARGE PLANNING
Spoke with son who states patient just keeps falling and unable to be alone.  Got kicked out of apt.  Because lost disability due to over payment and he is not sure when he will get it back. Patient unable to stay with son as lives in a apartment that he wouldn't be able to get into bathroom.  Sent referrals to SNF to see if can be accepted again. Told son will have to look at group homes or weekly rental with home health.[facility] space [facility] but pt is to young for that.  Could do home health if had a place to live.

## 2021-09-22 NOTE — ED TRIAGE NOTES
"Chief Complaint   Patient presents with   • Other     Pt was just discharged this AM and went to his sons house. Pt states \"he was breathing really heavy\" and his son was concerned so he brought him back. Pt denies chest pain. Pt currently breathing normal. Pt also states \"he is really struggling since his stroke\". Pt wants to go back to a rehab to get \"stronger\".        BP (!) 166/122   Pulse (!) 110   Temp 36.3 °C (97.4 °F) (Temporal)   Resp 18   Ht 1.829 m (6')   Wt 107 kg (235 lb)   SpO2 95%   BMI 31.87 kg/m²     Patient arrived to ED for the above complaint. Triage process explained to patient. Patient placed in lobby and told to notify staff of any changes.   "

## 2021-09-22 NOTE — ED NOTES
Pt to room. Changed into gown. Chart up for erp.    Pt advised no current complaints, requesting to be admitted to a skilled facility.

## 2021-09-22 NOTE — THERAPY
Physical Therapy   Initial Evaluation     Patient Name: Jacinto Loyola  Age:  51 y.o., Sex:  male  Medical Record #: [id number]  Today's Date: 9/22/2021     Precautions: Fall Risk  Comments: hx CVA 2 years ago, L deficits    Assessment  Patient is a 51 y.o. male presenting following GLF. Pt endorses a few GLFs in the past week. Pt with hx of CVA with L sided deficits and uses a WC at baseline. Pt reports has not ambulated in the last year. Pt states he lives in an apartment alone, and that his son lives close by but works out of town. Pt demonstrated ability to transfer to/from WC without assist, no LOB observed. LUE nonfunctional and LLE appears hypertonic, however pt able to use LLE during tranfers. Pt reports he is fearful of falling which has made mobility at home more difficult. Pt appears at his functional baseline and would benefit from HHPT upon DC to assist in home safety evaluation and preserving CLOF. Pt would also likely benefit from incr assistance for IADLs such as hired caregivers or may ultimately benefit from new living arrangement ie: [facility] v LTC. Patient will not be actively followed for physical therapy services at this time given functional baseline status, however may be seen if requested by physician for 1 more visit within 30 days to address any discharge or equipment needs.       Plan  Recommend Physical Therapy for Evaluation only   DC Equipment Recommendations: None  Discharge Recommendations: Recommend home health for continued physical therapy services (may need incr caregiver assistance v LTC in the future)          09/22/21 1248   Prior Living Situation   Prior Services None   Housing / Facility 1 Story Apartment / Condo   Steps Into Home 0   Steps In Home 0   Elevator Yes   Bathroom Set up Bathtub / Shower Combination;Tub Transfer Bench   Equipment Owned Wheelchair;Power Wheel Chair   Lives with -  Alone and Able to Care For Self   Comments pt reports lives alone and that his son  lives close by, and that when he's not out of town for work he is able to assist if needed. pt reports he is indep with ADLs and xfrs to/from his WC.    Prior Level of Functional Mobility   Bed Mobility Independent   Transfer Status Independent   Ambulation hasnt ambulated in ~1 year per pt   Assistive Devices Used Wheelchair   Wheelchair Independent   Comments pt most uses his power chair   History of Falls   History of Falls Yes   Date of Last Fall reports 3-4 in the last week when xfring   Balance Assessment   Sitting Balance (Static) Good   Sitting Balance (Dynamic) Fair +   Standing Balance (Static) Fair +   Standing Balance (Dynamic) Fair   Weight Shift Sitting Good   Weight Shift Standing Fair   Comments no AD   Gait Analysis   Gait Level Of Assist Unable to Participate   Comments pt reports was last ambulating ~ 1 year ago   Bed Mobility    Supine to Sit Supervised   Sit to Supine Supervised   Scooting Supervised   Functional Mobility   Sit to Stand Supervised   Bed, Chair, WC Transfer Supervised   Transfer Method Stand Pivot   Mobility to/from WC   Comments no LOB

## 2021-09-22 NOTE — DISCHARGE INSTRUCTIONS
Follow instructions of social work follow-up with your doctor take medicine as prescribed return for any new medical problems or complaints.

## 2021-09-22 NOTE — DISCHARGE PLANNING
Received Choice form at [phone]  Agency/Facility Name: [facility]   Referral sent per Choice form @ [phone]

## 2021-09-23 ENCOUNTER — HOME HEALTH ADMISSION (OUTPATIENT)
Dept: HOME HEALTH SERVICES | Facility: HOME HEALTHCARE | Age: 51
End: 2021-09-23
Payer: MEDICAID

## 2021-09-23 LAB
GAMMA INTERFERON BACKGROUND BLD IA-ACNC: 0.03 IU/ML
M TB IFN-G BLD-IMP: NEGATIVE
M TB IFN-G CD4+ BCKGRND COR BLD-ACNC: -0.01 IU/ML
MITOGEN IGNF BCKGRD COR BLD-ACNC: >10 IU/ML
QFT TB2 - NIL TBQ2: 0 IU/ML

## 2021-09-23 PROCEDURE — 700102 HCHG RX REV CODE 250 W/ 637 OVERRIDE(OP): Performed by: EMERGENCY MEDICINE

## 2021-09-23 PROCEDURE — A9270 NON-COVERED ITEM OR SERVICE: HCPCS | Performed by: EMERGENCY MEDICINE

## 2021-09-23 RX ADMIN — AMLODIPINE BESYLATE 10 MG: 5 TABLET ORAL at 06:13

## 2021-09-23 RX ADMIN — ATORVASTATIN CALCIUM 20 MG: 20 TABLET, FILM COATED ORAL at 06:13

## 2021-09-23 RX ADMIN — LISINOPRIL 40 MG: 20 TABLET ORAL at 06:13

## 2021-09-23 NOTE — ED PROVIDER NOTES
ED Provider Note    10:22 AM patient is seen at the bedside.  He is awaiting transfer to either a skilled nursing facility or a group home.  Social work is diligently, on the case.  Patient has no complaints at this time.  Patient care will be transferred to oncoming [facility], pending this disposition.

## 2021-09-23 NOTE — DISCHARGE PLANNING
ATTN: Case Management  RE: Referral for Home Health    Reason for referral denial: Unable to accept today due to insurance capacity - We are rapidly assessing our census for discharge opportunities.  At this time we are only able to accept referrals with SCP insurance.               Unfortunately, we are not able to accept this referral for the reason listed above. If further clarity is needed, our Transitional Care Specialists are available to discuss any barriers to service at [phone].      We look forward to collaborating with you in the future,  [facility]

## 2021-09-23 NOTE — ED NOTES
Pt assessed, AAO x 4 . Patient's concerns addressed. Patient aware of POC. Fall precautions in place.  Pt repositioned and comfortable.  Call light within reach. This RN masked and in appropriate PPE during encounter.

## 2021-09-23 NOTE — DISCHARGE PLANNING
@5740  Agency/Facility Name: [facility]  Spoke To: Barrett  Outcome: Did not get notes, resent.    Agency/Facility Name: SN[facility]  Outcome: Sent notes from the MD.    @1010  Agency/Facility Name: [facility]  Outcome: Sent MD notes.

## 2021-09-23 NOTE — ED NOTES
Med rec complete per Pt and [facility]. Per [facility] pt last filled these medications on July 13th for 30 day supply.  Allergies reviewed

## 2021-09-23 NOTE — DISCHARGE PLANNING
Unable to make arrangements for patient tonight.  Attempted to call son Beni to let him know still waiting on some skilled nursing facilities.  Was going to tell him to contact [facility] at [phone] because they have male beds.

## 2021-09-23 NOTE — ED NOTES
Patient resting in bed. No acute distress.     Medicated per MAR.     Fall and safety precautions maintained.     Hourly rounding in progress.

## 2021-09-23 NOTE — ED NOTES
Patient's home medications have been reviewed by the pharmacy team.     Past Medical History:   Diagnosis Date   • Depression 8/28/2009   • HTN 8/28/2009   • Migraines, neuralgic 8/28/2009   • Stroke (HCC)        Patient's Medications   New Prescriptions    No medications on file   Previous Medications    AMLODIPINE (NORVASC) 10 MG TAB    Take 1 Tablet by mouth every day.    ATORVASTATIN (LIPITOR) 20 MG TAB    Take 20 mg by mouth every day.    LISINOPRIL (PRINIVIL) 40 MG TABLET    Take 40 mg by mouth every day.   Modified Medications    No medications on file   Discontinued Medications    ACETAMINOPHEN (TYLENOL) 325 MG TAB    Take 2 Tabs by mouth every four hours as needed (Temperature greater than 100.4 F (38 C)).    ATORVASTATIN (LIPITOR) 80 MG TABLET    Take 0.5 Tablets by mouth every evening.    CHLORTHALIDONE (HYGROTON) 50 MG TAB    Take 1 Tab by mouth every day.    DOXAZOSIN (CARDURA) 4 MG TAB    Take 1 Tablet by mouth at bedtime.    ENALAPRIL (VASOTEC) 10 MG TAB    Take 1 Tablet by mouth every day.    OMEPRAZOLE (PRILOSEC) 20 MG DELAYED-RELEASE CAPSULE    Take 1 Capsule by mouth every day.    ONDANSETRON (ZOFRAN ODT) 4 MG TABLET DISPERSIBLE    Take 1 Tab by mouth every four hours as needed for Nausea (Nausea/Vomiting).          A:  Medications do not appear to be contributing to current complaints.         P:    Home medications have been reordered as appropriate.        Genoveva López, PharmD

## 2021-09-23 NOTE — DISCHARGE PLANNING
Anticipated Discharge Disposition: SNF vs GH with [facility]    Action:   · Received incoming VM from pt's son, Beni, [phone]  · Returned call. No answer. Left VM- provided update regarding SNF referrals and phone number for [facility] , which has available male beds.     Barriers to Discharge:   · Pending SNF acceptance. Possible GH placement.     Plan:   · Speak with pt's son to determine if GH is financially feasible for the pt and family. Continue to recheck SNF acceptance.     Addendum:    1011:    ·  Called Jennifer, admissions [facility]. Jennifer requests provider progress notes.   · Asked CADE Luis, resend the referral with ERP progress note.     1530: Per Epic, pt is accepted to [facility]. CADE Luis, attempted to call. No answer. Left .     1824: RN CM updated pt's son, Beni via phone, regarding acceptance to [facility]. Provided update to pt at bedside. Pt is agreeable to transfer to [facility] and signed the [employer] form.

## 2021-09-23 NOTE — ED NOTES
Patient resting in bed. No acute distress.     Fall and safety precautions maintained.     Hourly rounding in progress.

## 2021-09-24 VITALS
HEART RATE: 87 BPM | TEMPERATURE: 98 F | WEIGHT: 235 LBS | OXYGEN SATURATION: 92 % | SYSTOLIC BLOOD PRESSURE: 131 MMHG | DIASTOLIC BLOOD PRESSURE: 62 MMHG | BODY MASS INDEX: 31.83 KG/M2 | RESPIRATION RATE: 20 BRPM | HEIGHT: 72 IN

## 2021-09-24 PROCEDURE — 99281 EMR DPT VST MAYX REQ PHY/QHP: CPT | Mod: GC | Performed by: HOSPITALIST

## 2021-09-24 PROCEDURE — 700102 HCHG RX REV CODE 250 W/ 637 OVERRIDE(OP): Performed by: EMERGENCY MEDICINE

## 2021-09-24 PROCEDURE — A9270 NON-COVERED ITEM OR SERVICE: HCPCS | Performed by: EMERGENCY MEDICINE

## 2021-09-24 RX ADMIN — LISINOPRIL 40 MG: 20 TABLET ORAL at 06:22

## 2021-09-24 RX ADMIN — ATORVASTATIN CALCIUM 20 MG: 20 TABLET, FILM COATED ORAL at 06:22

## 2021-09-24 RX ADMIN — AMLODIPINE BESYLATE 10 MG: 5 TABLET ORAL at 06:22

## 2021-09-24 ASSESSMENT — ENCOUNTER SYMPTOMS
FEVER: 0
SORE THROAT: 0
PALPITATIONS: 0
VOMITING: 0
HEARTBURN: 0
COUGH: 0
FOCAL WEAKNESS: 1
ABDOMINAL PAIN: 0
HEADACHES: 1
CHILLS: 0
SHORTNESS OF BREATH: 0
NAUSEA: 0
DOUBLE VISION: 0
BLURRED VISION: 0
DIZZINESS: 0

## 2021-09-24 NOTE — DISCHARGE PLANNING
ADALBERTO ORTIZ called pt's son, Beni, provided update regarding transfer to [facility].

## 2021-09-24 NOTE — ED NOTES
Up to BR with WC and x1 assist  200cc urinal emptied  Room cleaned, linen changed.  Pt given food and water

## 2021-09-24 NOTE — CONSULTS
"Consult Note    Date of Admission: 9/24/2021  Admission Status: Emergency  [facility] Team: MARITZA  Attending: Turner Thompson M.D.   Senior Resident: Dr. Crow Zepeda M.D.  Intern: Dr. Bernard Deshpande M.D.  Contact Number: [phone]    Consult Reason: Medical management of hypertension    History of Present Illness (HPI):  Jacinto Brambila is a 51 y.o. male who was seen in the ED on 9/24/2021 by the medical team for management of his hypertension and hyper lipidemia.  Patient presented to the ED after multiple falls.  He has a past medical history of a stroke in December 2019.  At baseline, his left side is dysfunctional.  He lives at home by himself.  Patient states that his falls have been happening more frequently most commonly when he is transferring to and from his wheelchair or changing clothes.  Patient also complains that he has some fogginess in the morning for about 30 minutes.  He has never had a sleep study done but chart review shows a possible diagnosis of obstructive sleep apnea.  He says that he \"snores like a [employer] train.\" Previously, patient spent about 10 months in the [facility] facility for strength exercises, and says that he is very comfortable with that facility, and would like to go back there.    Review of Systems:  Review of Systems   Constitutional: Negative for chills and fever.   HENT: Negative for congestion and sore throat.    Eyes: Negative for blurred vision and double vision.   Respiratory: Negative for cough and shortness of breath.    Cardiovascular: Negative for chest pain, palpitations and leg swelling.   Gastrointestinal: Negative for abdominal pain, heartburn, nausea and vomiting.   Genitourinary: Negative for dysuria.   Neurological: Positive for focal weakness and headaches. Negative for dizziness.     Past Medical History:   Past Medical History was reviewed with patient.   has a past medical history of Depression (8/28/2009), HTN (8/28/2009), Migraines, neuralgic " (8/28/2009), and Stroke (HCC).    Past Surgical History: Past Surgical History was reviewed with patient.   has a past surgical history that includes knee reconstruction; appendectomy; pr place percut gastrostomy tube (Left, 1/13/2020); and gastroscopy (N/A, 1/13/2020).    Medications: Medications have been reviewed with patient.  Prior to Admission Medications   Prescriptions Last Dose Informant Patient Reported? Taking?   amLODIPine (NORVASC) 10 MG Tab 9/8/2021 at Unknown time Patient's Home Pharmacy No No   Sig: Take 1 Tablet by mouth every day.   atorvastatin (LIPITOR) 20 MG Tab 9/8/2021 at Unknown time Patient's Home Pharmacy Yes Yes   Sig: Take 20 mg by mouth every day.   lisinopril (PRINIVIL) 40 MG tablet 9/8/2021 at Unknown time Patient's Home Pharmacy Yes Yes   Sig: Take 40 mg by mouth every day.      Facility-Administered Medications: None        Allergies: Allergies have been reviewed with patient.  No Known Allergies    Family History:  family history is not on file.     Social History:   Tobacco: None  Alcohol: Social drinker very rarely  Recreational drugs (illegal and prescription): 1 to twice a week he smokes marijuana  Employment: None  Activity Level: Ambulates with wheelchair, left side dysfunctional  Living situation: Lives at home by himself independently  Recent travel: None  Primary Care Provider: OMEGA Mace    Vitals:  Pulse:  [63-96] 87  Resp:  [12-16] 16  BP: (100-142)/(66-94) 129/80  SpO2:  [89 %-94 %] 91 %    Physical Exam  Constitutional:       Appearance: Normal appearance. He is normal weight.   HENT:      Mouth/Throat:      Mouth: Mucous membranes are moist.   Eyes:      Extraocular Movements: Extraocular movements intact.      Conjunctiva/sclera: Conjunctivae normal.      Pupils: Pupils are equal, round, and reactive to light.   Neck:      Vascular: No carotid bruit.   Cardiovascular:      Rate and Rhythm: Normal rate and regular rhythm.      Pulses: Normal  pulses.      Heart sounds: Normal heart sounds. No murmur heard.   No friction rub.   Pulmonary:      Effort: Pulmonary effort is normal. No respiratory distress.      Breath sounds: Normal breath sounds.   Abdominal:      General: Abdomen is flat. Bowel sounds are normal. There is no distension.      Palpations: Abdomen is soft.      Tenderness: There is abdominal tenderness. There is no right CVA tenderness or left CVA tenderness.   Musculoskeletal:         General: Normal range of motion.      Cervical back: Normal range of motion and neck supple.      Right lower leg: No edema.      Left lower leg: No edema.   Skin:     General: Skin is warm.   Neurological:      General: No focal deficit present.      Mental Status: He is alert and oriented to person, place, and time. Mental status is at baseline.   Psychiatric:         Mood and Affect: Mood normal.       Labs:   No labs indicated at this time.    Imaging:   No new imaging indicated at this time.  Previous CT shows chronic brain bleed.    Assessment and Plan:   Patient is a 51-year-old male who presented initially to the ED with a chief complaint of multiple falls.  He is currently awaiting placement at a rehab facility. Medical team has been consulted to manage his hypertension and hypercholesterol.    For hypertension, we recommend continuing his current regimen of amlodipine and lisinopril.  His blood pressures have been well controlled  On this regimen.    For hyper lipidemia, patient has been continued on atorvastatin.  His last lipid panel from 2019 showed normal limits.  We recommend getting another lipid panel on patient to have updated labs.  Atorvastatin regimen can be changed if needed based on the lab work.    Given his concerns of fogginess in the morning that lasts about 30 minutes, added onto the fact that he snores at night, we would recommend a formal polysomnogram study, since patient has a documented past medical history of JAYASHREE, and but has  not had a formal sleep study.  His STOP-BANG questionnaire resulted in a score of 4-5 putting him in a moderate to high risk category for sleep apnea.

## 2021-09-24 NOTE — ED PROVIDER NOTES
ED PROVIDER NOTE    Scribed for Fidencio Nathan M.D. by Yris Camacho. 9/24/2021, 6:28 AM.    This is an addendum to the note on Jacinto Loyola. For further details and full chart entry, see the previously signed ED Provider Note written by Dr. Moore ([facility]).      6:28 AM -  Dr. KATIE Vazquez (ERP) who will transfer care of the patient to me at this time 0610.        6:28 AM - The patient's continuing management includes awaiting for transfer to SNF for rehab or to Group Home with Home Health.  Medical consult has not yet been ordered.  He has a history of hemorrhagic stroke with left-sided weakness and lost his motel apartment due to a disability issue.  He cannot go home to his son's home.  Feels he needs rehab or group home with home health.    6:28 AM -Patient assessment  Vital signs this morning were reviewed and normal.    7:57 AM I discussed the patient's case and the above findings with Dr. Zepeda ([facility] internal medicine) who will consult daily on the patient.     Occasions received in the last 24 hours were amlodipine atorvastatin and lisinopril.    On patient assessment he was sleeping comfortably with unlabored respirations.  /80   Pulse 87   Temp 36.3 °C (97.4 °F) (Temporal)   Resp 16   Ht 1.829 m (6')   Wt 107 kg (235 lb)   SpO2 91%   BMI 31.87 kg/m²       PLAN:  Medicine consult requested from Dr. Box, [facility] IM resident.    FINAL IMPRESSION   1. Left-sided weakness  REFERRAL TO HOME HEALTH    REFERRAL TO SKILLED NURSING FACILITY   2. History of intracranial hemorrhage  REFERRAL TO HOME HEALTH   3. Essential hypertension     4. Frequent falls          Yris MARTINEZ (Scribe), am scribing for, and in the presence of, Fidencio Nathan M.D..    Electronically signed by: Yris Camacho (Josemanuel), 9/24/2021    Fidencio MARTINEZ M.D. personally performed the services described in this documentation, as scribed by Yris Camacho in my presence, and it is both accurate and  complete.    The note accurately reflects work and decisions made by me.  Fidencio Nathan M.D.  9/24/2021  12:38 PM

## 2021-09-24 NOTE — ED NOTES
[phone], both number not answer for report

## 2021-09-24 NOTE — DISCHARGE PLANNING
@100  Agency/Facility Name: [facility]  Spoke To: Jennifer  Outcome: No beds today.    @5929  Agency/Facility Name: [facility]  Outcome: Left message, awaiting call back.

## 2021-09-24 NOTE — DISCHARGE PLANNING
Call from [facility] who states they can take pt today at 1700. She will arrange transport.

## 2021-09-24 NOTE — ED NOTES
Patient resting in bed. No acute distress.     No complaints at this time.     Fall and safety precautions maintained.     Hourly rounding in progress.

## 2023-02-12 ENCOUNTER — APPOINTMENT (OUTPATIENT)
Dept: RADIOLOGY | Facility: MEDICAL CENTER | Age: 53
DRG: 101 | End: 2023-02-12
Attending: STUDENT IN AN ORGANIZED HEALTH CARE EDUCATION/TRAINING PROGRAM
Payer: COMMERCIAL

## 2023-02-12 ENCOUNTER — HOSPITAL ENCOUNTER (INPATIENT)
Facility: MEDICAL CENTER | Age: 53
LOS: 1 days | DRG: 101 | End: 2023-02-13
Attending: EMERGENCY MEDICINE | Admitting: INTERNAL MEDICINE
Payer: COMMERCIAL

## 2023-02-12 ENCOUNTER — APPOINTMENT (OUTPATIENT)
Dept: RADIOLOGY | Facility: MEDICAL CENTER | Age: 53
DRG: 101 | End: 2023-02-12
Attending: EMERGENCY MEDICINE
Payer: COMMERCIAL

## 2023-02-12 DIAGNOSIS — R56.9 SEIZURE (HCC): ICD-10-CM

## 2023-02-12 DIAGNOSIS — G47.00 INSOMNIA, UNSPECIFIED TYPE: ICD-10-CM

## 2023-02-12 LAB
ALBUMIN SERPL BCP-MCNC: 4.2 G/DL (ref 3.2–4.9)
ALBUMIN/GLOB SERPL: 1.4 G/DL
ALP SERPL-CCNC: 68 U/L (ref 30–99)
ALT SERPL-CCNC: 37 U/L (ref 2–50)
AMMONIA PLAS-SCNC: 39 UMOL/L (ref 11–45)
ANION GAP SERPL CALC-SCNC: 13 MMOL/L (ref 7–16)
AST SERPL-CCNC: 22 U/L (ref 12–45)
BASOPHILS # BLD AUTO: 0.4 % (ref 0–1.8)
BASOPHILS # BLD: 0.05 K/UL (ref 0–0.12)
BILIRUB SERPL-MCNC: 0.9 MG/DL (ref 0.1–1.5)
BUN SERPL-MCNC: 25 MG/DL (ref 8–22)
CALCIUM ALBUM COR SERPL-MCNC: 9.2 MG/DL (ref 8.5–10.5)
CALCIUM SERPL-MCNC: 9.4 MG/DL (ref 8.5–10.5)
CHLORIDE SERPL-SCNC: 103 MMOL/L (ref 96–112)
CO2 SERPL-SCNC: 22 MMOL/L (ref 20–33)
CREAT SERPL-MCNC: 1.47 MG/DL (ref 0.5–1.4)
EOSINOPHIL # BLD AUTO: 0.07 K/UL (ref 0–0.51)
EOSINOPHIL NFR BLD: 0.6 % (ref 0–6.9)
ERYTHROCYTE [DISTWIDTH] IN BLOOD BY AUTOMATED COUNT: 42.1 FL (ref 35.9–50)
ETHANOL BLD-MCNC: <10.1 MG/DL
GFR SERPLBLD CREATININE-BSD FMLA CKD-EPI: 57 ML/MIN/1.73 M 2
GLOBULIN SER CALC-MCNC: 3 G/DL (ref 1.9–3.5)
GLUCOSE SERPL-MCNC: 138 MG/DL (ref 65–99)
HCT VFR BLD AUTO: 54.5 % (ref 42–52)
HGB BLD-MCNC: 18.2 G/DL (ref 14–18)
IMM GRANULOCYTES # BLD AUTO: 0.08 K/UL (ref 0–0.11)
IMM GRANULOCYTES NFR BLD AUTO: 0.6 % (ref 0–0.9)
LYMPHOCYTES # BLD AUTO: 2.56 K/UL (ref 1–4.8)
LYMPHOCYTES NFR BLD: 20.3 % (ref 22–41)
MCH RBC QN AUTO: 28.9 PG (ref 27–33)
MCHC RBC AUTO-ENTMCNC: 33.4 G/DL (ref 33.7–35.3)
MCV RBC AUTO: 86.5 FL (ref 81.4–97.8)
MONOCYTES # BLD AUTO: 0.93 K/UL (ref 0–0.85)
MONOCYTES NFR BLD AUTO: 7.4 % (ref 0–13.4)
NEUTROPHILS # BLD AUTO: 8.94 K/UL (ref 1.82–7.42)
NEUTROPHILS NFR BLD: 70.7 % (ref 44–72)
NRBC # BLD AUTO: 0 K/UL
NRBC BLD-RTO: 0 /100 WBC
PLATELET # BLD AUTO: 262 K/UL (ref 164–446)
PMV BLD AUTO: 8.5 FL (ref 9–12.9)
POTASSIUM SERPL-SCNC: 4.3 MMOL/L (ref 3.6–5.5)
PROT SERPL-MCNC: 7.2 G/DL (ref 6–8.2)
RBC # BLD AUTO: 6.3 M/UL (ref 4.7–6.1)
SODIUM SERPL-SCNC: 138 MMOL/L (ref 135–145)
TROPONIN T SERPL-MCNC: 34 NG/L (ref 6–19)
WBC # BLD AUTO: 12.6 K/UL (ref 4.8–10.8)

## 2023-02-12 PROCEDURE — 93005 ELECTROCARDIOGRAM TRACING: CPT | Performed by: EMERGENCY MEDICINE

## 2023-02-12 PROCEDURE — 82077 ASSAY SPEC XCP UR&BREATH IA: CPT

## 2023-02-12 PROCEDURE — 85025 COMPLETE CBC W/AUTO DIFF WBC: CPT

## 2023-02-12 PROCEDURE — 770022 HCHG ROOM/CARE - ICU (200)

## 2023-02-12 PROCEDURE — 70450 CT HEAD/BRAIN W/O DYE: CPT

## 2023-02-12 PROCEDURE — 99291 CRITICAL CARE FIRST HOUR: CPT | Performed by: INTERNAL MEDICINE

## 2023-02-12 PROCEDURE — 700105 HCHG RX REV CODE 258: Performed by: EMERGENCY MEDICINE

## 2023-02-12 PROCEDURE — 82140 ASSAY OF AMMONIA: CPT

## 2023-02-12 PROCEDURE — 96374 THER/PROPH/DIAG INJ IV PUSH: CPT

## 2023-02-12 PROCEDURE — 99291 CRITICAL CARE FIRST HOUR: CPT

## 2023-02-12 PROCEDURE — 700102 HCHG RX REV CODE 250 W/ 637 OVERRIDE(OP): Performed by: STUDENT IN AN ORGANIZED HEALTH CARE EDUCATION/TRAINING PROGRAM

## 2023-02-12 PROCEDURE — 84484 ASSAY OF TROPONIN QUANT: CPT

## 2023-02-12 PROCEDURE — 71045 X-RAY EXAM CHEST 1 VIEW: CPT

## 2023-02-12 PROCEDURE — 84145 PROCALCITONIN (PCT): CPT

## 2023-02-12 PROCEDURE — 82533 TOTAL CORTISOL: CPT

## 2023-02-12 PROCEDURE — A9270 NON-COVERED ITEM OR SERVICE: HCPCS | Performed by: STUDENT IN AN ORGANIZED HEALTH CARE EDUCATION/TRAINING PROGRAM

## 2023-02-12 PROCEDURE — 76775 US EXAM ABDO BACK WALL LIM: CPT

## 2023-02-12 PROCEDURE — 80053 COMPREHEN METABOLIC PANEL: CPT

## 2023-02-12 PROCEDURE — 700111 HCHG RX REV CODE 636 W/ 250 OVERRIDE (IP): Performed by: STUDENT IN AN ORGANIZED HEALTH CARE EDUCATION/TRAINING PROGRAM

## 2023-02-12 PROCEDURE — 36415 COLL VENOUS BLD VENIPUNCTURE: CPT

## 2023-02-12 RX ORDER — AMOXICILLIN 250 MG
2 CAPSULE ORAL 2 TIMES DAILY
Status: DISCONTINUED | OUTPATIENT
Start: 2023-02-12 | End: 2023-02-13 | Stop reason: HOSPADM

## 2023-02-12 RX ORDER — SODIUM CHLORIDE 9 MG/ML
2000 INJECTION, SOLUTION INTRAVENOUS ONCE
Status: COMPLETED | OUTPATIENT
Start: 2023-02-12 | End: 2023-02-12

## 2023-02-12 RX ORDER — SODIUM CHLORIDE 9 MG/ML
INJECTION, SOLUTION INTRAVENOUS CONTINUOUS
Status: DISCONTINUED | OUTPATIENT
Start: 2023-02-12 | End: 2023-02-13

## 2023-02-12 RX ORDER — GUAIFENESIN/DEXTROMETHORPHAN 100-10MG/5
10 SYRUP ORAL EVERY 6 HOURS PRN
Status: DISCONTINUED | OUTPATIENT
Start: 2023-02-12 | End: 2023-02-13 | Stop reason: HOSPADM

## 2023-02-12 RX ORDER — POLYETHYLENE GLYCOL 3350 17 G/17G
1 POWDER, FOR SOLUTION ORAL
Status: DISCONTINUED | OUTPATIENT
Start: 2023-02-12 | End: 2023-02-13 | Stop reason: HOSPADM

## 2023-02-12 RX ORDER — PROMETHAZINE HYDROCHLORIDE 25 MG/1
12.5-25 SUPPOSITORY RECTAL EVERY 4 HOURS PRN
Status: DISCONTINUED | OUTPATIENT
Start: 2023-02-12 | End: 2023-02-13 | Stop reason: HOSPADM

## 2023-02-12 RX ORDER — CHLORTHALIDONE 25 MG/1
25 TABLET ORAL DAILY
COMMUNITY

## 2023-02-12 RX ORDER — MIDODRINE HYDROCHLORIDE 5 MG/1
10 TABLET ORAL
Status: DISCONTINUED | OUTPATIENT
Start: 2023-02-12 | End: 2023-02-13

## 2023-02-12 RX ORDER — ONDANSETRON 4 MG/1
4 TABLET, ORALLY DISINTEGRATING ORAL EVERY 4 HOURS PRN
Status: DISCONTINUED | OUTPATIENT
Start: 2023-02-12 | End: 2023-02-13 | Stop reason: HOSPADM

## 2023-02-12 RX ORDER — SODIUM CHLORIDE, SODIUM LACTATE, POTASSIUM CHLORIDE, AND CALCIUM CHLORIDE .6; .31; .03; .02 G/100ML; G/100ML; G/100ML; G/100ML
500 INJECTION, SOLUTION INTRAVENOUS
Status: DISCONTINUED | OUTPATIENT
Start: 2023-02-12 | End: 2023-02-13

## 2023-02-12 RX ORDER — BISACODYL 10 MG
10 SUPPOSITORY, RECTAL RECTAL
Status: DISCONTINUED | OUTPATIENT
Start: 2023-02-12 | End: 2023-02-13 | Stop reason: HOSPADM

## 2023-02-12 RX ORDER — ACETAMINOPHEN 325 MG/1
650 TABLET ORAL EVERY 6 HOURS PRN
Status: DISCONTINUED | OUTPATIENT
Start: 2023-02-12 | End: 2023-02-13 | Stop reason: HOSPADM

## 2023-02-12 RX ORDER — SODIUM CHLORIDE 9 MG/ML
1000 INJECTION, SOLUTION INTRAVENOUS ONCE
Status: COMPLETED | OUTPATIENT
Start: 2023-02-12 | End: 2023-02-12

## 2023-02-12 RX ORDER — ONDANSETRON 2 MG/ML
4 INJECTION INTRAMUSCULAR; INTRAVENOUS EVERY 4 HOURS PRN
Status: DISCONTINUED | OUTPATIENT
Start: 2023-02-12 | End: 2023-02-13 | Stop reason: HOSPADM

## 2023-02-12 RX ORDER — ATORVASTATIN CALCIUM 20 MG/1
20 TABLET, FILM COATED ORAL
Status: DISCONTINUED | OUTPATIENT
Start: 2023-02-13 | End: 2023-02-13 | Stop reason: HOSPADM

## 2023-02-12 RX ORDER — PROCHLORPERAZINE EDISYLATE 5 MG/ML
5-10 INJECTION INTRAMUSCULAR; INTRAVENOUS EVERY 4 HOURS PRN
Status: DISCONTINUED | OUTPATIENT
Start: 2023-02-12 | End: 2023-02-13 | Stop reason: HOSPADM

## 2023-02-12 RX ORDER — PROMETHAZINE HYDROCHLORIDE 25 MG/1
12.5-25 TABLET ORAL EVERY 4 HOURS PRN
Status: DISCONTINUED | OUTPATIENT
Start: 2023-02-12 | End: 2023-02-13 | Stop reason: HOSPADM

## 2023-02-12 RX ORDER — LABETALOL HYDROCHLORIDE 5 MG/ML
10 INJECTION, SOLUTION INTRAVENOUS EVERY 4 HOURS PRN
Status: DISCONTINUED | OUTPATIENT
Start: 2023-02-12 | End: 2023-02-13 | Stop reason: HOSPADM

## 2023-02-12 RX ADMIN — SODIUM CHLORIDE 1000 ML: 9 INJECTION, SOLUTION INTRAVENOUS at 22:50

## 2023-02-12 RX ADMIN — HYDROCORTISONE SODIUM SUCCINATE 100 MG: 100 INJECTION, POWDER, FOR SOLUTION INTRAMUSCULAR; INTRAVENOUS at 23:21

## 2023-02-12 RX ADMIN — MIDODRINE HYDROCHLORIDE 10 MG: 5 TABLET ORAL at 23:22

## 2023-02-12 RX ADMIN — SODIUM CHLORIDE 2000 ML: 9 INJECTION, SOLUTION INTRAVENOUS at 20:58

## 2023-02-13 ENCOUNTER — APPOINTMENT (OUTPATIENT)
Dept: CARDIOLOGY | Facility: MEDICAL CENTER | Age: 53
DRG: 101 | End: 2023-02-13
Attending: INTERNAL MEDICINE
Payer: COMMERCIAL

## 2023-02-13 ENCOUNTER — APPOINTMENT (OUTPATIENT)
Dept: RADIOLOGY | Facility: MEDICAL CENTER | Age: 53
DRG: 101 | End: 2023-02-13
Attending: INTERNAL MEDICINE
Payer: COMMERCIAL

## 2023-02-13 VITALS
SYSTOLIC BLOOD PRESSURE: 136 MMHG | RESPIRATION RATE: 20 BRPM | HEART RATE: 92 BPM | TEMPERATURE: 98.4 F | OXYGEN SATURATION: 93 % | HEIGHT: 72 IN | WEIGHT: 250 LBS | DIASTOLIC BLOOD PRESSURE: 76 MMHG | BODY MASS INDEX: 33.86 KG/M2

## 2023-02-13 PROBLEM — R55 SYNCOPE: Status: ACTIVE | Noted: 2023-02-13

## 2023-02-13 PROBLEM — G47.09 OTHER INSOMNIA: Status: ACTIVE | Noted: 2023-02-13

## 2023-02-13 LAB
AMPHET UR QL SCN: NEGATIVE
ANION GAP SERPL CALC-SCNC: 8 MMOL/L (ref 7–16)
APPEARANCE UR: CLEAR
BACTERIA #/AREA URNS HPF: NEGATIVE /HPF
BARBITURATES UR QL SCN: NEGATIVE
BASOPHILS # BLD AUTO: 0.2 % (ref 0–1.8)
BASOPHILS # BLD: 0.03 K/UL (ref 0–0.12)
BENZODIAZ UR QL SCN: NEGATIVE
BILIRUB UR QL STRIP.AUTO: NEGATIVE
BUN SERPL-MCNC: 29 MG/DL (ref 8–22)
BZE UR QL SCN: NEGATIVE
CALCIUM SERPL-MCNC: 8.3 MG/DL (ref 8.5–10.5)
CANNABINOIDS UR QL SCN: POSITIVE
CHLORIDE SERPL-SCNC: 107 MMOL/L (ref 96–112)
CO2 SERPL-SCNC: 22 MMOL/L (ref 20–33)
COLOR UR: YELLOW
CORTIS SERPL-MCNC: 29.1 UG/DL (ref 0–23)
CREAT SERPL-MCNC: 1.42 MG/DL (ref 0.5–1.4)
CREAT UR-MCNC: 263.98 MG/DL
EKG IMPRESSION: NORMAL
EOSINOPHIL # BLD AUTO: 0 K/UL (ref 0–0.51)
EOSINOPHIL NFR BLD: 0 % (ref 0–6.9)
EPI CELLS #/AREA URNS HPF: NEGATIVE /HPF
ERYTHROCYTE [DISTWIDTH] IN BLOOD BY AUTOMATED COUNT: 43.5 FL (ref 35.9–50)
GFR SERPLBLD CREATININE-BSD FMLA CKD-EPI: 60 ML/MIN/1.73 M 2
GLUCOSE SERPL-MCNC: 140 MG/DL (ref 65–99)
GLUCOSE UR STRIP.AUTO-MCNC: 100 MG/DL
HCT VFR BLD AUTO: 46.1 % (ref 42–52)
HGB BLD-MCNC: 15.4 G/DL (ref 14–18)
HYALINE CASTS #/AREA URNS LPF: ABNORMAL /LPF
IMM GRANULOCYTES # BLD AUTO: 0.06 K/UL (ref 0–0.11)
IMM GRANULOCYTES NFR BLD AUTO: 0.5 % (ref 0–0.9)
KETONES UR STRIP.AUTO-MCNC: ABNORMAL MG/DL
LEUKOCYTE ESTERASE UR QL STRIP.AUTO: NEGATIVE
LV EJECT FRACT  99904: 55
LV EJECT FRACT MOD 2C 99903: 53.94
LV EJECT FRACT MOD 4C 99902: 55.96
LV EJECT FRACT MOD BP 99901: 53.75
LYMPHOCYTES # BLD AUTO: 1.58 K/UL (ref 1–4.8)
LYMPHOCYTES NFR BLD: 12.7 % (ref 22–41)
MAGNESIUM SERPL-MCNC: 1.8 MG/DL (ref 1.5–2.5)
MCH RBC QN AUTO: 29.3 PG (ref 27–33)
MCHC RBC AUTO-ENTMCNC: 33.4 G/DL (ref 33.7–35.3)
MCV RBC AUTO: 87.8 FL (ref 81.4–97.8)
METHADONE UR QL SCN: NEGATIVE
MICRO URNS: ABNORMAL
MONOCYTES # BLD AUTO: 0.59 K/UL (ref 0–0.85)
MONOCYTES NFR BLD AUTO: 4.8 % (ref 0–13.4)
NEUTROPHILS # BLD AUTO: 10.14 K/UL (ref 1.82–7.42)
NEUTROPHILS NFR BLD: 81.8 % (ref 44–72)
NITRITE UR QL STRIP.AUTO: NEGATIVE
NRBC # BLD AUTO: 0 K/UL
NRBC BLD-RTO: 0 /100 WBC
OPIATES UR QL SCN: NEGATIVE
OXYCODONE UR QL SCN: NEGATIVE
PCP UR QL SCN: NEGATIVE
PH UR STRIP.AUTO: 5 [PH] (ref 5–8)
PHOSPHATE SERPL-MCNC: 2.5 MG/DL (ref 2.5–4.5)
PLATELET # BLD AUTO: 208 K/UL (ref 164–446)
PMV BLD AUTO: 8.7 FL (ref 9–12.9)
POTASSIUM SERPL-SCNC: 4.2 MMOL/L (ref 3.6–5.5)
PROCALCITONIN SERPL-MCNC: 0.06 NG/ML
PROPOXYPH UR QL SCN: NEGATIVE
PROT UR QL STRIP: 30 MG/DL
RBC # BLD AUTO: 5.25 M/UL (ref 4.7–6.1)
RBC # URNS HPF: ABNORMAL /HPF
RBC UR QL AUTO: NEGATIVE
SODIUM SERPL-SCNC: 137 MMOL/L (ref 135–145)
SODIUM UR-SCNC: 75 MMOL/L
SP GR UR STRIP.AUTO: 1.02
TROPONIN T SERPL-MCNC: 28 NG/L (ref 6–19)
UROBILINOGEN UR STRIP.AUTO-MCNC: 1 MG/DL
WBC # BLD AUTO: 12.4 K/UL (ref 4.8–10.8)
WBC #/AREA URNS HPF: ABNORMAL /HPF

## 2023-02-13 PROCEDURE — 93306 TTE W/DOPPLER COMPLETE: CPT

## 2023-02-13 PROCEDURE — 84484 ASSAY OF TROPONIN QUANT: CPT

## 2023-02-13 PROCEDURE — 99233 SBSQ HOSP IP/OBS HIGH 50: CPT | Performed by: INTERNAL MEDICINE

## 2023-02-13 PROCEDURE — 81001 URINALYSIS AUTO W/SCOPE: CPT

## 2023-02-13 PROCEDURE — 80048 BASIC METABOLIC PNL TOTAL CA: CPT

## 2023-02-13 PROCEDURE — A9270 NON-COVERED ITEM OR SERVICE: HCPCS | Performed by: INTERNAL MEDICINE

## 2023-02-13 PROCEDURE — 700105 HCHG RX REV CODE 258: Performed by: INTERNAL MEDICINE

## 2023-02-13 PROCEDURE — 99222 1ST HOSP IP/OBS MODERATE 55: CPT | Performed by: PSYCHIATRY & NEUROLOGY

## 2023-02-13 PROCEDURE — 80307 DRUG TEST PRSMV CHEM ANLYZR: CPT

## 2023-02-13 PROCEDURE — 84300 ASSAY OF URINE SODIUM: CPT

## 2023-02-13 PROCEDURE — 82570 ASSAY OF URINE CREATININE: CPT

## 2023-02-13 PROCEDURE — 700102 HCHG RX REV CODE 250 W/ 637 OVERRIDE(OP): Performed by: STUDENT IN AN ORGANIZED HEALTH CARE EDUCATION/TRAINING PROGRAM

## 2023-02-13 PROCEDURE — 700102 HCHG RX REV CODE 250 W/ 637 OVERRIDE(OP): Performed by: INTERNAL MEDICINE

## 2023-02-13 PROCEDURE — 83735 ASSAY OF MAGNESIUM: CPT

## 2023-02-13 PROCEDURE — 99239 HOSP IP/OBS DSCHRG MGMT >30: CPT | Performed by: HOSPITALIST

## 2023-02-13 PROCEDURE — 70551 MRI BRAIN STEM W/O DYE: CPT

## 2023-02-13 PROCEDURE — 84100 ASSAY OF PHOSPHORUS: CPT

## 2023-02-13 PROCEDURE — 93306 TTE W/DOPPLER COMPLETE: CPT | Mod: 26 | Performed by: INTERNAL MEDICINE

## 2023-02-13 PROCEDURE — A9270 NON-COVERED ITEM OR SERVICE: HCPCS | Performed by: STUDENT IN AN ORGANIZED HEALTH CARE EDUCATION/TRAINING PROGRAM

## 2023-02-13 PROCEDURE — 85025 COMPLETE CBC W/AUTO DIFF WBC: CPT

## 2023-02-13 RX ORDER — LEVETIRACETAM 500 MG/1
500 TABLET ORAL 2 TIMES DAILY
Qty: 60 TABLET | Refills: 11 | Status: SHIPPED | OUTPATIENT
Start: 2023-02-13

## 2023-02-13 RX ORDER — TRAZODONE HYDROCHLORIDE 50 MG/1
50 TABLET ORAL NIGHTLY PRN
Qty: 30 TABLET | Refills: 2 | Status: SHIPPED | OUTPATIENT
Start: 2023-02-13

## 2023-02-13 RX ORDER — LORAZEPAM 2 MG/ML
2-4 INJECTION INTRAMUSCULAR EVERY 4 HOURS PRN
Status: DISCONTINUED | OUTPATIENT
Start: 2023-02-13 | End: 2023-02-13 | Stop reason: HOSPADM

## 2023-02-13 RX ORDER — SODIUM CHLORIDE, SODIUM LACTATE, POTASSIUM CHLORIDE, CALCIUM CHLORIDE 600; 310; 30; 20 MG/100ML; MG/100ML; MG/100ML; MG/100ML
INJECTION, SOLUTION INTRAVENOUS CONTINUOUS
Status: DISCONTINUED | OUTPATIENT
Start: 2023-02-13 | End: 2023-02-13 | Stop reason: HOSPADM

## 2023-02-13 RX ORDER — LEVETIRACETAM 500 MG/1
500 TABLET ORAL 2 TIMES DAILY
Status: DISCONTINUED | OUTPATIENT
Start: 2023-02-13 | End: 2023-02-13 | Stop reason: HOSPADM

## 2023-02-13 RX ORDER — MIDODRINE HYDROCHLORIDE 5 MG/1
5 TABLET ORAL
Status: DISCONTINUED | OUTPATIENT
Start: 2023-02-13 | End: 2023-02-13 | Stop reason: HOSPADM

## 2023-02-13 RX ADMIN — ATORVASTATIN CALCIUM 20 MG: 20 TABLET, FILM COATED ORAL at 01:07

## 2023-02-13 RX ADMIN — MIDODRINE HYDROCHLORIDE 5 MG: 5 TABLET ORAL at 08:54

## 2023-02-13 RX ADMIN — SODIUM CHLORIDE, POTASSIUM CHLORIDE, SODIUM LACTATE AND CALCIUM CHLORIDE: 600; 310; 30; 20 INJECTION, SOLUTION INTRAVENOUS at 00:54

## 2023-02-13 RX ADMIN — LEVETIRACETAM 500 MG: 500 TABLET, FILM COATED ORAL at 10:43

## 2023-02-13 ASSESSMENT — ENCOUNTER SYMPTOMS
SPEECH CHANGE: 0
FEVER: 0
DEPRESSION: 0
ABDOMINAL PAIN: 0
BACK PAIN: 0
SEIZURES: 0
NERVOUS/ANXIOUS: 0
SPUTUM PRODUCTION: 0
DIZZINESS: 0
BRUISES/BLEEDS EASILY: 0
LOSS OF CONSCIOUSNESS: 1
COUGH: 0
VOMITING: 0
SHORTNESS OF BREATH: 0
SENSORY CHANGE: 0
BLURRED VISION: 0
FOCAL WEAKNESS: 1
PALPITATIONS: 0
CHILLS: 0
HEADACHES: 1
SORE THROAT: 0
NAUSEA: 0
MYALGIAS: 0

## 2023-02-13 ASSESSMENT — PATIENT HEALTH QUESTIONNAIRE - PHQ9
SUM OF ALL RESPONSES TO PHQ9 QUESTIONS 1 AND 2: 0
2. FEELING DOWN, DEPRESSED, IRRITABLE, OR HOPELESS: NOT AT ALL
1. LITTLE INTEREST OR PLEASURE IN DOING THINGS: NOT AT ALL
3. TROUBLE FALLING OR STAYING ASLEEP OR SLEEPING TOO MUCH: NOT AT ALL

## 2023-02-13 ASSESSMENT — PAIN DESCRIPTION - PAIN TYPE
TYPE: ACUTE PAIN
TYPE: ACUTE PAIN
TYPE: CHRONIC PAIN

## 2023-02-13 ASSESSMENT — LIFESTYLE VARIABLES
HAVE PEOPLE ANNOYED YOU BY CRITICIZING YOUR DRINKING: NO
ALCOHOL_USE: YES
TOTAL SCORE: 0
EVER FELT BAD OR GUILTY ABOUT YOUR DRINKING: NO
EVER HAD A DRINK FIRST THING IN THE MORNING TO STEADY YOUR NERVES TO GET RID OF A HANGOVER: NO
DOES PATIENT WANT TO STOP DRINKING: NO
ON A TYPICAL DAY WHEN YOU DRINK ALCOHOL HOW MANY DRINKS DO YOU HAVE: 2
HAVE YOU EVER FELT YOU SHOULD CUT DOWN ON YOUR DRINKING: NO
HOW MANY TIMES IN THE PAST YEAR HAVE YOU HAD 5 OR MORE DRINKS IN A DAY: 0
AVERAGE NUMBER OF DAYS PER WEEK YOU HAVE A DRINK CONTAINING ALCOHOL: 0
CONSUMPTION TOTAL: NEGATIVE

## 2023-02-13 NOTE — PROGRESS NOTES
"Critical Care Progress Note    Date of admission  2/12/2023    Chief Complaint  52 y.o. male admitted 2/12/2023 with AMS    Hospital Course  \"This is a pleasant 52-year-old gentleman with a history of right basal ganglia hemorrhage in December 2019, primary hypertension, methamphetamine abuse and sleep apnea.  He was brought into the ED by EMS after he had a spell at home characterized by arching of his back, contraction of the right arm and staring off into space.  During this episode he did not respond to his son who was with him at the time.  He had associated incontinence of urine.  This gentleman tells me he has not felt well all day.  He was watching the Super Bowl with his son when this event occurred.  He is complaining of slight worsening of his speech and he has also noticed some numbness around his right face for the last couple of weeks.  He uses edible cannabis and took a dose today.  He apparently has been recently started on a diuretic.  He has chronic left-sided weakness since his right basal ganglia hemorrhage in December 2019.\" - Dr. Pride 2/12    Interval Problem Update  Reviewed last 24 hour events:   - AF   - NSR, SBP 87--> 115   - AAOx4, chronic L sided weakness   - WBC unchanged at 12, Hgb down to 15   - unchanged BEVERLY   - glucose well controlled   - echo being performed during exam   - low Mag   - UDS (+) THC   - troponin improving, cortisol ok   - MRI brain with new microhemorrhage   - EEG pending   - renal US WNL   - neuro consult    Review of Systems  Review of Systems   Constitutional:  Negative for chills, fever and malaise/fatigue.   HENT:  Negative for congestion and sore throat.    Eyes:  Negative for blurred vision.   Respiratory:  Negative for cough, sputum production and shortness of breath.    Cardiovascular:  Negative for chest pain, palpitations and leg swelling.   Gastrointestinal:  Negative for abdominal pain, nausea and vomiting.   Genitourinary:  Negative for dysuria. "   Musculoskeletal:  Negative for back pain and myalgias.   Skin:  Negative for rash.   Neurological:  Positive for focal weakness, loss of consciousness and headaches. Negative for dizziness, sensory change, speech change and seizures.   Endo/Heme/Allergies:  Does not bruise/bleed easily.   Psychiatric/Behavioral:  Negative for depression. The patient is not nervous/anxious.    All other systems reviewed and are negative.     Vital Signs for last 24 hours   Temp:  [36.4 °C (97.6 °F)-37.1 °C (98.8 °F)] 37.1 °C (98.8 °F)  Pulse:  [75-92] 79  Resp:  [14-24] 18  BP: ()/(48-69) 106/63  SpO2:  [89 %-97 %] 93 %    Respiratory Information for the last 24 hours   2 lpm n/c    Physical Exam   Physical Exam  Vitals and nursing note reviewed.   Constitutional:       General: He is awake. He is not in acute distress.     Appearance: He is well-developed and normal weight. He is not toxic-appearing.      Interventions: Nasal cannula in place.   HENT:      Head: Normocephalic and atraumatic.      Nose: Nose normal. No congestion.      Mouth/Throat:      Mouth: Mucous membranes are moist.      Pharynx: Oropharynx is clear. No oropharyngeal exudate.   Eyes:      General: No scleral icterus.     Extraocular Movements: Extraocular movements intact.      Conjunctiva/sclera: Conjunctivae normal.      Pupils: Pupils are equal, round, and reactive to light.   Neck:      Vascular: No JVD.      Comments: No meningismus  Cardiovascular:      Rate and Rhythm: Normal rate and regular rhythm.      Pulses: Normal pulses.      Heart sounds: Normal heart sounds. No murmur heard.     Comments: Improved hypotension  Pulmonary:      Effort: Pulmonary effort is normal. No respiratory distress.      Breath sounds: Normal breath sounds. No stridor. No wheezing.      Comments: Speaking in full sentences without difficulty  Abdominal:      General: Bowel sounds are normal. There is no distension.      Palpations: Abdomen is soft.      Tenderness:  There is no abdominal tenderness. There is no guarding or rebound.   Musculoskeletal:         General: No tenderness.      Cervical back: Neck supple. No rigidity or tenderness.      Right lower leg: No edema.      Left lower leg: No edema.   Skin:     General: Skin is warm and dry.      Capillary Refill: Capillary refill takes less than 2 seconds.      Coloration: Skin is not pale.   Neurological:      Mental Status: He is alert and oriented to person, place, and time.      Sensory: Sensory deficit present.      Motor: Weakness present.      Comments: Slight right facial droop, chronic left-sided weakness upper greater than lower, chronic hypersensitivity on the left side   Psychiatric:         Mood and Affect: Mood normal.         Behavior: Behavior normal. Behavior is cooperative.         Thought Content: Thought content normal.       Medications  Current Facility-Administered Medications   Medication Dose Route Frequency Provider Last Rate Last Admin    midodrine (PROAMATINE) tablet 5 mg  5 mg Oral TID WITH MEALS Brian Ledesma M.D.        lactated ringers infusion   Intravenous Continuous Brian Ledesma M.D. 125 mL/hr at 02/13/23 0054 New Bag at 02/13/23 0054    LORazepam (ATIVAN) injection 2-4 mg  2-4 mg Intravenous Q4HRS PRN Brian Ledesma M.D.        senna-docusate (PERICOLACE or SENOKOT S) 8.6-50 MG per tablet 2 Tablet  2 Tablet Oral BID Marcellus Acosta M.D.        And    polyethylene glycol/lytes (MIRALAX) PACKET 1 Packet  1 Packet Oral QDAY PRN Marcellus Acosta M.D.        And    bisacodyl (DULCOLAX) suppository 10 mg  10 mg Rectal QDAY PRN Marcellus Acosta M.D.        acetaminophen (Tylenol) tablet 650 mg  650 mg Oral Q6HRS PRN Marcellus Acosta M.D.        ondansetron (ZOFRAN) syringe/vial injection 4 mg  4 mg Intravenous Q4HRS PRN Marcellus Acosta M.D.        ondansetron (ZOFRAN ODT) dispertab 4 mg  4 mg Oral Q4HRS PRN Marcellus Acosta M.D.        promethazine (PHENERGAN) tablet 12.5-25 mg   12.5-25 mg Oral Q4HRS PRN Marcellus Acosta M.D.        promethazine (PHENERGAN) suppository 12.5-25 mg  12.5-25 mg Rectal Q4HRS PRN Marcellus Acosta M.D.        prochlorperazine (COMPAZINE) injection 5-10 mg  5-10 mg Intravenous Q4HRS PRN Marcellus Acosta M.D.        labetalol (NORMODYNE/TRANDATE) injection 10 mg  10 mg Intravenous Q4HRS PRN Marcellus Acosta M.D.        guaiFENesin dextromethorphan (ROBITUSSIN DM) 100-10 MG/5ML syrup 10 mL  10 mL Oral Q6HRS PRN Marcellus Acosta M.D.        atorvastatin (LIPITOR) tablet 20 mg  20 mg Oral QHS Brian Ledesma M.D.   20 mg at 02/13/23 0107       Fluids    Intake/Output Summary (Last 24 hours) at 2/13/2023 0637  Last data filed at 2/13/2023 0538  Gross per 24 hour   Intake 2747.67 ml   Output --   Net 2747.67 ml       Laboratory          Recent Labs     02/12/23 2003 02/13/23  0444   SODIUM 138 137   POTASSIUM 4.3 4.2   CHLORIDE 103 107   CO2 22 22   BUN 25* 29*   CREATININE 1.47* 1.42*   MAGNESIUM  --  1.8   PHOSPHORUS  --  2.5   CALCIUM 9.4 8.3*     Recent Labs     02/12/23 2003 02/13/23  0444   ALTSGPT 37  --    ASTSGOT 22  --    ALKPHOSPHAT 68  --    TBILIRUBIN 0.9  --    GLUCOSE 138* 140*     Recent Labs     02/12/23 2003 02/13/23  0444   WBC 12.6* 12.4*   NEUTSPOLYS 70.70 81.80*   LYMPHOCYTES 20.30* 12.70*   MONOCYTES 7.40 4.80   EOSINOPHILS 0.60 0.00   BASOPHILS 0.40 0.20   ASTSGOT 22  --    ALTSGPT 37  --    ALKPHOSPHAT 68  --    TBILIRUBIN 0.9  --      Recent Labs     02/12/23 2003 02/13/23  0444   RBC 6.30* 5.25   HEMOGLOBIN 18.2* 15.4   HEMATOCRIT 54.5* 46.1   PLATELETCT 262 208       Imaging  X-Ray:  I have personally reviewed the images and compared with prior images. and My impression is: No acute cardiopulmonary process, no tubes or lines  CT:    Reviewed  Ultrasound:  Reviewed  MRI:   Reviewed    Assessment/Plan  * Syncope  Assessment & Plan  Unclear etiology.  DDx includes drug effect vs seizure vs new structural intracranial event vs hypotension  EEG, hold  off on starting scheduled antiepileptic drugs  Neurology consultation  Continuous telemetry monitoring, echo  Maintain mean arterial pressure greater than 65 with additional IV fluids as needed  Discussed drug cessation  Fall precautions    Altered mental status- (present on admission)  Assessment & Plan  With associated syncopal event - improving, unclear etiology  Limit sedatives  Reorient to maintain sleep/wake cycle    History of intracranial hemorrhage- (present on admission)  Assessment & Plan  With resultant chronic left-sided weakness  Continue strict blood pressure management  MRI with new microhemorrhage, neurology consulted  Hold chemical DVT prophylaxis    BEVERLY (acute kidney injury) (HCC)- (present on admission)  Assessment & Plan  Secondary to transient hypotension, ATN  Avoid nephrotoxins  Monitor creatinine, urine output, electrolytes closely  Maintain renal perfusion    HTN (hypertension)- (present on admission)  Assessment & Plan  Currently controlled  Hold scheduled antihypertensives and monitor  Discontinue midodrine once blood pressure allows before restarting antihypertensives    Hypomagnesemia- (present on admission)  Assessment & Plan  Replete and monitor closely         VTE:  Contraindicated  Ulcer: Not Indicated  Lines: None    I have performed a physical exam and reviewed and updated ROS and Plan today (2/13/2023). In review of yesterday's note (2/12/2023), there are no changes except as documented above.     Discussed patient condition and risk of morbidity and/or mortality with Hospitalist, RN, RT, Pharmacy, , Charge nurse / hot rounds, Patient, and neurology. Critical care services will sign off at this time.  Please call with any questions or concerns.    Please note that this dictation was created using voice recognition software. I have made every reasonable attempt to correct obvious errors, but there may be errors of grammar and possibly content that I did not discover  before finalizing the note.

## 2023-02-13 NOTE — DISCHARGE INSTRUCTIONS
Discharge Instructions per Chandan Gabriel M.D.    Please take keppra twice a day and follow up with Dr. Granados neurology  No marijuana edibles    DIET: As tolerated    ACTIVITY: Continue usual activities of daily living    DIAGNOSIS: Seizure    Return to ER if symptoms worsen

## 2023-02-13 NOTE — ASSESSMENT & PLAN NOTE
Unclear etiology.  DDx includes drug effect vs seizure vs new structural intracranial event vs hypotension  EEG, hold off on starting scheduled antiepileptic drugs  Neurology consultation  Continuous telemetry monitoring, echo  Maintain mean arterial pressure greater than 65 with additional IV fluids as needed  Discussed drug cessation  Fall precautions

## 2023-02-13 NOTE — CARE PLAN
Problem: Knowledge Deficit - Standard  Goal: Patient and family/care givers will demonstrate understanding of plan of care, disease process/condition, diagnostic tests and medications  Outcome: Progressing     Problem: Hemodynamics  Goal: Patient's hemodynamics, fluid balance and neurologic status will be stable or improve  Outcome: Progressing     Problem: Fluid Volume  Goal: Fluid volume balance will be maintained  Outcome: Progressing     Problem: Urinary - Renal Perfusion  Goal: Ability to achieve and maintain adequate renal perfusion and functioning will improve  Outcome: Progressing     Problem: Respiratory  Goal: Patient will achieve/maintain optimum respiratory ventilation and gas exchange  Outcome: Progressing     Problem: Skin Integrity  Goal: Skin integrity is maintained or improved  Outcome: Progressing   The patient is Stable - Low risk of patient condition declining or worsening         Progress made toward(s) clinical / shift goals:  Pt educated on goals, cough and deep breathing, turning and positioning, skin breakdown, safety and unit orientation.     Patient is not progressing towards the following goals:

## 2023-02-13 NOTE — HOSPITAL COURSE
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  He was brought into the ED by EMS after he had a spell at home characterized by arching of his back, contraction of the right arm and staring off into space.  During this episode he did not respond to his son who was with him at the time.  He had associated incontinence of urine.  This gentleman tells me he has not felt well all day.  He was watching the Super Bowl with his son when this event occurred.  He is complaining of slight worsening of his speech and he has also noticed some numbness around his right face for the last couple of weeks.  He uses edible cannabis and took a dose today.  He apparently has been recently started on a diuretic.  He has chronic left-sided weakness since his right basal ganglia hemorrhage in December 2019.\" - Dr. Pride 2/12  "

## 2023-02-13 NOTE — ASSESSMENT & PLAN NOTE
With resultant chronic left-sided weakness  Continue strict blood pressure management  MRI with new microhemorrhage, neurology consulted  Hold chemical DVT prophylaxis

## 2023-02-13 NOTE — ED PROVIDER NOTES
ED Provider Note    CHIEF COMPLAINT  Chief Complaint   Patient presents with    ALOC       EXTERNAL RECORDS REVIEWED  Outpatient Notes last time patient was here he was in ED observation in September 2021 for discharge to a group home/SNF for L sided weakness and ICH with frequent falls     HPI/ROS  LIMITATION TO HISTORY   Select: Altered mental status / Confusion  OUTSIDE HISTORIAN(S):  EMS who state that the patient was brought in from home for being unresponsive or syncopal event for approximately 45 minutes during this time he had an episode of incontinence.  His glucose was within normal limits prior to arrival    Jacinto Loyola is a 52 y.o. male who presents to the emergency part with a syncopal event.  The patient cannot recall what happened night.  He does have a history of left-sided weakness secondary to a stroke several years ago.  He denies a headache no chest pain or shortness of breath.  He states that he has been feeling kind of off the last couple of days and more fatigued he was sitting there watching the Super Bowl in the next thing he knew he was in the ambulance.  Currently he just states that he feels a little tired he was whole body was kind of aching earlier so he took 150 mg of a marijuana edible which she had never taken that much before.    PAST MEDICAL HISTORY   has a past medical history of Depression (8/28/2009), HTN (8/28/2009), Migraines, neuralgic (8/28/2009), and Stroke ([facility]).    SURGICAL HISTORY   has a past surgical history that includes knee reconstruction; appendectomy; place percut gastrostomy tube (Left, 1/13/2020); and gastroscopy (N/A, 1/13/2020).    FAMILY HISTORY  History reviewed. No pertinent family history.    SOCIAL HISTORY  Social History     Tobacco Use    Smoking status: Never    Smokeless tobacco: Never   Vaping Use    Vaping Use: Never used   Substance and Sexual Activity    Alcohol use: Yes    Drug use: Yes     Comment: daily marijuana use    Sexual activity:  Not on file       CURRENT MEDICATIONS  Home Medications       Reviewed by Patrica Marino R.N. (Registered Nurse) on 02/12/23 at 1932  Med List Status: Not Addressed     Medication Last Dose Status   amLODIPine (NORVASC) 10 MG Tab  Active   atorvastatin (LIPITOR) 20 MG Tab  Active   lisinopril (PRINIVIL) 40 MG tablet  Active                    ALLERGIES  No Known Allergies    PHYSICAL EXAM  VITAL SIGNS: /69   Pulse 92   Temp 36.4 °C (97.6 °F) (Temporal)   Resp 18   Ht 1.829 m (6')   Wt 113 kg (250 lb)   SpO2 97%   BMI 33.91 kg/m²    Pulse Ox Interpretation:   Pulse Ox is normal   Constitutional: Alert in no apparent distress.  HENT: Normocephalic atraumatic, dry mucous membranes  Eyes: PER, Conjunctiva normal, Non-icteric.   Neck: Normal range of motion, No tenderness, Supple, No stridor.   Cardiovascular: Regular rate and rhythm, no murmurs.   Thorax & Lungs: Normal breath sounds, No respiratory distress, No wheezing, No chest tenderness.   Abdomen: Bowel sounds normal, Soft, No tenderness, No pulsatile masses. No peritoneal signs.  Skin: Warm, Dry, No erythema, No rash.   Back: No bony tenderness, No CVA tenderness.   Extremities/MSK: Intact equal distal pulses, No edema, No tenderness, No cyanosis, no major deformities noted  Neurologic: Alert and oriented x3, left-sided weakness he does have ability to move the left lower extremity can lift it off the bed with some gravity effort of the left upper extremity he does have a contracture cranial nerves II through XII and track strength on the right side 5 out of 5      DIAGNOSTIC STUDIES / PROCEDURES  EKG  I have independently interpreted this EKG  Results for orders placed or performed during the hospital encounter of 02/12/23   EKG   Result Value Ref Range    Report       [facility]    Test Date:  2023-02-12  Pt Name:    TERESA KIM          Department: ER  MRN:        [id number]                      Room:        S111  Gender:     Male                         Technician: [id number]  :        1970                   Requested By:ER TRIAGE PROTOCOL  Order #:    [id number]                    Reading MD: Cat Ferguson MD    Measurements  Intervals                                Axis  Rate:       86                           P:          25  PA:         160                          QRS:        36  QRSD:       95                           T:          90  QT:         378  QTc:        452    Interpretive Statements  Sinus rhythm rate 86 no ST elevations or ST depressions no abnormal T wave  inversions or Q waves normal intervals normal axis  Compared to ECG 2020 02:22:50  no sig change  Electronically Signed On 2023 1:49:40 PST by Cat Ferguson MD           LABS  Labs Reviewed   CBC WITH DIFFERENTIAL - Abnormal; Notable for the following components:       Result Value    WBC 12.6 (*)     RBC 6.30 (*)     Hemoglobin 18.2 (*)     Hematocrit 54.5 (*)     MCHC 33.4 (*)     MPV 8.5 (*)     Lymphocytes 20.30 (*)     Neutrophils (Absolute) 8.94 (*)     Monos (Absolute) 0.93 (*)     All other components within normal limits    Narrative:     Biotin intake of greater than 5 mg per day may interfere with  troponin levels, causing false low values.   COMP METABOLIC PANEL - Abnormal; Notable for the following components:    Glucose 138 (*)     Bun 25 (*)     Creatinine 1.47 (*)     All other components within normal limits    Narrative:     Biotin intake of greater than 5 mg per day may interfere with  troponin levels, causing false low values.   TROPONIN - Abnormal; Notable for the following components:    Troponin T 34 (*)     All other components within normal limits    Narrative:     Biotin intake of greater than 5 mg per day may interfere with  troponin levels, causing false low values.   ESTIMATED GFR - Abnormal; Notable for the following components:    GFR (CKD-EPI) 57 (*)     All other components within normal limits     Narrative:     Biotin intake of greater than 5 mg per day may interfere with  troponin levels, causing false low values.   CORTISOL - Abnormal; Notable for the following components:    Cortisol 29.1 (*)     All other components within normal limits    Narrative:     Prior to starting hydrocortisone   DIAGNOSTIC ALCOHOL    Narrative:     Biotin intake of greater than 5 mg per day may interfere with  troponin levels, causing false low values.   AMMONIA   CORRECTED CALCIUM    Narrative:     Biotin intake of greater than 5 mg per day may interfere with  troponin levels, causing false low values.   PROCALCITONIN    Narrative:     Prior to starting hydrocortisone   URINE DRUG SCREEN   URINALYSIS,CULTURE IF INDICATED   URINE SODIUM RANDOM   URINE CREATININE RANDOM   CBC WITH DIFFERENTIAL   MAGNESIUM   BASIC METABOLIC PANEL   PHOSPHORUS         RADIOLOGY  I have independently interpreted the diagnostic imaging associated with this visit and am waiting the final reading from the radiologist.   My preliminary interpretation is a follows:   CT head wo - R mid brain old infarct and calcification  CXR _no evidence of pna or effusion    Radiologist interpretation:   US-RENAL   Final Result         1.  Normal renal ultrasound.      CT-HEAD W/O   Final Result         1.  Subtle peripheral hyperdensity surrounding focus of low-density within the right thalamus, appears more pronounced but otherwise similar compared to prior study favoring evolving chronic etiology. However given increase in size and density since    prior study component of acute hemorrhage cannot be definitively excluded.   2.  Nonspecific white matter changes, commonly associated with small vessel ischemic disease.  Associated mild cerebral atrophy is noted.         DX-CHEST-PORTABLE (1 VIEW)   Final Result         1.  No acute cardiopulmonary disease.      MR-BRAIN-W/O    (Results Pending)   EC-ECHOCARDIOGRAM COMPLETE W/O CONT    (Results Pending)          COURSE & MEDICAL DECISION MAKING    ED Observation Status? Yes; I am placing the patient in to an observation status due to a diagnostic uncertainty as well as therapeutic intensity. Patient placed in observation status at 7:43 PM, 2/12/2023.     Observation plan is as follows: Laboratory analysis imaging evaluation for infection electrolyte abnormality dehydration    Upon Reevaluation, the patient's condition has: not improved; and will be escalated to hospitalization.    Patient discharged from ED Observation status at 10:30 pm (Time) 02/12/23  (Date).     INITIAL ASSESSMENT, COURSE AND PLAN  Care Narrative: Patient presents with a loss of consciousness for 4 to 5 minutes.  Son is not at the bedside quite yet but EMS reported some incontinence there is no evidence of tongue biting he denies any trauma he was sitting in his chair.  His neurologic exam is unchanged from baseline he says some left-sided weakness.  Is not complain of chest pain or shortness of breath.  He does not appear mildly dehydrated he will be treated with some fluids laboratory analysis and imaging and reassess.    9:54 PM  Son came to the bedside to give more history he states that during this episode he made a long kind of yawning sound he arched his back and then both of his arms were contracted to his chest he did not notice any straight shaking but he deftly was incontinent this lasted 45 minutes.  He states when he woke up took him a little bit but he came to pretty quickly.  And according to his girlfriend the patient was started on some new medications few weeks ago including what sounds like Lasix as well as to the tizanidine but unsure what other medications         Patient is having some hypotension he is responding to IV fluids still minimally hypotensive but his MAP is greater than 60.  There is no signs of infection at this time still pending a urinalysis.  His head CT does show continued calcification at the site of his  prior stroke I did speak with Dr. Duron who reviewed the imaging and agrees is likely calcification rather than an acute bleed especially without change in the patient's neurologic status but did recommend an MRI.    10:30 PM  Spoke w Dr Acosta with the hospitalist service who came to the bedside to evaluate the patient    10:52 PM  Patient w minimal response tp IVF MA{P > 60 but minimally responsive will discus case of IMCU as the patient's blood pressures not being overtly responsive to IV fluids.    I spoke with Dr. Mckinney with the ICU and he will come down to evaluate the patient    Due to the minimal response to the IV fluids the BEVERLY he will be hospitalized in the ICU overnight.  There is a question as to whether the syncopal event was a seizure but I agree based on the history but at this time there I would not say there is any acute change in his head CT beyond some increasing calcification.      ADDITIONAL PROBLEM LIST  BEVERLY  Hypotension  Un Intentional drug overdose  Syncope    DISPOSITION AND DISCUSSIONS    Patient presented with a syncopal event which potentially could have been a seizure without worsening of his neurologic examination and continued left-sided deficits.  No chest pain or shortness of breath he has been not quite responsive to IV fluids as we would have liked.  Which were given in the setting of hypotension and dehydration.  He will be admitted to the ICU for his continued hypotension possible with syncopal event and possible seizure.      I have discussed management of the patient with the following physicians and CAREY's:  Dave Duron and Dr Primo Tinoco   CRITICAL CARE  The very real possibilty of a deterioration of this patient's condition required the highest level of my preparedness for sudden, emergent intervention.  I provided critical care services, which included medication orders, frequent reevaluations of the patient's condition and response to treatment, ordering and  reviewing test results, and discussing the case with various consultants.  The critical care time associated with the care of the patient was 35 minutes. Review chart for interventions. This time is exclusive of any other billable procedures.       FINAL DIAGNOSIS  1.  BEVERLY  2.  Hypotension  3.  Syncope  4.  Altered mental status  3.  Unintentional drug overdose     Critical care time 35 minutes    Electronically signed by: Cat Ferguson M.D., 2/12/2023 7:38 PM

## 2023-02-13 NOTE — DISCHARGE SUMMARY
Discharge Summary    CHIEF COMPLAINT ON ADMISSION  Chief Complaint   Patient presents with    ALOC       Reason for Admission  EMS     Admission Date  2/12/2023    CODE STATUS  Full Code    HPI & HOSPITAL COURSE  This is a 52 y.o. male here with decreased level of consciousness.   Mr. 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  He has been in his usual state of health and was watching the Super Bowl with his son and ate some marijuana edibles when he had a loss of consciousness.  He was brought to the emergency room where he had evidence of dehydration given his elevated BUN and creatinine and had a CAT scan of the head revealing chronic changes.  He was admitted in guarded condition to the intensive care unit for close monitoring started on anti-epileptics.  He had an MRI of the brain which revealed old, remote infarcts as well as a left frontal periventricular white matter microhemorrhage new compared to the previous.    2/13/2023: Patient seen and evaluated in the ICU.  He has been seen by Dr. Granados neurology who recommends Keppra 500 mg twice a day and we will follow-up with him in the office.  It is unclear what lowered his seizure threshold though given his old prior stroke his seizure threshold is likely much lower than average.  He was watching the Super Bowl a lot of flashing lights and had taken an edible marijuana.  He does smoke marijuana routinely but apparently edibles were more potent.  We discussed no further edibles.  He does not have a 's license therefore DMV does not need to be contacted.  Of note is that he sleeps about 4 hours a night with a small catnap during the day.  This likely lowers his seizure threshold as well and I have prescribed trazodone to take at night as needed.  We discussed that he will need extra help in case he is a bit sedated and fortunately his girlfriend will  be staying with him to help him at night if he has to get up to go to the bathroom. His son, Beni is at bedside and will take his father home.     Therefore, he is discharged in good and stable condition to home with close outpatient follow-up.    The patient recovered much more quickly than anticipated on admission.    Discharge Date  2/13/2023    FOLLOW UP ITEMS POST DISCHARGE  PCP  Dr. Granados neurology     DISCHARGE DIAGNOSES  Principal Problem:    Syncope POA: Unknown  Active Problems:    HTN (hypertension) (Chronic) POA: Yes      Overview: ICD-10 transition    BEVERLY (acute kidney injury) (HCC) POA: Yes    Hypomagnesemia POA: Yes    Altered mental status POA: Yes    History of intracranial hemorrhage POA: Yes    Seizure (HCC) POA: Yes    Other insomnia POA: Unknown  Resolved Problems:    * No resolved hospital problems. *      FOLLOW UP  No future appointments.  No follow-up provider specified.    MEDICATIONS ON DISCHARGE     Medication List        START taking these medications        Instructions   levETIRAcetam 500 MG Tabs  Commonly known as: KEPPRA   Take 1 Tablet by mouth 2 times a day.  Dose: 500 mg     traZODone 50 MG Tabs  Commonly known as: DESYREL   Take 1 Tablet by mouth at bedtime as needed (insomnia).  Dose: 50 mg            CONTINUE taking these medications        Instructions   amLODIPine 10 MG Tabs  Commonly known as: NORVASC   Take 1 Tablet by mouth every day.  Dose: 10 mg     atorvastatin 20 MG Tabs  Commonly known as: LIPITOR   Take 20 mg by mouth at bedtime.  Dose: 20 mg     chlorthalidone 25 MG Tabs  Commonly known as: HYGROTON   Take 25 mg by mouth every day.  Dose: 25 mg     lisinopril 40 MG tablet  Commonly known as: PRINIVIL   Take 40 mg by mouth every day.  Dose: 40 mg              Allergies  No Known Allergies    DIET  Orders Placed This Encounter   Procedures    Diet Order Diet: Cardiac     Standing Status:   Standing     Number of Occurrences:   1     Order Specific Question:   Diet:      Answer:   Cardiac [6]       ACTIVITY  As tolerated.      CONSULTATIONS  Critical care  neurology    PROCEDURES  Echo:     CONCLUSIONS  Compared to the prior study on 12/25/2019, no changes.  Normal left ventricular systolic function.   No evidence of valvular abnormality based on Doppler evaluation.   Normal aortic root for body surface area. The ascending aorta diameter   is 3.8 cm.    LABORATORY  Lab Results   Component Value Date    SODIUM 137 02/13/2023    POTASSIUM 4.2 02/13/2023    CHLORIDE 107 02/13/2023    CO2 22 02/13/2023    GLUCOSE 140 (H) 02/13/2023    BUN 29 (H) 02/13/2023    CREATININE 1.42 (H) 02/13/2023    CREATININE 1.0 05/24/2007        Lab Results   Component Value Date    WBC 12.4 (H) 02/13/2023    HEMOGLOBIN 15.4 02/13/2023    HEMATOCRIT 46.1 02/13/2023    PLATELETCT 208 02/13/2023    Imaging studies:  EC-ECHOCARDIOGRAM COMPLETE W/O CONT   Final Result      MR-BRAIN-W/O   Final Result         No acute infarction or acute hemorrhage.      Multiple remote intracranial hemorrhages involving the brainstem, cerebellum and bilateral cerebral hemispheres. Left frontal periventricular white matter microhemorrhage is new compared to the previous examination.      Extensive chronic microvascular ischemic changes.      Multiple remote lacunar infarcts in the bilateral frontoparietal deep white matter.      US-RENAL   Final Result         1.  Normal renal ultrasound.      CT-HEAD W/O   Final Result         1.  Subtle peripheral hyperdensity surrounding focus of low-density within the right thalamus, appears more pronounced but otherwise similar compared to prior study favoring evolving chronic etiology. However given increase in size and density since    prior study component of acute hemorrhage cannot be definitively excluded.   2.  Nonspecific white matter changes, commonly associated with small vessel ischemic disease.  Associated mild cerebral atrophy is noted.         DX-CHEST-PORTABLE (1 VIEW)   Final  Result         1.  No acute cardiopulmonary disease.            Total time of the discharge process exceeds 34 minutes.

## 2023-02-13 NOTE — ED TRIAGE NOTES
Chief Complaint   Patient presents with    ALOC     Pt arrives from home via [facility] with complaint of unresponsiveness with incontinence for 4-5 minutes. Pt family noted that pt was not feeling well earlier. Pt does report 150 mg of edibles around 1745. Pt has hx of stroke with left sided deficits.

## 2023-02-13 NOTE — ED NOTES
Med rec updated and complete. Allergies reviewed. Confirmed name and date of birth.   Interview pt/ family at bedside.     No antibiotic use in last 30 days.      Home pharmacy [facility] [phone]

## 2023-02-13 NOTE — ASSESSMENT & PLAN NOTE
Secondary to transient hypotension, ATN  Avoid nephrotoxins  Monitor creatinine, urine output, electrolytes closely  Maintain renal perfusion

## 2023-02-13 NOTE — ASSESSMENT & PLAN NOTE
Currently controlled  Hold scheduled antihypertensives and monitor  Discontinue midodrine once blood pressure allows before restarting antihypertensives

## 2023-02-13 NOTE — PROGRESS NOTES
Patient transported with transport tech and family to discharge lounge with all belongings in possession.

## 2023-02-13 NOTE — H&P
CRITICAL CARE MEDICINE ATTENDING HISTORY AND PHYSICAL    Date of admission  2/12/2023    Chief Complaint  52 y.o. male admitted 2/12/2023 with probable seizure.    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  He was brought into the ED by EMS after he had a spell at home characterized by arching of his back, contraction of the right arm and staring off into space.  During this episode he did not respond to his son who was with him at the time.  He had associated incontinence of urine.  This gentleman tells me he has not felt well all day.  He was watching the Super Bowl with his son when this event occurred.  He is complaining of slight worsening of his speech and he has also noticed some numbness around his right face for the last couple of weeks.  He uses edible cannabis and took a dose today.  He apparently has been recently started on a diuretic.  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      Review of Systems  Review of Systems   Unable to perform ROS: Acuity of condition     Vital Signs for the last 24 hours  Temp:  [36.4 °C (97.6 °F)] 36.4 °C (97.6 °F)  Pulse:  [75-92] 79  Resp:  [14-19] 19  BP: ()/(48-69) 93/63  SpO2:  [90 %-97 %] 93 %    Hemodynamic parameters for the last 24 hours       Vent Settings for the last 24 hours       Physical Exam  Physical Exam  Constitutional:       Appearance: He is not diaphoretic.   HENT:      Head: Normocephalic.      Mouth/Throat:      Pharynx: Oropharynx is clear.   Eyes:      Pupils: Pupils are equal, round, and reactive to light.   Cardiovascular:      Comments: Sinus rhythm  Pulmonary:      Breath sounds: No wheezing or rales.   Abdominal:      General: There is no distension.      Tenderness: There is no abdominal tenderness.   Musculoskeletal:      Right  lower leg: No edema.      Left lower leg: No edema.   Skin:     General: Skin is warm.   Neurological:      Comments: Awake and alert.  Slightly slurred speech.  Slight right drooping of his mouth.  He has contracture and no movement of the left upper extremity.  He is weak in the left leg.  He is fully oriented.       Medications  Current Facility-Administered Medications   Medication Dose Route Frequency Provider Last Rate Last Admin    midodrine (PROAMATINE) tablet 5 mg  5 mg Oral TID WITH MEALS Brian Ledesma M.D.        lactated ringers infusion   Intravenous Continuous Brian Ledesma M.D.        LORazepam (ATIVAN) injection 2-4 mg  2-4 mg Intravenous Q4HRS PRN Brian Ledesma M.D.        senna-docusate (PERICOLACE or SENOKOT S) 8.6-50 MG per tablet 2 Tablet  2 Tablet Oral BID Mracellus Acosta M.D.        And    polyethylene glycol/lytes (MIRALAX) PACKET 1 Packet  1 Packet Oral QDAY PRN Marcellus Acosta M.D.        And    bisacodyl (DULCOLAX) suppository 10 mg  10 mg Rectal QDAY PRN Marcellus Acosta M.D.        acetaminophen (Tylenol) tablet 650 mg  650 mg Oral Q6HRS PRN Marcellus Acosta M.D.        ondansetron (ZOFRAN) syringe/vial injection 4 mg  4 mg Intravenous Q4HRS PRN Marcellus Acosta M.D.        ondansetron (ZOFRAN ODT) dispertab 4 mg  4 mg Oral Q4HRS PRN Marcellus Acosta M.D.        promethazine (PHENERGAN) tablet 12.5-25 mg  12.5-25 mg Oral Q4HRS PRN Marcellus Acosta M.D.        promethazine (PHENERGAN) suppository 12.5-25 mg  12.5-25 mg Rectal Q4HRS PRN Marcellus Acosta M.D.        prochlorperazine (COMPAZINE) injection 5-10 mg  5-10 mg Intravenous Q4HRS PRN Marcellus Acosta M.D.        labetalol (NORMODYNE/TRANDATE) injection 10 mg  10 mg Intravenous Q4HRS PRN Marcellus Chaung, M.D.        guaiFENesin dextromethorphan (ROBITUSSIN DM) 100-10 MG/5ML syrup 10 mL  10 mL Oral Q6HRS PRN Marcellus Acosta M.D.        atorvastatin (LIPITOR) tablet 20 mg  20 mg Oral QHS Brian Ledesma M.D.         Current  Outpatient Medications   Medication Sig Dispense Refill    chlorthalidone (HYGROTON) 25 MG Tab Take 25 mg by mouth every day.      amLODIPine (NORVASC) 10 MG Tab Take 1 Tablet by mouth every day. 30 Tablet 0    lisinopril (PRINIVIL) 40 MG tablet Take 40 mg by mouth every day.      atorvastatin (LIPITOR) 20 MG Tab Take 20 mg by mouth at bedtime.         Fluids    Intake/Output Summary (Last 24 hours) at 2/13/2023 0001  Last data filed at 2/12/2023 2300  Gross per 24 hour   Intake 2000 ml   Output --   Net 2000 ml       Laboratory      Recent Labs     02/12/23 2003   SODIUM 138   POTASSIUM 4.3   CHLORIDE 103   CO2 22   BUN 25*   CREATININE 1.47*   CALCIUM 9.4     Recent Labs     02/12/23 2003   ALTSGPT 37   ASTSGOT 22   ALKPHOSPHAT 68   TBILIRUBIN 0.9   GLUCOSE 138*     Recent Labs     02/12/23 2003   WBC 12.6*   NEUTSPOLYS 70.70   LYMPHOCYTES 20.30*   MONOCYTES 7.40   EOSINOPHILS 0.60   BASOPHILS 0.40   ASTSGOT 22   ALTSGPT 37   ALKPHOSPHAT 68   TBILIRUBIN 0.9     Recent Labs     02/12/23 2003   RBC 6.30*   HEMOGLOBIN 18.2*   HEMATOCRIT 54.5*   PLATELETCT 262       Imaging  CT:    Reviewed    Assessment/Plan      Probable seizure   He tells me he had seizures when he was 6 years old, but none as an adult   Head CT with more pronounced subtle peripheral hyperdensity within the right thalamus   Seizure precautions   MRI brain   Neuro checks every 2 hours   EEG    History of 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 In review of yesterday's note (2/12/2023), there are no changes except as documented above.     Have assessed and reassessed his respiratory status, blood pressure, hemodynamics, cardiovascular status and neurologic status.  He is at increased risk for worsening cardiovascular and CNS system dysfunction.    Discussed patient condition and risk of morbidity and/or mortality with RN and ER physician    The patient remains critically ill.  Critical care time = 35 minutes in directly providing and coordinating critical care and extensive data review.  No time overlap and excludes procedures.    A Critical Care Medicine progress note may have been authored by a resident physician or advanced practitioner of nursing under my direct supervision on this date of service.  As the supervising and attending physician, I have either attested to or cosigned that document.  IN THE EVENT THAT DISCREPANCIES EXIST BETWEEN THIS DOCUMENT AND ANY DOCUMENT THAT I HAVE ATTESTED TO OR COSIGNED ON THIS DATE OF SERVICE, THEN THIS DOCUMENT REMAINS THE FINAL AUTHORITY AS TO MY ASSESSMENT AND PLAN REGARDING THE CARE OF THIS PATIENT.    Brian Ledesma MD  Pulmonary and Critical Care Medicine

## 2023-02-13 NOTE — PROGRESS NOTES
Patient arrived to S111 at 0031    HR 87  /57  Temp 98.1F temporal  Weight 111.3kg  Spo2 94% on 2LNC    4 Eyes Skin Assessment Completed by ADALBERTO Samaniego and ADALBERTO Joya.    Head WDL  Ears WDL  Nose WDL  Mouth WDL  Neck WDL  Breast/Chest WDL  Shoulder Blades WDL  Spine WDL  (R) Arm/Elbow/Hand WDL  (L) Arm/Elbow/Hand WDL  Abdomen WDL  Groin WDL  Scrotum/Coccyx/Buttocks Redness and Blanching  (R) Leg WDL  (L) Leg WDL  (R) Heel/Foot/Toe WDL  (L) Heel/Foot/Toe WDL          Devices In Places ECG, Blood Pressure Cuff, Pulse Ox, Arterial Line, and Nasal Cannula      Interventions In Place Gray Ear Foams, NC W/Ear Foams, Sacral Mepilex, TAP System, Pillows, Q2 Turns, Low Air Loss Mattress, and Heels Loaded W/Pillows    Possible Skin Injury No    Pictures Uploaded Into Epic N/A  Wound Consult Placed N/A  RN Wound Prevention Protocol Ordered Yes    Belongings:  Phone, , clothing.

## 2023-02-13 NOTE — CONSULTS
Neurology Initial Consult  Neurohospitalist Service, [facility]    Referring Physician: Chandan Gabriel M.D.    Chief Complaint   Patient presents with    ALOC       HPI: Jacinto Loyola is a 52M presented 5/13 for suspected seizure; pmhx includes HTN, JAYASHREE, chronic R basal ganglia hemorrhage (2019, with L sided weakness, numbness/hyperalgesia); admitted to SICU for seizure.    Was in usual state of health prior to last night around 6pm. During Superbowl, son noticed him having back arching, right arm contraction, and staring. He did not respond to son trying to get his attention. Did have urinary incontinence during episode. Time discontinuity--recalls watching game then being awoken to son calling ambulance. Did NOT have any aura or preceding symptoms, and had post event confusion lasting an estimated 30-60 minutes. Now feeling back to baseline regarding his chronic L sided numbness/hypersensitivity, and weakness.    Of note, he did take a larger than normal edible MJ earlier in the day. NO EtOH ingestions. Has been chronically tired/not sleeping well due to hypersensitivity of L side. He had been given gabapentin for neuropathy, but had not been taking for an estimated 1-2 months as he did not feel this had been helpful.    There is some concern of possible oral HSV1 infection, however, he has not been started on any antiviral therapy yet.      Past Medical History:    has a past medical history of Depression (8/28/2009), HTN (8/28/2009), Migraines, neuralgic (8/28/2009), and Stroke ([facility]).    FHx:  family history is not on file.    SHx:  No tobacco use, EtOH rare, estimated at 3-6drinks/month, inhaled/ingested MJ    Allergies:  No Known Allergies    Medications:    Current Facility-Administered Medications:     midodrine (PROAMATINE) tablet 5 mg, 5 mg, Oral, TID WITH MEALS, Brian Ledesma M.D., 5 mg at 02/13/23 0854    lactated ringers infusion, , Intravenous, Continuous, Brian MURGUIA  Alexandre Tinoco M.D., Last Rate: 125 mL/hr at 02/13/23 0833, Rate Verify at 02/13/23 0833    LORazepam (ATIVAN) injection 2-4 mg, 2-4 mg, Intravenous, Q4HRS PRN, Brian Ledesma M.D.    levETIRAcetam (KEPPRA) tablet 500 mg, 500 mg, Oral, BID, Oscar Daily M.D.    senna-docusate (PERICOLACE or SENOKOT S) 8.6-50 MG per tablet 2 Tablet, 2 Tablet, Oral, BID **AND** polyethylene glycol/lytes (MIRALAX) PACKET 1 Packet, 1 Packet, Oral, QDAY PRN **AND** bisacodyl (DULCOLAX) suppository 10 mg, 10 mg, Rectal, QDAY PRN, Marcellus Acosta M.D.    acetaminophen (Tylenol) tablet 650 mg, 650 mg, Oral, Q6HRS PRN, Marcellus Acosta M.D.    ondansetron (ZOFRAN) syringe/vial injection 4 mg, 4 mg, Intravenous, Q4HRS PRN, Marcellus Acosta M.D.    ondansetron (ZOFRAN ODT) dispertab 4 mg, 4 mg, Oral, Q4HRS PRN, Marcellus Acosta M.D.    promethazine (PHENERGAN) tablet 12.5-25 mg, 12.5-25 mg, Oral, Q4HRS PRN, Marcellus Acosta M.D.    promethazine (PHENERGAN) suppository 12.5-25 mg, 12.5-25 mg, Rectal, Q4HRS PRN, Marcellus Acosta M.D.    prochlorperazine (COMPAZINE) injection 5-10 mg, 5-10 mg, Intravenous, Q4HRS PRN, Marcellus Acosta M.D.    labetalol (NORMODYNE/TRANDATE) injection 10 mg, 10 mg, Intravenous, Q4HRS PRN, Marcellus Acosta M.D.    guaiFENesin dextromethorphan (ROBITUSSIN DM) 100-10 MG/5ML syrup 10 mL, 10 mL, Oral, Q6HRS PRN, Marcellus Acosta M.D.    atorvastatin (LIPITOR) tablet 20 mg, 20 mg, Oral, QHS, Brian Ledesma M.D., 20 mg at 02/13/23 0107    Physical Examination:     Vitals:    02/13/23 0500 02/13/23 0600 02/13/23 0700 02/13/23 0800   BP: 99/59 106/63 114/73 108/75   Pulse: 82 79 (!) 102 89   Resp:    18   Temp:    37.1 °C (98.7 °F)   TempSrc:    Temporal   SpO2: 91% 93% 90% 91%   Weight:       Height:           General: Patient is awake and in no acute distress  CV: RRR    NEUROLOGICAL EXAM:     PERRL/EOMI; no nystagmus, normal accomodation  Facial movements/sensation intact, slight L angle of mouth reduced movement, no  rash/lesions visible  Coordination movements normal of R hand, leg (L chronic weakness of arm/leg, with chronic L arm contraction) hearing normal  No lingual/uvular deviation. No wounds suggestive of biting. Language comprehension and responses normal without delay or deficit.  Neck rotation/shoulder elevation normal and symmetric.    Objective Data:    Labs:  Lab Results   Component Value Date/Time    PROTHROMBTM 12.6 09/22/2021 03:05 AM    INR 0.97 09/22/2021 03:05 AM      Lab Results   Component Value Date/Time    WBC 12.4 (H) 02/13/2023 04:44 AM    RBC 5.25 02/13/2023 04:44 AM    HEMOGLOBIN 15.4 02/13/2023 04:44 AM    HEMATOCRIT 46.1 02/13/2023 04:44 AM    MCV 87.8 02/13/2023 04:44 AM    MCH 29.3 02/13/2023 04:44 AM    MCHC 33.4 (L) 02/13/2023 04:44 AM    MPV 8.7 (L) 02/13/2023 04:44 AM    NEUTSPOLYS 81.80 (H) 02/13/2023 04:44 AM    LYMPHOCYTES 12.70 (L) 02/13/2023 04:44 AM    MONOCYTES 4.80 02/13/2023 04:44 AM    EOSINOPHILS 0.00 02/13/2023 04:44 AM    BASOPHILS 0.20 02/13/2023 04:44 AM      Lab Results   Component Value Date/Time    SODIUM 137 02/13/2023 04:44 AM    POTASSIUM 4.2 02/13/2023 04:44 AM    CHLORIDE 107 02/13/2023 04:44 AM    CO2 22 02/13/2023 04:44 AM    GLUCOSE 140 (H) 02/13/2023 04:44 AM    BUN 29 (H) 02/13/2023 04:44 AM    CREATININE 1.42 (H) 02/13/2023 04:44 AM    CREATININE 1.0 05/24/2007 09:55 PM      Lab Results   Component Value Date/Time    CHOLSTRLTOT 136 12/24/2019 06:18 PM    LDL 61 12/24/2019 06:18 PM    HDL 44 12/24/2019 06:18 PM    TRIGLYCERIDE 125 12/29/2019 03:45 AM       Lab Results   Component Value Date/Time    ALKPHOSPHAT 68 02/12/2023 08:03 PM    ASTSGOT 22 02/12/2023 08:03 PM    ALTSGPT 37 02/12/2023 08:03 PM    TBILIRUBIN 0.9 02/12/2023 08:03 PM        Imaging/Testing:  Reviewed recent CT head and MRI head: CT head withOUT acute intracranial process/hemorrhage, MRI demonstrates chronic changes post hemorrhage.    Assessment and Plan:    Jacinto Loyola is a 52M  presented 5/13 for suspected seizure; pmhx includes HTN, JAYASHREE, chronic R basal ganglia hemorrhage (2019, with L sided weakness, numbness/hyperalgesia); admitted to SICU for seizure.    Assessment:    #Seizure, tonic-clonic  Unclear trigger of event, however, anatomical changes related to chronic hemorrhage is likely nidus.  -EEG can be pursued outpatient if continued episodes  -Keppra 500mg PO BID  -[facility] report to [facility]  -Follow up Neurology outpatient in stroke clinic ([facility])    #oral HSV1  Possible HSV1 flare, extremely unlikely to be distinctive cause of recent seizure event. No obvious oral lesions on exam.  -consider acyclovir course        From a Neurology standpoint, there do not appear to be any limitations to discharge and outpatient follow up. Therefore, Neurology will sign off at this time. Thank you for involving us in the care of Mr. Loyola.

## 2023-02-13 NOTE — ASSESSMENT & PLAN NOTE
With associated syncopal event - improving, unclear etiology  Limit sedatives  Reorient to maintain sleep/wake cycle

## 2023-03-08 ENCOUNTER — TELEPHONE (OUTPATIENT)
Dept: SCHEDULING | Facility: IMAGING CENTER | Age: 53
End: 2023-03-08

## 2023-03-08 NOTE — TELEPHONE ENCOUNTER
Jacinto Peguero was consulted by  in the ER and per 's notes, Jacinto is to follow up with him but I do not see a referral that was put through.    Patient contact: [phone]    Thanks!

## 2023-03-14 DIAGNOSIS — R56.9 SEIZURE (HCC): ICD-10-CM

## 2023-05-01 ENCOUNTER — TELEPHONE (OUTPATIENT)
Dept: HEALTH INFORMATION MANAGEMENT | Facility: OTHER | Age: 53
End: 2023-05-01
Payer: COMMERCIAL

## 2024-01-13 ENCOUNTER — APPOINTMENT (OUTPATIENT)
Dept: RADIOLOGY | Facility: MEDICAL CENTER | Age: 54
End: 2024-01-13
Attending: EMERGENCY MEDICINE
Payer: COMMERCIAL

## 2024-01-13 ENCOUNTER — HOSPITAL ENCOUNTER (EMERGENCY)
Facility: MEDICAL CENTER | Age: 54
End: 2024-01-13
Attending: EMERGENCY MEDICINE
Payer: COMMERCIAL

## 2024-01-13 VITALS
HEIGHT: 72 IN | BODY MASS INDEX: 32.51 KG/M2 | OXYGEN SATURATION: 93 % | SYSTOLIC BLOOD PRESSURE: 156 MMHG | RESPIRATION RATE: 15 BRPM | DIASTOLIC BLOOD PRESSURE: 96 MMHG | TEMPERATURE: 98.1 F | HEART RATE: 96 BPM | WEIGHT: 240 LBS

## 2024-01-13 DIAGNOSIS — I10 HYPERTENSION, UNSPECIFIED TYPE: ICD-10-CM

## 2024-01-13 PROCEDURE — A9270 NON-COVERED ITEM OR SERVICE: HCPCS | Mod: UD | Performed by: EMERGENCY MEDICINE

## 2024-01-13 PROCEDURE — 700111 HCHG RX REV CODE 636 W/ 250 OVERRIDE (IP): Mod: UD | Performed by: EMERGENCY MEDICINE

## 2024-01-13 PROCEDURE — 70450 CT HEAD/BRAIN W/O DYE: CPT

## 2024-01-13 PROCEDURE — 96374 THER/PROPH/DIAG INJ IV PUSH: CPT

## 2024-01-13 PROCEDURE — 700102 HCHG RX REV CODE 250 W/ 637 OVERRIDE(OP): Mod: UD | Performed by: EMERGENCY MEDICINE

## 2024-01-13 PROCEDURE — 99285 EMERGENCY DEPT VISIT HI MDM: CPT

## 2024-01-13 RX ORDER — AMLODIPINE BESYLATE 10 MG/1
10 TABLET ORAL DAILY
Qty: 30 TABLET | Refills: 0 | Status: SHIPPED | OUTPATIENT
Start: 2024-01-13

## 2024-01-13 RX ORDER — AMLODIPINE BESYLATE 10 MG/1
10 TABLET ORAL DAILY
Qty: 30 TABLET | Refills: 0 | Status: SHIPPED | OUTPATIENT
Start: 2024-01-13 | End: 2024-01-13

## 2024-01-13 RX ORDER — AMLODIPINE BESYLATE 5 MG/1
10 TABLET ORAL ONCE
Status: COMPLETED | OUTPATIENT
Start: 2024-01-13 | End: 2024-01-13

## 2024-01-13 RX ORDER — LISINOPRIL 20 MG/1
40 TABLET ORAL ONCE
Status: COMPLETED | OUTPATIENT
Start: 2024-01-13 | End: 2024-01-13

## 2024-01-13 RX ORDER — LISINOPRIL 40 MG/1
40 TABLET ORAL DAILY
Qty: 30 TABLET | Refills: 0 | Status: SHIPPED | OUTPATIENT
Start: 2024-01-13 | End: 2024-01-13

## 2024-01-13 RX ORDER — ACETAMINOPHEN 325 MG/1
650 TABLET ORAL ONCE
Status: DISCONTINUED | OUTPATIENT
Start: 2024-01-13 | End: 2024-01-13 | Stop reason: HOSPADM

## 2024-01-13 RX ORDER — LISINOPRIL 40 MG/1
40 TABLET ORAL DAILY
Qty: 30 TABLET | Refills: 0 | Status: SHIPPED | OUTPATIENT
Start: 2024-01-13

## 2024-01-13 RX ORDER — HYDRALAZINE HYDROCHLORIDE 20 MG/ML
20 INJECTION INTRAMUSCULAR; INTRAVENOUS ONCE
Status: COMPLETED | OUTPATIENT
Start: 2024-01-13 | End: 2024-01-13

## 2024-01-13 RX ADMIN — HYDRALAZINE HYDROCHLORIDE 20 MG: 20 INJECTION, SOLUTION INTRAMUSCULAR; INTRAVENOUS at 12:41

## 2024-01-13 RX ADMIN — AMLODIPINE BESYLATE 10 MG: 5 TABLET ORAL at 12:47

## 2024-01-13 RX ADMIN — LISINOPRIL 40 MG: 20 TABLET ORAL at 12:42

## 2024-01-13 ASSESSMENT — FIBROSIS 4 INDEX: FIB4 SCORE: 0.9

## 2024-01-13 NOTE — ED NOTES
PIV placed, pt medicated per emar, tolerated well. Pt is a&ox4, resps even and unlabored. Nsr on cardiac monitor. Pt has baseline contracture to left arm, no change from baseline neuro status. Pt denies chest pain, denies headache.

## 2024-01-13 NOTE — ED TRIAGE NOTES
"Chief Complaint   Patient presents with    Hypertension     Pt states \" My blood pressure is through the roof and I ran put out my blood pressure medication a week ago\"      BP (!) 209/140 Comment: PATY  Pulse 84   Temp 37.1 °C (98.8 °F) (Oral)   Resp 16   Ht 1.829 m (6')   Wt 109 kg (240 lb)   SpO2 95%   BMI 32.55 kg/m²     "

## 2024-01-13 NOTE — ED PROVIDER NOTES
"  ER Provider Note    Scribed for Kathe Mcclendon M.d. by Kathe Mcclendon M.D.. 1/13/2024  12:15 PM    Primary Care Provider: OMEGA Sanchez    CHIEF COMPLAINT  Chief Complaint   Patient presents with    Hypertension     Pt states \" My blood pressure is through the roof and I ran put out my blood pressure medication a week ago\"      LIMITATION TO HISTORY   Select: : None    HPI/ROS  OUTSIDE HISTORIAN(S):  None    EXTERNAL RECORDS REVIEWED  Inpatient Notes discharge summary from February 23 reviewed.  He has a history of hypertension prior right basal ganglia hemorrhage and prior methamphetamine abuse.  Patient was noted at that time to be on amlodipine 10 mg lisinopril 40 mg for BP    Jacinto Loyola is a 53 y.o. male who presents to the ED for hypertension onset a week and a half ago. The patient states that he controls his blood pressure with daily medications. However, he has been out of them recently as he moved and lost his medication. Last night, the patient noticed some weakness in the left lip and his left le \"was not doing well\". Denies headache, chest pain or vomiting. Patient is concerned about a brain bleed and is requesting for his medications to be refilled.     PAST MEDICAL HISTORY  Past Medical History:   Diagnosis Date    Depression 8/28/2009    HTN 8/28/2009    Migraines, neuralgic 8/28/2009    Stroke ([facility])        SURGICAL HISTORY  Past Surgical History:   Procedure Laterality Date    MD PLACE PERCUT GASTROSTOMY TUBE Left 1/13/2020    Procedure: INSERTION, PEG TUBE;  Surgeon: Lorenzo Samayoa M.D.;  Location: SURGERY SAME DAY [facility];  Service: Gastroenterology    GASTROSCOPY N/A 1/13/2020    Procedure: GASTROSCOPY PEG TUBE;  Surgeon: Lorenzo Samayoa M.D.;  Location: SURGERY SAME DAY [facility];  Service: Gastroenterology    APPENDECTOMY      KNEE RECONSTRUCTION      x5       FAMILY HISTORY  History reviewed. No pertinent family history.    SOCIAL HISTORY   reports that he has never " smoked. He has never used smokeless tobacco. He reports that he does not currently use alcohol. He reports current drug use.    CURRENT MEDICATIONS  Previous Medications    AMLODIPINE (NORVASC) 10 MG TAB    Take 1 Tablet by mouth every day.    ATORVASTATIN (LIPITOR) 20 MG TAB    Take 20 mg by mouth at bedtime.    CHLORTHALIDONE (HYGROTON) 25 MG TAB    Take 25 mg by mouth every day.    LEVETIRACETAM (KEPPRA) 500 MG TAB    Take 1 Tablet by mouth 2 times a day.    LISINOPRIL (PRINIVIL) 40 MG TABLET    Take 40 mg by mouth every day.    TRAZODONE (DESYREL) 50 MG TAB    Take 1 Tablet by mouth at bedtime as needed (insomnia).       ALLERGIES  Patient has no known allergies.    PHYSICAL EXAM  BP (!) 209/140 Comment: PATY  Pulse 84   Temp 37.1 °C (98.8 °F) (Oral)   Resp 16   Ht 1.829 m (6')   Wt 109 kg (240 lb)   SpO2 95%   BMI 32.55 kg/m²     Constitutional: Alert in no apparent distress.  HENT: No signs of trauma, Bilateral external ears normal, Nose normal.   Eyes: Pupils are equal and reactive, Conjunctiva normal, Non-icteric.   Neck: Normal range of motion, No tenderness, Supple, No stridor.   Cardiovascular: Regular rate and rhythm, no murmurs.   Thorax & Lungs: Normal breath sounds, No respiratory distress, No wheezing, No chest tenderness.   Abdomen: Bowel sounds normal, Soft, No tenderness, No masses, No peritoneal signs.  Skin: Warm, Dry, No erythema, No rash.   Musculoskeletal:  No major deformities noted.   Neurologic: Alert, Left sided upper contracture weakness that is chronic.    DIAGNOSTIC STUDIES & PROCEDURES  .    Radiology:   The attending Emergency Physician has independently interpreted the diagnostic imaging associated with this visit and is awaiting the final reading from the radiologist, which will be displayed below.    Preliminary interpretation is a follows: No head bleed  Radiologist interpretation:   CT-HEAD W/O   Final Result      1.  Mild cortical atrophy.   2.  Patchy low-density in the  white matter bilaterally.  Microvascular ischemic change versus demyelination or gliosis.  Unchanged from prior.   3.  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   4.  No acute intracranial hemorrhage or territorial infarct.              COURSE & MEDICAL DECISION MAKING    ED Observation Status? No; the patient does not meet the criteria for ED Observation.     12:18 PM - Patient seen and evaluated at bedside. Patient will be treated with hydralazine 20 mg injection, amlodipine 10 mg tablet, and lisinopril 40 mg tablet for his symptoms. Ordered CT-Head w/o to evaluate. He understands and agrees to the plan of care. Differential diagnoses include but are not limited to: Uncontrolled blood pressure, intracranial bleed    2:17 PM - The patient will be medicated with 650 mg tablet.     2:44 PM - Patient was reevaluated at bedside. Discussed radiology results with the patient and informed them that they were reassuring. Informed patient of the plan for discharge. He was allowed to ask questions at this time and agrees to the plan of care.       INITIAL ASSESSMENT AND PLAN  Care Narrative: This is a 53-year-old gentleman with a history of longstanding hypertension who ran out of his medications a week and a half ago.  He is quite hypertensive.  Given his degree of hypertension it was concern for intracranial bleed he was reporting some worsening left-sided weakness.  However his left-sided weakness is chronic for him.  Head CT was performed that did not show any evidence of new bleed.  Blood pressure was controlled with IV hydralazine and giving him his oral medications.  He will be refilled his oral medications until he can follow-up with a new primary care doctor.  He is agreeable to this plan and will be discharged.  At this point he has no chest pain or other concerns for endorgan damage and therefore I do not think  additional labs are indicated.  Patient is agreeable to this plan and will be discharged.      ADDITIONAL PROBLEM LIST AND DISPOSITION  History of intracranial hemorrhage  Hypertension               DISPOSITION AND DISCUSSIONS  I have discussed management of the patient with the following physicians and CAREY's: None    Discussion of management with other [facility] or appropriate source(s): None     Escalation of care considered, and ultimately not performed: blood analysis and acute inpatient care management, however at this time, the patient is most appropriate for outpatient management.    Barriers to care at this time, including but not limited to: Patient does not have established PCP.     Decision tools and prescription drugs considered including, but not limited to:  Hypertensive medications .    The patient will return for new or worsening symptoms and is stable at the time of discharge. Patient was given return precautions. Patient and/or family member verbalizes understanding and will comply.    DISPOSITION:  Patient will be discharged home in stable condition.    FOLLOW UP:  [facility]  [address] [place] [address] [address]  [facility] [address]  [phone]        [facility], Emergency Dept  [address] [facility] [id number] [phone]    Return to the emergency department for worsening blood pressure, worsening symptoms or other concerns.      FINAL IMPRESSION   1. Hypertension, unspecified type      I, Kathe Mcclendon M.D. (Scribe), am scribing for, and in the presence of, Kathe Mcclendon M.D..    Electronically signed by: Kathe Mcclendon M.D. (Scribe), 1/13/2024    IKathe M.D. personally performed the services described in this documentation, as scribed by Kathe Mcclendon M.D. in my presence, and it is both accurate and complete.    The note accurately reflects work and decisions made by me.  Kathe Mcclendon M.D.  1/13/2024  3:47 PM

## 2024-01-13 NOTE — ED NOTES
Pt c/o left sided facial numbness ; states this has been present since last night but is feeling worse. Face symmetrical. No other neuro changes. MD Mcclendon notified. Pt has just returned from CT.

## 2024-01-13 NOTE — ED NOTES
Pt a&o, resps even and unlabored, pt notes no changes to sensation or motor function from baseline. Pt reassessed by MD. Pt a&ox4, repss even and unlabored. Speech clear. Pt able to ambulate with steady gait using own cane. Mobility at baseline status per pt, pupils equal, round and reactive, face symmetrical. Bilateral LE strength equal, 5/5. Pt given written and verbal discharge instructions regarding follow up, return criteria, prescription medications. Meds sent to [facility] on [address] per pt request as usual pharmacy is closed today. PIV removed with tip intact. Pt wheeled to discharge using own motorized scooter, all belongings including cane with pt at dc. Gf with pt at NE.

## 2024-05-14 NOTE — PROGRESS NOTES
Critical Care Progress Note    Date of admission  12/24/2019    Chief Complaint  Jacinto Loyola, a 49 y.o. male for evaluation and management of the above problem. The history is mostly obtained from healthcare providers and the medical record as this gentleman cannot provide me with any history.  This gentleman has a history of primary hypertension, medical noncompliance, methamphetamine abuse and JAYASHREE.  At approximately 1600 hrs. today he was found down in his yard near his car.  He had left-sided weakness and slurred speech.  EMS was activated.  He complained of a headache en route to the hospital.  On arrival at [facility], he underwent CT imaging which revealed a 2.7 cm right basal ganglia hemorrhage with extension into the right lateral ventricle.  He was emergently seen by Dr. Graff from neurology.  CT imaging of his head and neck did not reveal any evidence of large vessel occlusion, aneurysms or AVMs.  At this point, Dr. Graff does not believe that neurosurgery needs to be involved.  This is likely a hypertensive hemorrhage. Taken from Dr Ledesma note.     Hospital Course    Interval Problem Update  Reviewed last 24 hour events:  Neuro: q1 hour inc q2  perrl sluggish sedation vecation moves left withdrawal in all prop 50   HR: 70-90  SBP: 150' off nicardipine  Tmax: 101.5 tylenol cooling blanket  GI: cortrax, prior to arrival   UOP: palmer  Lines: peripheral 3  Resp: vent 1 vc 20 470 8 40%    Vte: contra  PPI/H2:pepcid  Antibx: unasyn 1/5 mrsa nares    Scheduled tylenol  Procal 0.16  k 4.4  Ma 2.2  Phos 3.1  almilodipine 10 coreg 12.5   zyprexa 5 mg q12  Normal saline bolus  Hold norvasc since hypotension  Use more nicardipine gtt as needed      Review of Systems  Review of Systems   Unable to perform ROS: Intubated        Vital Signs for last 24 hours   Temp:  [37.2 °C (99 °F)-38.9 °C (102 °F)] 38.9 °C (102 °F)  Pulse:  [] 67  Resp:  [14-35] 20  BP: ()/() 121/79  SpO2:  [90 %-100 %]  99 %    Hemodynamic parameters for last 24 hours       Respiratory Information for the last 24 hours  [place] Vent Mode: APVCMV  Rate (breaths/min): 20  Vt Target (mL): 470  PEEP/CPAP: 8  FiO2: 30  P MEAN: 12  Control VTE (exp VT): 479    Physical Exam   Physical Exam  Constitutional:       Appearance: He is not ill-appearing or diaphoretic.      Comments: Diaphoretic ill appearing, on ventilator   HENT:      Head: Normocephalic.      Nose: Nose normal.      Mouth/Throat:      Mouth: Mucous membranes are dry.      Comments: Thick blood like secretions  Eyes:      Comments: Disconjugate gaze   Cardiovascular:      Rate and Rhythm: Tachycardia present.      Heart sounds: No murmur.   Pulmonary:      Effort: No respiratory distress.      Breath sounds: No stridor. No wheezing, rhonchi or rales.   Abdominal:      General: Abdomen is flat. There is no distension.      Palpations: There is no mass.      Tenderness: There is no tenderness. There is no guarding or rebound.      Hernia: No hernia is present.   Skin:     Capillary Refill: Capillary refill takes less than 2 seconds.   Neurological:      Comments: Pupils symmetric, withdrawal postures in left arm and leg improved, moving right sided, not following commands, no facial asymmetry on fentanyl gtt.    Psychiatric:      Comments: Unable          Medications  Current Facility-Administered Medications   Medication Dose Route Frequency Provider Last Rate Last Dose   • ampicillin/sulbactam (UNASYN) 3 g in  mL IVPB  3 g Intravenous Q6HRS Wilberto Ness M.D.   Stopped at 12/27/19 1546   • lactated ringers infusion   Intravenous Continuous Wilberto Ness M.D. 100 mL/hr at 12/27/19 1138     • propofol (DIPRIVAN) injection  0-80 mcg/kg/min Intravenous Continuous Bruce Pete, D.O.   Stopped at 12/27/19 1400   • fentaNYL (SUBLIMAZE) 50 mcg/mL in 50mL (Continuous Infusion)   Intravenous Continuous Bruce Pete, D.O. 1 mL/hr at 12/27/19 0336 50 mcg/hr at  12/27/19 0336   • lidocaine (XYLOCAINE) 1 % injection 1-2 mL  1-2 mL Tracheal Tube Q30 MIN PRN Wilberto Ness M.D.       • lidocaine (XYLOCAINE) 1 % injection 0.5 mL  0.5 mL Intradermal Once PRN Bruce Pete D.O.       • acetaminophen (TYLENOL) tablet 1,000 mg  1,000 mg Oral Q6HRS Constantin Avina M.D.   1,000 mg at 12/27/19 1138   • famotidine (PEPCID) tablet 20 mg  20 mg Enteral Tube BID Constantin Avina M.D.   20 mg at 12/27/19 1138    Or   • famotidine (PEPCID) injection 20 mg  20 mg Intravenous BID Constantin Avina M.D.       • NS (BOLUS) infusion 1,000 mL  1,000 mL Intravenous Once Constantin Avina M.D.       • dexmedetomidine (PRECEDEX) 400 mcg/100mL NS premix infusion  0.1-1.5 mcg/kg/hr Intravenous Continuous Constantin Avina M.D.   Stopped at 12/26/19 1200   • haloperidol lactate (HALDOL) injection 2 mg  2 mg Intravenous Once PRN Constantin Avina M.D.       • OLANZapine (ZYPREXA) tablet 5 mg  5 mg Enteral Tube Q12HRS Constantin Avina M.D.   5 mg at 12/27/19 0825   • carvedilol (COREG) tablet 12.5 mg  12.5 mg Enteral Tube BID WITH MEALS Constantin Avina M.D.   12.5 mg at 12/27/19 0819   • Pharmacy Consult: Enteral tube insertion - review meds/change route/product selection  1 Each Other PHARMACY TO DOSE Constantin Avina M.D.       • senna-docusate (PERICOLACE or SENOKOT S) 8.6-50 MG per tablet 2 Tab  2 Tab Enteral Tube BID Constantin Avina M.D.   2 Tab at 12/27/19 0818    And   • polyethylene glycol/lytes (MIRALAX) PACKET 1 Packet  1 Packet Enteral Tube QDAY PRN Constantin Avina M.D.        And   • magnesium hydroxide (MILK OF MAGNESIA) suspension 30 mL  30 mL Enteral Tube QDAY PRN Constantin Avina M.D.        And   • bisacodyl (DULCOLAX) suppository 10 mg  10 mg Rectal QDAY PRN Constantin Avina M.D.       • acetaminophen (TYLENOL) tablet 650 mg  650 mg Enteral Tube Q4HRS PRN Constantin Avina M.D.   650 mg at 12/25/19 1603    Or   • acetaminophen (TYLENOL) suppository 650 mg  650 mg  Rectal Q4HRS PRN Constantin Avina M.D.   650 mg at 12/26/19 2341   • ondansetron (ZOFRAN ODT) dispertab 4 mg  4 mg Enteral Tube Q4HRS PRN Constantin Avina M.D.        Or   • ondansetron (ZOFRAN) syringe/vial injection 4 mg  4 mg Intravenous Q4HRS PRN Constantin Avina M.D.       • labetalol (NORMODYNE/TRANDATE) injection 10 mg  10 mg Intravenous Q4HRS PRN Wilberto Ness M.D.   10 mg at 12/26/19 2217   • MD Alert...ICU Electrolyte Replacement per Pharmacy   Other PHARMACY TO DOSE Wilberto Ness M.D.       • Respiratory Care per Protocol   Nebulization Continuous RT Wilberto Ness M.D.       • niCARdipine (CARDENE) 25 mg in  mL Infusion  0-15 mg/hr Intravenous Continuous Wilberto Ness M.D.   Stopped at 12/27/19 0201       Fluids    Intake/Output Summary (Last 24 hours) at 12/27/2019 1729  Last data filed at 12/27/2019 1600  Gross per 24 hour   Intake 4086 ml   Output 1118 ml   Net 2968 ml       Laboratory  Recent Labs     12/25/19  2205 12/26/19  1210 12/27/19  0304   ISTATAPH 7.404 7.371* 7.364*   ISTATAPCO2 37.3* 34.4 41.2*   ISTATAPO2 60* 61* 247*   ISTATATCO2 24 21 25   JUXBEBL9BOJ 91* 91* 100*   ISTATARTHCO3 23.3 20.0 23.5   ISTATARTBE -1 -4 -2   ISTATTEMP 98.2 F 98.6 F 100.6 F   ISTATFIO2 30 36 100   ISTATSPEC Arterial Arterial Arterial   ISTATAPHTC 7.407 7.371* 7.348*   [id number] 59* 61* 253*         Recent Labs     12/25/19  0331 12/26/19  0240 12/27/19  0230   SODIUM 136 136 135   POTASSIUM 3.5* 3.6 4.4   CHLORIDE 105 105 106   CO2 24 23 21   BUN 15 13 31*   CREATININE 1.03 0.90 1.18   MAGNESIUM 1.7 1.9 2.2   PHOSPHORUS 1.9* 1.9* 3.1   CALCIUM 8.6 8.3* 8.0*     Recent Labs     12/24/19 1818 12/25/19  0331 12/26/19  0240 12/27/19  0230   ALTSGPT 15  --   --   --    ASTSGOT 19  --   --   --    ALKPHOSPHAT 65  --   --   --    TBILIRUBIN 0.6  --   --   --    PREALBUMIN  --   --  20.0  --    GLUCOSE 96 125* 122* 121*     Recent Labs     12/24/19 1818  12/25/19  0300 12/26/19 0320 12/27/19  0230   WBC 8.5 16.1* 17.0* 19.5*   NEUTSPOLYS 54.90 82.30* 79.20* 83.40*   LYMPHOCYTES 33.50 11.20* 11.30* 6.20*   MONOCYTES 8.70 5.60 8.30 9.50   EOSINOPHILS 2.00 0.30 0.40 0.10   BASOPHILS 0.50 0.20 0.20 0.20   ASTSGOT 19  --   --   --    ALTSGPT 15  --   --   --    ALKPHOSPHAT 65  --   --   --    TBILIRUBIN 0.6  --   --   --      Recent Labs     12/24/19  1818 12/25/19 0300 12/26/19 0320 12/27/19  0230   RBC 5.54 5.36 5.57 4.91   HEMOGLOBIN 16.2 15.8 16.3 14.4   HEMATOCRIT 48.6 46.3 47.3 42.8   PLATELETCT 222 251 272 243   PROTHROMBTM 12.8  --   --   --    APTT 26.8  --   --   --    INR 0.95  --   --   --        Imaging  X-Ray:  I have personally reviewed the images and compared with prior images. and My impression is: bilateral lower lobes infiltrates, ET in place  CT:    Reviewed    Assessment/Plan  * Intraparenchymal hemorrhage of brain (HCC)- (present on admission)  Assessment & Plan  Neurosurgery consult: No Neurology felt it was not necessary  Correct and reverse coagulopathy  Avoid platelet transfusion (aspirin/plavix) unless planned neurosurgery (patch trial)  SBP goal < 140 unless chronic hypertension and or poor exam < 160  Aspiration precautions  Head of bed 30 degress   Maintain CPP > 60-70  Neuro check Q 1 and pupilometer  Seizure prophylaxis: not indicated  Maintain euvolemia  Sodium goal: normatremia    Hypertensive related to methamphetamine abuse  Still titrating nicardipine gtt  Ct head 12/26 reviewed    Acute respiratory failure with hypoxia and hypercapnia (HCC)  Assessment & Plan  Intubated date: 12/26 for aspiration event and airway protection  Goal saturation > 92%    Monitor pulse oximetry and adjust peep and FIO2 to abg/vbg, and peep optimization.  Monitor ventilator waveforms and titrate flow/peep and volumes according.   CXR: monitor lung volumes and tube/line placement  VAP bundle prevention, oral care, post pyloric feeding  Head of bed > 30  degree  GI prophylaxis  Daily awakening and SBT trials unless contraindicated  Monitor for liberation/extubation    Respiratory treatments: prn            Methamphetamine abuse (HCC)  Assessment & Plan  Cessation counseling when clinically appropriate  Urine drug screen positive for amphetamines  zyprexa    Concerns for meth, betablocker withdrawal, ICH, sepsis    JAYASHREE (obstructive sleep apnea)  Assessment & Plan  Unclear if relate to ICH patient with significant obstructive on exam  Head of bed at 30-45 degrees   Unable to use Bipap with mental status and stroke    Re-evaluate  Will need outpatient sleep study     Hypertensive emergency- (present on admission)  Assessment & Plan  Strict blood pressure control with SBP < 160  I am titrating a nicardipine drip to achieve blood pressure goals      Episode of hypotension relative likely worsened from sedation and infection from aspiration  Will hold oral medication and bolus NS  Monitor need for pressors     Aspiration into airway  Assessment & Plan  Patient with acute aspiration into airways due to depressed mental status from ICH  Start Unasyn x 5 days  Follow up on BAL  Check MRSA nares due to length of hospitalization      Febrile  Assessment & Plan  Multifactorial deep ICH, methamphetamines, aspiration pneumonia  Schedule tylenol, cooling blanket to prevent CNS injury    Hypokalemia  Assessment & Plan  Replete potassium       VTE:  Contraindicated  Ulcer: Not Indicated  Lines: None    I have performed a physical exam and reviewed and updated ROS and Plan today (12/27/2019). In review of yesterday's note (12/26/2019), there are no changes except as documented above.     Discussed patient condition and risk of morbidity and/or mortality with Hospitalist, RN, RT, Pharmacy, Charge nurse / hot rounds, Patient and neurology     The patient remains critically ill from ICH on ventilator with episode of hypotension bolus fluids and titration of nicardipine.  Critical care  time = 80 minutes in directly providing and coordinating critical care and extensive data review.  No time overlap and excludes procedures.   Detail Level: Detailed

## 2024-08-15 NOTE — PROGRESS NOTES
Internal Medicine Interval Note  Note Author: Alex Lozoya M.D.     Name Jacinto Loyola       1970   Age/Sex 49 y.o. male   MRN [id number]   Code Status Full Code     After 5PM or if no immediate response to page, please call for cross-coverage  Attending/Team: Dr Pemberton See Patient List for primary contact information  Call [phone] to page    1st Call - Day Intern (R1):   Dr Lozoya 2nd Call - Day Sr. Resident (R2/R3):   Dr Blanco         Reason for interval visit  (Principal Problem)   Intraparenchymal Hemorrhage of Right Basal Ganglia secondary to Hypertensive Emergency.      Interval Problem Daily Status Update  (24 hours, problem oriented, brief subjective history, new lab/imaging data pertinent to that problem)   - No acute events overnight reported per nursing personnel.  - Patient alert and oriented times 3, vital signs stable, denies any pain.  - Peg tube in use w/o complains by nurses.  - Patient has shown remarkable improvement in the past 24 hrs, his speech is more understandable, he now is able to move his lower extremity, but without any coordination of his movements. Patient is very optimistic in his improvement. He is unable to move his upper extremity at all.  - Patient will undergo barium swallow this afternoon/evening.  - SW note awaiting Medicaid and SNF acceptance.    Review of Systems   Constitutional: Negative for chills, diaphoresis and fever.   HENT: Negative for ear pain, hearing loss, nosebleeds, sinus pain and tinnitus.    Eyes: Negative for blurred vision, double vision and photophobia.   Respiratory: Negative for cough, hemoptysis, sputum production and shortness of breath.    Cardiovascular: Negative for chest pain, palpitations and orthopnea.   Gastrointestinal: Negative for abdominal pain, heartburn, nausea and vomiting.   Genitourinary: Negative for dysuria, frequency and urgency.   Musculoskeletal: Negative for back pain, myalgias and neck pain.  End Of Shift Note  [facility]  Summary of shift: B/P and heart meds adjusted and parameters added to orders. Call Dr Juárez with any questions. Up as tolerated, sob on exertion. A/o, Heart failure education started with family and scale sent home with wife. Possible dsch 8/16.    Vitals:    Vitals:    08/15/24 1221 08/15/24 1549 08/15/24 1600 08/15/24 1648   BP: 108/65 (!) 94/55 103/63 103/63   Pulse: 76 68  63   Resp: 16 16     Temp: 97.7 °F (36.5 °C) 98.4 °F (36.9 °C) 97.5 °F (36.4 °C)    TempSrc: Oral Oral Temporal    SpO2: 94% 97% 97%    Weight:       Height:            I&O:   Intake/Output Summary (Last 24 hours) at 8/15/2024 1915  Last data filed at 8/15/2024 1915  Gross per 24 hour   Intake 715 ml   Output 1350 ml   Net -635 ml       Resp Status: RA    Ventilator Settings:     / / /     Critical Care IV infusions:   sodium chloride      sodium chloride      sodium chloride Stopped (08/11/24 1450)    dextrose          LDA:   Peripheral IV 08/13/24 Left;Proximal;Anterior Forearm (Active)   Number of days: 1       Incision Knee Anterior;Right (Active)   Number of days:             Skin: Negative for itching and rash.   Neurological: Negative for dizziness, tingling, seizures and headaches.   Endo/Heme/Allergies: Does not bruise/bleed easily.   Psychiatric/Behavioral: Negative for depression, hallucinations and suicidal ideas.       Disposition/Barriers to discharge:   Placement/Insurance    Consultants/Specialty  Neurology  GI  Critical care    PCP: Pcp Pt States None      Quality Measures  Quality-Core Measures   Reviewed items::  Labs reviewed and Medications reviewed  Villasenor Catheter: yes.  DVT prophylaxis pharmacological::  Heparin  DVT prophylaxis - mechanical:  SCDs  Ulcer Prophylaxis::  Yes          Physical Exam       Vitals:    01/14/20 1600 01/14/20 2000 01/15/20 0352 01/15/20 0800   BP: 132/93 142/86 140/88 126/84   Pulse: 68 74 76 78   Resp: 16 (!) 22 20 14   Temp: 36.4 °C (97.5 °F) 36.6 °C (97.8 °F) 36.4 °C (97.6 °F) 36.4 °C (97.5 °F)   TempSrc: Temporal   Temporal   SpO2: 94% 95% 95% 99%   Weight:       Height:         Body mass index is 24.84 kg/m².    Oxygen Therapy:  Pulse Oximetry: 99 %, O2 Delivery: None (Room Air)    Physical Exam   Constitutional: He is oriented to person, place, and time and well-developed, well-nourished, and in no distress. No distress.   HENT:   Head: Normocephalic and atraumatic.   Mouth/Throat: No oropharyngeal exudate.   Eyes: Pupils are equal, round, and reactive to light. Conjunctivae and EOM are normal. No scleral icterus.   Neck: Normal range of motion. Neck supple. No JVD present.   Cardiovascular: Normal rate, regular rhythm and normal heart sounds.   No murmur heard.  Pulmonary/Chest: Effort normal and breath sounds normal. No stridor. No respiratory distress. He has no wheezes. He has no rales.   Abdominal: Soft. Bowel sounds are normal. He exhibits no distension. There is no tenderness. There is no rebound and no guarding.   Musculoskeletal: Normal range of motion.         General: No edema.   Neurological: He is alert and oriented to  person, place, and time.   Left side Hemiparesis.  Patient has shown remarkable improvement in the past 24-48 hours, his speech is more understandable, he also can move his left extremity, but w/o coordination.   Skin: Skin is warm. No rash noted. He is not diaphoretic. No erythema.   Psychiatric: Mood and affect normal.             Assessment/Plan     * Intraparenchymal hemorrhage of brain (HCC)- (present on admission)  Assessment & Plan  - Intracranial Hemorrhage sec to poorly controlled HTN  -HTN emergency  - H/O methamphetamine abuse  - Head CT showed a 2.7 cm acute right basal ganglia/thalamic hemorrhage, no indications for neurosurgery intervention  - Repeated CT scan on 1/5 showed stable hemorrhage, no new bleed.  - Per Neurology: recommend inpatient SBP control <160, outpatient <130, LDL <70, A1C <7%.  - EEG negative for seizure. Neuro signed off on 12/30.  - Unchanged Dysarthria and Hemiparesis.  - Patient has not improved in gaining mobility at all in his extremities since I have been in the service.  - Peg Tube placed on 1/13  - RN's using peg,  not issues reported.                   PLAN:  - Barium Swallow today 1/15  - Use Peg tube  - Continue RT  - PT/OT/SLP  - Turn patient Q2 hrs to prevent new bed sores  - HTN med changes made, with better BP control.  - SNF referral placed prior to transfer from ICU; pending.  - Speech eval 1/7 recommended continue strict NPO.    Urinary retention  Assessment & Plan  - Villasenor catheter came out accidentally yesterday evening 1/13  - RN personnel instructed to monitor for spontaneous voiding  - Bladder scans every 4 hrs  - Straight cath if urine more than 450 ml  - Patient was not able to void spontaneously, straight cath twice.  - Villasenor Catheter placed back again.      Plan:  - Continue Villasenor catheter   - Void trial in the future      Altered mental status  Assessment & Plan  - Intraparenchymal hemorrhage.   - Alertness waxes and wanes throughout the day  - Modafinil  daily.  - Bowel protocol  - Improvement, he is alert and oriented most of the day.  - Improvement in Dysarthria   - Strict NPO  - Peg tube placement on 1/13  - CTM    Methamphetamine abuse (HCC)- (present on admission)  Assessment & Plan  Counseling, when appropriate  Consulted SW    JAYASHREE (obstructive sleep apnea)- (present on admission)  Assessment & Plan  - Not on CPAP at home  - Unable to use Bipap with mental status and stroke  - Continue O2 supplementation as needed  - CTM    Hypertensive emergency- (present on admission)  Assessment & Plan  -Presented with blood pressures of  190s  SBP; and 110-130s DBP  - Intraparenchymal hemorrhage Imaging  - CTM BP  - Assessing changes in BP regimen for better BP control  - Increasing Chlortalidone to 50 and Doxazosin to 2mg  - BP has been stable WNL SBP between 110's-130's  - Asymptomatic    S/P percutaneous endoscopic gastrostomy (PEG) tube placement (HCC)  Assessment & Plan  - Placement on 1/13  - RN's using Peg tube, not reporting issues.  - May start using it for meds  - Monitor for signs of Infection    Acute respiratory failure with hypoxia and hypercapnia (HCC)- (present on admission)  Assessment & Plan  - Resolved.  - Patient was Intubated 12/26- 12/31/2019 (for aspiration and airway protection)  - Extubate successfully 12/31/2019  - Was treated with Abxs for MSSA/ PNA (Ancef x5 days)  - PT/OT/SLP  - Aspiration precautions.  - Strict NPO.  - Oral care daily 2-3 times a day  - RT/O2 per protocol   - O2 sats WNL on RA  - CTM    Aspiration into airway- (present on admission)  Assessment & Plan  - PNA resolved  - MSSA pneumonia  - Patient Completed cefazolin 5 day course  - Aspiration precautions  - O2/RT   - Strict NPO  - Peg Tube placed on 1/13.  - RN using peg right after patient came back to room, not reporting any issues with Peg tube use.  - Speech therapy following 3 times per week  - Going for Barium Evaluation today 1/15

## 2025-08-08 ENCOUNTER — APPOINTMENT (OUTPATIENT)
Dept: RADIOLOGY | Facility: MEDICAL CENTER | Age: 55
End: 2025-08-08
Attending: STUDENT IN AN ORGANIZED HEALTH CARE EDUCATION/TRAINING PROGRAM
Payer: COMMERCIAL

## 2025-08-08 ENCOUNTER — HOSPITAL ENCOUNTER (EMERGENCY)
Facility: MEDICAL CENTER | Age: 55
End: 2025-08-09
Attending: STUDENT IN AN ORGANIZED HEALTH CARE EDUCATION/TRAINING PROGRAM
Payer: COMMERCIAL

## 2025-08-08 DIAGNOSIS — Z76.0 MEDICATION REFILL: ICD-10-CM

## 2025-08-08 DIAGNOSIS — R03.0 ELEVATED BLOOD PRESSURE READING: Primary | ICD-10-CM

## 2025-08-08 DIAGNOSIS — I10 PRIMARY HYPERTENSION: Chronic | ICD-10-CM

## 2025-08-08 LAB
ALBUMIN SERPL BCP-MCNC: 4.4 G/DL (ref 3.2–4.9)
ALBUMIN/GLOB SERPL: 1.1 G/DL
ALP SERPL-CCNC: 87 U/L (ref 30–99)
ALT SERPL-CCNC: 32 U/L (ref 2–50)
ANION GAP SERPL CALC-SCNC: 12 MMOL/L (ref 7–16)
AST SERPL-CCNC: 28 U/L (ref 12–45)
BILIRUB SERPL-MCNC: 0.5 MG/DL (ref 0.1–1.5)
BUN SERPL-MCNC: 17 MG/DL (ref 8–22)
CALCIUM ALBUM COR SERPL-MCNC: 9.5 MG/DL (ref 8.5–10.5)
CALCIUM SERPL-MCNC: 9.8 MG/DL (ref 8.5–10.5)
CHLORIDE SERPL-SCNC: 100 MMOL/L (ref 96–112)
CO2 SERPL-SCNC: 23 MMOL/L (ref 20–33)
CREAT SERPL-MCNC: 1.01 MG/DL (ref 0.5–1.4)
GFR SERPLBLD CREATININE-BSD FMLA CKD-EPI: 88 ML/MIN/1.73 M 2
GLOBULIN SER CALC-MCNC: 3.9 G/DL (ref 1.9–3.5)
GLUCOSE SERPL-MCNC: 92 MG/DL (ref 65–99)
LIPASE SERPL-CCNC: 26 U/L (ref 11–82)
NT-PROBNP SERPL IA-MCNC: <36 PG/ML (ref 0–125)
POTASSIUM SERPL-SCNC: 4.3 MMOL/L (ref 3.6–5.5)
PROT SERPL-MCNC: 8.3 G/DL (ref 6–8.2)
SODIUM SERPL-SCNC: 135 MMOL/L (ref 135–145)
TROPONIN T SERPL-MCNC: 40 NG/L (ref 6–19)

## 2025-08-08 PROCEDURE — 700111 HCHG RX REV CODE 636 W/ 250 OVERRIDE (IP): Performed by: STUDENT IN AN ORGANIZED HEALTH CARE EDUCATION/TRAINING PROGRAM

## 2025-08-08 PROCEDURE — 83880 ASSAY OF NATRIURETIC PEPTIDE: CPT

## 2025-08-08 PROCEDURE — 84484 ASSAY OF TROPONIN QUANT: CPT

## 2025-08-08 PROCEDURE — 80053 COMPREHEN METABOLIC PANEL: CPT

## 2025-08-08 PROCEDURE — 36415 COLL VENOUS BLD VENIPUNCTURE: CPT

## 2025-08-08 PROCEDURE — 83690 ASSAY OF LIPASE: CPT

## 2025-08-08 PROCEDURE — 99285 EMERGENCY DEPT VISIT HI MDM: CPT

## 2025-08-08 PROCEDURE — 96375 TX/PRO/DX INJ NEW DRUG ADDON: CPT

## 2025-08-08 PROCEDURE — 71045 X-RAY EXAM CHEST 1 VIEW: CPT

## 2025-08-08 PROCEDURE — 96374 THER/PROPH/DIAG INJ IV PUSH: CPT

## 2025-08-08 PROCEDURE — 85025 COMPLETE CBC W/AUTO DIFF WBC: CPT

## 2025-08-08 RX ORDER — ONDANSETRON 2 MG/ML
4 INJECTION INTRAMUSCULAR; INTRAVENOUS ONCE
Status: COMPLETED | OUTPATIENT
Start: 2025-08-09 | End: 2025-08-08

## 2025-08-08 RX ORDER — LABETALOL HYDROCHLORIDE 5 MG/ML
20 INJECTION, SOLUTION INTRAVENOUS ONCE
Status: COMPLETED | OUTPATIENT
Start: 2025-08-08 | End: 2025-08-08

## 2025-08-08 RX ADMIN — LABETALOL HYDROCHLORIDE 20 MG: 5 INJECTION, SOLUTION INTRAVENOUS at 23:13

## 2025-08-08 RX ADMIN — ONDANSETRON 4 MG: 2 INJECTION INTRAMUSCULAR; INTRAVENOUS at 23:50

## 2025-08-08 ASSESSMENT — PAIN DESCRIPTION - PAIN TYPE: TYPE: ACUTE PAIN

## 2025-08-09 VITALS
HEART RATE: 80 BPM | TEMPERATURE: 98.2 F | OXYGEN SATURATION: 93 % | BODY MASS INDEX: 31.86 KG/M2 | WEIGHT: 235.23 LBS | HEIGHT: 72 IN | SYSTOLIC BLOOD PRESSURE: 159 MMHG | DIASTOLIC BLOOD PRESSURE: 100 MMHG | RESPIRATION RATE: 19 BRPM

## 2025-08-09 LAB
BASOPHILS # BLD AUTO: 0.9 % (ref 0–1.8)
BASOPHILS # BLD: 0.09 K/UL (ref 0–0.12)
EKG IMPRESSION: NORMAL
EOSINOPHIL # BLD AUTO: 0.18 K/UL (ref 0–0.51)
EOSINOPHIL NFR BLD: 1.8 % (ref 0–6.9)
ERYTHROCYTE [DISTWIDTH] IN BLOOD BY AUTOMATED COUNT: 41.9 FL (ref 35.9–50)
HCT VFR BLD AUTO: 60 % (ref 42–52)
HGB BLD-MCNC: 20.2 G/DL (ref 14–18)
IMM GRANULOCYTES # BLD AUTO: 0.15 K/UL (ref 0–0.11)
IMM GRANULOCYTES NFR BLD AUTO: 1.5 % (ref 0–0.9)
LYMPHOCYTES # BLD AUTO: 2.79 K/UL (ref 1–4.8)
LYMPHOCYTES NFR BLD: 28.3 % (ref 22–41)
MCH RBC QN AUTO: 29.1 PG (ref 27–33)
MCHC RBC AUTO-ENTMCNC: 33.7 G/DL (ref 32.3–36.5)
MCV RBC AUTO: 86.5 FL (ref 81.4–97.8)
MONOCYTES # BLD AUTO: 0.84 K/UL (ref 0–0.85)
MONOCYTES NFR BLD AUTO: 8.5 % (ref 0–13.4)
NEUTROPHILS # BLD AUTO: 5.82 K/UL (ref 1.82–7.42)
NEUTROPHILS NFR BLD: 59 % (ref 44–72)
NRBC # BLD AUTO: 0 K/UL
NRBC BLD-RTO: 0 /100 WBC (ref 0–0.2)
PLATELET # BLD AUTO: 285 K/UL (ref 164–446)
PMV BLD AUTO: 8.6 FL (ref 9–12.9)
RBC # BLD AUTO: 6.94 M/UL (ref 4.7–6.1)
TROPONIN T SERPL-MCNC: 39 NG/L (ref 6–19)
WBC # BLD AUTO: 9.9 K/UL (ref 4.8–10.8)

## 2025-08-09 PROCEDURE — A9270 NON-COVERED ITEM OR SERVICE: HCPCS | Performed by: STUDENT IN AN ORGANIZED HEALTH CARE EDUCATION/TRAINING PROGRAM

## 2025-08-09 PROCEDURE — 70496 CT ANGIOGRAPHY HEAD: CPT

## 2025-08-09 PROCEDURE — 93005 ELECTROCARDIOGRAM TRACING: CPT | Mod: TC | Performed by: STUDENT IN AN ORGANIZED HEALTH CARE EDUCATION/TRAINING PROGRAM

## 2025-08-09 PROCEDURE — 96376 TX/PRO/DX INJ SAME DRUG ADON: CPT | Mod: XU

## 2025-08-09 PROCEDURE — 700102 HCHG RX REV CODE 250 W/ 637 OVERRIDE(OP): Performed by: STUDENT IN AN ORGANIZED HEALTH CARE EDUCATION/TRAINING PROGRAM

## 2025-08-09 PROCEDURE — 700117 HCHG RX CONTRAST REV CODE 255: Performed by: STUDENT IN AN ORGANIZED HEALTH CARE EDUCATION/TRAINING PROGRAM

## 2025-08-09 PROCEDURE — 70498 CT ANGIOGRAPHY NECK: CPT

## 2025-08-09 PROCEDURE — 84484 ASSAY OF TROPONIN QUANT: CPT

## 2025-08-09 RX ORDER — AMLODIPINE BESYLATE 10 MG/1
10 TABLET ORAL DAILY
Qty: 60 TABLET | Refills: 0 | Status: SHIPPED | OUTPATIENT
Start: 2025-08-09 | End: 2025-11-07

## 2025-08-09 RX ORDER — LISINOPRIL 40 MG/1
40 TABLET ORAL DAILY
Qty: 60 TABLET | Refills: 0 | Status: SHIPPED | OUTPATIENT
Start: 2025-08-09 | End: 2025-10-08

## 2025-08-09 RX ORDER — LABETALOL HYDROCHLORIDE 5 MG/ML
20 INJECTION, SOLUTION INTRAVENOUS ONCE
Status: DISCONTINUED | OUTPATIENT
Start: 2025-08-09 | End: 2025-08-09 | Stop reason: HOSPADM

## 2025-08-09 RX ORDER — LISINOPRIL 20 MG/1
40 TABLET ORAL ONCE
Status: COMPLETED | OUTPATIENT
Start: 2025-08-09 | End: 2025-08-09

## 2025-08-09 RX ORDER — AMLODIPINE BESYLATE 5 MG/1
10 TABLET ORAL ONCE
Status: COMPLETED | OUTPATIENT
Start: 2025-08-09 | End: 2025-08-09

## 2025-08-09 RX ADMIN — AMLODIPINE BESYLATE 10 MG: 5 TABLET ORAL at 01:59

## 2025-08-09 RX ADMIN — LISINOPRIL 40 MG: 20 TABLET ORAL at 01:59

## 2025-08-09 RX ADMIN — IOHEXOL 80 ML: 350 INJECTION, SOLUTION INTRAVENOUS at 00:45

## (undated) DEVICE — CANISTER SUCTION RIGID RED 1500CC (40EA/CA)

## (undated) DEVICE — SENSOR SPO2 NEO LNCS ADHESIVE (20/BX) SEE USER NOTES

## (undated) DEVICE — MASK WITH FACE SHIELD (25/BX 4BX/CA)

## (undated) DEVICE — CANNULA W/ SUPPLY TUBING O2 - (50/CA)

## (undated) DEVICE — CANISTER SUCTION 3000ML MECHANICAL FILTER AUTO SHUTOFF MEDI-VAC NONSTERILE LF DISP  (40EA/CA)

## (undated) DEVICE — CATHETER IV 20 GA X 1-1/4 ---SURG.& SDS ONLY--- (50EA/BX)

## (undated) DEVICE — KIT CUSTOM PROCEDURE SINGLE FOR ENDO  (15/CA)

## (undated) DEVICE — TUBING CLEARLINK DUO-VENT - C-FLO (48EA/CA)

## (undated) DEVICE — TUBE CONNECTING SUCTION - CLEAR PLASTIC STERILE 72 IN (50EA/CA)

## (undated) DEVICE — TUBING O2 7FT TIP SMTH BORE - (50/CA)

## (undated) DEVICE — SET EXTENSION WITH 2 PORTS (48EA/CA) ***PART #2C8610 IS A SUBSTITUTE*****

## (undated) DEVICE — ELECTRODE 850 FOAM ADHESIVE - HYDROGEL RADIOTRNSPRNT (50/PK)

## (undated) DEVICE — TUBE NG SALEM SUMP 14FR (50EA/BX)

## (undated) DEVICE — BITE BLOCK ADULT 60FR (100EA/CA)